# Patient Record
Sex: FEMALE | Race: WHITE | NOT HISPANIC OR LATINO | Employment: OTHER | URBAN - METROPOLITAN AREA
[De-identification: names, ages, dates, MRNs, and addresses within clinical notes are randomized per-mention and may not be internally consistent; named-entity substitution may affect disease eponyms.]

---

## 2017-09-20 ENCOUNTER — TRANSFERRED RECORDS (OUTPATIENT)
Dept: MULTI SPECIALTY CLINIC | Facility: CLINIC | Age: 52
End: 2017-09-20

## 2018-01-31 ENCOUNTER — DOCUMENTATION ONLY (OUTPATIENT)
Dept: FAMILY MEDICINE | Facility: OTHER | Age: 53
End: 2018-01-31

## 2018-01-31 RX ORDER — TRIAMTERENE/HYDROCHLOROTHIAZID 37.5-25 MG
1 TABLET ORAL EVERY MORNING
COMMUNITY
Start: 2013-10-30 | End: 2018-10-22

## 2018-01-31 RX ORDER — MEDROXYPROGESTERONE ACETATE 10 MG
10 TABLET ORAL DAILY
COMMUNITY
Start: 2013-10-30 | End: 2018-09-17

## 2018-01-31 RX ORDER — FOLIC ACID 1 MG/1
2 TABLET ORAL DAILY
COMMUNITY
Start: 2013-10-30

## 2018-01-31 RX ORDER — PANTOPRAZOLE SODIUM 40 MG/1
40 TABLET, DELAYED RELEASE ORAL DAILY
COMMUNITY
Start: 2013-10-30 | End: 2018-10-22

## 2018-01-31 RX ORDER — POTASSIUM CHLORIDE 1500 MG/1
20 TABLET, EXTENDED RELEASE ORAL 2 TIMES DAILY WITH MEALS
COMMUNITY
Start: 2013-10-30 | End: 2018-09-17

## 2018-01-31 RX ORDER — TRAMADOL HYDROCHLORIDE 50 MG/1
50 TABLET ORAL EVERY 6 HOURS PRN
COMMUNITY
Start: 2013-10-30 | End: 2018-09-17

## 2018-01-31 RX ORDER — HYDROXYCHLOROQUINE SULFATE 200 MG/1
400 TABLET, FILM COATED ORAL DAILY
COMMUNITY
Start: 2013-10-30 | End: 2023-02-01

## 2018-09-17 ENCOUNTER — HOSPITAL ENCOUNTER (EMERGENCY)
Facility: OTHER | Age: 53
Discharge: HOME | End: 2018-09-17
Attending: EMERGENCY MEDICINE | Admitting: EMERGENCY MEDICINE
Payer: COMMERCIAL

## 2018-09-17 ENCOUNTER — APPOINTMENT (OUTPATIENT)
Dept: CT IMAGING | Facility: OTHER | Age: 53
End: 2018-09-17
Attending: EMERGENCY MEDICINE
Payer: COMMERCIAL

## 2018-09-17 ENCOUNTER — APPOINTMENT (OUTPATIENT)
Dept: GENERAL RADIOLOGY | Facility: OTHER | Age: 53
End: 2018-09-17
Attending: EMERGENCY MEDICINE
Payer: COMMERCIAL

## 2018-09-17 VITALS
TEMPERATURE: 99.5 F | OXYGEN SATURATION: 84 % | RESPIRATION RATE: 8 BRPM | SYSTOLIC BLOOD PRESSURE: 112 MMHG | HEART RATE: 103 BPM | DIASTOLIC BLOOD PRESSURE: 83 MMHG | HEIGHT: 70 IN | WEIGHT: 280 LBS | BODY MASS INDEX: 40.09 KG/M2

## 2018-09-17 DIAGNOSIS — I80.02 SUPERFICIAL PHLEBITIS OF LEFT LEG: ICD-10-CM

## 2018-09-17 DIAGNOSIS — R07.9 ACUTE CHEST PAIN: Primary | ICD-10-CM

## 2018-09-17 LAB
ALBUMIN SERPL-MCNC: 4 G/DL (ref 3.5–5.7)
ALP SERPL-CCNC: 51 U/L (ref 34–104)
ALT SERPL W P-5'-P-CCNC: 16 U/L (ref 7–52)
ANION GAP SERPL CALCULATED.3IONS-SCNC: 6 MMOL/L (ref 3–14)
AST SERPL W P-5'-P-CCNC: 16 U/L (ref 13–39)
BASOPHILS # BLD AUTO: 0.1 10E9/L (ref 0–0.2)
BASOPHILS NFR BLD AUTO: 1 %
BILIRUB SERPL-MCNC: 0.6 MG/DL (ref 0.3–1)
BUN SERPL-MCNC: 18 MG/DL (ref 7–25)
CALCIUM SERPL-MCNC: 9.3 MG/DL (ref 8.6–10.3)
CHLORIDE SERPL-SCNC: 102 MMOL/L (ref 98–107)
CO2 SERPL-SCNC: 28 MMOL/L (ref 21–31)
CREAT SERPL-MCNC: 0.95 MG/DL (ref 0.6–1.2)
CRP SERPL-MCNC: 0.1 MG/L
D DIMER PPP DDU-MCNC: 884 NG/ML D-DU (ref 0–230)
DIFFERENTIAL METHOD BLD: ABNORMAL
EOSINOPHIL # BLD AUTO: 0 10E9/L (ref 0–0.7)
EOSINOPHIL NFR BLD AUTO: 0.2 %
ERYTHROCYTE [DISTWIDTH] IN BLOOD BY AUTOMATED COUNT: 15.9 % (ref 10–15)
ERYTHROCYTE [SEDIMENTATION RATE] IN BLOOD BY WESTERGREN METHOD: 12 MM/H (ref 1–15)
GFR SERPL CREATININE-BSD FRML MDRD: 61 ML/MIN/1.7M2
GLUCOSE SERPL-MCNC: 102 MG/DL (ref 70–105)
HCT VFR BLD AUTO: 40.6 % (ref 35–47)
HGB BLD-MCNC: 13.5 G/DL (ref 11.7–15.7)
IMM GRANULOCYTES # BLD: 0 10E9/L (ref 0–0.4)
IMM GRANULOCYTES NFR BLD: 0.2 %
LACTATE SERPL-SCNC: 1.2 MMOL/L (ref 0.5–2.2)
LIPASE SERPL-CCNC: 20 U/L (ref 11–82)
LYMPHOCYTES # BLD AUTO: 1.1 10E9/L (ref 0.8–5.3)
LYMPHOCYTES NFR BLD AUTO: 22 %
MCH RBC QN AUTO: 27.8 PG (ref 26.5–33)
MCHC RBC AUTO-ENTMCNC: 33.3 G/DL (ref 31.5–36.5)
MCV RBC AUTO: 84 FL (ref 78–100)
MONOCYTES # BLD AUTO: 0.5 10E9/L (ref 0–1.3)
MONOCYTES NFR BLD AUTO: 10.4 %
NEUTROPHILS # BLD AUTO: 3.4 10E9/L (ref 1.6–8.3)
NEUTROPHILS NFR BLD AUTO: 66.2 %
PLATELET # BLD AUTO: 216 10E9/L (ref 150–450)
POTASSIUM SERPL-SCNC: 4.1 MMOL/L (ref 3.5–5.1)
PROT SERPL-MCNC: 7 G/DL (ref 6.4–8.9)
RBC # BLD AUTO: 4.85 10E12/L (ref 3.8–5.2)
SODIUM SERPL-SCNC: 136 MMOL/L (ref 134–144)
TROPONIN I SERPL-MCNC: <0.03 UG/L (ref 0–0.03)
TSH SERPL DL<=0.05 MIU/L-ACNC: 2.1 IU/ML (ref 0.34–5.6)
WBC # BLD AUTO: 5.1 10E9/L (ref 4–11)

## 2018-09-17 PROCEDURE — 84443 ASSAY THYROID STIM HORMONE: CPT | Performed by: EMERGENCY MEDICINE

## 2018-09-17 PROCEDURE — 25500064 ZZH RX 255 OP 636: Performed by: EMERGENCY MEDICINE

## 2018-09-17 PROCEDURE — 99284 EMERGENCY DEPT VISIT MOD MDM: CPT | Mod: Z6 | Performed by: EMERGENCY MEDICINE

## 2018-09-17 PROCEDURE — 25000132 ZZH RX MED GY IP 250 OP 250 PS 637: Performed by: EMERGENCY MEDICINE

## 2018-09-17 PROCEDURE — 71046 X-RAY EXAM CHEST 2 VIEWS: CPT

## 2018-09-17 PROCEDURE — 36415 COLL VENOUS BLD VENIPUNCTURE: CPT | Performed by: EMERGENCY MEDICINE

## 2018-09-17 PROCEDURE — 71260 CT THORAX DX C+: CPT

## 2018-09-17 PROCEDURE — 85379 FIBRIN DEGRADATION QUANT: CPT | Performed by: EMERGENCY MEDICINE

## 2018-09-17 PROCEDURE — 86140 C-REACTIVE PROTEIN: CPT | Performed by: EMERGENCY MEDICINE

## 2018-09-17 PROCEDURE — 93010 ELECTROCARDIOGRAM REPORT: CPT | Performed by: INTERNAL MEDICINE

## 2018-09-17 PROCEDURE — 93005 ELECTROCARDIOGRAM TRACING: CPT | Performed by: EMERGENCY MEDICINE

## 2018-09-17 PROCEDURE — 85652 RBC SED RATE AUTOMATED: CPT | Performed by: EMERGENCY MEDICINE

## 2018-09-17 PROCEDURE — 84484 ASSAY OF TROPONIN QUANT: CPT | Performed by: EMERGENCY MEDICINE

## 2018-09-17 PROCEDURE — 80053 COMPREHEN METABOLIC PANEL: CPT | Performed by: EMERGENCY MEDICINE

## 2018-09-17 PROCEDURE — 83605 ASSAY OF LACTIC ACID: CPT | Performed by: EMERGENCY MEDICINE

## 2018-09-17 PROCEDURE — 83690 ASSAY OF LIPASE: CPT | Performed by: EMERGENCY MEDICINE

## 2018-09-17 PROCEDURE — 85025 COMPLETE CBC W/AUTO DIFF WBC: CPT | Performed by: EMERGENCY MEDICINE

## 2018-09-17 PROCEDURE — 99285 EMERGENCY DEPT VISIT HI MDM: CPT | Mod: 25 | Performed by: EMERGENCY MEDICINE

## 2018-09-17 RX ORDER — ASPIRIN 325 MG/1
325 TABLET, FILM COATED ORAL DAILY
COMMUNITY
End: 2019-06-18

## 2018-09-17 RX ORDER — IODIXANOL 320 MG/ML
100 INJECTION, SOLUTION INTRAVASCULAR ONCE
Status: COMPLETED | OUTPATIENT
Start: 2018-09-17 | End: 2018-09-17

## 2018-09-17 RX ORDER — ASPIRIN 81 MG/1
162 TABLET, CHEWABLE ORAL ONCE
Status: COMPLETED | OUTPATIENT
Start: 2018-09-17 | End: 2018-09-17

## 2018-09-17 RX ADMIN — ASPIRIN 81 MG 162 MG: 81 TABLET ORAL at 11:58

## 2018-09-17 RX ADMIN — IODIXANOL 100 ML: 320 INJECTION, SOLUTION INTRAVASCULAR at 14:03

## 2018-09-17 NOTE — ED AVS SNAPSHOT
"    [facility]: [phone]                                              INTERAGENCY TRANSFER FORM - LAB / IMAGING / EKG / EMG RESULTS   2018                    Hospital Admission Date: 2018  HUMBERTO MCBRIDE   : 1965  Sex: Female        Attending Provider: Alexys Byrd MD     Allergies:  No Known Allergies    Infection:  None   Service:  EMERGENCY ME    Ht:  1.778 m (5' 10\")   Wt:  127 kg (280 lb)   Admission Wt:  127 kg (280 lb)    BMI:  40.18 kg/m 2   BSA:  2.5 m 2            Patient PCP Information     None on File         Lab Results - 3 Days      Erythrocyte sedimentation rate auto [[id number]]  Resulted: 18 1328, Result status: Final result    Ordering provider: Alexys Byrd MD  18 1148 Resulting lab: [facility]    Specimen Information    Type Source Collected On   Blood  18 1155          Components       Value Reference Range Flag Lab   Sed Rate 12 1 - 15 mm/h  [facility]            Thyrotropin GH [[id number]]  Resulted: 18 1255, Result status: Final result    Ordering provider: Alexys Byrd MD  18 1148 Resulting lab: [facility]    Specimen Information    Type Source Collected On   Blood  18 1155          Components       Value Reference Range Flag Lab   Thyrotropin 2.10 0.34 - 5.60 IU/mL  [facility]            Lipase [[id number]]  Resulted: 18 1228, Result status: Final result    Ordering provider: Alexys Byrd MD  18 1148 Resulting lab: [facility]    Specimen Information    Type Source Collected On   Blood  18 1155          Components       Value Reference Range Flag Lab   Lipase 20 11 - 82 U/L  [facility]            CRP inflammation [[id number]]  Resulted: 18 1228, Result status: Final result    Ordering provider: Alexys Byrd MD  18 1148 Resulting lab: [facility]    Specimen Information  "   Type Source Collected On   Blood  09/17/18 1155          Components       Value Reference Range Flag Lab   CRP Inflammation 0.1 <0.5 mg/L  [facility]            Comprehensive metabolic panel [[id number]]  Resulted: 09/17/18 1228, Result status: Final result    Ordering provider: Alexys Byrd MD  09/17/18 1148 Resulting lab: [facility]    Specimen Information    Type Source Collected On   Blood  09/17/18 1155          Components       Value Reference Range Flag Lab   Sodium 136 134 - 144 mmol/L  [facility]   Potassium 4.1 3.5 - 5.1 mmol/L  [facility]   Chloride 102 98 - 107 mmol/L  [facility]   Carbon Dioxide 28 21 - 31 mmol/L  [facility]   Anion Gap 6 3 - 14 mmol/L  [facility]   Glucose 102 70 - 105 mg/dL  [facility]   Urea Nitrogen 18 7 - 25 mg/dL  [facility]   Creatinine 0.95 0.60 - 1.20 mg/dL  [facility]   GFR Estimate 61 >60 mL/min/1.7m2  [facility]   GFR Estimate If Black 74 >60 mL/min/1.7m2  [facility]   Calcium 9.3 8.6 - 10.3 mg/dL  [facility]   Bilirubin Total 0.6 0.3 - 1.0 mg/dL  [facility]   Albumin 4.0 3.5 - 5.7 g/dL  [facility]   Protein Total 7.0 6.4 - 8.9 g/dL  [facility]   Alkaline Phosphatase 51 34 - 104 U/L  [facility]   ALT 16 7 - 52 U/L  [facility]   AST 16 13 - 39 U/L  [facility]            Troponin I [[id number]]  Resulted: 09/17/18 1224, Result status: Final result    Ordering provider: Alexys Byrd MD  09/17/18 1148 Resulting lab: [facility]    Specimen Information    Type Source Collected On   Blood  09/17/18 1155          Components       Value Reference Range Flag Lab   Troponin I ES <0.030 0.000 - 0.034 ug/L  [facility]            Lactic acid [[id number]]  Resulted: 09/17/18 1222, Result status: Final result    Ordering provider: Alexys Byrd MD  09/17/18 1148 Resulting lab: [facility]    Specimen Information    Type Source Collected On   Blood  09/17/18 1155          Components       Value Reference Range Flag  Lab   Lactic Acid 1.2 0.5 - 2.2 mmol/L  [facility]            D-Dimer ([facility]) [[id number]] (Abnormal)  Resulted: 09/17/18 1213, Result status: Final result    Ordering provider: Alexys Byrd MD  09/17/18 1148 Resulting lab: [facility]    Specimen Information    Type Source Collected On   Blood  09/17/18 1155          Components       Value Reference Range Flag Lab   D-Dimer ng/mL 884 0 - 230 ng/ml D-DU H [facility]            CBC with platelets differential [[id number]] (Abnormal)  Resulted: 09/17/18 1207, Result status: Final result    Ordering provider: Alexys Byrd MD  09/17/18 1148 Resulting lab: [facility]    Specimen Information    Type Source Collected On   Blood  09/17/18 1155          Components       Value Reference Range Flag Lab   WBC 5.1 4.0 - 11.0 10e9/L  [facility]   RBC Count 4.85 3.8 - 5.2 10e12/L  [facility]   Hemoglobin 13.5 11.7 - 15.7 g/dL  [facility]   Hematocrit 40.6 35.0 - 47.0 %  [facility]   MCV 84 78 - 100 fl  [facility]   MCH 27.8 26.5 - 33.0 pg  [facility]   MCHC 33.3 31.5 - 36.5 g/dL  [facility]   RDW 15.9 10.0 - 15.0 % H [facility]   Platelet Count 216 150 - 450 10e9/L  [facility]   Diff Method Automated Method   [facility]   % Neutrophils 66.2 %  [facility]   % Lymphocytes 22.0 %  [facility]   % Monocytes 10.4 %  [facility]   % Eosinophils 0.2 %  [facility]   % Basophils 1.0 %  [facility]   % Immature Granulocytes 0.2 %  [facility]   Absolute Neutrophil 3.4 1.6 - 8.3 10e9/L  [facility]   Absolute Lymphocytes 1.1 0.8 - 5.3 10e9/L  [facility]   Absolute Monocytes 0.5 0.0 - 1.3 10e9/L  [facility]   Absolute Eosinophils 0.0 0.0 - 0.7 10e9/L  [facility]   Absolute Basophils 0.1 0.0 - 0.2 10e9/L  [facility]   Abs Immature Granulocytes 0.0 0 - 0.4 10e9/L  [facility]            Testing Performed By     Lab - Abbreviation Name Director Address Valid Date Range    1743 - [facility] Unknown [address]  [facility] [address] 08/07/15 0948 - Present            Unresulted Labs     None         Imaging Results - 3 Days      CT Chest Pulmonary Embolism w Contrast [[id number]]  Resulted: 09/17/18 1433, Result status: Final result    Ordering provider: Alexys Byrd MD  09/17/18 1257 Resulted by: Shaw Conn MD    Performed: 09/17/18 1352 - 09/17/18 1409 Resulting lab: RADIOLOGY RESULTS    Narrative:       CT CHEST PULMONARY EMBOLISM W CONTRAST    HISTORY: 53 years Female Dyspnea;     TECHNIQUE: Axial CT imaging of the chest was performed With  intravenous contrast. Coronal and sagittal reconstructions were  obtained.    COMPARISON: None    FINDINGS:  There is no evidence of pulmonary embolism. There is no evidence of  thoracic aortic aneurysm or dissection.  There is no mediastinal or hilar or axillary lymphadenopathy.    The lungs are clear. No consolidating airspace opacities are present.  No concerning pulmonary nodules or masses are present         No concerning osseous lesions are identified      Impression:       IMPRESSION: Clear chest. There is no evidence of pulmonary embolism.    SHAW CONN MD      XR Chest 2 Views [[id number]]  Resulted: 09/17/18 1216, Result status: Final result    Ordering provider: Alexys Byrd MD  09/17/18 1148 Resulted by: Shaw Conn MD    Performed: 09/17/18 1201 - 09/17/18 1211 Resulting lab: RADIOLOGY RESULTS    Narrative:       XR CHEST 2 VW    HISTORY: 53 years Female Chest pressure since yesterday morning;     COMPARISON: None    TECHNIQUE: 2 views of the chest were obtained.    FINDINGS: Two views of the chest were obtained. Heart size and  pulmonary vascularity are within normal limits, lungs are clear both  views. No consolidating air space opacities are present.          Impression:       IMPRESSION: Clear chest.    SHAW CONN MD      Testing Performed By     Lab - Abbreviation Name Director Address Valid Date Range    104 -  [facility] RADIOLOGY RESULTS Unknown Unknown 02/16/05 1553 - Present            Encounter-Level Documents:     There are no encounter-level documents.      Order-Level Documents:     There are no order-level documents.

## 2018-09-17 NOTE — DISCHARGE INSTRUCTIONS
*CHEST PAIN, NONCARDIAC    Based on your visit today, the exact cause of your chest pain is not certain. Your condition does not seem serious and your pain does not appear to be coming from your heart. However, sometimes the signs of a serious problem take more time to appear. Therefore, please watch for the warning signs listed below.  HOME CARE:  1. Rest today and avoid strenuous activity.  2. Take any prescribed medicine as directed.  FOLLOW UP with your doctor in 1-3 days.   GET PROMPT MEDICAL ATTENTION if any of the following occur:    A change in the type of pain: if it feels different, becomes more severe, lasts longer, or begins to spread into your shoulder, arm, neck, jaw or back    Shortness of breath or increased pain with breathing    Cough with blood or dark colored sputum (phlegm)    Weakness, dizziness, or fainting    Fever over 101  F (38.3  C)    Swelling, pain or redness in one leg    0392-9946 [employer] [address] [place] [address]. All rights reserved. This information is not intended as a substitute for professional medical care. Always follow your healthcare professional's instructions.  This information has been modified by your health care provider with permission from the publisher.

## 2018-09-17 NOTE — ED AVS SNAPSHOT
[facility]    [address]    [place] [address]    Phone:  [phone]    Fax:  [phone]                                       Phyllis Washington   MRN: [id number]    Department:  [facility]   Date of Visit:  9/17/2018           Patient Information     Date Of Birth          1965        Your diagnoses for this visit were:     Acute chest pain     Superficial phlebitis of left leg        You were seen by Alexys Byrd MD.      Follow-up Information     Schedule an appointment as soon as possible for a visit with pcp.    Why:  Re-evaluation, Continuity of care        Discharge Instructions         *CHEST PAIN, NONCARDIAC    Based on your visit today, the exact cause of your chest pain is not certain. Your condition does not seem serious and your pain does not appear to be coming from your heart. However, sometimes the signs of a serious problem take more time to appear. Therefore, please watch for the warning signs listed below.  HOME CARE:  1. Rest today and avoid strenuous activity.  2. Take any prescribed medicine as directed.  FOLLOW UP with your doctor in 1-3 days.   GET PROMPT MEDICAL ATTENTION if any of the following occur:    A change in the type of pain: if it feels different, becomes more severe, lasts longer, or begins to spread into your shoulder, arm, neck, jaw or back    Shortness of breath or increased pain with breathing    Cough with blood or dark colored sputum (phlegm)    Weakness, dizziness, or fainting    Fever over 101  F (38.3  C)    Swelling, pain or redness in one leg    1557-6954 [employer] [address] [place] [address]. All rights reserved. This information is not intended as a substitute for professional medical care. Always follow your healthcare professional's instructions.  This information has been modified by your health care provider with permission from the publisher.      24 Hour  Appointment Hotline     To schedule an appointment at [facility], please call [phone]. If you don't have a family doctor or clinic, we will help you find one. [facility] are conveniently located to serve the needs of you and your family.           Review of your medicines      Our records show that you are taking the medicines listed below. If these are incorrect, please call your family doctor or clinic.        Dose / Directions Last dose taken    aspirin - buffered 325 MG Tabs tablet   Commonly known as:  ASCRIPTIN   Dose:  325 mg        Take 325 mg by mouth daily   Refills:  0        folic acid 1 MG tablet   Commonly known as:  FOLVITE   Dose:  1 mg        Take 1 mg by mouth daily   Refills:  0        HUMIRA PEN 40 MG/0.8ML pen kit   Generic drug:  adalimumab        INJECT ONE PEN (40 MG) SUBCUTANEOUSLY EVERY 14 DAYS . REFRIGERATE DO NOT FREEZE.   Refills:  0        hydroxychloroquine 200 MG tablet   Commonly known as:  PLAQUENIL        Refills:  0        IRON PO        Refills:  0        methotrexate 2.5 MG tablet CHEMO   Dose:  20 mg        Take 20 mg by mouth once a week   Refills:  0        pantoprazole 40 MG EC tablet   Commonly known as:  PROTONIX   Dose:  40 mg        Take 40 mg by mouth daily   Refills:  0        triamterene-hydrochlorothiazide 37.5-25 MG per tablet   Commonly known as:  MAXZIDE-25   Dose:  1 tablet        Take 1 tablet by mouth every morning   Refills:  0                Procedures and tests performed during your visit     CBC with platelets differential    CRP inflammation    CT Chest Pulmonary Embolism w Contrast    Comprehensive metabolic panel    D-Dimer ([facility])    EKG 12-lead, tracing only    Erythrocyte sedimentation rate auto    Lactic acid    Lipase    Thyrotropin GH    Troponin I    XR Chest 2 Views      Orders Needing Specimen Collection     None      Pending Results     No orders found from 9/15/2018 to 9/18/2018.            Pending Culture Results     No orders  "found from 9/15/2018 to 2018.            Pending Results Instructions     If you had any lab results that were not finalized at the time of your Discharge, you can call the ED Lab Result RN at [phone]. You will be contacted by this team for any positive Lab results or changes in treatment. The nurses are available 7 days a week from 10A to 6:30P.  You can leave a message 24 hours per day and they will return your call.        Thank you for choosing [facility]       Thank you for choosing [facility] for your care. Our goal is always to provide you with excellent care. Hearing back from our patients is one way we can continue to improve our services. Please take a few minutes to complete the written survey that you may receive in the mail after you visit with us. Thank you!        [employer] Information     [employer] lets you send messages to your doctor, view your test results, renew your prescriptions, schedule appointments and more. To sign up, go to [email or web address] . Click on \"Log in\" on the left side of the screen, which will take you to the Welcome page. Then click on \"Sign up Now\" on the right side of the page.     You will be asked to enter the access code listed below, as well as some personal information. Please follow the directions to create your username and password.     Your access code is: [email or web address]  Expires: 2018  2:56 PM     Your access code will  in 90 days. If you need help or a new code, please call your [facility] clinic or [phone].        Care EveryWhere ID     This is your Care EveryWhere ID. This could be used by other organizations to access your [facility] medical records  [id number]        Equal Access to Services     LESTER TATE : jhony Baez qaybta kaalmada adeegyada, waxay idiin hayaan adeeg kharash la'aan ah. So [facility] [phone].    ATENCIÓN: Si habla español, tiene a almonte disposición servicios gratuitos de asistencia " chavez Valentinhuang al [phone].    We comply with applicable federal civil rights laws and [place] laws. We do not discriminate on the basis of race, color, national origin, age, disability, sex, sexual orientation, or gender identity.            After Visit Summary       This is your record. Keep this with you and show to your community pharmacist(s) and doctor(s) at your next visit.

## 2018-09-17 NOTE — ED PROVIDER NOTES
History   No chief complaint on file.    CLARA Rayurer is a 53 year old female who presented to the emergency department with a 2 day history of chest pressure, upper abdominal pain, nausea but no vomiting.  Patient woke up yesterday with upper abdominal pain and chest pressure.  The symptoms have persisted over the last 24 hours.  They have neither improved nor receded.  She takes baby aspirin every day because she has history of recurrent superficial thrombophlebitis.  She had tried using Xarelto before but she started having a.  And this was discontinued.  She denies overt chest pain, shortness of breath, fever, diarrhea or vomiting.  She is worried, very emotional anteriorly and teary.  She suspects that she could be having a clot in her lungs.    Problem List:    Patient Active Problem List    Diagnosis Date Noted     Enthesopathy of hip region 01/14/2011     Priority: Medium     Hypercholesterolemia 12/14/2009     Priority: Medium     Allergic rhinitis 11/02/2004     Priority: Medium     Asthma 09/22/2003     Priority: Medium     Esophageal reflux 09/22/2003     Priority: Medium     Essential hypertension, benign 05/14/2001     Priority: Medium     Obesity 05/14/2001     Priority: Medium        Past Medical History:    Past Medical History:   Diagnosis Date     Personal history of other medical treatment (CODE)        Past Surgical History:    No past surgical history on file.    Family History:    Family History   Problem Relation Age of Onset     Breast Cancer Mother 46     Cancer-breast,now 68     Hypertension Father      Hypertension,67     Diabetes Brother 16     Diabetes,now 41     Other - See Comments Maternal Grandmother      Stroke,85     Other - See Comments Paternal Grandmother      Alzheimer's 73       Social History:  Marital Status:   [2]  Social History   Substance Use Topics     Smoking status: Never Smoker     Smokeless tobacco: Never Used     Alcohol use Not on file     "    Medications:      adalimumab (HUMIRA PEN) 40 MG/0.8ML pen kit   aspirin - buffered (ASCRIPTIN) 325 MG TABS tablet   folic acid (FOLVITE) 1 MG tablet   pantoprazole (PROTONIX) 40 MG EC tablet   triamterene-hydrochlorothiazide (MAXZIDE-25) 37.5-25 MG per tablet   hydroxychloroquine (PLAQUENIL) 200 MG tablet   IRON PO   methotrexate 2.5 MG tablet CHEMO         Review of Systems   All other systems reviewed and are negative.      Physical Exam   BP: 122/85  Pulse: 103  Heart Rate: 104  Temp: 99.5  F (37.5  C)  Resp: 16  Height: 177.8 cm (5' 10\")  Weight: 127 kg (280 lb)  SpO2: 98 %      Physical Exam   Constitutional: She is oriented to person, place, and time. She appears well-developed and well-nourished. No distress.   HENT:   Head: Normocephalic and atraumatic.   Mouth/Throat: Oropharynx is clear and moist.   Eyes: EOM are normal. Pupils are equal, round, and reactive to light.   Cardiovascular: Regular rhythm, normal heart sounds and intact distal pulses.    Pulmonary/Chest: Effort normal and breath sounds normal. No respiratory distress. She has no wheezes.   Abdominal: Soft. Bowel sounds are normal. She exhibits no distension. There is no tenderness.   Musculoskeletal:        Legs:  Neurological: She is alert and oriented to person, place, and time.   Skin: She is not diaphoretic.   Nursing note and vitals reviewed.      ED Course   Patient evaluated and laboratory tests ordered.  Aspirin 160 mg oral given.    ED Course     Procedures    EKG: Sinus tachycardia at 102 bpm.    Results for orders placed or performed during the hospital encounter of 09/17/18 (from the past 24 hour(s))   CBC with platelets differential   Result Value Ref Range    WBC 5.1 4.0 - 11.0 10e9/L    RBC Count 4.85 3.8 - 5.2 10e12/L    Hemoglobin 13.5 11.7 - 15.7 g/dL    Hematocrit 40.6 35.0 - 47.0 %    MCV 84 78 - 100 fl    MCH 27.8 26.5 - 33.0 pg    MCHC 33.3 31.5 - 36.5 g/dL    RDW 15.9 (H) 10.0 - 15.0 %    Platelet Count 216 150 - 450 " 10e9/L    Diff Method Automated Method     % Neutrophils 66.2 %    % Lymphocytes 22.0 %    % Monocytes 10.4 %    % Eosinophils 0.2 %    % Basophils 1.0 %    % Immature Granulocytes 0.2 %    Absolute Neutrophil 3.4 1.6 - 8.3 10e9/L    Absolute Lymphocytes 1.1 0.8 - 5.3 10e9/L    Absolute Monocytes 0.5 0.0 - 1.3 10e9/L    Absolute Eosinophils 0.0 0.0 - 0.7 10e9/L    Absolute Basophils 0.1 0.0 - 0.2 10e9/L    Abs Immature Granulocytes 0.0 0 - 0.4 10e9/L   D-Dimer ([facility])   Result Value Ref Range    D-Dimer ng/mL 884 (H) 0 - 230 ng/ml D-DU   Comprehensive metabolic panel   Result Value Ref Range    Sodium 136 134 - 144 mmol/L    Potassium 4.1 3.5 - 5.1 mmol/L    Chloride 102 98 - 107 mmol/L    Carbon Dioxide 28 21 - 31 mmol/L    Anion Gap 6 3 - 14 mmol/L    Glucose 102 70 - 105 mg/dL    Urea Nitrogen 18 7 - 25 mg/dL    Creatinine 0.95 0.60 - 1.20 mg/dL    GFR Estimate 61 >60 mL/min/1.7m2    GFR Estimate If Black 74 >60 mL/min/1.7m2    Calcium 9.3 8.6 - 10.3 mg/dL    Bilirubin Total 0.6 0.3 - 1.0 mg/dL    Albumin 4.0 3.5 - 5.7 g/dL    Protein Total 7.0 6.4 - 8.9 g/dL    Alkaline Phosphatase 51 34 - 104 U/L    ALT 16 7 - 52 U/L    AST 16 13 - 39 U/L   Thyrotropin GH   Result Value Ref Range    Thyrotropin 2.10 0.34 - 5.60 IU/mL   Lactic acid   Result Value Ref Range    Lactic Acid 1.2 0.5 - 2.2 mmol/L   Lipase   Result Value Ref Range    Lipase 20 11 - 82 U/L   Erythrocyte sedimentation rate auto   Result Value Ref Range    Sed Rate 12 1 - 15 mm/h   CRP inflammation   Result Value Ref Range    CRP Inflammation 0.1 <0.5 mg/L   Troponin I   Result Value Ref Range    Troponin I ES <0.030 0.000 - 0.034 ug/L   XR Chest 2 Views    Narrative    XR CHEST 2 VW    HISTORY: 53 years Female Chest pressure since yesterday morning;     COMPARISON: None    TECHNIQUE: 2 views of the chest were obtained.    FINDINGS: Two views of the chest were obtained. Heart size and  pulmonary vascularity are within normal limits, lungs are clear  both  views. No consolidating air space opacities are present.          Impression    IMPRESSION: Clear chest.    MAHAD CONN MD   CT Chest Pulmonary Embolism w Contrast    Narrative    CT CHEST PULMONARY EMBOLISM W CONTRAST    HISTORY: 53 years Female Dyspnea;     TECHNIQUE: Axial CT imaging of the chest was performed With  intravenous contrast. Coronal and sagittal reconstructions were  obtained.    COMPARISON: None    FINDINGS:  There is no evidence of pulmonary embolism. There is no evidence of  thoracic aortic aneurysm or dissection.  There is no mediastinal or hilar or axillary lymphadenopathy.    The lungs are clear. No consolidating airspace opacities are present.  No concerning pulmonary nodules or masses are present         No concerning osseous lesions are identified      Impression    IMPRESSION: Clear chest. There is no evidence of pulmonary embolism.    MAHAD CONN MD       Medications   aspirin chewable tablet 162 mg (162 mg Oral Given 9/17/18 1158)   iodixanol (VISIPAQUE 320) injection 100 mL (100 mLs Intravenous Given 9/17/18 1403)       Assessments & Plan (with Medical Decision Making)   Acute chest pain with superficial thrombophlebitis of the left thigh: Patient presented to the ED with complaints of chest pressure and epigastric abdominal pain since yesterday morning.  Shows also feeling some subjective shortness of breath.  Patient is very worried that she could be having a clot in her lungs.  She has had a history of recurrent superficial thrombophlebitis and currently has one on the left thigh.  She is on daily baby aspirin.  Patient was evaluated in the ED and EKG done showed sinus tachycardia.  At presentation patient was very emotional anteriorly.  Laboratory test done showed normal troponin, CRP, ESR, lactic acid, TSH, CBC and CMP.  D-dimer was elevated to over 800 ng/ml and CT pulmonary angiogram ruled out acute pulmonary embolism.  These results were discussed with the  patient and she is happy that she did not have a PE.  Patient will try over-the-counter Zantac for the epigastric abdominal discomfort.  Subsequently discharged from the ED.    I have reviewed the nursing notes.    I have reviewed the findings, diagnosis, plan and need for follow up with the patient.    Discharge Medication List as of 9/17/2018  2:56 PM          Final diagnoses:   Acute chest pain   Superficial phlebitis of left leg       9/17/2018   [facility]  09/17/18 7714

## 2018-09-17 NOTE — ED TRIAGE NOTES
"ED Nursing Triage Note (General)   ________________________________    Phyllis VILLASENOR Neururer is a 53 year old Female that presents to triage private car  With history of nausea, lack of appetite, mid sternal chest pressure, dyspnea,  epigastric pain since Saturday.  She has a history of varicose veins with thrombus and has one now in her left thigh.  reported by patient .  /85  Pulse 103  Temp 99.5  F (37.5  C) (Tympanic)  Resp 16  Ht 1.778 m (5' 10\")  Wt 127 kg (280 lb)  SpO2 98%  BMI 40.18 kg/m2t  Patient appears alert  and oriented, in moderate distress., and cooperative, pleasant and anxious behavior.  GCS Total = 15  Airway: intact  Breathing noted as Normal and nonlabored.  Lung sounds clear throughout.  Circulation :ST low 100's, stable BP with   Skin normal, warm, dry  Action taken:  Triage to critical care immediately in bay 5      PRE HOSPITAL PRIOR LIVING SITUATION Spouse  "

## 2018-09-17 NOTE — ED AVS SNAPSHOT
[facility]    [address]    [place] [address]    Phone:  [phone]    Fax:  [phone]                                       Phyllis Washington   MRN: [id number]    Department:  [facility]   Date of Visit:  9/17/2018           After Visit Summary Signature Page     I have received my discharge instructions, and my questions have been answered. I have discussed any challenges I see with this plan with the nurse or doctor.    ..........................................................................................................................................  Patient/Patient Representative Signature      ..........................................................................................................................................  Patient Representative Print Name and Relationship to Patient    ..................................................               ................................................  Date                                   Time    ..........................................................................................................................................  Reviewed by Signature/Title    ...................................................              ..............................................  Date                                               Time          22EPIC Rev 08/18

## 2018-09-26 ENCOUNTER — HEALTH MAINTENANCE LETTER (OUTPATIENT)
Age: 53
End: 2018-09-26

## 2018-10-19 PROBLEM — K63.5 COLON POLYP: Status: ACTIVE | Noted: 2017-09-20

## 2018-10-22 ENCOUNTER — OFFICE VISIT (OUTPATIENT)
Dept: FAMILY MEDICINE | Facility: OTHER | Age: 53
End: 2018-10-22
Attending: FAMILY MEDICINE
Payer: COMMERCIAL

## 2018-10-22 VITALS
DIASTOLIC BLOOD PRESSURE: 80 MMHG | BODY MASS INDEX: 39.51 KG/M2 | HEIGHT: 70 IN | WEIGHT: 276 LBS | SYSTOLIC BLOOD PRESSURE: 118 MMHG | HEART RATE: 72 BPM | TEMPERATURE: 97.1 F | RESPIRATION RATE: 20 BRPM

## 2018-10-22 DIAGNOSIS — K21.9 GASTROESOPHAGEAL REFLUX DISEASE, ESOPHAGITIS PRESENCE NOT SPECIFIED: ICD-10-CM

## 2018-10-22 DIAGNOSIS — Z00.00 HEALTH CARE MAINTENANCE: ICD-10-CM

## 2018-10-22 DIAGNOSIS — Z12.31 ENCOUNTER FOR SCREENING MAMMOGRAM FOR BREAST CANCER: ICD-10-CM

## 2018-10-22 DIAGNOSIS — M05.79 RHEUMATOID ARTHRITIS INVOLVING MULTIPLE SITES WITH POSITIVE RHEUMATOID FACTOR (H): ICD-10-CM

## 2018-10-22 DIAGNOSIS — I10 ESSENTIAL HYPERTENSION, BENIGN: Primary | ICD-10-CM

## 2018-10-22 LAB
ANION GAP SERPL CALCULATED.3IONS-SCNC: 6 MMOL/L (ref 3–14)
BUN SERPL-MCNC: 23 MG/DL (ref 7–25)
CALCIUM SERPL-MCNC: 9.5 MG/DL (ref 8.6–10.3)
CHLORIDE SERPL-SCNC: 100 MMOL/L (ref 98–107)
CO2 SERPL-SCNC: 31 MMOL/L (ref 21–31)
CREAT SERPL-MCNC: 1.31 MG/DL (ref 0.6–1.2)
GFR SERPL CREATININE-BSD FRML MDRD: 42 ML/MIN/1.7M2
GLUCOSE SERPL-MCNC: 101 MG/DL (ref 70–105)
POTASSIUM SERPL-SCNC: 3.6 MMOL/L (ref 3.5–5.1)
SODIUM SERPL-SCNC: 137 MMOL/L (ref 134–144)

## 2018-10-22 PROCEDURE — 36415 COLL VENOUS BLD VENIPUNCTURE: CPT | Performed by: FAMILY MEDICINE

## 2018-10-22 PROCEDURE — 99396 PREV VISIT EST AGE 40-64: CPT | Performed by: FAMILY MEDICINE

## 2018-10-22 PROCEDURE — 80048 BASIC METABOLIC PNL TOTAL CA: CPT | Performed by: FAMILY MEDICINE

## 2018-10-22 RX ORDER — TRIAMTERENE/HYDROCHLOROTHIAZID 37.5-25 MG
1 TABLET ORAL EVERY MORNING
Qty: 90 TABLET | Refills: 3 | Status: SHIPPED | OUTPATIENT
Start: 2018-10-22 | End: 2019-09-26

## 2018-10-22 RX ORDER — PANTOPRAZOLE SODIUM 40 MG/1
40 TABLET, DELAYED RELEASE ORAL DAILY
Qty: 90 TABLET | Refills: 3 | Status: SHIPPED | OUTPATIENT
Start: 2018-10-22 | End: 2019-02-21

## 2018-10-22 ASSESSMENT — ENCOUNTER SYMPTOMS
UNEXPECTED WEIGHT CHANGE: 0
SHORTNESS OF BREATH: 0
JOINT SWELLING: 0
FATIGUE: 0
ABDOMINAL PAIN: 1
ARTHRALGIAS: 0

## 2018-10-22 NOTE — PROGRESS NOTES
"  SUBJECTIVE:   Nursing Notes:   Carin Williamson LPN  10/22/2018 12:53 PM  Unsigned  Chief Complaint   Patient presents with     Physical     establish care        Initial /80 (BP Location: Right arm, Patient Position: Sitting, Cuff Size: Adult Large)  Pulse 72  Temp 97.1  F (36.2  C) (Tympanic)  Resp 20  Ht 5' 10\" (1.778 m)  Wt 276 lb (125.2 kg)  Breastfeeding? No  BMI 39.6 kg/m2 Estimated body mass index is 39.6 kg/(m^2) as calculated from the following:    Height as of this encounter: 5' 10\" (1.778 m).    Weight as of this encounter: 276 lb (125.2 kg).  Medication Reconciliation: complete    Carin Williamson LPN    Phyllis Rayurer is a 53 year old female who presents to clinic today for a physical and to establish care.      Sees Ashley Parkinson for her rheumatoid arthritis.  Had her last flare at the end of July.  Took a taper of prednisone at that time.  This was the first time in a long time that she needed to take prednisone.  She has been on Humira for the past 3 years.  She is also on methotrexate and plaquenil.      Last month had some stomach issues.  Ended up going to the ER.  Had stomach pain and chest pain at the same time.  Has had phlebitis in her varicose veins in the past and had a superficial phlebitis at the same time.  She was evaluated for a PE with CT which was negative.  They had suggested she take zantac as well as the protonix.  She has been on protonix for many years.  Her stomach is better than it had been with taking the additional zantac.  She had her gall bladder checked as well.  Had an abdominal ultrasound, which was normal.      Last mammogram was 11/14/17.  She completes these yearly.  Her mom had breast cancer.    HPI    I personally reviewed medications/allergies/history listed below:    Patient Active Problem List    Diagnosis Date Noted     Health care maintenance 10/22/2018     Priority: Medium     Pap NIL and HPV neg on 8/22/17 ([facility])  DEXA 2015 normal.   "     Colon polyp 09/20/2017     Priority: Medium     Overview:   Colonoscopy 9/20/17 with transverse colon polyp and sigmoid colon polyp (20 cm), both removed       Thrombosis of saphenous vein, left 11/16/2016     Priority: Medium     Disturbance in sleep behavior 07/02/2015     Priority: Medium     Overview:   [facility] Sleep Study: Showed Mild upper airway resistance. No sleep apnea or limb movement disorder.   IMO Update       Family history of diabetes mellitus type II 04/07/2015     Priority: Medium     High risk medication use 09/10/2014     Priority: Medium     Overview:   HCQ, MTX, Humira       Cervical muscle pain 07/25/2013     Priority: Medium     Rheumatoid arthritis involving multiple sites with positive rheumatoid factor (H) 09/28/2012     Priority: Medium     Overview:   RF + 121, CCP neg; onset ~ 2011, Dx 2012  Doxy (9/8/2012- 3/2013)  Nuc Med Scan with symmetric uptake  Plaquenil trial 3/2013- ; MTX 7/2013- ; SSA 10/2014-12/2014; Humira 10/2015-   6/2014: VECTRA 42       Vitamin D deficiency 06/18/2012     Priority: Medium     Overview:   Level of 25 on 5/11/2012       Myalgia 05/11/2012     Priority: Medium     Enthesopathy of hip region 01/14/2011     Priority: Medium     Hypercholesterolemia 12/14/2009     Priority: Medium     Allergic rhinitis 11/02/2004     Priority: Medium     Asthma 09/22/2003     Priority: Medium     Overview:   Updated per 10/1/17 IMO import       Esophageal reflux 09/22/2003     Priority: Medium     Essential hypertension, benign 05/14/2001     Priority: Medium     Obesity 05/14/2001     Priority: Medium     Past Medical History:   Diagnosis Date     Personal history of other medical treatment (CODE)     3 normal childbirths      Past Surgical History:   Procedure Laterality Date     COLONOSCOPY  2017    Dr. Fofana - 2 polyps found.  Told to f/u in 5 years.     Family History   Problem Relation Age of Onset     Hypertension Father      Hypertension,67     Other - See Comments  Maternal Grandmother      Stroke,85     Other - See Comments Paternal Grandmother      Alzheimer's 73     Breast Cancer Mother 46     Cancer-breast,now 68     Cerebrovascular Disease Mother      stroke with hemiparesis 2017     Diabetes Brother 16     Diabetes,now 41     Social History   Substance Use Topics     Smoking status: Never Smoker     Smokeless tobacco: Never Used     Alcohol use Not on file     Social History     Social History Narrative    , 3 adult children;     Homemaker; hobbies - reading, crafts.     Works at [employer] part-time.     Walks 3 miles a day     - Demetrio             Current Outpatient Prescriptions   Medication Sig Dispense Refill     pantoprazole (PROTONIX) 40 MG EC tablet Take 1 tablet (40 mg) by mouth daily 90 tablet 3     triamterene-hydrochlorothiazide (MAXZIDE-25) 37.5-25 MG per tablet Take 1 tablet by mouth every morning 90 tablet 3     adalimumab (HUMIRA PEN) 40 MG/0.8ML pen kit INJECT ONE PEN (40 MG) SUBCUTANEOUSLY EVERY 14 DAYS . REFRIGERATE DO NOT FREEZE.       aspirin - buffered (ASCRIPTIN) 325 MG TABS tablet Take 325 mg by mouth daily       folic acid (FOLVITE) 1 MG tablet Take 1 mg by mouth daily       hydroxychloroquine (PLAQUENIL) 200 MG tablet        IRON PO        methotrexate 2.5 MG tablet CHEMO Take 20 mg by mouth once a week       ranitidine (ZANTAC) 150 MG tablet Take 150 mg by mouth daily  10     [DISCONTINUED] triamterene-hydrochlorothiazide (MAXZIDE-25) 37.5-25 MG per tablet Take 1 tablet by mouth every morning       No Known Allergies    Review of Systems   Constitutional: Negative for fatigue and unexpected weight change.   Respiratory: Negative for shortness of breath.    Cardiovascular: Positive for chest pain. Negative for peripheral edema.   Gastrointestinal: Positive for abdominal pain.   Musculoskeletal: Negative for arthralgias and joint swelling.        OBJECTIVE:     /80 (BP Location: Right arm, Patient Position: Sitting, Cuff Size:  "Adult Large)  Pulse 72  Temp 97.1  F (36.2  C) (Tympanic)  Resp 20  Ht 5' 10\" (1.778 m)  Wt 276 lb (125.2 kg)  Breastfeeding? No  BMI 39.6 kg/m2  Body mass index is 39.6 kg/(m^2).  Physical Exam   Constitutional: She is oriented to person, place, and time. She appears well-developed and well-nourished. No distress.   HENT:   Head: Normocephalic.   Right Ear: Tympanic membrane and external ear normal.   Left Ear: Tympanic membrane and external ear normal.   Nose: Nose normal.   Mouth/Throat: Oropharynx is clear and moist. No oropharyngeal exudate.   Eyes: Conjunctivae are normal. Pupils are equal, round, and reactive to light. Right eye exhibits no discharge. Left eye exhibits no discharge.   Neck: Neck supple. No tracheal deviation present. No thyromegaly present.   Cardiovascular: Normal rate, regular rhythm, S1 normal, S2 normal, normal heart sounds, intact distal pulses and normal pulses.  Exam reveals no gallop, no S3, no S4 and no friction rub.    No murmur heard.  Pulmonary/Chest: Effort normal and breath sounds normal. No respiratory distress. She has no wheezes. She has no rales.   Breast exam:  No masses palpable.  No skin changes, tethering or axillary lymphadenopathy.   Abdominal: Soft. Bowel sounds are normal. She exhibits no distension and no mass. There is no hepatosplenomegaly. There is no tenderness.   Genitourinary: No breast swelling, tenderness or discharge.   Genitourinary Comments: Pelvic/Rectal exams deferred per patient.   Musculoskeletal: Normal range of motion. She exhibits no edema.   Lymphadenopathy:     She has no cervical adenopathy.   Neurological: She is alert and oriented to person, place, and time. She has normal strength and normal reflexes. She exhibits normal muscle tone.   Skin: Skin is warm and dry. No rash noted.   Psychiatric: She has a normal mood and affect. Judgment and thought content normal. Cognition and memory are normal.       PHQ-2 Score:     PHQ-2 ( 1999 " [employer]) 10/22/2018   Q1: Little interest or pleasure in doing things 0   Q2: Feeling down, depressed or hopeless 0   PHQ-2 Score 0       I personally reviewed results withpatient as listed below:   Diagnostic Test Results:  none     ASSESSMENT/PLAN:       ICD-10-CM    1. Essential hypertension, benign I10 triamterene-hydrochlorothiazide (MAXZIDE-25) 37.5-25 MG per tablet     Basic Metabolic Panel     Basic Metabolic Panel   2. Gastroesophageal reflux disease, esophagitis presence not specified K21.9 pantoprazole (PROTONIX) 40 MG EC tablet   3. Rheumatoid arthritis involving multiple sites with positive rheumatoid factor (H) M05.79    4. Encounter for screening mammogram for breast cancer Z12.31 MA Screen Bilateral w/Nathan   5. Health care maintenance Z00.00     Pap NIL and HPV neg on 8/22/17 ([facility])  DEXA 2015 normal.       1.  Maxzide refilled today as above.  Basic Metabolic Profile today as above.   2.  Refilled protonix.  Discussed possibility that symptoms could be from bile reflux from having been on PPI for so many years.  Discussed considering going off of protonix at some point to see if she is able to control symptoms with zantac alone.  Discussed she may experience a temporary increase in reflux symptoms going off of PPI, which would be expected and that we might be able to manage those symptoms if necessary with carafate.  She will consider this at a later date.  protonix was refilled for now.  She doesn't feel she would want to evaluate the chest pain further at this point, but will let me know if she does.  3.  Rheumatoid arthritis is being managed by Dr. Parkinson as above.  4.  Mammogram ordered.  5.  Pap Smear and DEXA are up to date.  Colonoscopy is up to date.  Plans to get a flu shot at her 's employer this week ([employer] [facility]).  Other vaccines are up to date.    Ya Victoria MD  [facility]

## 2018-10-22 NOTE — NURSING NOTE
"Chief Complaint   Patient presents with     Physical     establish care        Initial /80 (BP Location: Right arm, Patient Position: Sitting, Cuff Size: Adult Large)  Pulse 72  Temp 97.1  F (36.2  C) (Tympanic)  Resp 20  Ht 5' 10\" (1.778 m)  Wt 276 lb (125.2 kg)  Breastfeeding? No  BMI 39.6 kg/m2 Estimated body mass index is 39.6 kg/(m^2) as calculated from the following:    Height as of this encounter: 5' 10\" (1.778 m).    Weight as of this encounter: 276 lb (125.2 kg).  Medication Reconciliation: complete    Carin Williamson LPN  "

## 2018-10-22 NOTE — MR AVS SNAPSHOT
After Visit Summary   10/22/2018    Phyllis Washington    MRN: [id number]           Patient Information     Date Of Birth          1965        Visit Information        Provider Department      10/22/2018 12:45 PM Ya Victoria MD [facility]        Today's Diagnoses     Essential hypertension, benign    -  1    Gastroesophageal reflux disease, esophagitis presence not specified        Encounter for screening mammogram for breast cancer           Follow-ups after your visit        Future tests that were ordered for you today     Open Future Orders        Priority Expected Expires Ordered    Basic Metabolic Panel Routine  10/22/2019 10/22/2018    MA Screen Bilateral w/Nathan Routine  10/22/2019 10/22/2018            Who to contact     If you have questions or need follow up information about today's clinic visit or your schedule please contact [facility] AND hospitals directly at [phone].  Normal or non-critical lab and imaging results will be communicated to you by Stocardhart, letter or phone within 4 business days after the clinic has received the results. If you do not hear from us within 7 days, please contact the clinic through Stocardhart or phone. If you have a critical or abnormal lab result, we will notify you by phone as soon as possible.  Submit refill requests through Musicplayr or call your pharmacy and they will forward the refill request to us. Please allow 3 business days for your refill to be completed.          Additional Information About Your Visit        MyChart Information     Musicplayr gives you secure access to your electronic health record. If you see a primary care provider, you can also send messages to your care team and make appointments. If you have questions, please call your primary care clinic.  If you do not have a primary care provider, please call [phone] and they will assist you.        Care EveryWhere ID     This is your Care  "EveryWhere ID. This could be used by other organizations to access your [facility] medical records  [id number]        Your Vitals Were     Pulse Temperature Respirations Height Breastfeeding? BMI (Body Mass Index)    72 97.1  F (36.2  C) (Tympanic) 20 5' 10\" (1.778 m) No 39.6 kg/m2       Blood Pressure from Last 3 Encounters:   10/22/18 118/80   09/17/18 112/83   10/30/13 124/78    Weight from Last 3 Encounters:   10/22/18 276 lb (125.2 kg)   09/17/18 280 lb (127 kg)   10/30/13 276 lb 3.2 oz (125.3 kg)                 Where to get your medicines      These medications were sent to [place] [address] [facility] - [place], [place] - [address]  [address] [facility] [address]     Phone:  [phone]     pantoprazole 40 MG EC tablet    triamterene-hydrochlorothiazide 37.5-25 MG per tablet          Primary Care Provider Fax #    Physician No Ref-Primary [phone]       No address on file        Equal Access to Services     [facility] Center[facility] : Hadii jose de la pazo Sobrant, waaxda luaveryadaha, qaybta kaalmada fawn, duane medina . So [facility] [phone].    ATENCIÓN: Si habla español, tiene a almonte disposición servicios gratuitos de asistencia lingüística. Woody al [phone].    We comply with applicable federal civil rights laws and [place] laws. We do not discriminate on the basis of race, color, national origin, age, disability, sex, sexual orientation, or gender identity.            Thank you!     Thank you for choosing [facility] AND [facility]  for your care. Our goal is always to provide you with excellent care. Hearing back from our patients is one way we can continue to improve our services. Please take a few minutes to complete the written survey that you may receive in the mail after your visit with us. Thank you!             Your Updated Medication List - Protect others around you: Learn how to safely use, store and throw away your medicines at " www.disposemymeds.org.          This list is accurate as of 10/22/18  1:22 PM.  Always use your most recent med list.                   Brand Name Dispense Instructions for use Diagnosis    aspirin - buffered 325 MG Tabs tablet    ASCRIPTIN     Take 325 mg by mouth daily        folic acid 1 MG tablet    FOLVITE     Take 1 mg by mouth daily        HUMIRA PEN 40 MG/0.8ML pen kit   Generic drug:  adalimumab      INJECT ONE PEN (40 MG) SUBCUTANEOUSLY EVERY 14 DAYS . REFRIGERATE DO NOT FREEZE.        hydroxychloroquine 200 MG tablet    PLAQUENIL          IRON PO           methotrexate 2.5 MG tablet CHEMO      Take 20 mg by mouth once a week        pantoprazole 40 MG EC tablet    PROTONIX    90 tablet    Take 1 tablet (40 mg) by mouth daily    Gastroesophageal reflux disease, esophagitis presence not specified       ranitidine 150 MG tablet    ZANTAC     Take 150 mg by mouth daily        triamterene-hydrochlorothiazide 37.5-25 MG per tablet    MAXZIDE-25    90 tablet    Take 1 tablet by mouth every morning    Essential hypertension, benign

## 2018-11-02 ENCOUNTER — TRANSFERRED RECORDS (OUTPATIENT)
Dept: HEALTH INFORMATION MANAGEMENT | Facility: OTHER | Age: 53
End: 2018-11-02

## 2018-11-15 ENCOUNTER — HOSPITAL ENCOUNTER (OUTPATIENT)
Dept: MAMMOGRAPHY | Facility: OTHER | Age: 53
Discharge: HOME | End: 2018-11-15
Attending: FAMILY MEDICINE | Admitting: FAMILY MEDICINE
Payer: COMMERCIAL

## 2018-11-15 DIAGNOSIS — Z12.31 ENCOUNTER FOR SCREENING MAMMOGRAM FOR BREAST CANCER: ICD-10-CM

## 2018-11-15 PROCEDURE — 77067 SCR MAMMO BI INCL CAD: CPT

## 2019-02-06 ENCOUNTER — MYC MEDICAL ADVICE (OUTPATIENT)
Dept: FAMILY MEDICINE | Facility: OTHER | Age: 54
End: 2019-02-06

## 2019-02-06 DIAGNOSIS — K21.9 GASTROESOPHAGEAL REFLUX DISEASE WITHOUT ESOPHAGITIS: Primary | ICD-10-CM

## 2019-02-18 ENCOUNTER — MYC MEDICAL ADVICE (OUTPATIENT)
Dept: FAMILY MEDICINE | Facility: OTHER | Age: 54
End: 2019-02-18

## 2019-02-18 DIAGNOSIS — I80.9 SUPERFICIAL PHLEBITIS: Primary | ICD-10-CM

## 2019-02-18 NOTE — TELEPHONE ENCOUNTER
I would like to see her in my same day appointment on Thursday for complaint of superficial phlebitis.  I have placed an order for bilateral lower extremity doppler ultrasounds.  Ya Victoria MD on 2/18/2019 at 3:42 PM

## 2019-02-18 NOTE — TELEPHONE ENCOUNTER
Has been scheduled and patient is aware.  . Carin Williamson LPN ....................2/18/2019  4:05 PM

## 2019-02-21 ENCOUNTER — HOSPITAL ENCOUNTER (OUTPATIENT)
Dept: ULTRASOUND IMAGING | Facility: OTHER | Age: 54
Discharge: HOME | End: 2019-02-21
Attending: FAMILY MEDICINE | Admitting: FAMILY MEDICINE
Payer: COMMERCIAL

## 2019-02-21 ENCOUNTER — OFFICE VISIT (OUTPATIENT)
Dept: FAMILY MEDICINE | Facility: OTHER | Age: 54
End: 2019-02-21
Attending: FAMILY MEDICINE
Payer: COMMERCIAL

## 2019-02-21 VITALS
HEIGHT: 70 IN | BODY MASS INDEX: 39.37 KG/M2 | HEART RATE: 75 BPM | SYSTOLIC BLOOD PRESSURE: 122 MMHG | TEMPERATURE: 97 F | RESPIRATION RATE: 20 BRPM | DIASTOLIC BLOOD PRESSURE: 64 MMHG | WEIGHT: 275 LBS

## 2019-02-21 DIAGNOSIS — R20.2 PARESTHESIA: ICD-10-CM

## 2019-02-21 DIAGNOSIS — I80.9 SUPERFICIAL PHLEBITIS: Primary | ICD-10-CM

## 2019-02-21 DIAGNOSIS — I80.9 SUPERFICIAL PHLEBITIS: ICD-10-CM

## 2019-02-21 DIAGNOSIS — M79.661 RIGHT CALF PAIN: ICD-10-CM

## 2019-02-21 LAB — VIT B12 SERPL-MCNC: 498 PG/ML (ref 180–914)

## 2019-02-21 PROCEDURE — 82607 VITAMIN B-12: CPT | Performed by: FAMILY MEDICINE

## 2019-02-21 PROCEDURE — 99213 OFFICE O/P EST LOW 20 MIN: CPT | Performed by: FAMILY MEDICINE

## 2019-02-21 PROCEDURE — 93970 EXTREMITY STUDY: CPT

## 2019-02-21 PROCEDURE — 36415 COLL VENOUS BLD VENIPUNCTURE: CPT | Performed by: FAMILY MEDICINE

## 2019-02-21 RX ORDER — PREDNISONE 5 MG/1
5 TABLET ORAL DAILY
Refills: 0 | COMMUNITY
Start: 2018-11-02

## 2019-02-21 ASSESSMENT — MIFFLIN-ST. JEOR: SCORE: 1927.64

## 2019-02-21 NOTE — NURSING NOTE
"Chief Complaint   Patient presents with     RECHECK     superfical phlebitis       Initial /64 (BP Location: Right arm, Patient Position: Sitting, Cuff Size: Adult Large)   Pulse 75   Temp 97  F (36.1  C) (Tympanic)   Resp 20   Ht 1.778 m (5' 10\")   Wt 124.7 kg (275 lb)   BMI 39.46 kg/m   Estimated body mass index is 39.46 kg/m  as calculated from the following:    Height as of this encounter: 1.778 m (5' 10\").    Weight as of this encounter: 124.7 kg (275 lb).  Medication Reconciliation: complete    Carin Williamson LPN  "

## 2019-02-21 NOTE — PROGRESS NOTES
"  SUBJECTIVE:   Nursing Notes:   Carin Williamson LPN  2/21/2019 10:46 AM  Sign at exiting of workspace  Chief Complaint   Patient presents with     RECHECK     superfical phlebitis       Initial /64 (BP Location: Right arm, Patient Position: Sitting, Cuff Size: Adult Large)   Pulse 75   Temp 97  F (36.1  C) (Tympanic)   Resp 20   Ht 1.778 m (5' 10\")   Wt 124.7 kg (275 lb)   BMI 39.46 kg/m    Estimated body mass index is 39.46 kg/m  as calculated from the following:    Height as of this encounter: 1.778 m (5' 10\").    Weight as of this encounter: 124.7 kg (275 lb).  Medication Reconciliation: complete    Carin Williamson LPN    Phyllis VILLASENOR Neururer is a 53 year old female who presents to clinic today for superficial phlebitis.  Went to [place] on 2/8/[place].  Always wears compression stocking when traveling.  Noted swelling and inflammation in her right upper thigh.  Has been wearing compression stockings ever since.  Has been having more swelling distally with pain as well.  Elevating, warm compresses.  Taking a regular strength aspirin daily.  Was very painful 3 days.  Hasn't gotten any worse since then, but still very tender.  For a few weeks, has had some issues with her right calf.  Last summer had a sharp pain in her calf while playing corn hole.  Lasted a few weeks, then went away.  The second time this happened last summer, had swelling as well.  Had an ultrasound at that time and it was negative for DVT.  After [place], her calf started hurting again.  Feels a tingly numbness at times that radiates up to the back of her knee.  Doesn't extend to her achilles.  Her 5th toe on her right foot has been numb for a few years.  Doesn't have any back pain.  No swelling behind her knee.  She had a bilateral lower extremity ultrasound today which showed phlebitis as noted in report below, but no DVT.    HPI    I personally reviewed medications/allergies/history listed below:    Patient Active Problem List "    Diagnosis Date Noted     Health care maintenance 10/22/2018     Priority: Medium     Pap NIL and HPV neg on 8/22/17 ([facility])  DEXA 2015 normal.       Colon polyp 09/20/2017     Priority: Medium     Overview:   Colonoscopy 9/20/17 with transverse colon polyp and sigmoid colon polyp (20 cm), both removed       Thrombosis of saphenous vein, left 11/16/2016     Priority: Medium     Disturbance in sleep behavior 07/02/2015     Priority: Medium     Overview:   [facility] Sleep Study: Showed Mild upper airway resistance. No sleep apnea or limb movement disorder.   IMO Update       Family history of diabetes mellitus type II 04/07/2015     Priority: Medium     High risk medication use 09/10/2014     Priority: Medium     Overview:   HCQ, MTX, Humira       Cervical muscle pain 07/25/2013     Priority: Medium     Rheumatoid arthritis involving multiple sites with positive rheumatoid factor (H) 09/28/2012     Priority: Medium     Overview:   RF + 121, CCP neg; onset ~ 2011, Dx 2012  Doxy (9/8/2012- 3/2013)  Nuc Med Scan with symmetric uptake  Plaquenil trial 3/2013- ; MTX 7/2013- ; SSA 10/2014-12/2014; Humira 10/2015-   6/2014: VECTRA 42       Vitamin D deficiency 06/18/2012     Priority: Medium     Overview:   Level of 25 on 5/11/2012       Myalgia 05/11/2012     Priority: Medium     Enthesopathy of hip region 01/14/2011     Priority: Medium     Hypercholesterolemia 12/14/2009     Priority: Medium     Allergic rhinitis 11/02/2004     Priority: Medium     Asthma 09/22/2003     Priority: Medium     Overview:   Updated per 10/1/17 IMO import       Esophageal reflux 09/22/2003     Priority: Medium     Essential hypertension, benign 05/14/2001     Priority: Medium     Obesity 05/14/2001     Priority: Medium     Past Medical History:   Diagnosis Date     Personal history of other medical treatment (CODE)     3 normal childbirths      Past Surgical History:   Procedure Laterality Date     COLONOSCOPY  2017    Dr. Fofana - [place] polyps  found.  Told to f/u in 5 years.     Family History   Problem Relation Age of Onset     Hypertension Father         Hypertension,67     Other - See Comments Maternal Grandmother         Stroke,85     Other - See Comments Paternal Grandmother         Alzheimer's 73     Breast Cancer Mother 46        Cancer-breast,now 68     Cerebrovascular Disease Mother         stroke with hemiparesis 2017     Diabetes Brother 16        Diabetes,now 41     Social History     Tobacco Use     Smoking status: Never Smoker     Smokeless tobacco: Never Used   Substance Use Topics     Alcohol use: Not on file     Social History     Social History Narrative    , 3 adult children;     Homemaker; hobbies - reading, crafts.     Works at frame shop part-time.     Walks 3 miles a day     - Demetrio         Current Outpatient Medications   Medication Sig Dispense Refill     adalimumab (HUMIRA PEN) 40 MG/0.8ML pen kit INJECT ONE PEN (40 MG) SUBCUTANEOUSLY EVERY 14 DAYS . REFRIGERATE DO NOT FREEZE.       aspirin - buffered (ASCRIPTIN) 325 MG TABS tablet Take 325 mg by mouth daily       folic acid (FOLVITE) 1 MG tablet Take 1 mg by mouth daily       hydroxychloroquine (PLAQUENIL) 200 MG tablet        methotrexate 2.5 MG tablet CHEMO Take 20 mg by mouth once a week       predniSONE (DELTASONE) 5 MG tablet Take 5 mg by mouth daily.  0     ranitidine (ZANTAC) 150 MG capsule Take 1 capsule (150 mg) by mouth 2 times daily 180 capsule 3     triamterene-hydrochlorothiazide (MAXZIDE-25) 37.5-25 MG per tablet Take 1 tablet by mouth every morning 90 tablet 3     No Known Allergies    Review of Systems   Constitutional: Negative for fatigue.   Respiratory: Negative for shortness of breath.    Cardiovascular: Negative for chest pain.   Musculoskeletal: Positive for myalgias (right calf region.). Negative for arthralgias and joint swelling.   Psychiatric/Behavioral: Negative for mood changes.        OBJECTIVE:     /64 (BP Location: Right arm,  "Patient Position: Sitting, Cuff Size: Adult Large)   Pulse 75   Temp 97  F (36.1  C) (Tympanic)   Resp 20   Ht 1.778 m (5' 10\")   Wt 124.7 kg (275 lb)   BMI 39.46 kg/m    Body mass index is 39.46 kg/m .  Physical Exam   Constitutional: She appears well-developed.   HENT:   Head: Normocephalic.   Eyes: Pupils are equal, round, and reactive to light.   Neck: Normal range of motion. Neck supple. No thyromegaly present.   Cardiovascular: Normal rate, regular rhythm and normal heart sounds. Exam reveals no friction rub.   No murmur heard.  Pulmonary/Chest: Effort normal and breath sounds normal. No respiratory distress. She has no wheezes. She has no rales.   Musculoskeletal: She exhibits no edema.   She has extensive varicosities bilaterally.  In her right medial thigh region, there is palpable superficial phlebitis.  No palpable phlebitis elsewhere in her legs.  Her right calf is non-tender to palpation.  No worsening of pain with passive dorsiflexion of her right foot.  No swelling or fullness of the popliteal space.   Lymphadenopathy:     She has no cervical adenopathy.   Psychiatric: She has a normal mood and affect.       PHQ-2 Score:     PHQ-2 ( 1999 Pfizer) 2/21/2019 10/22/2018   Q1: Little interest or pleasure in doing things 0 0   Q2: Feeling down, depressed or hopeless 0 0   PHQ-2 Score 0 0           I personally reviewed results withpatient as listed below:   Diagnostic Test Results:  EXAM:US LOWER EXTREMITY VENOUS DUPLEX BILATERAL     HISTORY: recurrent superficial phlebitis.; Superficial phlebitis.        COMPARISONS: None     TECHNIQUE: Venous duplex ultrasonography of the bilateral lower  extremities was performed.      FINDINGS: The common femoral vein, femoral vein and popliteal vein are  fully compressible with spontaneous and augmentable venous flow.     There is superficial thrombus in varicose veins in the right calf and  thigh which may be acute. There is more chronic appearing " superficial  thrombus in left anterior thigh varicosities.                                                                        IMPRESSION:   1. Negative for DVT.  2. Superficial thrombophlebitis involving varicosities in both lower  extremities..     RANDALL SALEH MD      ASSESSMENT/PLAN:       ICD-10-CM    1. Superficial phlebitis I80.9    2. Right calf pain M79.661 MR Tibia Fibula Lower Leg Right wo Contrast   3. Paresthesia R20.2 Vitamin B12     Vitamin B12       1.  She will continue to wear support stockings, use heat, etc to relieve symptoms.  Recommend taking a daily baby aspirin to help prevent future occurrences.  Also discussed considering vein procedure to see if this also would help to prevent occurrences.  She will consider this and let us know at some point if she would want to pursue this.  2.  Will obtain and MRI to evaluate further to see if there was a muscle injury to account for her symptoms.  3.  Check B12 level to ensure this is not contributing to her right 5th toe numbness. At this time, she feels that the toe numbness doesn't bother her enough that she would want to have an EMG or other tests to evaluate at this time.    Ya Victoria MD  [facility] AND \[facility]\""

## 2019-02-22 ASSESSMENT — ENCOUNTER SYMPTOMS
SHORTNESS OF BREATH: 0
JOINT SWELLING: 0
FATIGUE: 0
ARTHRALGIAS: 0
MYALGIAS: 1

## 2019-02-25 ENCOUNTER — HOSPITAL ENCOUNTER (OUTPATIENT)
Dept: MRI IMAGING | Facility: OTHER | Age: 54
Discharge: HOME | End: 2019-02-25
Attending: FAMILY MEDICINE | Admitting: FAMILY MEDICINE
Payer: COMMERCIAL

## 2019-02-25 DIAGNOSIS — M79.661 RIGHT CALF PAIN: ICD-10-CM

## 2019-02-25 PROCEDURE — 73718 MRI LOWER EXTREMITY W/O DYE: CPT | Mod: RT

## 2019-04-05 ENCOUNTER — HOSPITAL ENCOUNTER (OUTPATIENT)
Dept: ULTRASOUND IMAGING | Facility: OTHER | Age: 54
Discharge: HOME | End: 2019-04-05
Attending: FAMILY MEDICINE | Admitting: FAMILY MEDICINE
Payer: COMMERCIAL

## 2019-04-05 ENCOUNTER — OFFICE VISIT (OUTPATIENT)
Dept: FAMILY MEDICINE | Facility: OTHER | Age: 54
End: 2019-04-05
Attending: FAMILY MEDICINE
Payer: COMMERCIAL

## 2019-04-05 ENCOUNTER — MYC MEDICAL ADVICE (OUTPATIENT)
Dept: FAMILY MEDICINE | Facility: OTHER | Age: 54
End: 2019-04-05

## 2019-04-05 VITALS
TEMPERATURE: 97 F | HEART RATE: 64 BPM | BODY MASS INDEX: 39.57 KG/M2 | RESPIRATION RATE: 16 BRPM | WEIGHT: 276.4 LBS | HEIGHT: 70 IN | DIASTOLIC BLOOD PRESSURE: 80 MMHG | SYSTOLIC BLOOD PRESSURE: 120 MMHG

## 2019-04-05 DIAGNOSIS — I80.9 SUPERFICIAL PHLEBITIS: Primary | ICD-10-CM

## 2019-04-05 DIAGNOSIS — I80.9 PHLEBITIS: ICD-10-CM

## 2019-04-05 DIAGNOSIS — M54.2 NECK PAIN: ICD-10-CM

## 2019-04-05 PROCEDURE — 99214 OFFICE O/P EST MOD 30 MIN: CPT | Performed by: FAMILY MEDICINE

## 2019-04-05 PROCEDURE — 93971 EXTREMITY STUDY: CPT | Mod: RT

## 2019-04-05 SDOH — HEALTH STABILITY: MENTAL HEALTH: HOW OFTEN DO YOU HAVE A DRINK CONTAINING ALCOHOL?: NEVER

## 2019-04-05 ASSESSMENT — MIFFLIN-ST. JEOR: SCORE: 1933.37

## 2019-04-05 ASSESSMENT — PAIN SCALES - GENERAL: PAINLEVEL: MODERATE PAIN (4)

## 2019-04-05 NOTE — NURSING NOTE
"Patient presents to the clinic today with complaints of right leg thrombophlebitis.  Angela Alvarez LPN 4/5/2019   2:35 PM     Chief Complaint   Patient presents with     Musculoskeletal Problem       Initial /80 (BP Location: Right arm, Patient Position: Sitting, Cuff Size: Adult Regular)   Pulse 64   Temp 97  F (36.1  C) (Tympanic)   Resp 16   Ht 1.785 m (5' 10.28\")   Wt 125.4 kg (276 lb 6.4 oz)   Breastfeeding? No   BMI 39.35 kg/m   Estimated body mass index is 39.35 kg/m  as calculated from the following:    Height as of this encounter: 1.785 m (5' 10.28\").    Weight as of this encounter: 125.4 kg (276 lb 6.4 oz).  Medication Reconciliation: complete    Angela Alvarez LPN    "

## 2019-04-05 NOTE — PROGRESS NOTES
"  SUBJECTIVE:   Nursing Notes:   Angela Alvarez LPN  4/5/2019  2:41 PM  Sign at exiting of workspace  Patient presents to the clinic today with complaints of right leg thrombophlebitis.  Angela Alvarez LPN 4/5/2019   2:35 PM     Chief Complaint   Patient presents with     Musculoskeletal Problem       Initial /80 (BP Location: Right arm, Patient Position: Sitting, Cuff Size: Adult Regular)   Pulse 64   Temp 97  F (36.1  C) (Tympanic)   Resp 16   Ht 1.785 m (5' 10.28\")   Wt 125.4 kg (276 lb 6.4 oz)   Breastfeeding? No   BMI 39.35 kg/m    Estimated body mass index is 39.35 kg/m  as calculated from the following:    Height as of this encounter: 1.785 m (5' 10.28\").    Weight as of this encounter: 125.4 kg (276 lb 6.4 oz).  Medication Reconciliation: complete    Angela Alvarez LPN      Phyllis VILLASENOR Neururer is a 54 year old female who presents to clinic today for continued issues with superficial phlebitis in her right leg since 2/9/19.  She continues to have new areas cropping up.  Some of the areas she has had are less swollen and inflamed as they had been, but .  A couple of different times in the past, she has needed to be treated with xarelto for this.  The last time she took it, was about 2 year ago.  Ever time she had taken it in the past, she got her period and it was very heavy.  She has not had a period now for 2 years.  She has been taking a full strength 325 mg aspirin daily since February.  She has also been wearing thigh high compression stockings to try to prevent further phlebitis from occurring.  The last time she took xarelto, only was able to take it for about 10 days.  The time prior about 4 years ago, she had taken it for about 3 weeks.  She had not been taking aspirin between her last episode of phlebitis 2 years ago and this past February when this episode started again.    She also notes that her neck is been more sore in the last month or 2 than normal.  She is " tried getting a new pillow, which has not helped.  She has a relatively new mattress within the past 2 years.  She has not been doing any activities that she thinks have aggravated this.  She does so a lot, but this is not unusual for her.    HPI    I personally reviewed medications/allergies/history listed below:    Patient Active Problem List    Diagnosis Date Noted     Health care maintenance 10/22/2018     Priority: Medium     Pap NIL and HPV neg on 8/22/17 ([facility])  DEXA 2015 normal.       Colon polyp 09/20/2017     Priority: Medium     Overview:   Colonoscopy 9/20/17 with transverse colon polyp and sigmoid colon polyp (20 cm), both removed       Thrombosis of saphenous vein, left 11/16/2016     Priority: Medium     Disturbance in sleep behavior 07/02/2015     Priority: Medium     Overview:   [facility] Sleep Study: Showed Mild upper airway resistance. No sleep apnea or limb movement disorder.   IMO Update       Family history of diabetes mellitus type II 04/07/2015     Priority: Medium     High risk medication use 09/10/2014     Priority: Medium     Overview:   HCQ, MTX, Humira       Cervical muscle pain 07/25/2013     Priority: Medium     Rheumatoid arthritis involving multiple sites with positive rheumatoid factor (H) 09/28/2012     Priority: Medium     Overview:   RF + 121, CCP neg; onset ~ 2011, Dx 2012  Doxy (9/8/2012- 3/2013)  Nuc Med Scan with symmetric uptake  Plaquenil trial 3/2013- ; MTX 7/2013- ; SSA 10/2014-12/2014; Humira 10/2015-   6/2014: VECTRA 42       Vitamin D deficiency 06/18/2012     Priority: Medium     Overview:   Level of 25 on 5/11/2012       Myalgia 05/11/2012     Priority: Medium     Enthesopathy of hip region 01/14/2011     Priority: Medium     Hypercholesterolemia 12/14/2009     Priority: Medium     Allergic rhinitis 11/02/2004     Priority: Medium     Asthma 09/22/2003     Priority: Medium     Overview:   Updated per 10/1/17 IMO import       Esophageal reflux 09/22/2003      Priority: Medium     Essential hypertension, benign 05/14/2001     Priority: Medium     Obesity 05/14/2001     Priority: Medium     Past Medical History:   Diagnosis Date     Personal history of other medical treatment (CODE)     3 normal childbirths      Past Surgical History:   Procedure Laterality Date     COLONOSCOPY  2017    Dr. Fofana - 2 polyps found.  Told to f/u in 5 years.     Family History   Problem Relation Age of Onset     Hypertension Father         Hypertension,67     Other - See Comments Maternal Grandmother         Stroke,85     Other - See Comments Paternal Grandmother         Alzheimer's 73     Breast Cancer Mother 46        Cancer-breast,now 68     Cerebrovascular Disease Mother         stroke with hemiparesis 2017     Diabetes Brother 16        Diabetes,now 41     Social History     Tobacco Use     Smoking status: Never Smoker     Smokeless tobacco: Never Used   Substance Use Topics     Alcohol use: No     Frequency: Never     Comment: Rarely     Social History     Social History Narrative    , 3 adult children;     Homemaker; hobbies - reading, crafts.     Works at frame shop part-time.     Walks 3 miles a day     - Demetrio         Current Outpatient Medications   Medication Sig Dispense Refill     adalimumab (HUMIRA PEN) 40 MG/0.8ML pen kit INJECT ONE PEN (40 MG) SUBCUTANEOUSLY EVERY 14 DAYS . REFRIGERATE DO NOT FREEZE.       aspirin - buffered (ASCRIPTIN) 325 MG TABS tablet Take 325 mg by mouth daily       folic acid (FOLVITE) 1 MG tablet Take 1 mg by mouth daily       hydroxychloroquine (PLAQUENIL) 200 MG tablet        methotrexate 2.5 MG tablet CHEMO Take 20 mg by mouth once a week       predniSONE (DELTASONE) 5 MG tablet Take 5 mg by mouth daily.  0     ranitidine (ZANTAC) 150 MG capsule Take 1 capsule (150 mg) by mouth 2 times daily 180 capsule 3     rivaroxaban ANTICOAGULANT (XARELTO) 20 MG TABS tablet Take 1 tablet (20 mg) by mouth daily (with dinner) 30 tablet 2      "triamterene-hydrochlorothiazide (MAXZIDE-25) 37.5-25 MG per tablet Take 1 tablet by mouth every morning 90 tablet 3     No Known Allergies    Review of Systems   Constitutional: Negative for chills and fever.   Respiratory: Negative for cough and shortness of breath.    Musculoskeletal: Positive for neck pain. Negative for joint swelling.   Psychiatric/Behavioral: The patient is not nervous/anxious.         OBJECTIVE:     /80 (BP Location: Right arm, Patient Position: Sitting, Cuff Size: Adult Regular)   Pulse 64   Temp 97  F (36.1  C) (Tympanic)   Resp 16   Ht 1.785 m (5' 10.28\")   Wt 125.4 kg (276 lb 6.4 oz)   Breastfeeding? No   BMI 39.35 kg/m    Body mass index is 39.35 kg/m .  Physical Exam   Constitutional: She appears well-developed.   HENT:   Head: Normocephalic.   Eyes: Pupils are equal, round, and reactive to light.   Neck: Normal range of motion. Neck supple. No thyromegaly present.   No carotid bruits   Cardiovascular: Normal rate, regular rhythm, normal heart sounds and intact distal pulses.   No murmur heard.  Pulmonary/Chest: Effort normal and breath sounds normal. She has no wheezes. She has no rales.   Musculoskeletal:   Right leg: At several locations along the course of her medial right leg, there are areas of varicosity superficially which feel thrombosed.  These are scattered from above her knee and her mid thigh to just below her knee and her medial calf.   Lymphadenopathy:     She has no cervical adenopathy.   Skin: Skin is warm and dry.   Psychiatric: She has a normal mood and affect.       PHQ-2 Score:     PHQ-2 ( 1999 Pfizer) 4/5/2019 2/21/2019   Q1: Little interest or pleasure in doing things 0 0   Q2: Feeling down, depressed or hopeless 0 0   PHQ-2 Score 0 0         ACT Total Scores 4/5/2019   ACT TOTAL SCORE (Goal Greater than or Equal to 20) 25   In the past 12 months, how many times did you visit the emergency room for your asthma without being admitted to the hospital? 0 "   In the past 12 months, how many times were you hospitalized overnight because of your asthma? 0         I personally reviewed results withpatient as listed below:   Diagnostic Test Results:  Procedure: US LOWER EXTREMITY VENOUS DUPLEX RIGHT     HISTORY:  extensive superficial phlebitis.  r/o DVT.; Phlebitis.     TECHNIQUE: Grayscale, color Doppler and compression views of the deep  venous structures of the right lower extremity.     COMPARISON: None.     FINDINGS:     Interrogation of the deep venous structures from the right common  femoral vein through the veins of the proximal calf demonstrate normal  grayscale appearance, compressibility and augmentable color Doppler  flow.     Thrombosed superficial varicosities are seen in the right medial thigh  through the right calf.                                                                      IMPRESSION:      No evidence of right lower extremity DVT.       PENNY ROMAN MD      ASSESSMENT/PLAN:       ICD-10-CM    1. Superficial phlebitis I80.9 US Lower Extremity Venous Duplex Right     rivaroxaban ANTICOAGULANT (XARELTO) 20 MG TABS tablet   2. Neck pain M54.2        1.  Ultrasound of right leg was repeated today and confirmed that we are still just dealing with superficial phlebitis.  As above, there is no evidence of DVT.  However this is not been improving despite being on a full-strength aspirin 325 mg daily since onset of this almost 2 months ago.  She has been faithfully wearing compression stockings as well.  She has needed to use anticoagulation in the past.  Since the total length of these superficial phlebitis is definitely over 5 mm in length, I did elect to put her back on Xarelto.  Since she had problems with the higher dosing past and she does not have a DVT currently, we will treat her with Xarelto 20 mg daily.  I do want to see her back in a couple of weeks to see how she is doing to make sure this is improving.  Recommended that she hold her  aspirin while she is on the Xarelto.  May need to treat with 3-6 months total.  Once she has completed a course of Xarelto, discussed that she may want to stay on aspirin long-term to try to prevent recurrence of the superficial phlebitis in the future.  2.  Likely musculoskeletal in origin.  She does have underlying rheumatoid arthritis, which certainly could be related.  Discussed that if this is not improving, could consider imaging.  She declined that at this time.  Recheck at next visit.    Ya Victoria MD  [facility]

## 2019-04-05 NOTE — TELEPHONE ENCOUNTER
Patient will come in as soon as she can. She will try to be here by 2:30 pm . Carin Williamson LPN ....................4/5/2019  1:51 PM

## 2019-04-05 NOTE — TELEPHONE ENCOUNTER
I think Phyllis should be seen again to discuss possibly having her on anticoagulation again.  If she could come this afternoon, I can see her.  If today doesn't work, please let me know.  Ya Victoria MD on 4/5/2019 at 1:44 PM

## 2019-04-06 ASSESSMENT — ASTHMA QUESTIONNAIRES: ACT_TOTALSCORE: 25

## 2019-04-06 ASSESSMENT — ENCOUNTER SYMPTOMS
NERVOUS/ANXIOUS: 0
COUGH: 0
CHILLS: 0
FEVER: 0
JOINT SWELLING: 0
NECK PAIN: 1
SHORTNESS OF BREATH: 0

## 2019-04-12 ENCOUNTER — MYC MEDICAL ADVICE (OUTPATIENT)
Dept: FAMILY MEDICINE | Facility: OTHER | Age: 54
End: 2019-04-12

## 2019-04-12 DIAGNOSIS — I80.9 SUPERFICIAL PHLEBITIS: Primary | ICD-10-CM

## 2019-04-18 ENCOUNTER — OFFICE VISIT (OUTPATIENT)
Dept: FAMILY MEDICINE | Facility: OTHER | Age: 54
End: 2019-04-18
Attending: FAMILY MEDICINE
Payer: COMMERCIAL

## 2019-04-18 VITALS
BODY MASS INDEX: 39.8 KG/M2 | HEIGHT: 70 IN | HEART RATE: 72 BPM | RESPIRATION RATE: 20 BRPM | DIASTOLIC BLOOD PRESSURE: 60 MMHG | WEIGHT: 278 LBS | SYSTOLIC BLOOD PRESSURE: 122 MMHG | TEMPERATURE: 98.6 F

## 2019-04-18 DIAGNOSIS — I80.9 SUPERFICIAL PHLEBITIS: Primary | ICD-10-CM

## 2019-04-18 PROCEDURE — 99213 OFFICE O/P EST LOW 20 MIN: CPT | Performed by: FAMILY MEDICINE

## 2019-04-18 ASSESSMENT — ENCOUNTER SYMPTOMS
SHORTNESS OF BREATH: 0
FEVER: 0

## 2019-04-18 ASSESSMENT — MIFFLIN-ST. JEOR: SCORE: 1939.43

## 2019-04-18 ASSESSMENT — PAIN SCALES - GENERAL: PAINLEVEL: MILD PAIN (2)

## 2019-04-18 NOTE — NURSING NOTE
"Chief Complaint   Patient presents with     RECHECK     leg        Initial /60 (BP Location: Right arm, Patient Position: Sitting, Cuff Size: Adult Large)   Pulse 72   Temp 98.6  F (37  C) (Tympanic)   Resp 20   Ht 1.783 m (5' 10.2\")   Wt 126.1 kg (278 lb)   Breastfeeding? No   BMI 39.66 kg/m   Estimated body mass index is 39.66 kg/m  as calculated from the following:    Height as of this encounter: 1.783 m (5' 10.2\").    Weight as of this encounter: 126.1 kg (278 lb).  Medication Reconciliation: complete    Carin Williamson LPN  "

## 2019-04-18 NOTE — PROGRESS NOTES
"  SUBJECTIVE:   Nursing Notes:   Carin Williamson LPN  4/18/2019 12:56 PM  Sign at exiting of workspace  Chief Complaint   Patient presents with     RECHECK     leg        Initial /60 (BP Location: Right arm, Patient Position: Sitting, Cuff Size: Adult Large)   Pulse 72   Temp 98.6  F (37  C) (Tympanic)   Resp 20   Ht 1.783 m (5' 10.2\")   Wt 126.1 kg (278 lb)   Breastfeeding? No   BMI 39.66 kg/m    Estimated body mass index is 39.66 kg/m  as calculated from the following:    Height as of this encounter: 1.783 m (5' 10.2\").    Weight as of this encounter: 126.1 kg (278 lb).  Medication Reconciliation: complete    Carin Williamson LPN    Phyllis VILLASENOR Neururer is a 54 year old female who presents to clinic today for follow up of her right leg.  She has been dealing with superficial phlebitis in this leg since around February 9.  Initially, she just started taking aspirin daily and was wearing compression stockings regularly.  Ultrasound confirmed superficial phlebitis and ruled out DVT.  She then had progressive worsening of the phlebitis.  She had returned on 4/5/2019 to clinic.  At that time, she had new areas of phlebitis that were painful despite taking the full strength aspirin 325 mg daily.  She had needed to be treated with Xarelto in the past.  Ultrasound again was repeated and ruled out DVT, confirming superficial phlebitis.  At that time, we had elected to treat with Xarelto 20 mg daily.  She had previously been on 15 mg twice daily when she had previous phlebitis.  She at that time had onset of vaginal bleeding which was problematic.  For that reason, we had chosen the lower dose of 20 mg daily.  She had contacted me through my chart several days ago due to concerns of new areas of phlebitis despite being on Xarelto.  For that reason, her Xarelto was increased to 15 mg twice daily.  Is only been on this for about 3 days now.  Since that time she does feel like her symptoms are settling a bit.  She " still has some areas of tenderness, but they seem less tender than they had been previously.  She has had no new areas of phlebitis with this increase in dose.  She denies any chest pain or shortness of breath.    HPI    I personally reviewed medications/allergies/history listed below:    Patient Active Problem List    Diagnosis Date Noted     Health care maintenance 10/22/2018     Priority: Medium     Pap NIL and HPV neg on 8/22/17 ([facility])  DEXA 2015 normal.       Colon polyp 09/20/2017     Priority: Medium     Overview:   Colonoscopy 9/20/17 with transverse colon polyp and sigmoid colon polyp (20 cm), both removed       Thrombosis of saphenous vein, left 11/16/2016     Priority: Medium     Disturbance in sleep behavior 07/02/2015     Priority: Medium     Overview:   [facility] Sleep Study: Showed Mild upper airway resistance. No sleep apnea or limb movement disorder.   IMO Update       Family history of diabetes mellitus type II 04/07/2015     Priority: Medium     High risk medication use 09/10/2014     Priority: Medium     Overview:   HCQ, MTX, Humira       Cervical muscle pain 07/25/2013     Priority: Medium     Rheumatoid arthritis involving multiple sites with positive rheumatoid factor (H) 09/28/2012     Priority: Medium     Overview:   RF + 121, CCP neg; onset ~ 2011, Dx 2012  Doxy (9/8/2012- 3/2013)  Nuc Med Scan with symmetric uptake  Plaquenil trial 3/2013- ; MTX 7/2013- ; SSA 10/2014-12/2014; Humira 10/2015-   6/2014: VECTRA 42       Vitamin D deficiency 06/18/2012     Priority: Medium     Overview:   Level of 25 on 5/11/2012       Myalgia 05/11/2012     Priority: Medium     Enthesopathy of hip region 01/14/2011     Priority: Medium     Hypercholesterolemia 12/14/2009     Priority: Medium     Allergic rhinitis 11/02/2004     Priority: Medium     Asthma 09/22/2003     Priority: Medium     Overview:   Updated per 10/1/17 IMO import       Esophageal reflux 09/22/2003     Priority: Medium     Essential  hypertension, benign 05/14/2001     Priority: Medium     Obesity 05/14/2001     Priority: Medium     Past Medical History:   Diagnosis Date     Personal history of other medical treatment (CODE)     3 normal childbirths      Past Surgical History:   Procedure Laterality Date     COLONOSCOPY  2017    Dr. Fofana - 2 polyps found.  Told to f/u in 5 years.     Family History   Problem Relation Age of Onset     Hypertension Father         Hypertension,67     Other - See Comments Maternal Grandmother         Stroke,85     Other - See Comments Paternal Grandmother         Alzheimer's 73     Breast Cancer Mother 46        Cancer-breast,now 68     Cerebrovascular Disease Mother         stroke with hemiparesis 2017     Diabetes Brother 16        Diabetes,now 41     Social History     Tobacco Use     Smoking status: Never Smoker     Smokeless tobacco: Never Used   Substance Use Topics     Alcohol use: No     Frequency: Never     Comment: Rarely     Social History     Social History Narrative    , 3 adult children;     Homemaker; hobbies - reading, crafts.     Works at frame shop part-time.     Walks 3 miles a day     - Demetrio         Current Outpatient Medications   Medication Sig Dispense Refill     adalimumab (HUMIRA PEN) 40 MG/0.8ML pen kit INJECT ONE PEN (40 MG) SUBCUTANEOUSLY EVERY 14 DAYS . REFRIGERATE DO NOT FREEZE.       aspirin - buffered (ASCRIPTIN) 325 MG TABS tablet Take 325 mg by mouth daily       folic acid (FOLVITE) 1 MG tablet Take 1 mg by mouth daily       hydroxychloroquine (PLAQUENIL) 200 MG tablet        methotrexate 2.5 MG tablet CHEMO Take 20 mg by mouth once a week       predniSONE (DELTASONE) 5 MG tablet Take 5 mg by mouth daily.  0     ranitidine (ZANTAC) 150 MG capsule Take 1 capsule (150 mg) by mouth 2 times daily 180 capsule 3     rivaroxaban ANTICOAGULANT (XARELTO) 15 MG TABS tablet Take 1 tablet (15 mg) by mouth 2 times daily (with meals) for 21 days 42 tablet 0     rivaroxaban  "ANTICOAGULANT (XARELTO) 20 MG TABS tablet Take 1 tablet (20 mg) by mouth daily (with dinner) 30 tablet 2     triamterene-hydrochlorothiazide (MAXZIDE-25) 37.5-25 MG per tablet Take 1 tablet by mouth every morning 90 tablet 3     No Known Allergies    Review of Systems   Constitutional: Negative for fever.   Respiratory: Negative for shortness of breath.    Cardiovascular: Negative for chest pain.   Psychiatric/Behavioral: Negative for mood changes.        OBJECTIVE:     /60 (BP Location: Right arm, Patient Position: Sitting, Cuff Size: Adult Large)   Pulse 72   Temp 98.6  F (37  C) (Tympanic)   Resp 20   Ht 1.783 m (5' 10.2\")   Wt 126.1 kg (278 lb)   Breastfeeding? No   BMI 39.66 kg/m    Body mass index is 39.66 kg/m .  Physical Exam   Constitutional: She appears well-developed.   HENT:   Head: Normocephalic.   Eyes: Pupils are equal, round, and reactive to light.   Neck: Normal range of motion. Neck supple. No thyromegaly present.   Cardiovascular: Normal rate, regular rhythm, normal heart sounds and intact distal pulses.   No murmur heard.  Pulmonary/Chest: Effort normal and breath sounds normal. She has no wheezes. She has no rales.   Musculoskeletal:   Right leg: At several locations along the course of her medial right leg, there are areas of varicosity superficially which feel thrombosed.  These are scattered from above her knee and her mid thigh to just below her knee and her medial calf.  These are less prominent than they were at her last visit.   Lymphadenopathy:     She has no cervical adenopathy.   Skin: Skin is warm and dry.   Psychiatric: She has a normal mood and affect.       PHQ-2 Score:     PHQ-2 ( 1999 Pf[employer]) 4/18/2019 4/5/2019   Q1: Little interest or pleasure in doing things 0 0   Q2: Feeling down, depressed or hopeless 0 0   PHQ-2 Score 0 0         ACT Total Scores 4/5/2019 4/18/2019   ACT TOTAL SCORE (Goal Greater than or Equal to 20) 25 25   In the past 12 months, how many times " did you visit the emergency room for your asthma without being admitted to the hospital? 0 0   In the past 12 months, how many times were you hospitalized overnight because of your asthma? 0 0         I personally reviewed results withpatient as listed below:   Diagnostic Test Results:  none     ASSESSMENT/PLAN:       ICD-10-CM    1. Superficial phlebitis I80.9        1.  She will stay on current dose of Xarelto 15 mg twice daily for a full 3 weeks, then decrease to 20 mg daily.  She will follow up in about 2 months to assess whether she may go off of the xarelto at the end of 3 months of treatment.  Continue wearing compression stockings.  At the end of her xarelto treatment, would recommend transitioning to aspirin 325 mg daily. If has more issues with recurrent clots, may need consult with hematology.    Ya Victoria MD  [facility]

## 2019-04-19 ASSESSMENT — ASTHMA QUESTIONNAIRES: ACT_TOTALSCORE: 25

## 2019-05-04 DIAGNOSIS — I80.9 SUPERFICIAL PHLEBITIS: ICD-10-CM

## 2019-05-07 RX ORDER — RIVAROXABAN 15 MG/1
TABLET, FILM COATED ORAL
Qty: 42 TABLET | Refills: 0 | Status: SHIPPED | OUTPATIENT
Start: 2019-05-07 | End: 2019-06-01

## 2019-05-07 NOTE — TELEPHONE ENCOUNTER
"Routing refill request to provider for review/approval because:  Creatinine Clearance greater than 50 ml/min on file in past 3 mos     Serum creatinine less than or equal to 1.4 on file in past 3 mos           LOV: 4/18/19  Unsure if patient to continue  Per LOV  \"1.  She will stay on current dose of Xarelto 15 mg twice daily for a full 3 weeks, then decrease to 20 mg daily.  She will follow up in about 2 months to assess whether she may go off of the xarelto at the end of 3 months of treatment.  Continue wearing compression stockings.  At the end of her xarelto treatment, would recommend transitioning to aspirin 325 mg daily\"    Giulia Saha RN on 5/7/2019 at 1:05 PM    "

## 2019-06-01 DIAGNOSIS — I80.9 SUPERFICIAL PHLEBITIS: ICD-10-CM

## 2019-06-04 RX ORDER — RIVAROXABAN 15 MG/1
TABLET, FILM COATED ORAL
Qty: 30 TABLET | Refills: 0 | Status: SHIPPED | OUTPATIENT
Start: 2019-06-04 | End: 2019-06-18

## 2019-06-04 NOTE — TELEPHONE ENCOUNTER
Chart review shows that Rx as requested was last filled as follows:    Outpatient Medication Detail      Disp Refills Start End ILIA   XARELTO 15 MG TABS tablet 42 tablet 0 5/7/2019  No   Sig: TAKE 1 TABLET (15 MG) BY MOUTH 2 TIMES DAILY (WITH MEALS) FOR 21 DAYS   Sent to pharmacy as: XARELTO 15 MG TABS tablet   Class: E-Prescribe   Order: [id number]   E-Prescribing Status: Receipt confirmed by pharmacy (5/7/2019  1:16 PM CDT)   Printout Tracking     External Result Report   Pharmacy     [facility] [id number] [facility] - [place], [place] - [address]   Associated Diagnoses     Superficial phlebitis [I80.9]       Source Order Set     Order Set Name Order ID    [id number]     Prescribing Provider's NPI: [id number]  Ya Victoria    And is due for a refill. LOV with [facility] was on 4/18/19 and patient is following up on 6/18/19. Writer is unable to fill Rx as requested as per RN refill protocol. Writer will ajay up and route Rx request to [facility] for her consideration/approval.    Unable to complete prescription refill per RN Medication Refill Policy. Froylan Yeh 6/4/2019 12:23 PM

## 2019-06-18 ENCOUNTER — OFFICE VISIT (OUTPATIENT)
Dept: FAMILY MEDICINE | Facility: OTHER | Age: 54
End: 2019-06-18
Attending: FAMILY MEDICINE
Payer: COMMERCIAL

## 2019-06-18 VITALS
SYSTOLIC BLOOD PRESSURE: 115 MMHG | BODY MASS INDEX: 39.21 KG/M2 | HEART RATE: 72 BPM | WEIGHT: 274.8 LBS | TEMPERATURE: 97.4 F | DIASTOLIC BLOOD PRESSURE: 80 MMHG | RESPIRATION RATE: 18 BRPM

## 2019-06-18 DIAGNOSIS — I80.9 SUPERFICIAL PHLEBITIS: ICD-10-CM

## 2019-06-18 PROCEDURE — 99213 OFFICE O/P EST LOW 20 MIN: CPT | Performed by: FAMILY MEDICINE

## 2019-06-18 ASSESSMENT — ENCOUNTER SYMPTOMS
SHORTNESS OF BREATH: 0
NERVOUS/ANXIOUS: 0
FEVER: 0
CHILLS: 0

## 2019-06-18 ASSESSMENT — PAIN SCALES - GENERAL: PAINLEVEL: NO PAIN (0)

## 2019-06-18 NOTE — NURSING NOTE
"Chief Complaint   Patient presents with     RECHECK       Initial /80 (BP Location: Right arm, Patient Position: Sitting, Cuff Size: Adult Large)   Pulse 72   Temp 97.4  F (36.3  C) (Tympanic)   Resp 18   Wt 124.6 kg (274 lb 12.8 oz)   Breastfeeding? No   BMI 39.21 kg/m   Estimated body mass index is 39.21 kg/m  as calculated from the following:    Height as of 4/18/19: 1.783 m (5' 10.2\").    Weight as of this encounter: 124.6 kg (274 lb 12.8 oz).  Medication Reconciliation: complete    Shilpi Booker LPN  "

## 2019-06-18 NOTE — LETTER
My Asthma Action Plan  Name: Phyllis Washington   YOB: 1965  Date: 6/18/2019   My doctor: Ya Victoria MD   My clinic: [facility]        My Control Medicine: None  My Rescue Medicine: none   My Asthma Severity: intermittent  Avoid your asthma triggers: upper respiratory infections               GREEN ZONE   Good Control    I feel good    No cough or wheeze    Can work, sleep and play without asthma symptoms       Take your asthma control medicine every day.     1. If exercise triggers your asthma, take your rescue medication    15 minutes before exercise or sports, and    During exercise if you have asthma symptoms  2. Spacer to use with inhaler: If you have a spacer, make sure to use it with your inhaler             YELLOW ZONE Getting Worse  I have ANY of these:    I do not feel good    Cough or wheeze    Chest feels tight    Wake up at night   1. Keep taking your Green Zone medications  2. Start taking your rescue medicine:    every 20 minutes for up to 1 hour. Then every 4 hours for 24-48 hours.  3. If you stay in the Yellow Zone for more than 12-24 hours, contact your doctor.  4. If you do not return to the Green Zone in 12-24 hours or you get worse, start taking your oral steroid medicine if prescribed by your provider.           RED ZONE Medical Alert - Get Help  I have ANY of these:    I feel awful    Medicine is not helping    Breathing getting harder    Trouble walking or talking    Nose opens wide to breathe       1. Take your rescue medicine NOW  2. If your provider has prescribed an oral steroid medicine, start taking it NOW  3. Call your doctor NOW  4. If you are still in the Red Zone after 20 minutes and you have not reached your doctor:    Take your rescue medicine again and    Call 911 or go to the emergency room right away    See your regular doctor within 2 weeks of an Emergency Room or Urgent Care visit for follow-up treatment.          Annual  Reminders:  Meet with Asthma Educator,  Flu Shot in the Fall, consider Pneumonia Vaccination for patients with asthma (aged 19 and older).    Pharmacy: [facility] [id number] [place] [facility] - [place] [address]                      Asthma Triggers  How To Control Things That Make Your Asthma Worse    Triggers are things that make your asthma worse.  Look at the list below to help you find your triggers and what you can do about them.  You can help prevent asthma flare-ups by staying away from your triggers.      Trigger                                                          What you can do   Cigarette Smoke  Tobacco smoke can make asthma worse. Do not allow smoking in your home, car or around you.  Be sure no one smokes at a child s day care or school.  If you smoke, ask your health care provider for ways to help you quit.  Ask family members to quit too.  Ask your health care provider for a referral to Quit Plan to help you quit smoking, or call 5-082-117-PLAN.     Colds, Flu, Bronchitis  These are common triggers of asthma. Wash your hands often.  Don t touch your eyes, nose or mouth.  Get a flu shot every year.     Dust Mites  These are tiny bugs that live in cloth or carpet. They are too small to see. Wash sheets and blankets in hot water every week.   Encase pillows and mattress in dust mite proof covers.  Avoid having carpet if you can. If you have carpet, vacuum weekly.   Use a dust mask and HEPA vacuum.   Pollen and Outdoor Mold  Some people are allergic to trees, grass, or weed pollen, or molds. Try to keep your windows closed.  Limit time out doors when pollen count is high.   Ask you health care provider about taking medicine during allergy season.     Animal Dander  Some people are allergic to skin flakes, urine or saliva from pets with fur or feathers. Keep pets with fur or feathers out of your home.    If you can t keep the pet outdoors, then keep the pet out of your bedroom.  Keep the bedroom  door closed.  Keep pets off cloth furniture and away from stuffed toys.     Mice, Rats, and Cockroaches  Some people are allergic to the waste from these pests.   Cover food and garbage.  Clean up spills and food crumbs.  Store grease in the refrigerator.   Keep food out of the bedroom.   Indoor Mold  This can be a trigger if your home has high moisture. Fix leaking faucets, pipes, or other sources of water.   Clean moldy surfaces.  Dehumidify basement if it is damp and smelly.   Smoke, Strong Odors, and Sprays  These can reduce air quality. Stay away from strong odors and sprays, such as perfume, powder, hair spray, paints, smoke incense, paint, cleaning products, candles and new carpet.   Exercise or Sports  Some people with asthma have this trigger. Be active!  Ask your doctor about taking medicine before sports or exercise to prevent symptoms.    Warm up for 5-10 minutes before and after sports or exercise.     Other Triggers of Asthma  Cold air:  Cover your nose and mouth with a scarf.  Sometimes laughing or crying can be a trigger.  Some medicines and food can trigger asthma.

## 2019-06-18 NOTE — PROGRESS NOTES
"  SUBJECTIVE:   Nursing Notes:   Shilpi Booker LPN  6/18/2019  9:02 AM  Sign at exiting of workspace  Chief Complaint   Patient presents with     RECHECK       Initial /80 (BP Location: Right arm, Patient Position: Sitting, Cuff Size: Adult Large)   Pulse 72   Temp 97.4  F (36.3  C) (Tympanic)   Resp 18   Wt 124.6 kg (274 lb 12.8 oz)   Breastfeeding? No   BMI 39.21 kg/m    Estimated body mass index is 39.21 kg/m  as calculated from the following:    Height as of 4/18/19: 1.783 m (5' 10.2\").    Weight as of this encounter: 124.6 kg (274 lb 12.8 oz).  Medication Reconciliation: tristan Booker LPN    Phyllis VILLASENOR Neururer is a 54 year old female who presents to clinic today for follow up of her superficial thrombophlebitis.  No new phlebitis lesions.  No bleeding issues.  She feels that she still has palpable clot in her medial right knee and proximal medial calf, but does feel like the clotted area is getting smaller with time.  She is going on a road trip driving out [place] 3 [place] in September.  Plans to be wearing her compression stockings on her trip.  She is still on Xarelto 15 mg twice daily at this time.  She is wondering if she was to have decreased to 20 mg daily again.  We had previously talked about having her take the Xarelto for just 3 months, but now she is wondering if she should be on it longer given that clot is still palpable and also that she will be going on the long road trip.    HPI    I personally reviewed medications/allergies/history listed below:    Patient Active Problem List    Diagnosis Date Noted     Health care maintenance 10/22/2018     Priority: Medium     Pap NIL and HPV neg on 8/22/17 ([facility])  DEXA 2015 normal.       Colon polyp 09/20/2017     Priority: Medium     Overview:   Colonoscopy 9/20/17 with transverse colon polyp and sigmoid colon polyp (20 cm), both removed       Thrombosis of saphenous vein, left 11/16/2016     Priority: Medium     Disturbance in " sleep behavior 07/02/2015     Priority: Medium     Overview:   [facility] Sleep Study: Showed Mild upper airway resistance. No sleep apnea or limb movement disorder.   IMO Update       Family history of diabetes mellitus type II 04/07/2015     Priority: Medium     High risk medication use 09/10/2014     Priority: Medium     Overview:   HCQ, MTX, Humira       Cervical muscle pain 07/25/2013     Priority: Medium     Rheumatoid arthritis involving multiple sites with positive rheumatoid factor (H) 09/28/2012     Priority: Medium     Overview:   RF + 121, CCP neg; onset ~ 2011, Dx 2012  Doxy (9/8/2012- 3/2013)  Nuc Med Scan with symmetric uptake  Plaquenil trial 3/2013- ; MTX 7/2013- ; SSA 10/2014-12/2014; Humira 10/2015-   6/2014: VECTRA 42       Vitamin D deficiency 06/18/2012     Priority: Medium     Overview:   Level of 25 on 5/11/2012       Myalgia 05/11/2012     Priority: Medium     Enthesopathy of hip region 01/14/2011     Priority: Medium     Hypercholesterolemia 12/14/2009     Priority: Medium     Allergic rhinitis 11/02/2004     Priority: Medium     Asthma 09/22/2003     Priority: Medium     Overview:   Updated per 10/1/17 IMO import       Esophageal reflux 09/22/2003     Priority: Medium     Essential hypertension, benign 05/14/2001     Priority: Medium     Obesity 05/14/2001     Priority: Medium     Past Medical History:   Diagnosis Date     Personal history of other medical treatment (CODE)     3 normal childbirths      Past Surgical History:   Procedure Laterality Date     COLONOSCOPY  2017    Dr. Fofana - 2 polyps found.  Told to f/u in 5 years.     Family History   Problem Relation Age of Onset     Hypertension Father         Hypertension,67     Other - See Comments Maternal Grandmother         Stroke,85     Other - See Comments Paternal Grandmother         Alzheimer's 73     Breast Cancer Mother 46        Cancer-breast,now 68     Cerebrovascular Disease Mother         stroke with hemiparesis 2017      Diabetes Brother 16        Diabetes,now 41     Social History     Tobacco Use     Smoking status: Never Smoker     Smokeless tobacco: Never Used   Substance Use Topics     Alcohol use: No     Frequency: Never     Comment: Rarely     Social History     Social History Narrative    , 3 adult children;     Homemaker; hobbies - reading, crafts.     Works at frame shop part-time.     Walks 3 miles a day     - Demetrio         Current Outpatient Medications   Medication Sig Dispense Refill     adalimumab (HUMIRA PEN) 40 MG/0.8ML pen kit INJECT ONE PEN (40 MG) SUBCUTANEOUSLY EVERY 14 DAYS . REFRIGERATE DO NOT FREEZE.       folic acid (FOLVITE) 1 MG tablet Take 1 mg by mouth daily       hydroxychloroquine (PLAQUENIL) 200 MG tablet        methotrexate 2.5 MG tablet CHEMO Take 20 mg by mouth once a week       predniSONE (DELTASONE) 5 MG tablet Take 5 mg by mouth daily.  0     ranitidine (ZANTAC) 150 MG capsule Take 1 capsule (150 mg) by mouth 2 times daily 180 capsule 3     rivaroxaban ANTICOAGULANT (XARELTO) 20 MG TABS tablet Take 1 tablet (20 mg) by mouth daily (with dinner) 30 tablet 2     triamterene-hydrochlorothiazide (MAXZIDE-25) 37.5-25 MG per tablet Take 1 tablet by mouth every morning 90 tablet 3     No Known Allergies    Review of Systems   Constitutional: Negative for chills and fever.   Respiratory: Negative for shortness of breath.    Psychiatric/Behavioral: The patient is not nervous/anxious.         OBJECTIVE:     /80 (BP Location: Right arm, Patient Position: Sitting, Cuff Size: Adult Large)   Pulse 72   Temp 97.4  F (36.3  C) (Tympanic)   Resp 18   Wt 124.6 kg (274 lb 12.8 oz)   Breastfeeding? No   BMI 39.21 kg/m    Body mass index is 39.21 kg/m .  Physical Exam   Constitutional: She appears well-developed.   HENT:   Head: Normocephalic.   Eyes: Pupils are equal, round, and reactive to light. EOM are normal.   Neck: Normal range of motion. Neck supple. No thyromegaly present.    Cardiovascular: Normal rate, regular rhythm, normal heart sounds and intact distal pulses. Exam reveals no friction rub.   No murmur heard.  Pulmonary/Chest: Effort normal and breath sounds normal. No respiratory distress. She has no wheezes.   Musculoskeletal: She exhibits no edema.   Palpable varicosities in the medial knee and proximal medial calf regions of her right leg.   Lymphadenopathy:     She has no cervical adenopathy.   Psychiatric: She has a normal mood and affect.       PHQ-2 Score:     PHQ-2 ( 1999 Pfizer) 6/18/2019 4/18/2019   Q1: Little interest or pleasure in doing things 0 0   Q2: Feeling down, depressed or hopeless 0 0   PHQ-2 Score 0 0       ACT Total Scores 4/5/2019 4/18/2019 6/18/2019   ACT TOTAL SCORE (Goal Greater than or Equal to 20) 25 25 25   In the past 12 months, how many times did you visit the emergency room for your asthma without being admitted to the hospital? 0 0 0   In the past 12 months, how many times were you hospitalized overnight because of your asthma? 0 0 0         I personally reviewed results withpatient as listed below:   Diagnostic Test Results:  none     ASSESSMENT/PLAN:       ICD-10-CM    1. Superficial phlebitis I80.9 rivaroxaban ANTICOAGULANT (XARELTO) 20 MG TABS tablet       1.  She still has palpable clot and has plans to go on a long road trip in 3 months.  For this reason, will have her continue the xarelto for 3 additional months.  However, will have her take 20 mg daily instead of the 15 mg twice daily that she has been taking.  Will have her follow up with me in clinic after she is back from her trip at the end of September to see if she would be ready to stop it at that time.  She will wear her compression stockings on her trip as noted above.  Follow up sooner if any worsening symptoms.  Will plan to transition to aspirin 325 mg daily on discontinuation of Xarelto in the hopes that this will help prevent recurrence.    Ya Victoria MD  [facility]

## 2019-06-19 ASSESSMENT — ASTHMA QUESTIONNAIRES: ACT_TOTALSCORE: 25

## 2019-07-25 ENCOUNTER — MYC MEDICAL ADVICE (OUTPATIENT)
Dept: FAMILY MEDICINE | Facility: OTHER | Age: 54
End: 2019-07-25

## 2019-07-25 DIAGNOSIS — I80.9 SUPERFICIAL PHLEBITIS: ICD-10-CM

## 2019-07-26 ENCOUNTER — MYC MEDICAL ADVICE (OUTPATIENT)
Dept: FAMILY MEDICINE | Facility: OTHER | Age: 54
End: 2019-07-26

## 2019-07-26 DIAGNOSIS — I80.9 SUPERFICIAL PHLEBITIS: Primary | ICD-10-CM

## 2019-07-30 RX ORDER — RIVAROXABAN 15 MG/1
TABLET, FILM COATED ORAL
Qty: 30 TABLET | Refills: 0 | OUTPATIENT
Start: 2019-07-30

## 2019-07-30 NOTE — TELEPHONE ENCOUNTER
Filled 07/26/19 #42. Pharmacy alerted. Unable to complete prescription refill per RNMedication Refill Policy.................... Gisela Tim ....................  7/30/2019   11:13 AM    rivaroxaban ANTICOAGULANT (XARELTO) 15 MG TABS tablet 42 tablet 0 7/26/2019 8/16/2019 No   Sig - Route: Take 1 tablet (15 mg) by mouth 2 times daily (with meals) for 21 days , then resume 20 mg daily. - Oral   Sent to pharmacy as: rivaroxaban ANTICOAGULANT (XARELTO) 15 MG TABS tablet   Class: E-Prescribe   Order: [id number]   E-Prescribing Status: Receipt confirmed by pharmacy (7/26/2019  1:04 PM CDT)   Printout Tracking     External Result Report   Medication Administration Instructions     , then resume 20 mg daily.   Pharmacy     [facility] [id number] [address] [facility] - [place], [place] - [facility] [address]

## 2019-08-15 ENCOUNTER — MYC MEDICAL ADVICE (OUTPATIENT)
Dept: FAMILY MEDICINE | Facility: OTHER | Age: 54
End: 2019-08-15

## 2019-08-15 ENCOUNTER — NURSE TRIAGE (OUTPATIENT)
Dept: FAMILY MEDICINE | Facility: OTHER | Age: 54
End: 2019-08-15

## 2019-08-15 DIAGNOSIS — I80.9 SUPERFICIAL PHLEBITIS: Primary | ICD-10-CM

## 2019-08-15 NOTE — TELEPHONE ENCOUNTER
With [facility] out until Monday will send to triage to review advise.   Mireille Bolanos LPN .............8/15/2019     11:41 AM

## 2019-08-15 NOTE — TELEPHONE ENCOUNTER
"Follow up call to patient regarding My Chart message- see below.     S-(situation):/B-(background):  Hi Ya,   My phebitis was improving until yesterday. Pretty sure I developed another area just above the inside of my knee on the right leg. Very painful so trying to elevate it more often and applying warm cloth to the area. I  have been wearing my compression stocking, but sometimes I think I aggravate the areas on my leg just from putting them on. Definitely felt it could have been what happened yesterday.   I was wondering if I would be able to stay with the 30 mg xarelto for another 3 weeks.  Demetrio and I will be leaving in 2 weeks and driving  out west for 18 days. I'd feel better about being on the 30 mg dose during this time.     I was also wondering if I could get a prescription for another pair of compression stockings. I got 2 pairs 3 or 4 years ago from a prescription that I took to [facility] to get. Since they are now closed does [facility] carry some of these medical supplies?     Thank you, Phyllis Washington       A-(assessment): Contacted patient who states, \" I have been dealing with this for 6 months now. Flares up. Come and goes. They do improve and then something flares it up again. I don't know if it is from the stockings or what. I do have 5 days left of the Xarelto\".     R-(recommendations): Writer offered to send to doc of the day since PCP is out of clinic. \" I am ok with you sending it to the doc of the day and if they think it can it can wait until she gets back on Monday I am ok with that too\".     Will route to doc of the day for consideration. Is this OK to wait until PCP returns on Monday to address?        Gisela Tim RN on 8/15/2019 at 12:15 PM          "

## 2019-08-15 NOTE — TELEPHONE ENCOUNTER
"Doc of the day, SIRIA Marr,  reviewed note below. Per SIRIA Marr, \"Ok to wait until [facility] returns on Monday to address. Patient should go to the ED for an evaluation in the interim with increase swelling, calf pain and SOB\". Patient verbalized understanding and intent to comply. \" I will. Thank you so much\". Gisela Tim RN on 8/15/2019 at 12:33 PM    "

## 2019-08-20 NOTE — TELEPHONE ENCOUNTER
"Pended new rx per patient's request to be sent to [facility].    I called [facility] and they do have compression stockings.   She did say that you would need to write my leg measurements for the stockings on the prescription.    My stockings that I have now are JOBST Relief 20-30 mmHg.  If I could get 2 new pairs I would appreciate it!    Here are the measurements:  Ankle=10\"  Calf=17.5\"  Upper thigh=33\"    Yu Becerra LPN........................8/20/2019  11:26 AM    "

## 2019-09-03 ENCOUNTER — TELEPHONE (OUTPATIENT)
Dept: FAMILY MEDICINE | Facility: OTHER | Age: 54
End: 2019-09-03

## 2019-09-03 NOTE — TELEPHONE ENCOUNTER
Left message for patient to call back . Carin Williamson LPN ....................9/3/2019  12:11 PM

## 2019-09-03 NOTE — TELEPHONE ENCOUNTER
Received form from [employer] [employer] . Says that ranitidine 150 mg is not covered as OTC is available . Please advise if the doctor wants to prescribe a different medication or have the patient buy over the counter. Carin Williamson LPN ....................9/3/2019  10:43 AM

## 2019-09-05 NOTE — TELEPHONE ENCOUNTER
No answer . Will wait for patient to call if has questions. Carin Williamson LPN ....................9/5/2019  1:12 PM

## 2019-09-17 ENCOUNTER — TELEPHONE (OUTPATIENT)
Dept: FAMILY MEDICINE | Facility: OTHER | Age: 54
End: 2019-09-17

## 2019-09-17 RX ORDER — MOXIFLOXACIN 5 MG/ML
SOLUTION/ DROPS OPHTHALMIC
Refills: 0 | COMMUNITY
Start: 2019-06-25 | End: 2019-11-12

## 2019-09-17 RX ORDER — HYDROCODONE BITARTRATE AND ACETAMINOPHEN 5; 325 MG/1; MG/1
1 TABLET ORAL EVERY 6 HOURS PRN
Refills: 0 | COMMUNITY
Start: 2019-08-20 | End: 2019-11-12

## 2019-09-17 NOTE — TELEPHONE ENCOUNTER
Called patient back and she is aware of information below. Carin Williamson LPN ....................9/17/2019  9:05 AM

## 2019-09-23 ENCOUNTER — OFFICE VISIT (OUTPATIENT)
Dept: FAMILY MEDICINE | Facility: OTHER | Age: 54
End: 2019-09-23
Attending: FAMILY MEDICINE
Payer: COMMERCIAL

## 2019-09-23 VITALS
SYSTOLIC BLOOD PRESSURE: 102 MMHG | DIASTOLIC BLOOD PRESSURE: 70 MMHG | TEMPERATURE: 98 F | OXYGEN SATURATION: 98 % | BODY MASS INDEX: 40.43 KG/M2 | RESPIRATION RATE: 18 BRPM | HEIGHT: 69 IN | WEIGHT: 273 LBS | HEART RATE: 64 BPM

## 2019-09-23 DIAGNOSIS — M05.79 RHEUMATOID ARTHRITIS INVOLVING MULTIPLE SITES WITH POSITIVE RHEUMATOID FACTOR (H): ICD-10-CM

## 2019-09-23 DIAGNOSIS — Z01.818 PREOP GENERAL PHYSICAL EXAM: Primary | ICD-10-CM

## 2019-09-23 LAB
ALBUMIN SERPL-MCNC: 3.9 G/DL (ref 3.5–5.7)
ALP SERPL-CCNC: 52 U/L (ref 34–104)
ALT SERPL W P-5'-P-CCNC: 23 U/L (ref 7–52)
ANION GAP SERPL CALCULATED.3IONS-SCNC: 6 MMOL/L (ref 3–14)
AST SERPL W P-5'-P-CCNC: 19 U/L (ref 13–39)
BASOPHILS # BLD AUTO: 0 10E9/L (ref 0–0.2)
BASOPHILS NFR BLD AUTO: 0.9 %
BILIRUB SERPL-MCNC: 0.4 MG/DL (ref 0.3–1)
BUN SERPL-MCNC: 19 MG/DL (ref 7–25)
CALCIUM SERPL-MCNC: 9.2 MG/DL (ref 8.6–10.3)
CHLORIDE SERPL-SCNC: 101 MMOL/L (ref 98–107)
CO2 SERPL-SCNC: 31 MMOL/L (ref 21–31)
CREAT SERPL-MCNC: 0.97 MG/DL (ref 0.6–1.2)
DIFFERENTIAL METHOD BLD: NORMAL
EOSINOPHIL # BLD AUTO: 0 10E9/L (ref 0–0.7)
EOSINOPHIL NFR BLD AUTO: 0.4 %
ERYTHROCYTE [DISTWIDTH] IN BLOOD BY AUTOMATED COUNT: 14.7 % (ref 10–15)
GFR SERPL CREATININE-BSD FRML MDRD: 60 ML/MIN/{1.73_M2}
GLUCOSE SERPL-MCNC: 88 MG/DL (ref 70–105)
HCT VFR BLD AUTO: 43.3 % (ref 35–47)
HGB BLD-MCNC: 13.7 G/DL (ref 11.7–15.7)
IMM GRANULOCYTES # BLD: 0 10E9/L (ref 0–0.4)
IMM GRANULOCYTES NFR BLD: 0 %
LYMPHOCYTES # BLD AUTO: 1.8 10E9/L (ref 0.8–5.3)
LYMPHOCYTES NFR BLD AUTO: 37.5 %
MCH RBC QN AUTO: 27.5 PG (ref 26.5–33)
MCHC RBC AUTO-ENTMCNC: 31.6 G/DL (ref 31.5–36.5)
MCV RBC AUTO: 87 FL (ref 78–100)
MONOCYTES # BLD AUTO: 0.5 10E9/L (ref 0–1.3)
MONOCYTES NFR BLD AUTO: 9.6 %
NEUTROPHILS # BLD AUTO: 2.4 10E9/L (ref 1.6–8.3)
NEUTROPHILS NFR BLD AUTO: 51.6 %
PLATELET # BLD AUTO: 244 10E9/L (ref 150–450)
POTASSIUM SERPL-SCNC: 3.9 MMOL/L (ref 3.5–5.1)
PROT SERPL-MCNC: 7 G/DL (ref 6.4–8.9)
RBC # BLD AUTO: 4.98 10E12/L (ref 3.8–5.2)
SODIUM SERPL-SCNC: 138 MMOL/L (ref 134–144)
WBC # BLD AUTO: 4.7 10E9/L (ref 4–11)

## 2019-09-23 PROCEDURE — 99214 OFFICE O/P EST MOD 30 MIN: CPT | Performed by: FAMILY MEDICINE

## 2019-09-23 PROCEDURE — 93000 ELECTROCARDIOGRAM COMPLETE: CPT | Performed by: INTERNAL MEDICINE

## 2019-09-23 PROCEDURE — 36415 COLL VENOUS BLD VENIPUNCTURE: CPT | Mod: ZL | Performed by: FAMILY MEDICINE

## 2019-09-23 PROCEDURE — 85025 COMPLETE CBC W/AUTO DIFF WBC: CPT | Mod: ZL | Performed by: FAMILY MEDICINE

## 2019-09-23 PROCEDURE — 80053 COMPREHEN METABOLIC PANEL: CPT | Mod: ZL | Performed by: FAMILY MEDICINE

## 2019-09-23 ASSESSMENT — MIFFLIN-ST. JEOR: SCORE: 1901.66

## 2019-09-23 ASSESSMENT — PAIN SCALES - GENERAL: PAINLEVEL: MODERATE PAIN (4)

## 2019-09-23 NOTE — PROGRESS NOTES
[facility] AND [facility]  [address]  [place] [address]  [phone]  Dept: [phone]    PRE-OP EVALUATION:  Today's date: 2019    Phyllis VILLASENOR Neururer (: 1965) presents for pre-operative evaluation assessment as requested by Dr. Guerrero.  She requires evaluation and anesthesia risk assessment prior to undergoing surgery/procedure for treatment of Left cataract surgery .    Proposed Surgery/ Procedure: left cataract surger  Date of Surgery/ Procedure: 10/9/19  Time of Surgery/ Procedure: TBD  Hospital/Surgical Facility: [facility] [place], [place]  Fax number for surgical facility:   Primary Physician: Ya Victoria  Type of Anesthesia Anticipated: to be determined    Patient has a Health Care Directive or Living Will:  NO    1. NO - Do you have a history of heart attack, stroke, stent, bypass or surgery on an artery in the head, neck, heart or legs?  2. NO - Do you ever have any pain or discomfort in your chest?  3. NO - Do you have a history of  Heart Failure?  4. NO - Are you troubled by shortness of breath when: walking on the level, up a slight hill or at night?  5. NO - Do you currently have a cold, bronchitis or other respiratory infection?  6. NO - Do you have a cough, shortness of breath or wheezing?  7. NO - Do you sometimes get pains in the calves of your legs when you walk?  8. YES - DO YOU OR ANYONE IN YOUR FAMILY HAVE PREVIOUS HISTORY OF BLOOD CLOTS? Mother has had a blot clot.  Phyllis has had a history of superficial phlebitis.  9. NO - Do you or does anyone in your family have a serious bleeding problem such as prolonged bleeding following surgeries or cuts?  10. YES - HAVE YOU EVER HAD PROBLEMS WITH ANEMIA OR BEEN TOLD TO TAKE IRON PILLS? Pt has hx of anemia many years ago.  11. NO - Have you had any abnormal blood loss such as black, tarry or bloody stools, or abnormal vaginal bleeding?  12. NO - Have you ever had a blood transfusion?  13.  NO - Have you or any of your relatives ever had problems with anesthesia?  14. NO - Do you have sleep apnea, excessive snoring or daytime drowsiness?  15. NO - Do you have any prosthetic heart valves?  16. NO - Do you have prosthetic joints?  17. NO - Is there any chance that you may be pregnant?      HPI:     HPI related to upcoming procedure: Phyllis has had gradual worsening of the vision of her left eye over the past year or two.  She has had a rather rapidly evolving cataract of this eye.  She does take plaquenil for her rheumatoid arthritis.    She was recently on a road trip to [place] with her .  She is still on xarelto 20 mg daily and has been wearing her compression stockings regularly.  She has not noted any new areas of superficial phlebitis.    MEDICAL HISTORY:     Patient Active Problem List    Diagnosis Date Noted     Health care maintenance 10/22/2018     Priority: Medium     Pap NIL and HPV neg on 8/22/17 ([facility])  DEXA 2015 normal.       Colon polyp 09/20/2017     Priority: Medium     Overview:   Colonoscopy 9/20/17 with transverse colon polyp and sigmoid colon polyp (20 cm), both removed       Thrombosis of saphenous vein, left 11/16/2016     Priority: Medium     Disturbance in sleep behavior 07/02/2015     Priority: Medium     Overview:   [facility] Sleep Study: Showed Mild upper airway resistance. No sleep apnea or limb movement disorder.   IMO Update       Family history of diabetes mellitus type II 04/07/2015     Priority: Medium     High risk medication use 09/10/2014     Priority: Medium     Overview:   HCQ, MTX, Humira       Cervical muscle pain 07/25/2013     Priority: Medium     Rheumatoid arthritis involving multiple sites with positive rheumatoid factor (H) 09/28/2012     Priority: Medium     Overview:   RF + 121, CCP neg; onset ~ 2011, Dx 2012  Doxy (9/8/2012- 3/2013)  Nuc Med Scan with symmetric uptake  Plaquenil trial 3/2013- ; MTX 7/2013- ; SSA 10/2014-12/2014; Humira  10/2015-   6/2014: VECTRA 42       Vitamin D deficiency 06/18/2012     Priority: Medium     Overview:   Level of 25 on 5/11/2012       Myalgia 05/11/2012     Priority: Medium     Enthesopathy of hip region 01/14/2011     Priority: Medium     Hypercholesterolemia 12/14/2009     Priority: Medium     Allergic rhinitis 11/02/2004     Priority: Medium     Asthma 09/22/2003     Priority: Medium     Overview:   Updated per 10/1/17 IMO import       Esophageal reflux 09/22/2003     Priority: Medium     Essential hypertension, benign 05/14/2001     Priority: Medium     Obesity 05/14/2001     Priority: Medium      Past Medical History:   Diagnosis Date     Personal history of other medical treatment (CODE)     3 normal childbirths     Past Surgical History:   Procedure Laterality Date     COLONOSCOPY  2017    Dr. Fofana - 2 polyps found.  Told to f/u in 5 years.     Current Outpatient Medications   Medication Sig Dispense Refill     adalimumab (HUMIRA PEN) 40 MG/0.8ML pen kit INJECT ONE PEN (40 MG) SUBCUTANEOUSLY EVERY 14 DAYS . REFRIGERATE DO NOT FREEZE.       folic acid (FOLVITE) 1 MG tablet Take 1 mg by mouth daily       HYDROcodone-acetaminophen (NORCO) 5-325 MG tablet Take 1 tablet by mouth every 6 hours as needed  0     hydroxychloroquine (PLAQUENIL) 200 MG tablet        ILEVRO 0.3 % ophthalmic suspension PLACE 1 DROP INTO LEFT EYE ONCE A DAY. START 3 DAYS BEFORE SURGERY & CONTINUE FOR 6 WEEKS AFTER  1     methotrexate 2.5 MG tablet CHEMO Take 20 mg by mouth once a week       moxifloxacin (VIGAMOX) 0.5 % ophthalmic solution PLACE 1 DROP INTO LEFT EYE 4X A DAY FOR 3 DAYS BEFORE SURGERY AND CONTINUE FOR 1 WEEK AFTER SURGERY  0     order for DME Equipment being ordered: Jobst Stockings   Thigh High: 20-30mm/hg    Leg measures:  Ankle: 10in  Calf: 17.5in  Upper Thigh: 33in 2 each 1     other medical supplies Jobst stockings.  Thigh high.  16-20 mmHg.  Diagnosis:  I80.9.  Length of need:  99. 2 each 1     predniSONE  "(DELTASONE) 5 MG tablet Take 5 mg by mouth daily.  0     ranitidine (ZANTAC) 150 MG capsule Take 1 capsule (150 mg) by mouth 2 times daily 180 capsule 3     rivaroxaban ANTICOAGULANT (XARELTO) 15 MG TABS tablet Take 1 tablet (15 mg) by mouth 2 times daily (with meals) , then resume 20 mg daily. 42 tablet 2     rivaroxaban ANTICOAGULANT (XARELTO) 20 MG TABS tablet Take 1 tablet (20 mg) by mouth daily (with dinner) 30 tablet 2     triamterene-hydrochlorothiazide (MAXZIDE-25) 37.5-25 MG per tablet Take 1 tablet by mouth every morning 90 tablet 3     OTC products: Prednisone - last dose 10-12 days ago.  No recent use of NSAIDs or aspirin.  She is taking Xarelto 20 mg daily.  She plans to stop this on 10/2/19.    No Known Allergies   Latex Allergy: NO    Social History     Tobacco Use     Smoking status: Never Smoker     Smokeless tobacco: Never Used   Substance Use Topics     Alcohol use: No     Frequency: Never     Comment: Rarely     History   Drug Use No       REVIEW OF SYSTEMS:   CONSTITUTIONAL: NEGATIVE for fever, chills, change in weight  INTEGUMENTARY/SKIN: NEGATIVE for worrisome rashes, moles or lesions  EYES: NEGATIVE for vision changes or irritation  ENT/MOUTH: NEGATIVE for ear, mouth and throat problems  RESP: NEGATIVE for significant cough or SOB  BREAST: NEGATIVE for masses, tenderness or discharge  CV: NEGATIVE for chest pain, palpitations or peripheral edema  GI: NEGATIVE for nausea, abdominal pain, heartburn, or change in bowel habits  : NEGATIVE for frequency, dysuria, or hematuria  MUSCULOSKELETAL: NEGATIVE for significant arthralgias or myalgia  NEURO: NEGATIVE for weakness, dizziness or paresthesias  ENDOCRINE: NEGATIVE for temperature intolerance, skin/hair changes  HEME: NEGATIVE for bleeding problems  PSYCHIATRIC: NEGATIVE for changes in mood or affect    EXAM:   /70   Pulse 64   Temp 98  F (36.7  C) (Tympanic)   Resp 18   Ht 1.759 m (5' 9.25\")   Wt 123.8 kg (273 lb)   SpO2 98%   " BMI 40.02 kg/m      GENERAL APPEARANCE: healthy, alert and no distress     EYES: EOMI, PERRL     HENT: ear canals and TM's normal and nose and mouth without ulcers or lesions     NECK: no adenopathy, no asymmetry, masses, or scars and thyroid normal to palpation     RESP: lungs clear to auscultation - no rales, rhonchi or wheezes     CV: regular rates and rhythm, normal S1 S2, no S3 or S4 and no murmur, click or rub     ABDOMEN:  soft, nontender, no HSM or masses and bowel sounds normal     MS: extremities normal- no gross deformities noted, no evidence of inflammation in joints, FROM in all extremities.     SKIN: no suspicious lesions or rashes     NEURO: Normal strength and tone, sensory exam grossly normal, mentation intact and speech normal     PSYCH: mentation appears normal. and affect normal/bright     LYMPHATICS: No cervical adenopathy    DIAGNOSTICS:   EKG: appears normal, NSR, normal axis, normal intervals, no acute ST/T changes c/w ischemia    Results for orders placed or performed in visit on 09/23/19   Comprehensive Metabolic Panel   Result Value Ref Range    Sodium 138 134 - 144 mmol/L    Potassium 3.9 3.5 - 5.1 mmol/L    Chloride 101 98 - 107 mmol/L    Carbon Dioxide 31 21 - 31 mmol/L    Anion Gap 6 3 - 14 mmol/L    Glucose 88 70 - 105 mg/dL    Urea Nitrogen 19 7 - 25 mg/dL    Creatinine 0.97 0.60 - 1.20 mg/dL    GFR Estimate 60 (L) >60 mL/min/[1.73_m2]    GFR Estimate If Black 72 >60 mL/min/[1.73_m2]    Calcium 9.2 8.6 - 10.3 mg/dL    Bilirubin Total 0.4 0.3 - 1.0 mg/dL    Albumin 3.9 3.5 - 5.7 g/dL    Protein Total 7.0 6.4 - 8.9 g/dL    Alkaline Phosphatase 52 34 - 104 U/L    ALT 23 7 - 52 U/L    AST 19 13 - 39 U/L   CBC and Differential   Result Value Ref Range    WBC 4.7 4.0 - 11.0 10e9/L    RBC Count 4.98 3.8 - 5.2 10e12/L    Hemoglobin 13.7 11.7 - 15.7 g/dL    Hematocrit 43.3 35.0 - 47.0 %    MCV 87 78 - 100 fl    MCH 27.5 26.5 - 33.0 pg    MCHC 31.6 31.5 - 36.5 g/dL    RDW 14.7 10.0 - 15.0 %     Platelet Count 244 150 - 450 10e9/L    Diff Method Automated Method     % Neutrophils 51.6 %    % Lymphocytes 37.5 %    % Monocytes 9.6 %    % Eosinophils 0.4 %    % Basophils 0.9 %    % Immature Granulocytes 0.0 %    Absolute Neutrophil 2.4 1.6 - 8.3 10e9/L    Absolute Lymphocytes 1.8 0.8 - 5.3 10e9/L    Absolute Monocytes 0.5 0.0 - 1.3 10e9/L    Absolute Eosinophils 0.0 0.0 - 0.7 10e9/L    Absolute Basophils 0.0 0.0 - 0.2 10e9/L    Abs Immature Granulocytes 0.0 0 - 0.4 10e9/L   EKG 12-LEAD CLINIC READ    Impression    Normal EKG with a rate of 61.  Ayo Covington MD          IMPRESSION:   Reason for surgery/procedure: cataract left eye.  Diagnosis/reason for consult: rheumatoid arthritis.    The proposed surgical procedure is considered LOW risk.    REVISED CARDIAC RISK INDEX  The patient has the following serious cardiovascular risks for perioperative complications such as (MI, PE, VFib and 3  AV Block):  No serious cardiac risks  INTERPRETATION: 0 risks: Class I (very low risk - 0.4% complication rate)    The patient has the following additional risks for perioperative complications:  No identified additional risks      ICD-10-CM    1. Preop general physical exam Z01.818 EKG 12-LEAD CLINIC READ     CBC and Differential     Comprehensive Metabolic Panel     Comprehensive Metabolic Panel     CBC and Differential   2. Rheumatoid arthritis involving multiple sites with positive rheumatoid factor (H) M05.79 CBC and Differential     Comprehensive Metabolic Panel     Comprehensive Metabolic Panel     CBC and Differential       RECOMMENDATIONS:     She will plan to stop the xarelto and aspirin a week prior to her surgery.    APPROVAL GIVEN to proceed with proposed procedure, without further diagnostic evaluation       Signed Electronically by: Ya Victoria MD    Copy of this evaluation report is provided to requesting physician.    [facility] Preop Guidelines    Revised Cardiac Risk Index

## 2019-09-23 NOTE — NURSING NOTE
Pt presents to clinic today for pre-op exam.      Medication Reconciliation: complete  Ann Felder LPN

## 2019-09-26 DIAGNOSIS — I10 ESSENTIAL HYPERTENSION, BENIGN: ICD-10-CM

## 2019-09-27 RX ORDER — TRIAMTERENE/HYDROCHLOROTHIAZID 37.5-25 MG
TABLET ORAL
Qty: 90 TABLET | Refills: 3 | Status: SHIPPED | OUTPATIENT
Start: 2019-09-27 | End: 2020-08-24

## 2019-09-27 NOTE — TELEPHONE ENCOUNTER
"Requested Prescriptions   Pending Prescriptions Disp Refills     triamterene-HCTZ (MAXZIDE-25) 37.5-25 MG tablet [Pharmacy Med Name: TRIAMTERENE-HCTZ 37.5-25 MG TB] 90 tablet 3     Sig: TAKE 1 TABLET BY MOUTH EVERY DAY IN THE MORNING       Diuretics (Including Combos) Protocol Passed - 9/26/2019  1:34 AM        Passed - Blood pressure under 140/90 in past 12 months     BP Readings from Last 3 Encounters:   09/23/19 102/70   06/18/19 115/80   04/18/19 122/60                 Passed - Recent (12 mo) or future (30 days) visit within the authorizing provider's specialty     Patient had office visit in the last 12 months or has a visit in the next 30 days with authorizing provider or within the authorizing provider's specialty.  See \"Patient Info\" tab in inbasket, or \"Choose Columns\" in Meds & Orders section of the refill encounter.              Passed - Medication is active on med list        Passed - Patient is age 18 or older        Passed - No active pregancy on record        Passed - Normal serum creatinine on file in past 12 months     Recent Labs   Lab Test 09/23/19  1155   CR 0.97              Passed - Normal serum potassium on file in past 12 months     Recent Labs   Lab Test 09/23/19  1155   POTASSIUM 3.9                    Passed - Normal serum sodium on file in past 12 months     Recent Labs   Lab Test 09/23/19  1155                 Passed - No positive pregnancy test in past 12 months        LOV 9/23/19  Prescription approved per [facility] Refill Protocol.  Brenda J. Goodell, RN on 9/27/2019 at 9:52 AM      "

## 2019-10-10 ENCOUNTER — TELEPHONE (OUTPATIENT)
Dept: FAMILY MEDICINE | Facility: OTHER | Age: 54
End: 2019-10-10

## 2019-10-10 ENCOUNTER — HOSPITAL ENCOUNTER (OUTPATIENT)
Dept: GENERAL RADIOLOGY | Facility: OTHER | Age: 54
Discharge: HOME | End: 2019-10-10
Attending: FAMILY MEDICINE | Admitting: FAMILY MEDICINE
Payer: COMMERCIAL

## 2019-10-10 ENCOUNTER — OFFICE VISIT (OUTPATIENT)
Dept: FAMILY MEDICINE | Facility: OTHER | Age: 54
End: 2019-10-10
Attending: FAMILY MEDICINE
Payer: COMMERCIAL

## 2019-10-10 ENCOUNTER — HOSPITAL ENCOUNTER (OUTPATIENT)
Dept: GENERAL RADIOLOGY | Facility: OTHER | Age: 54
End: 2019-10-10
Attending: FAMILY MEDICINE
Payer: COMMERCIAL

## 2019-10-10 VITALS
SYSTOLIC BLOOD PRESSURE: 110 MMHG | DIASTOLIC BLOOD PRESSURE: 68 MMHG | RESPIRATION RATE: 16 BRPM | HEART RATE: 60 BPM | HEIGHT: 69 IN | WEIGHT: 275.6 LBS | BODY MASS INDEX: 40.82 KG/M2 | TEMPERATURE: 97.7 F

## 2019-10-10 DIAGNOSIS — S46.819A STRAIN OF TRAPEZIUS MUSCLE, UNSPECIFIED LATERALITY, INITIAL ENCOUNTER: Primary | ICD-10-CM

## 2019-10-10 DIAGNOSIS — M54.2 NECK PAIN: ICD-10-CM

## 2019-10-10 DIAGNOSIS — M25.512 ACUTE PAIN OF LEFT SHOULDER: ICD-10-CM

## 2019-10-10 DIAGNOSIS — I80.9 SUPERFICIAL PHLEBITIS: ICD-10-CM

## 2019-10-10 DIAGNOSIS — Z23 NEED FOR INFLUENZA VACCINATION: ICD-10-CM

## 2019-10-10 DIAGNOSIS — I83.93 VARICOSE VEINS OF BOTH LOWER EXTREMITIES, UNSPECIFIED WHETHER COMPLICATED: ICD-10-CM

## 2019-10-10 PROCEDURE — 90471 IMMUNIZATION ADMIN: CPT | Performed by: FAMILY MEDICINE

## 2019-10-10 PROCEDURE — 99214 OFFICE O/P EST MOD 30 MIN: CPT | Mod: 25 | Performed by: FAMILY MEDICINE

## 2019-10-10 PROCEDURE — 90686 IIV4 VACC NO PRSV 0.5 ML IM: CPT | Performed by: FAMILY MEDICINE

## 2019-10-10 PROCEDURE — 72050 X-RAY EXAM NECK SPINE 4/5VWS: CPT

## 2019-10-10 PROCEDURE — 73030 X-RAY EXAM OF SHOULDER: CPT | Mod: LT

## 2019-10-10 ASSESSMENT — ENCOUNTER SYMPTOMS
COUGH: 0
JOINT SWELLING: 0
NECK PAIN: 1
SHORTNESS OF BREATH: 0
ARTHRALGIAS: 1
MYALGIAS: 1
NERVOUS/ANXIOUS: 0
FEVER: 0
BACK PAIN: 1

## 2019-10-10 ASSESSMENT — PAIN SCALES - GENERAL: PAINLEVEL: MODERATE PAIN (5)

## 2019-10-10 ASSESSMENT — MIFFLIN-ST. JEOR: SCORE: 1913.45

## 2019-10-10 NOTE — TELEPHONE ENCOUNTER
Please put patient is at 10:15 for 30 minutes for back and arm pain. Carin Williamson LPN ....................10/10/2019  8:54 AM

## 2019-10-10 NOTE — NURSING NOTE
Chief Complaint   Patient presents with     Back Pain     Patient presents to the clinic with back pain. Patient stated pain started in left shoulder about 2 weeks ago. Pain radiates down arm and back.   Medication Reconciliation: completed   Daysi Matthews LPN  10/10/2019 10:08 AM

## 2019-10-10 NOTE — PROGRESS NOTES
SUBJECTIVE:   Nursing Notes:   Daysi Matthews LPN  10/10/2019 10:10 AM  Sign at exiting of workspace  Chief Complaint   Patient presents with     Back Pain     Patient presents to the clinic with back pain. Patient stated pain started in left shoulder about 2 weeks ago. Pain radiates down arm and back.   Medication Reconciliation: completed   Daysi Matthews LPN  10/10/2019 10:08 AM     Phyllis Rayurer is a 54 year old female who presents to clinic today for upper back pain.  Started with left posterior shoulder pain 2 weeks ago after helping her  hold up a heavy wooden section of a deer stand that he has been building.  The pain started a couple of days after doing this. The pain is sharp at times, sometimes like an electrical sensation.  Has a stiffness of her neck.  Feels a specific spot in her neck that is sore.  Worse when she goes to bed, more so if she lays on her right side.  Today, the pain has moved over to her right shoulder as well.  No pain radiating into her hands.  Does have a pain that radiates to her left shoulder at times.  No weakness of  in her hands.  No swelling of her hands or arms.  Has had more rheumatoid arthritis pain in her hands and other joints lately.  She usually doesn't get sharp pain in her shoulders like she has had lately.  She has had more more esophageal discomfort lately.  when she swallows, has a pain in her chest.  No chest pain with exertion.  She is taking zantac for gastroesophageal reflux disease symptoms.  Had cataract surgery yesterday.      She has had a history of extensive varicosities in her lower extremities with recurrent superficial phlebitis.  She had been on xarelto on a couple of different occasions to get these areas of phlebitis to resolve.  She feels that things are stable right now, but is worried that it could occur again in the future.  She is planning on restarting aspirin, which she had been holding due to her recent cataract surgery.    She  needs a flu shot today.    HPI    I personally reviewed medications/allergies/history listed below:    Patient Active Problem List    Diagnosis Date Noted     Health care maintenance 10/22/2018     Priority: Medium     Pap NIL and HPV neg on 8/22/17 ([facility])  DEXA 2015 normal.       Colon polyp 09/20/2017     Priority: Medium     Overview:   Colonoscopy 9/20/17 with transverse colon polyp and sigmoid colon polyp (20 cm), both removed       Thrombosis of saphenous vein, left 11/16/2016     Priority: Medium     Disturbance in sleep behavior 07/02/2015     Priority: Medium     Overview:   [facility] Sleep Study: Showed Mild upper airway resistance. No sleep apnea or limb movement disorder.   IMO Update       Family history of diabetes mellitus type II 04/07/2015     Priority: Medium     High risk medication use 09/10/2014     Priority: Medium     Overview:   HCQ, MTX, Humira       Cervical muscle pain 07/25/2013     Priority: Medium     Rheumatoid arthritis involving multiple sites with positive rheumatoid factor (H) 09/28/2012     Priority: Medium     Overview:   RF + 121, CCP neg; onset ~ 2011, Dx 2012  Doxy (9/8/2012- 3/2013)  Nuc Med Scan with symmetric uptake  Plaquenil trial 3/2013- ; MTX 7/2013- ; SSA 10/2014-12/2014; Humira 10/2015-   6/2014: VECTRA 42       Vitamin D deficiency 06/18/2012     Priority: Medium     Overview:   Level of 25 on 5/11/2012       Myalgia 05/11/2012     Priority: Medium     Enthesopathy of hip region 01/14/2011     Priority: Medium     Hypercholesterolemia 12/14/2009     Priority: Medium     Allergic rhinitis 11/02/2004     Priority: Medium     Asthma 09/22/2003     Priority: Medium     Overview:   Updated per 10/1/17 IMO import       Esophageal reflux 09/22/2003     Priority: Medium     Essential hypertension, benign 05/14/2001     Priority: Medium     Obesity 05/14/2001     Priority: Medium     Past Medical History:   Diagnosis Date     Personal history of other medical treatment  (CODE)     3 normal childbirths      Past Surgical History:   Procedure Laterality Date     COLONOSCOPY  2017    Dr. Fofana - 2 polyps found.  Told to f/u in 5 years.     Family History   Problem Relation Age of Onset     Hypertension Father         Hypertension,67     Other - See Comments Maternal Grandmother         Stroke,85     Other - See Comments Paternal Grandmother         Alzheimer's 73     Breast Cancer Mother 46        Cancer-breast,now 68     Cerebrovascular Disease Mother         stroke with hemiparesis 2017     Diabetes Brother 16        Diabetes,now 41     Social History     Tobacco Use     Smoking status: Never Smoker     Smokeless tobacco: Never Used   Substance Use Topics     Alcohol use: No     Frequency: Never     Comment: Rarely     Social History     Patient does not qualify to have social determinant information on file (likely too young).   Social History Narrative    , 3 adult children;     Homemaker; hobbies - reading, crafts.     Works at frame shop part-time.     Walks 3 miles a day     - Demetrio         Current Outpatient Medications   Medication Sig Dispense Refill     adalimumab (HUMIRA PEN) 40 MG/0.8ML pen kit INJECT ONE PEN (40 MG) SUBCUTANEOUSLY EVERY 14 DAYS . REFRIGERATE DO NOT FREEZE.       folic acid (FOLVITE) 1 MG tablet Take 1 mg by mouth daily       HYDROcodone-acetaminophen (NORCO) 5-325 MG tablet Take 1 tablet by mouth every 6 hours as needed  0     hydroxychloroquine (PLAQUENIL) 200 MG tablet        ILEVRO 0.3 % ophthalmic suspension PLACE 1 DROP INTO LEFT EYE ONCE A DAY. START 3 DAYS BEFORE SURGERY & CONTINUE FOR 6 WEEKS AFTER  1     methotrexate 2.5 MG tablet CHEMO Take 20 mg by mouth once a week       moxifloxacin (VIGAMOX) 0.5 % ophthalmic solution PLACE 1 DROP INTO LEFT EYE 4X A DAY FOR 3 DAYS BEFORE SURGERY AND CONTINUE FOR 1 WEEK AFTER SURGERY  0     order for DME Equipment being ordered: Jobst Stockings   Thigh High: 20-30mm/hg    Leg measures:  Ankle:  "10in  Calf: 17.5in  Upper Thigh: 33in 2 each 1     other medical supplies Jobst stockings.  Thigh high.  16-20 mmHg.  Diagnosis:  I80.9.  Length of need:  99. 2 each 1     predniSONE (DELTASONE) 5 MG tablet Take 5 mg by mouth daily.  0     ranitidine (ZANTAC) 150 MG capsule Take 1 capsule (150 mg) by mouth 2 times daily 180 capsule 3     rivaroxaban ANTICOAGULANT (XARELTO) 15 MG TABS tablet Take 1 tablet (15 mg) by mouth 2 times daily (with meals) , then resume 20 mg daily. 42 tablet 2     rivaroxaban ANTICOAGULANT (XARELTO) 20 MG TABS tablet Take 1 tablet (20 mg) by mouth daily (with dinner) 30 tablet 2     triamterene-HCTZ (MAXZIDE-25) 37.5-25 MG tablet TAKE 1 TABLET BY MOUTH EVERY DAY IN THE MORNING 90 tablet 3     No Known Allergies    Review of Systems   Constitutional: Negative for fever.   Respiratory: Negative for cough and shortness of breath.    Cardiovascular: Negative for peripheral edema.   Musculoskeletal: Positive for arthralgias, back pain, myalgias and neck pain. Negative for joint swelling.   Psychiatric/Behavioral: Negative for mood changes. The patient is not nervous/anxious.         OBJECTIVE:     /68 (BP Location: Right arm, Patient Position: Sitting, Cuff Size: Adult Regular)   Pulse 60   Temp 97.7  F (36.5  C) (Tympanic)   Resp 16   Ht 1.759 m (5' 9.25\")   Wt 125 kg (275 lb 9.6 oz)   BMI 40.41 kg/m    Body mass index is 40.41 kg/m .  Physical Exam  HENT:      Head: Normocephalic.   Eyes:      Pupils: Pupils are equal, round, and reactive to light.   Neck:      Musculoskeletal: Normal range of motion and neck supple.   Cardiovascular:      Rate and Rhythm: Normal rate and regular rhythm.      Pulses: Normal pulses.      Heart sounds: Normal heart sounds. No murmur.   Pulmonary:      Effort: Pulmonary effort is normal.      Breath sounds: Normal breath sounds. No wheezing, rhonchi or rales.   Musculoskeletal:      Right lower leg: No edema.      Left lower leg: No edema.      " Comments: Cervical spine:  No spinous process tenderness in cervical region.  She has paraspinous/trapezius tenderness on the left side.    Left shoulder:  She is able to abduct fully to 180 degrees actively.  Neer's, Hawkin's and Empty can tests are all negative.  Normal range of motion with internal and external rotation.   Lymphadenopathy:      Cervical: No cervical adenopathy.   Neurological:      Mental Status: She is alert.         PHQ-2 Score:     PHQ-2 ( 1999 Pfizer) 10/10/2019 9/23/2019   Q1: Little interest or pleasure in doing things 0 0   Q2: Feeling down, depressed or hopeless 0 0   PHQ-2 Score 0 0         ACT Total Scores 4/5/2019 4/18/2019 6/18/2019   ACT TOTAL SCORE (Goal Greater than or Equal to 20) 25 25 25   In the past 12 months, how many times did you visit the emergency room for your asthma without being admitted to the hospital? 0 0 0   In the past 12 months, how many times were you hospitalized overnight because of your asthma? 0 0 0         I personally reviewed results withpatient as listed below:   Diagnostic Test Results:      PROCEDURE:  XR SHOULDER LT G/E 3 VW     HISTORY: Acute pain of left shoulder.     COMPARISON:  None.     TECHNIQUE:  3 views left shoulder.     FINDINGS:  No fracture or dislocation is identified. The joint spaces  are preserved. No foreign body is seen.                                                                       IMPRESSION: No acute fracture.       PENNY ROMAN MD        PROCEDURE: XR CERVICAL SPINE G/E 4 VW     HISTORY: Neck pain.     COMPARISON: None.     TECHNIQUE: 5 views of the cervical spine were obtained.     FINDINGS: No acute fracture or subluxation is seen. Alignment is  maintained. The odontoid is intact. Disc height loss is mild to  moderate at C4-5, associated with mild uncovertebral hypertrophy and  mild left and mild-to-moderate right foraminal stenosis. Scattered  facet degenerative changes are present.                                                                       IMPRESSION: Degenerative changes of the cervical spine are most  pronounced at C4-5.      PENNY ROMAN MD      ASSESSMENT/PLAN:       ICD-10-CM    1. Strain of trapezius muscle, unspecified laterality, initial encounter S46.819A    2. Neck pain M54.2 XR Cervical Spine G/E 4 Views   3. Acute pain of left shoulder M25.512 XR Shoulder Left G/E 3 Views   4. Superficial phlebitis I80.9 Oncology/Hematology Adult Referral   5. Varicose veins of both lower extremities, unspecified whether complicated I83.93 RADIOLOGY REFERRAL   6. Need for influenza vaccination Z23 GH-IMM- FLU VAC PRESRV FREE QUAD SPLIT VIR > 6 MONTHS IM       1.  She does have some degenerative changes on her cervical spine x-ray, but her pain seems to be focused over her trapezius region.  Offered Physical Therapy referral, which she declines today.  May take ibuprofen, etc if needed for pain.    3.  See #1.  Discussed if symptoms are not improving, could also consider MRI of her cervical spine.  2.  See #1.  3.  She seems to have good mobility of the shoulder itself and x-ray of shoulder looks ok.  Likely due to trapezius issue as noted above.  4.  Given the extensive nature of these in the past and the recurrences she has had, will refer to hematology for further evaluation.  She has a history of rheumatoid arthritis as well and question whether a thrombosis work up would be appropriate.  5.  Referred for a radiology consult to see whether she would be a candidate for ablative procedure.  6.  Flu shot updated today.    Ya Victoria MD  [facility] AND [facility]    Portions of this dictation were created using the Dragon Nuance voice recognition system. Proofreading was completed but there may be errors in text.

## 2019-10-10 NOTE — TELEPHONE ENCOUNTER
Patient is wanting a work in appointment for back pain and shoulder pain if at all possible for today. Please review and call patient. Thanks!

## 2019-10-25 ENCOUNTER — HOSPITAL ENCOUNTER (OUTPATIENT)
Dept: ULTRASOUND IMAGING | Facility: OTHER | Age: 54
Discharge: HOME | End: 2019-10-25
Attending: FAMILY MEDICINE | Admitting: FAMILY MEDICINE
Payer: COMMERCIAL

## 2019-10-25 DIAGNOSIS — I83.93 VARICOSE VEINS OF BOTH LOWER EXTREMITIES, UNSPECIFIED WHETHER COMPLICATED: ICD-10-CM

## 2019-10-25 PROCEDURE — 93970 EXTREMITY STUDY: CPT

## 2019-10-31 ENCOUNTER — TELEPHONE (OUTPATIENT)
Dept: FAMILY MEDICINE | Facility: OTHER | Age: 54
End: 2019-10-31

## 2019-10-31 DIAGNOSIS — I83.93 VARICOSE VEINS OF BOTH LOWER EXTREMITIES, UNSPECIFIED WHETHER COMPLICATED: Primary | ICD-10-CM

## 2019-10-31 RX ORDER — DIAZEPAM 10 MG
TABLET ORAL
Qty: 2 TABLET | Refills: 0 | Status: SHIPPED | OUTPATIENT
Start: 2019-10-31 | End: 2020-07-06

## 2019-10-31 NOTE — TELEPHONE ENCOUNTER
----- Message from Pat Maradiaga RN sent at 10/30/2019  1:48 PM CDT -----  Regarding: Valium pre-procedure vein ablation  The patient has been approved for a vein ablation. The patient will have the option of receiving 10 mg valium 1 hour prior to procedure and is having 2 veins done on separate days. Could you please order 2 valium for the patient to  prior to the procedure? Thank you! :)    Registered Nurse  Diagnostic Imaging  [facility]  [phone]

## 2019-11-04 ENCOUNTER — TELEPHONE (OUTPATIENT)
Dept: FAMILY MEDICINE | Facility: OTHER | Age: 54
End: 2019-11-04

## 2019-11-04 NOTE — TELEPHONE ENCOUNTER
[facility]-pt was sent wrong compression stockings for her vein procedure with Dr. Lauren. Please call. Thank you.  Gisela Lomax

## 2019-11-04 NOTE — TELEPHONE ENCOUNTER
Called patient and she needed number to call [facility] about her compression stockings for up coming vein procedure. Carin Williamson LPN ....................11/4/2019  10:47 AM

## 2019-11-12 ENCOUNTER — OFFICE VISIT (OUTPATIENT)
Dept: FAMILY MEDICINE | Facility: OTHER | Age: 54
End: 2019-11-12
Attending: FAMILY MEDICINE
Payer: COMMERCIAL

## 2019-11-12 VITALS
TEMPERATURE: 96 F | SYSTOLIC BLOOD PRESSURE: 120 MMHG | OXYGEN SATURATION: 92 % | BODY MASS INDEX: 41.18 KG/M2 | RESPIRATION RATE: 20 BRPM | HEART RATE: 65 BPM | WEIGHT: 278 LBS | DIASTOLIC BLOOD PRESSURE: 80 MMHG | HEIGHT: 69 IN

## 2019-11-12 DIAGNOSIS — Z12.31 VISIT FOR SCREENING MAMMOGRAM: ICD-10-CM

## 2019-11-12 DIAGNOSIS — K21.9 GASTROESOPHAGEAL REFLUX DISEASE WITHOUT ESOPHAGITIS: ICD-10-CM

## 2019-11-12 DIAGNOSIS — Z13.29 SCREENING FOR THYROID DISORDER: ICD-10-CM

## 2019-11-12 DIAGNOSIS — Z13.220 SCREENING FOR LIPID DISORDERS: ICD-10-CM

## 2019-11-12 DIAGNOSIS — Z00.00 HEALTH CARE MAINTENANCE: Primary | ICD-10-CM

## 2019-11-12 DIAGNOSIS — I83.93 VARICOSE VEINS OF BOTH LOWER EXTREMITIES, UNSPECIFIED WHETHER COMPLICATED: ICD-10-CM

## 2019-11-12 PROCEDURE — 99396 PREV VISIT EST AGE 40-64: CPT | Performed by: FAMILY MEDICINE

## 2019-11-12 RX ORDER — HYDROCODONE BITARTRATE AND ACETAMINOPHEN 5; 325 MG/1; MG/1
1 TABLET ORAL EVERY 6 HOURS PRN
Refills: 0 | Status: CANCELLED | OUTPATIENT
Start: 2019-11-12

## 2019-11-12 RX ORDER — DIAZEPAM 10 MG
TABLET ORAL
Qty: 2 TABLET | Refills: 0 | Status: CANCELLED | OUTPATIENT
Start: 2019-11-12

## 2019-11-12 RX ORDER — FAMOTIDINE 20 MG/1
20 TABLET, FILM COATED ORAL 2 TIMES DAILY
Qty: 180 TABLET | Refills: 3 | Status: SHIPPED | OUTPATIENT
Start: 2019-11-12

## 2019-11-12 ASSESSMENT — ENCOUNTER SYMPTOMS
NERVOUS/ANXIOUS: 0
ARTHRALGIAS: 1
CHILLS: 0
FEVER: 0

## 2019-11-12 ASSESSMENT — MIFFLIN-ST. JEOR: SCORE: 1924.34

## 2019-11-12 ASSESSMENT — PAIN SCALES - GENERAL: PAINLEVEL: MODERATE PAIN (5)

## 2019-11-12 NOTE — PROGRESS NOTES
"  SUBJECTIVE:   Nursing Notes:   Carin Williamson LPN  11/12/2019  8:33 AM  Sign at exiting of workspace  Chief Complaint   Patient presents with     Physical       Initial /80 (BP Location: Right arm, Patient Position: Sitting, Cuff Size: Adult Large)   Pulse 65   Temp 96  F (35.6  C) (Tympanic)   Resp 20   Ht 1.759 m (5' 9.25\")   Wt 126.1 kg (278 lb)   SpO2 92%   BMI 40.76 kg/m    Estimated body mass index is 40.76 kg/m  as calculated from the following:    Height as of this encounter: 1.759 m (5' 9.25\").    Weight as of this encounter: 126.1 kg (278 lb).  Medication Reconciliation: complete    Carin Williamson LPN    Phyllis Washington is a 54 year old female who presents to clinic today for a physical.    She has had significant issues with lower extremity varicosities and recurrent superficial thrombophlebitis.  She had been on Xarelto earlier this year due to the extent of the superficial phlebitis.  She did not have any DVTs.  She is no longer on Xarelto, but is taking a full 325 mg aspirin daily.  Although she hasn't noticed any increase in her phlebitis since she stopped the Xarelto in September, she hasn't noticed that they have fully resolved either.  She has an upcoming appointment to see Dr. Ojeda on 11/1/519 to discuss this further.  She has an underlying history of rheumatoid arthritis as well.      She has been taking zantac for gastroesophageal reflux disease symptoms.  She recently was told that the zantac was unavailable.  She has been having a little epigastric burning recently, but no acid reflux.  She still has some protonix available at home that she was taking previously.        HPI    I personally reviewed medications/allergies/history listed below:    Patient Active Problem List    Diagnosis Date Noted     Health care maintenance 10/22/2018     Priority: Medium     Pap NIL and HPV neg on 8/22/17 ([facility])  DEXA 2015 normal.       Colon polyp 09/20/2017     Priority: Medium "     Overview:   Colonoscopy 9/20/17 with transverse colon polyp and sigmoid colon polyp (20 cm), both removed       Thrombosis of saphenous vein, left 11/16/2016     Priority: Medium     Disturbance in sleep behavior 07/02/2015     Priority: Medium     Overview:   [facility] Sleep Study: Showed Mild upper airway resistance. No sleep apnea or limb movement disorder.   IMO Update       Family history of diabetes mellitus type II 04/07/2015     Priority: Medium     High risk medication use 09/10/2014     Priority: Medium     Overview:   HCQ, MTX, Humira       Cervical muscle pain 07/25/2013     Priority: Medium     Rheumatoid arthritis involving multiple sites with positive rheumatoid factor (H) 09/28/2012     Priority: Medium     Overview:   RF + 121, CCP neg; onset ~ 2011, Dx 2012  Doxy (9/8/2012- 3/2013)  Nuc Med Scan with symmetric uptake  Plaquenil trial 3/2013- ; MTX 7/2013- ; SSA 10/2014-12/2014; Humira 10/2015-   6/2014: VECTRA 42       Vitamin D deficiency 06/18/2012     Priority: Medium     Overview:   Level of 25 on 5/11/2012       Myalgia 05/11/2012     Priority: Medium     Enthesopathy of hip region 01/14/2011     Priority: Medium     Hypercholesterolemia 12/14/2009     Priority: Medium     Allergic rhinitis 11/02/2004     Priority: Medium     Asthma 09/22/2003     Priority: Medium     Overview:   Updated per 10/1/17 IMO import       Esophageal reflux 09/22/2003     Priority: Medium     Essential hypertension, benign 05/14/2001     Priority: Medium     Obesity 05/14/2001     Priority: Medium     Past Medical History:   Diagnosis Date     Personal history of other medical treatment (CODE)     3 normal childbirths      Past Surgical History:   Procedure Laterality Date     COLONOSCOPY  2017    Dr. Fofana - 2 polyps found.  Told to f/u in 5 years.     Family History   Problem Relation Age of Onset     Hypertension Father         Hypertension,67     Other - See Comments Maternal Grandmother         Stroke,85      Other - See Comments Paternal Grandmother         Alzheimer's 73     Breast Cancer Mother 46        Cancer-breast,now 68     Cerebrovascular Disease Mother         stroke with hemiparesis 2017     Diabetes Brother 16        Diabetes,now 41     Social History     Tobacco Use     Smoking status: Never Smoker     Smokeless tobacco: Never Used   Substance Use Topics     Alcohol use: No     Frequency: Never     Comment: Rarely     Social History     Patient does not qualify to have social determinant information on file (likely too young).   Social History Narrative    , 3 adult children;     Homemaker; hobbies - reading, crafts.     Works at frame shop part-time.     Walks 3 miles a day     - Demetrio         Current Outpatient Medications   Medication Sig Dispense Refill     adalimumab (HUMIRA PEN) 40 MG/0.8ML pen kit INJECT ONE PEN (40 MG) SUBCUTANEOUSLY EVERY 14 DAYS . REFRIGERATE DO NOT FREEZE.       diazepam (VALIUM) 10 MG tablet Take 1 by mouth 60 minutes prior to procedure. 2 tablet 0     famotidine (PEPCID) 20 MG tablet Take 1 tablet (20 mg) by mouth 2 times daily 180 tablet 3     folic acid (FOLVITE) 1 MG tablet Take 1 mg by mouth daily       hydroxychloroquine (PLAQUENIL) 200 MG tablet        ILEVRO 0.3 % ophthalmic suspension PLACE 1 DROP INTO LEFT EYE ONCE A DAY. START 3 DAYS BEFORE SURGERY & CONTINUE FOR 6 WEEKS AFTER  1     methotrexate 2.5 MG tablet CHEMO Take 20 mg by mouth once a week       order for DME Equipment being ordered: Jobst Stockings   Thigh High: 20-30mm/hg    Leg measures:  Ankle: 10in  Calf: 17.5in  Upper Thigh: 33in 2 each 1     other medical supplies Jobst stockings.  Thigh high.  16-20 mmHg.  Diagnosis:  I80.9.  Length of need:  99. 2 each 1     predniSONE (DELTASONE) 5 MG tablet Take 5 mg by mouth daily.  0     triamterene-HCTZ (MAXZIDE-25) 37.5-25 MG tablet TAKE 1 TABLET BY MOUTH EVERY DAY IN THE MORNING 90 tablet 3     No Known Allergies    Review of Systems  "  Constitutional: Negative for chills and fever.   Cardiovascular: Negative for peripheral edema.   Musculoskeletal: Positive for arthralgias.   Psychiatric/Behavioral: Negative for mood changes. The patient is not nervous/anxious.         OBJECTIVE:     /80 (BP Location: Right arm, Patient Position: Sitting, Cuff Size: Adult Large)   Pulse 65   Temp 96  F (35.6  C) (Tympanic)   Resp 20   Ht 1.759 m (5' 9.25\")   Wt 126.1 kg (278 lb)   SpO2 92%   BMI 40.76 kg/m    Body mass index is 40.76 kg/m .  Physical Exam  Constitutional:       General: She is not in acute distress.     Appearance: She is well-developed.   HENT:      Head: Normocephalic.      Right Ear: Tympanic membrane and external ear normal.      Left Ear: Tympanic membrane and external ear normal.      Nose: Nose normal.      Mouth/Throat:      Pharynx: No oropharyngeal exudate.   Eyes:      General:         Right eye: No discharge.         Left eye: No discharge.      Conjunctiva/sclera: Conjunctivae normal.      Pupils: Pupils are equal, round, and reactive to light.   Neck:      Musculoskeletal: Neck supple.      Thyroid: No thyromegaly.      Trachea: No tracheal deviation.   Cardiovascular:      Rate and Rhythm: Normal rate and regular rhythm.      Pulses: Normal pulses.      Heart sounds: Normal heart sounds, S1 normal and S2 normal. No murmur. No friction rub. No gallop. No S3 or S4 sounds.    Pulmonary:      Effort: Pulmonary effort is normal. No respiratory distress.      Breath sounds: Normal breath sounds. No wheezing or rales.      Comments: Breast exam:  No masses palpable bilaterally.  No skin changes, tethering or axillary lymphadenopathy bilaterally.    Abdominal:      General: Bowel sounds are normal. There is no distension.      Palpations: Abdomen is soft. There is no mass.      Tenderness: There is no tenderness.   Genitourinary:     Comments: Pelvic/Rectal exams deferred per patient.  Musculoskeletal: Normal range of motion. "      Comments: Extensive varicosities of both lower legs, right worse than left.  She still has an area of induration over the varicosities of her right medial leg around her knee.   Lymphadenopathy:      Cervical: No cervical adenopathy.   Skin:     General: Skin is warm and dry.      Findings: No rash.   Neurological:      Mental Status: She is alert and oriented to person, place, and time.      Motor: No abnormal muscle tone.      Deep Tendon Reflexes: Reflexes are normal and symmetric.   Psychiatric:         Thought Content: Thought content normal.         Judgement: Judgment normal.           PHQ-2 Score:     PHQ-2 ( 1999 Pfizer) 11/12/2019 10/10/2019   Q1: Little interest or pleasure in doing things 0 0   Q2: Feeling down, depressed or hopeless - 0   PHQ-2 Score - 0         ACT Total Scores 4/5/2019 4/18/2019 6/18/2019   ACT TOTAL SCORE (Goal Greater than or Equal to 20) 25 25 25   In the past 12 months, how many times did you visit the emergency room for your asthma without being admitted to the hospital? 0 0 0   In the past 12 months, how many times were you hospitalized overnight because of your asthma? 0 0 0         I personally reviewed results withpatient as listed below:   Diagnostic Test Results:  none     ASSESSMENT/PLAN:       ICD-10-CM    1. Health care maintenance Z00.00    2. Visit for screening mammogram Z12.31 MA Screen Bilateral w/Nathan   3. Screening for lipid disorders Z13.220 Lipid Panel   4. Screening for thyroid disorder Z13.29 TSH Reflex GH   5. Varicose veins of both lower extremities, unspecified whether complicated I83.93    6. Gastroesophageal reflux disease without esophagitis K21.9 famotidine (PEPCID) 20 MG tablet       1.  Mammogram ordered as below.  Colonoscopy is up to date - 2017, 5 year follow up was recommended ([facility]).  Pap Smear in 2017 was normal ([facility]).  Last DEXA in 2015 was normal ([facility]).  2.  See #1.  3.  Fasting lipid profile ordered.  4.  TSH ordered as  above.  5.  As above, she has an upcoming appointment with Dr. Ojeda.  She is also scheduled for a vein ablation in December.  6.  Recommend trial of protonix for a couple of weeks to see if it will settle her stomach as it sounds like she may have some gastritis currently.  Once finished with protonix, try transitioning to Pepcid 20 mg twice daily.      Ya Victoria MD  [facility] AND [facility]    Portions of this dictation were created using the Dragon Nuance voice recognition system. Proofreading was completed but there may be errors in text.

## 2019-11-12 NOTE — NURSING NOTE
"Chief Complaint   Patient presents with     Physical       Initial /80 (BP Location: Right arm, Patient Position: Sitting, Cuff Size: Adult Large)   Pulse 65   Temp 96  F (35.6  C) (Tympanic)   Resp 20   Ht 1.759 m (5' 9.25\")   Wt 126.1 kg (278 lb)   SpO2 92%   BMI 40.76 kg/m   Estimated body mass index is 40.76 kg/m  as calculated from the following:    Height as of this encounter: 1.759 m (5' 9.25\").    Weight as of this encounter: 126.1 kg (278 lb).  Medication Reconciliation: complete    Carin Williamson LPN  "

## 2019-11-15 ENCOUNTER — ONCOLOGY VISIT (OUTPATIENT)
Dept: ONCOLOGY | Facility: OTHER | Age: 54
End: 2019-11-15
Attending: FAMILY MEDICINE
Payer: COMMERCIAL

## 2019-11-15 VITALS
BODY MASS INDEX: 41.32 KG/M2 | TEMPERATURE: 98.4 F | OXYGEN SATURATION: 99 % | HEIGHT: 69 IN | SYSTOLIC BLOOD PRESSURE: 114 MMHG | WEIGHT: 279 LBS | RESPIRATION RATE: 16 BRPM | HEART RATE: 66 BPM | DIASTOLIC BLOOD PRESSURE: 80 MMHG

## 2019-11-15 DIAGNOSIS — I82.812 THROMBOSIS OF SAPHENOUS VEIN, LEFT: Primary | ICD-10-CM

## 2019-11-15 DIAGNOSIS — Z13.29 SCREENING FOR THYROID DISORDER: ICD-10-CM

## 2019-11-15 DIAGNOSIS — I80.9 SUPERFICIAL PHLEBITIS: ICD-10-CM

## 2019-11-15 DIAGNOSIS — Z13.220 SCREENING FOR LIPID DISORDERS: ICD-10-CM

## 2019-11-15 LAB
ALBUMIN SERPL-MCNC: 3.9 G/DL (ref 3.5–5.7)
ALP SERPL-CCNC: 58 U/L (ref 34–104)
ALT SERPL W P-5'-P-CCNC: 21 U/L (ref 7–52)
ANION GAP SERPL CALCULATED.3IONS-SCNC: 8 MMOL/L (ref 3–14)
AST SERPL W P-5'-P-CCNC: 19 U/L (ref 13–39)
BASOPHILS # BLD AUTO: 0 10E9/L (ref 0–0.2)
BASOPHILS NFR BLD AUTO: 0.9 %
BILIRUB SERPL-MCNC: 0.7 MG/DL (ref 0.3–1)
BUN SERPL-MCNC: 20 MG/DL (ref 7–25)
CALCIUM SERPL-MCNC: 9.2 MG/DL (ref 8.6–10.3)
CHLORIDE SERPL-SCNC: 102 MMOL/L (ref 98–107)
CHOLEST SERPL-MCNC: 181 MG/DL
CO2 SERPL-SCNC: 29 MMOL/L (ref 21–31)
CREAT SERPL-MCNC: 0.87 MG/DL (ref 0.6–1.2)
D DIMER PPP DDU-MCNC: <200 NG/ML D-DU (ref 0–230)
DIFFERENTIAL METHOD BLD: ABNORMAL
EOSINOPHIL # BLD AUTO: 0 10E9/L (ref 0–0.7)
EOSINOPHIL NFR BLD AUTO: 0.4 %
ERYTHROCYTE [DISTWIDTH] IN BLOOD BY AUTOMATED COUNT: 15.3 % (ref 10–15)
ERYTHROCYTE [SEDIMENTATION RATE] IN BLOOD BY WESTERGREN METHOD: 13 MM/H (ref 1–15)
GFR SERPL CREATININE-BSD FRML MDRD: 68 ML/MIN/{1.73_M2}
GLUCOSE SERPL-MCNC: 98 MG/DL (ref 70–105)
HCT VFR BLD AUTO: 41.5 % (ref 35–47)
HDLC SERPL-MCNC: 75 MG/DL (ref 23–92)
HGB BLD-MCNC: 13.6 G/DL (ref 11.7–15.7)
IMM GRANULOCYTES # BLD: 0 10E9/L (ref 0–0.4)
IMM GRANULOCYTES NFR BLD: 0.2 %
LDH SERPL L TO P-CCNC: 162 U/L (ref 140–271)
LDLC SERPL CALC-MCNC: 92 MG/DL
LYMPHOCYTES # BLD AUTO: 1.8 10E9/L (ref 0.8–5.3)
LYMPHOCYTES NFR BLD AUTO: 39.8 %
MCH RBC QN AUTO: 27.9 PG (ref 26.5–33)
MCHC RBC AUTO-ENTMCNC: 32.8 G/DL (ref 31.5–36.5)
MCV RBC AUTO: 85 FL (ref 78–100)
MONOCYTES # BLD AUTO: 0.2 10E9/L (ref 0–1.3)
MONOCYTES NFR BLD AUTO: 5.2 %
NEUTROPHILS # BLD AUTO: 2.5 10E9/L (ref 1.6–8.3)
NEUTROPHILS NFR BLD AUTO: 53.5 %
NONHDLC SERPL-MCNC: 106 MG/DL
PLATELET # BLD AUTO: 280 10E9/L (ref 150–450)
POTASSIUM SERPL-SCNC: 3.6 MMOL/L (ref 3.5–5.1)
PROT SERPL-MCNC: 7.8 G/DL (ref 6.4–8.9)
RBC # BLD AUTO: 4.88 10E12/L (ref 3.8–5.2)
RHEUMATOID FACT SER NEPH-ACNC: 42 IU/ML (ref 0–20)
SODIUM SERPL-SCNC: 139 MMOL/L (ref 134–144)
TRIGL SERPL-MCNC: 68 MG/DL
TSH SERPL DL<=0.05 MIU/L-ACNC: 2.47 IU/ML (ref 0.34–5.6)
WBC # BLD AUTO: 4.6 10E9/L (ref 4–11)

## 2019-11-15 PROCEDURE — 99205 OFFICE O/P NEW HI 60 MIN: CPT | Performed by: INTERNAL MEDICINE

## 2019-11-15 PROCEDURE — 85300 ANTITHROMBIN III ACTIVITY: CPT | Mod: ZL | Performed by: INTERNAL MEDICINE

## 2019-11-15 PROCEDURE — 86146 BETA-2 GLYCOPROTEIN ANTIBODY: CPT | Mod: ZL | Performed by: INTERNAL MEDICINE

## 2019-11-15 PROCEDURE — 81291 MTHFR GENE: CPT | Mod: ZL | Performed by: INTERNAL MEDICINE

## 2019-11-15 PROCEDURE — 81240 F2 GENE: CPT | Mod: ZL | Performed by: INTERNAL MEDICINE

## 2019-11-15 PROCEDURE — 80061 LIPID PANEL: CPT | Mod: ZL | Performed by: FAMILY MEDICINE

## 2019-11-15 PROCEDURE — 84443 ASSAY THYROID STIM HORMONE: CPT | Mod: ZL | Performed by: FAMILY MEDICINE

## 2019-11-15 PROCEDURE — 36415 COLL VENOUS BLD VENIPUNCTURE: CPT | Mod: ZL | Performed by: FAMILY MEDICINE

## 2019-11-15 PROCEDURE — 85025 COMPLETE CBC W/AUTO DIFF WBC: CPT | Mod: ZL | Performed by: INTERNAL MEDICINE

## 2019-11-15 PROCEDURE — 86038 ANTINUCLEAR ANTIBODIES: CPT | Mod: ZL | Performed by: INTERNAL MEDICINE

## 2019-11-15 PROCEDURE — 85652 RBC SED RATE AUTOMATED: CPT | Mod: ZL | Performed by: INTERNAL MEDICINE

## 2019-11-15 PROCEDURE — 82784 ASSAY IGA/IGD/IGG/IGM EACH: CPT | Mod: ZL | Performed by: INTERNAL MEDICINE

## 2019-11-15 PROCEDURE — 85379 FIBRIN DEGRADATION QUANT: CPT | Mod: ZL | Performed by: INTERNAL MEDICINE

## 2019-11-15 PROCEDURE — 00000402 ZZHCL STATISTIC TOTAL PROTEIN: Mod: ZL | Performed by: INTERNAL MEDICINE

## 2019-11-15 PROCEDURE — 86431 RHEUMATOID FACTOR QUANT: CPT | Mod: ZL | Performed by: INTERNAL MEDICINE

## 2019-11-15 PROCEDURE — 85306 CLOT INHIBIT PROT S FREE: CPT | Mod: ZL | Performed by: INTERNAL MEDICINE

## 2019-11-15 PROCEDURE — 86334 IMMUNOFIX E-PHORESIS SERUM: CPT | Mod: ZL | Performed by: INTERNAL MEDICINE

## 2019-11-15 PROCEDURE — 83090 ASSAY OF HOMOCYSTEINE: CPT | Mod: ZL | Performed by: INTERNAL MEDICINE

## 2019-11-15 PROCEDURE — 85303 CLOT INHIBIT PROT C ACTIVITY: CPT | Mod: ZL | Performed by: INTERNAL MEDICINE

## 2019-11-15 PROCEDURE — 80053 COMPREHEN METABOLIC PANEL: CPT | Mod: ZL | Performed by: INTERNAL MEDICINE

## 2019-11-15 PROCEDURE — 82784 ASSAY IGA/IGD/IGG/IGM EACH: CPT | Mod: ZL,91 | Performed by: INTERNAL MEDICINE

## 2019-11-15 PROCEDURE — 81241 F5 GENE: CPT | Mod: ZL | Performed by: INTERNAL MEDICINE

## 2019-11-15 PROCEDURE — 86147 CARDIOLIPIN ANTIBODY EA IG: CPT | Mod: ZL,91 | Performed by: INTERNAL MEDICINE

## 2019-11-15 PROCEDURE — 84165 PROTEIN E-PHORESIS SERUM: CPT | Mod: ZL | Performed by: INTERNAL MEDICINE

## 2019-11-15 PROCEDURE — 83615 LACTATE (LD) (LDH) ENZYME: CPT | Mod: ZL | Performed by: INTERNAL MEDICINE

## 2019-11-15 PROCEDURE — 86147 CARDIOLIPIN ANTIBODY EA IG: CPT | Mod: ZL | Performed by: INTERNAL MEDICINE

## 2019-11-15 ASSESSMENT — PAIN SCALES - GENERAL: PAINLEVEL: MODERATE PAIN (5)

## 2019-11-15 ASSESSMENT — MIFFLIN-ST. JEOR: SCORE: 1928.92

## 2019-11-15 NOTE — NURSING NOTE
"Chief Complaint   Patient presents with     Consult     Superficial phlebitis        Initial /80 (BP Location: Right arm, Patient Position: Sitting, Cuff Size: Adult Regular)   Pulse 66   Temp 98.4  F (36.9  C) (Oral)   Resp 16   Ht 1.759 m (5' 9.25\")   Wt 126.6 kg (279 lb)   SpO2 99%   Breastfeeding No   BMI 40.90 kg/m   Estimated body mass index is 40.9 kg/m  as calculated from the following:    Height as of this encounter: 1.759 m (5' 9.25\").    Weight as of this encounter: 126.6 kg (279 lb).  Medication Reconciliation: complete  Angela Alvarez LPN  "

## 2019-11-15 NOTE — PROGRESS NOTES
Visit Date:   11/15/2019      HEMATOLOGY/ONCOLOGY CONSULTATION      REASON FOR CONSULTATION:  Recurrent superficial phlebitis.      REQUESTING PHYSICIAN:  Ya Victoria MD      HISTORY OF PRESENT ILLNESS:  Ms. Phyllis Washington is a 54-year-old obese white female with a history of rheumatoid arthritis whom we were asked to evaluate concerning recurrent superficial phlebitis.  Apparently, she said she had a first episode of superficial phlebitis involving the right lower extremity approximately 4-5 years ago and was treated with Xarelto for a period of time and developed bleeding; this was stopped.  Subsequently, she developed another episode in the left lower extremity approximately a year ago and most recently had 1 in the right lower extremity which lasted 6 months, she says.  She was on a period of Xarelto and is currently on aspirin.  She did have a recent ultrasound of the lower extremity on 10/25/2019.  The right lower extremity revealed no DVT.  There was deep venous reflux present in the common femoral and femoral vein, saphenofemoral junction was incompetent, greater saphenous vein was incompetent in the lower calf, all the anterior accessory saphenous veins were incompetent and connected to the greater saphenous vein in the lower thigh and calf.  The anterior accessory saphenous vein led to the symptomatic varicosities.  Results of the duplex ultrasound showed reflux with 3 cycles of the anterior accessory saphenous vein in the thigh.  There was a sequela of prior superficial thrombophlebitis in the varicose veins and the greater saphenous vein in the lower calf.  Left lower extremity revealed no DVT.  There was a deep venous reflux in the common femoral and femoral veins.  The saphenofemoral junction was incompetent.  The greater saphenous vein was incompetent.  The anterior accessory saphenous vein was incompetent and led to symptomatic varicosities in the thigh and calf.  There was a sequela of primary  superficial thrombophlebitis and several varicose veins in the lower leg.  The duplex ultrasound showed reflux of 4 seconds within the anterior accessory saphenous vein in the thigh.  The arteries were multiphasic bilaterally.  Final impression was that there was venous insufficiency with the study demonstrating an incompetent right anterior accessory saphenous vein with reflux leading to numerous symptomatic varicosities in the lower thigh and calf.  Venous insufficiency study demonstrated incompetent left anterior accessory saphenous vein with reflux leading to numerous symptomatic varicosities in the thigh and calf.        The patient was felt to be a good candidate for radiofrequency ablation of the bilateral anterior accessory saphenous veins because of the diameter in target vessels measuring 8 mm.  The patient had at least 3 months of compression therapy with persistent leg pain and heaviness interfering with her daily activities.  The patient is now concerned as to why she was having recurrent thrombophlebitis.  She thinks it may be related to rheumatoid arthritis.  She is obese.  She says she is raising a family.  She has had 3 children.  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  Otherwise, she denies any family history of DVT.  She does have chronic neck pain.  Her major complaint is related to the right lower extremity varicosities that cause her pain.  She denies any shortness of breath, chest pain, abdominal pain, fevers, night sweats, weight loss.      PAST MEDICAL HISTORY:  As above, history of obesity, rheumatoid arthritis, allergic rhinitis, myalgias, vitamin D deficiency, cervical muscle pain, disturbance in sleep disorder, thrombosis of saphenous vein, colon polyp, essential hypertension, esophageal reflux, asthma.      PAST SURGICAL HISTORY:  Colonoscopy in 2017 with 2 polyps found.      ALLERGIES:  NO KNOWN DRUG ALLERGIES.      CURRENT MEDICATIONS:   1.  Pepcid 20 mg daily.   2.  Valium 10 mg p.r.n.    3.  Maxzide daily.   4.  Prednisone 5 mg daily.   5.  Humira 40 mg q.14 days.   6.  Folic acid 1 mg daily.   7.  Plaquenil 200 mg daily.   8.  Methotrexate 20 mg once a week.      SOCIAL HISTORY:  Tobacco is negative.  Alcohol is negative.  She is a retired homemaker.      FAMILY HISTORY:  Mother with breast cancer.  Father with lung cancer.      REVIEW OF SYSTEMS:   CENTRAL NERVOUS SYSTEM:  Negative for headaches, change in mental status.   RESPIRATORY:  Negative for cough, hemoptysis, shortness of breath.   ENT:  Significant for hearing loss, odynophagia, nasal congestion.   CARDIAC:  Negative for chest pain, orthopnea, PND, ankle edema.   GASTROINTESTINAL:  Negative for change in bowel habits, bright red blood per rectum, constipation, diarrhea.   MUSCULOSKELETAL:  Significant for chronic neck pain, pain related to varicose veins in the right lower extremity.   GENITOURINARY:  Negative for nocturia, urgency, hematuria, frequency.   CONSTITUTIONAL:  Negative for fevers, night sweats, weight loss.   HEMATOLOGIC:  Negative for easy bruisability, gingival bleeding, epistaxis.      PHYSICAL EXAMINATION:   GENERAL:  She is an obese middle-aged white female in no acute distress.   VITAL SIGNS:  Blood pressure 114/80, pulse 66, respirations 16, temperature 98.4.   HEENT:  Atraumatic, normocephalic.  Oropharynx nonerythematous.   NECK:  Supple.   LUNGS:  Clear to auscultation and percussion.   HEART:  Regular rhythm, S1, S2 normal.   ABDOMEN:  Obese, soft, nontender.   LYMPHATICS:  No cervical, supraclavicular, axillary or inguinal nodes.   EXTREMITIES:  She has multiple varicosities in the right lower extremity just below the knee in the popliteal region.   NEUROLOGIC:  Grossly nonfocal.      LABORATORY DATA:  D-dimer is less than 200.  CBC is within normal limits.      IMPRESSION:  Recurrent superficial thrombophlebitis of both lower extremities.  The patient is scheduled to have radiofrequency ablation.  Rule out  hypercoagulable state.  We will obtain factor V Leiden, prothrombin 2 mutation, homocysteine level, MTHFR DNA, protein C, protein S as well as serum protein electrophoresis.  Otherwise, we will see the patient after the above workup.  Suspect recurrent thrombophlebitis is likely multifactorial due to multiple possibilities including obesity.  We will otherwise see the patient after the above workup.      Seventy minutes was spent with the patient, greater than half the time was spent in counseling and coordinating care.         YANETH MCFADDEN MD             D: 11/15/2019   T: 11/15/2019   MT: ANGELICA      Name:     HUMBERTO MCBRIDE   MRN:      [id number]        Account:      [id number]   :      1965           Visit Date:   11/15/2019      Document: [id number]       cc: Ya Victoria MD

## 2019-11-16 LAB
CARDIOLIPIN ANTIBODY IGG: <1.6 GPL-U/ML (ref 0–19.9)
CARDIOLIPIN ANTIBODY IGM: 1.7 MPL-U/ML (ref 0–19.9)

## 2019-11-17 LAB
B2 GLYCOPROT1 IGG SERPL IA-ACNC: 0.9 U/ML
B2 GLYCOPROT1 IGM SERPL IA-ACNC: <2.9 U/ML

## 2019-11-18 LAB
ALBUMIN SERPL ELPH-MCNC: 4 G/DL (ref 3.7–5.1)
ALPHA1 GLOB SERPL ELPH-MCNC: 0.3 G/DL (ref 0.2–0.4)
ALPHA2 GLOB SERPL ELPH-MCNC: 0.8 G/DL (ref 0.5–0.9)
ANA SER QL IF: NEGATIVE
AT III ACT/NOR PPP CHRO: 97 % (ref 85–135)
B-GLOBULIN SERPL ELPH-MCNC: 0.9 G/DL (ref 0.6–1)
GAMMA GLOB SERPL ELPH-MCNC: 1.4 G/DL (ref 0.7–1.6)
IGA SERPL-MCNC: 282 MG/DL (ref 70–380)
IGG SERPL-MCNC: 1330 MG/DL (ref 695–1620)
IGM SERPL-MCNC: 130 MG/DL (ref 60–265)
M PROTEIN SERPL ELPH-MCNC: 0 G/DL
PROT C ACT/NOR PPP CHRO: 114 % (ref 70–170)
PROT PATTERN SERPL ELPH-IMP: NORMAL
PROT PATTERN SERPL IFE-IMP: NORMAL
PROT S FREE AG ACT/NOR PPP IA: 106 % (ref 55–125)

## 2019-11-20 ENCOUNTER — HOSPITAL ENCOUNTER (OUTPATIENT)
Dept: MAMMOGRAPHY | Facility: OTHER | Age: 54
Discharge: HOME | End: 2019-11-20
Attending: FAMILY MEDICINE | Admitting: FAMILY MEDICINE
Payer: COMMERCIAL

## 2019-11-20 ENCOUNTER — TRANSFERRED RECORDS (OUTPATIENT)
Dept: HEALTH INFORMATION MANAGEMENT | Facility: OTHER | Age: 54
End: 2019-11-20

## 2019-11-20 DIAGNOSIS — Z12.31 VISIT FOR SCREENING MAMMOGRAM: ICD-10-CM

## 2019-11-20 LAB
AST SERPL-CCNC: 26 IU/L (ref 10–40)
CREAT SERPL-MCNC: 0.85 MG/DL (ref 0.4–1)
GFR SERPL CREATININE-BSD FRML MDRD: >60 ML/MIN/1.73M*2
HCYS SERPL-SCNC: 10.8 UMOL/L (ref 4–12)

## 2019-11-20 PROCEDURE — 77063 BREAST TOMOSYNTHESIS BI: CPT

## 2019-11-21 LAB — COPATH REPORT: NORMAL

## 2019-11-25 LAB — COPATH REPORT: NORMAL

## 2019-12-04 ENCOUNTER — HOSPITAL ENCOUNTER (OUTPATIENT)
Dept: GENERAL RADIOLOGY | Facility: OTHER | Age: 54
Discharge: HOME | End: 2019-12-04
Attending: RADIOLOGY | Admitting: FAMILY MEDICINE
Payer: COMMERCIAL

## 2019-12-04 VITALS
SYSTOLIC BLOOD PRESSURE: 113 MMHG | OXYGEN SATURATION: 98 % | TEMPERATURE: 97.5 F | HEART RATE: 77 BPM | DIASTOLIC BLOOD PRESSURE: 85 MMHG | RESPIRATION RATE: 16 BRPM

## 2019-12-04 DIAGNOSIS — I83.813 VARICOSE VEINS OF LOWER EXTREMITY WITH PAIN, BILATERAL: ICD-10-CM

## 2019-12-04 DIAGNOSIS — I83.90 VARICOSE VEIN OF LEG: Primary | ICD-10-CM

## 2019-12-04 PROCEDURE — 25000125 ZZHC RX 250: Performed by: RADIOLOGY

## 2019-12-04 PROCEDURE — 25800030 ZZH RX IP 258 OP 636: Performed by: RADIOLOGY

## 2019-12-04 PROCEDURE — 36475 ENDOVENOUS RF 1ST VEIN: CPT | Mod: RT

## 2019-12-04 PROCEDURE — 25000132 ZZH RX MED GY IP 250 OP 250 PS 637: Performed by: RADIOLOGY

## 2019-12-04 RX ORDER — IBUPROFEN 400 MG/1
400 TABLET, FILM COATED ORAL
Status: COMPLETED | OUTPATIENT
Start: 2019-12-04 | End: 2019-12-04

## 2019-12-04 RX ADMIN — NITROGLYCERIN 15 MG: 20 OINTMENT TOPICAL at 12:35

## 2019-12-04 RX ADMIN — LIDOCAINE HYDROCHLORIDE 10 ML: 10 INJECTION, SOLUTION INFILTRATION; PERINEURAL at 12:35

## 2019-12-04 RX ADMIN — IBUPROFEN 400 MG: 400 TABLET ORAL at 12:35

## 2019-12-04 RX ADMIN — SODIUM BICARBONATE: 84 INJECTION, SOLUTION INTRAVENOUS at 13:13

## 2019-12-04 NOTE — PROGRESS NOTES
Prior to the start of the procedure, Right leg was cleansed with chlorhexidine in sterile fashion, then draped by rad tech. nitroglycerin paste was removed during cleansing of the leg.     Radiofrequency Ablation Information    Company [employer]; Lot # [id number]    RFA catheter length: 60 cm    Element length: 3 cm      Time out:  1338    Cycles of treatment: 5     Cm treated:  15.5 cm    Tumescent used:  100 mL    Total time: 1 min 40 sec    Skin of extremity where heating pad was placed is WDL/yes.      Direct pressure was held to right leg upon removal of the catheter.    Catheter insertion site was covered with sterile 2x2 gauze and sterile tegaderm.    Compression stocking placed on right leg.     Patient verbalized understanding of discharge instructions, and  will drive patient home

## 2019-12-04 NOTE — DISCHARGE INSTRUCTIONS
Radiofrequency Ablation    Your follow-up appointment is 12/09/2019 at 1015.  This exam will take approximately 45 minutes and you may drive yourself to this appointment.  Wear you compression stocking day and night until this appointment.  You may take them off to shower.      After your three day follow-up appointment, wear your stocking during the day and off at night for one week.    Activity:   *  Resume your normal activities today such as walking.   *  Walk at least three times per day for 20 minutes.  *  Avoid vigorous exercise or weight lifting for three days  *  No baths or hot tubs for one week  *  Avoid excessive sun or tanning booths  *  If you received Valium, avoid driving or drinking alcohol for 24 hours    Comfort:  *  You may use ice for the first 24 hours, only leave on for 20 minutes at a time    Diet:  *  Resume your normal diet    When to call your Physician:  *  Toes become discolored and numb  *  Excessive pain or increased pain with walking  *  Fever, chills, redness, drainage or warmth around your incision    It is normal to have bruising and tenderness.  You may also experience a pulling/or tugging sensation of your leg starting around the third or fourth day.      For questions, problems or concerns, contact [phone].

## 2019-12-04 NOTE — IP AVS SNAPSHOT
[facility]  [address]  [place] [address]  Phone:  [phone]  Fax:  [phone]                                    After Visit Summary   12/4/2019    Phyllis Washington    MRN: [id number]           After Visit Summary Signature Page    I have received my discharge instructions, and my questions have been answered. I have discussed any challenges I see with this plan with the nurse or doctor.    ..........................................................................................................................................  Patient/Patient Representative Signature      ..........................................................................................................................................  Patient Representative Print Name and Relationship to Patient    ..................................................               ................................................  Date                                   Time    ..........................................................................................................................................  Reviewed by Signature/Title    ...................................................              ..............................................  Date                                               Time          22EPIC Rev 08/18

## 2019-12-09 ENCOUNTER — HOSPITAL ENCOUNTER (OUTPATIENT)
Dept: ULTRASOUND IMAGING | Facility: OTHER | Age: 54
Discharge: HOME | End: 2019-12-09
Attending: RADIOLOGY | Admitting: FAMILY MEDICINE
Payer: COMMERCIAL

## 2019-12-09 DIAGNOSIS — I83.813 VARICOSE VEINS OF LOWER EXTREMITY WITH PAIN, BILATERAL: ICD-10-CM

## 2019-12-09 PROCEDURE — 93971 EXTREMITY STUDY: CPT | Mod: RT

## 2019-12-19 ENCOUNTER — ONCOLOGY VISIT (OUTPATIENT)
Dept: ONCOLOGY | Facility: OTHER | Age: 54
End: 2019-12-19
Attending: INTERNAL MEDICINE
Payer: COMMERCIAL

## 2019-12-19 VITALS
TEMPERATURE: 97.9 F | DIASTOLIC BLOOD PRESSURE: 80 MMHG | SYSTOLIC BLOOD PRESSURE: 110 MMHG | RESPIRATION RATE: 16 BRPM | BODY MASS INDEX: 41.62 KG/M2 | HEIGHT: 69 IN | HEART RATE: 60 BPM | OXYGEN SATURATION: 98 % | WEIGHT: 281 LBS

## 2019-12-19 DIAGNOSIS — I80.9 SUPERFICIAL PHLEBITIS: Primary | ICD-10-CM

## 2019-12-19 PROCEDURE — 99215 OFFICE O/P EST HI 40 MIN: CPT | Performed by: INTERNAL MEDICINE

## 2019-12-19 ASSESSMENT — MIFFLIN-ST. JEOR: SCORE: 1937.99

## 2019-12-19 ASSESSMENT — PAIN SCALES - GENERAL: PAINLEVEL: MILD PAIN (2)

## 2019-12-19 NOTE — NURSING NOTE
"Chief Complaint   Patient presents with     RECHECK     Superficial Phlebitis       Initial /80 (BP Location: Right arm, Patient Position: Sitting, Cuff Size: Adult Regular)   Pulse 60   Temp 97.9  F (36.6  C) (Tympanic)   Resp 16   Ht 1.759 m (5' 9.25\")   Wt 127.5 kg (281 lb)   SpO2 98%   Breastfeeding No   BMI 41.19 kg/m   Estimated body mass index is 41.19 kg/m  as calculated from the following:    Height as of this encounter: 1.759 m (5' 9.25\").    Weight as of this encounter: 127.5 kg (281 lb).  Medication Reconciliation: complete  Angela Alvarez LPN  "

## 2019-12-19 NOTE — PROGRESS NOTES
Visit Date:   12/19/2019      HEMATOLOGY ONCOLOGY CLINIC NOTE      Mrs. Washington returns for followup of recurrent superficial phlebitis.  We had seen her in consultation on 11/15/2018 at the request of Dr. Victoria.  At that time, she was a 54-year-old obese white female with a history of rheumatoid arthritis we were asked to evaluate concerning recurrent superficial phlebitis.  She said she had a first episode of superficial phlebitis involving the right lower extremity approximately 4-5 years ago, was treated with Xarelto for a period of time and developed bleeding.  This was stopped.  Subsequently, she developed another episode in the left lower extremity approximately a year ago.  Most recently, she had one in the right lower extremity which lasted 6 months.  She says she was on Xarelto for a period of time and was currently on aspirin.  She did have a recent ultrasound of the lower extremity on 10/25/2019.  The right lower extremity revealed no DVT.  There was deep venous reflux present in the common femoral, femoral vein, saphenofemoral junction was incompetent, greater saphenous vein was incompetent, lower calf all the anterior accessory saphenous veins were incompetent, connected to the greater saphenous vein in the lower thigh and calf in the anterior accessory saphenous vein led to symptomatic varicosities.  There was also a duplex ultrasound that showed reflux of 3 cycles of the anterior accessory saphenous vein in the thigh.  There was a sequela of prior superficial thrombophlebitis in the varicose veins and the greater saphenous vein in the lower calf.  Left lower extremity revealed no DVT.  There was a deep venous reflux in the common femoral and femoral vein, saphenofemoral junction was incompetent, the greater saphenous vein was incompetent, the anterior accessory saphenous vein was incompetent which lead to symptomatic varicosities in thigh and calf, sequela of primary superficial  thrombophlebitis in several varicose veins of the lower leg.  Duplex ultrasound showed reflux of 4-6 in the anterior accessory saphenous vein in the thigh.  The arteries were multiphasic bilaterally.  Final impression was that there was venous insufficiency.  The study demonstrated incompetent right anterior accessory saphenous vein with reflux leading to numerous symptomatic varicosities in the lower thigh and calf.  Venous insufficiency study demonstrated incompetent left anterior accessory saphenous vein with reflux leading to numerous symptomatic varicosities in the thigh and calf.  The patient was felt to be a good candidate for radiofrequency ablation of the bilateral anterior accessory saphenous veins because of the diameter in target vessels measuring 8 mm.  The patient had at least 3 months of compression therapy with persistent leg pain and heaviness interfering with her daily activities.  The patient was consented as to why she was having recurrent thrombophlebitis.  She thinks it may be related to rheumatoid arthritis.  She was obese.  There is no family history of DVT.  When we saw the patient, we wanted to rule out hypercoagulable state.  Workup was negative except for MTHFR DNA was heterozygous for the [id number] locus.  The patient is currently on aspirin.  She says she takes folic acid.  She did have radiofrequency ablation of the right lower extremity and she says she feels better.  Otherwise, no other complaints of fevers, night sweats, weight loss.      PHYSICAL EXAMINATION:   GENERAL:  She is a middle-aged white female in no acute distress.   VITAL SIGNS:  Blood pressure 110/80, pulse 60, respirations 16, temp 97.9.   HEENT:  Atraumatic, normocephalic.  Oropharynx nonerythematous.   NECK:  Supple.   LUNGS:  Clear to auscultation and percussion.   HEART:  Regular rhythm, S1, S2 normal.   ABDOMEN:  Soft, normoactive bowel sounds.  No mass, nontender.   BACK:  No cervical, supraclavicular, axillary  nodes or inguinal nodes.   EXTREMITIES:  With trace ankle edema.   NEUROLOGIC:  Nonfocal.      LABORATORY DATA:  D-dimer is less than 200.  Serum protein electrophoresis was negative.  Protein C&S is negative.  Prothrombin II and factor V Leiden mutation analysis was negative.  Beta-2 glycoprotein is negative.  Homocysteine level was normal.  Antithrombin III level is normal.      IMPRESSION:  Recurrent superficial thrombophlebitis in both lower extremities.  The patient underwent radiofrequency ablation of the right lower extremity.  The patient is currently on aspirin.  The plan is to continue aspirin.  We will recommend B12, folic acid and B6 supplementation.  Otherwise, the patient will return to clinic on a p.r.n. basis for followup with Dr. Victoria.  She does not have any evidence of hypercoagulability at this point, as the heterozygous state does not put her at increased risk of deep vein thrombosis.         YANETH MCFADDEN MD             D: 2019   T: 2019   MT: CATRINA      Name:     HUMBERTO MCBRIDE   MRN:      [id number]        Account:      [id number]   :      1965           Visit Date:   2019      Document: [id number]

## 2019-12-27 ENCOUNTER — HOSPITAL ENCOUNTER (OUTPATIENT)
Dept: ULTRASOUND IMAGING | Facility: OTHER | Age: 54
Discharge: HOME | End: 2019-12-27
Attending: RADIOLOGY | Admitting: RADIOLOGY
Payer: COMMERCIAL

## 2019-12-27 DIAGNOSIS — I83.813 VARICOSE VEINS OF LOWER EXTREMITY WITH PAIN, BILATERAL: ICD-10-CM

## 2019-12-27 PROCEDURE — 93971 EXTREMITY STUDY: CPT | Mod: RT

## 2020-01-16 ENCOUNTER — OFFICE VISIT (OUTPATIENT)
Dept: FAMILY MEDICINE | Facility: OTHER | Age: 55
End: 2020-01-16
Attending: FAMILY MEDICINE
Payer: COMMERCIAL

## 2020-01-16 ENCOUNTER — TELEPHONE (OUTPATIENT)
Dept: FAMILY MEDICINE | Facility: OTHER | Age: 55
End: 2020-01-16

## 2020-01-16 ENCOUNTER — HOSPITAL ENCOUNTER (OUTPATIENT)
Dept: ULTRASOUND IMAGING | Facility: OTHER | Age: 55
Discharge: HOME | End: 2020-01-16
Attending: FAMILY MEDICINE | Admitting: FAMILY MEDICINE
Payer: COMMERCIAL

## 2020-01-16 VITALS
SYSTOLIC BLOOD PRESSURE: 124 MMHG | OXYGEN SATURATION: 95 % | TEMPERATURE: 96.3 F | HEART RATE: 67 BPM | DIASTOLIC BLOOD PRESSURE: 62 MMHG | RESPIRATION RATE: 20 BRPM

## 2020-01-16 DIAGNOSIS — M79.661 PAIN OF RIGHT LOWER LEG: ICD-10-CM

## 2020-01-16 DIAGNOSIS — I80.9 SUPERFICIAL PHLEBITIS: ICD-10-CM

## 2020-01-16 DIAGNOSIS — I83.93 VARICOSE VEINS OF BOTH LOWER EXTREMITIES, UNSPECIFIED WHETHER COMPLICATED: ICD-10-CM

## 2020-01-16 DIAGNOSIS — M79.661 PAIN OF RIGHT LOWER LEG: Primary | ICD-10-CM

## 2020-01-16 PROCEDURE — 99214 OFFICE O/P EST MOD 30 MIN: CPT | Performed by: FAMILY MEDICINE

## 2020-01-16 PROCEDURE — 93971 EXTREMITY STUDY: CPT | Mod: RT

## 2020-01-16 ASSESSMENT — ASTHMA QUESTIONNAIRES
ACT_TOTALSCORE: 25
QUESTION_2 LAST FOUR WEEKS HOW OFTEN HAVE YOU HAD SHORTNESS OF BREATH: NOT AT ALL
QUESTION_1 LAST FOUR WEEKS HOW MUCH OF THE TIME DID YOUR ASTHMA KEEP YOU FROM GETTING AS MUCH DONE AT WORK, SCHOOL OR AT HOME: NONE OF THE TIME
QUESTION_4 LAST FOUR WEEKS HOW OFTEN HAVE YOU USED YOUR RESCUE INHALER OR NEBULIZER MEDICATION (SUCH AS ALBUTEROL): NOT AT ALL
QUESTION_5 LAST FOUR WEEKS HOW WOULD YOU RATE YOUR ASTHMA CONTROL: COMPLETELY CONTROLLED
QUESTION_3 LAST FOUR WEEKS HOW OFTEN DID YOUR ASTHMA SYMPTOMS (WHEEZING, COUGHING, SHORTNESS OF BREATH, CHEST TIGHTNESS OR PAIN) WAKE YOU UP AT NIGHT OR EARLIER THAN USUAL IN THE MORNING: NOT AT ALL

## 2020-01-16 ASSESSMENT — PAIN SCALES - GENERAL: PAINLEVEL: MODERATE PAIN (5)

## 2020-01-16 ASSESSMENT — MIFFLIN-ST. JEOR: SCORE: 1937.95

## 2020-01-16 NOTE — TELEPHONE ENCOUNTER
Patient had a vein ablation in right leg on 12/4/19. She is experiencing sudden onset of pain in leg this morning. Offered appointment with [facility], patient refused and would like a call back from [facility] or nurse.    Please call patient at [phone].    Jeanette Murdock on 1/16/2020 at 9:27 AM

## 2020-01-16 NOTE — TELEPHONE ENCOUNTER
Patient notified of below, she accepted appointment today at 1:45.    Myra Naik LPN.................. 1/16/2020 12:13 PM

## 2020-01-16 NOTE — TELEPHONE ENCOUNTER
Talked to patient and she woke up today with a tender spot on the inside of her right thigh . She had vein surgery 6 weeks ago on the right leg and wondering if she should come in or wait to see if it gets worse. She rates the pain at a 5 out of 10 on pain scale. She says it is not red and is not running a fever. Please advise. Carin Williamson LPN ....................1/16/2020  9:50 AM

## 2020-01-16 NOTE — NURSING NOTE
"Chief Complaint   Patient presents with     Musculoskeletal Problem     leg/ vein tenderness     Patient reported as she got up today she has had right leg pain. This is the leg where she had vein surgery 6 weeks ago .   Initial /62 (BP Location: Right arm, Patient Position: Sitting, Cuff Size: Adult Large)   Pulse 67   Temp 96.3  F (35.7  C) (Tympanic)   Resp 20   Ht (P) 1.759 m (5' 9.25\")   Wt (P) 127.5 kg (281 lb)   SpO2 95%   BMI (P) 41.20 kg/m   Estimated body mass index is 41.2 kg/m  (pended) as calculated from the following:    Height as of this encounter: (P) 1.759 m (5' 9.25\").    Weight as of this encounter: (P) 127.5 kg (281 lb).  Medication Reconciliation: complete    Carin Williamson LPN  "

## 2020-01-16 NOTE — PROGRESS NOTES
"  SUBJECTIVE:   Nursing Notes:   Carin Williamson LPN  1/16/2020  1:55 PM  Sign at exiting of workspace  Chief Complaint   Patient presents with     Musculoskeletal Problem     leg/ vein tenderness     Patient reported as she got up today she has had right leg pain. This is the leg where she had vein surgery 6 weeks ago .   Initial /62 (BP Location: Right arm, Patient Position: Sitting, Cuff Size: Adult Large)   Pulse 67   Temp 96.3  F (35.7  C) (Tympanic)   Resp 20   Ht (P) 1.759 m (5' 9.25\")   Wt (P) 127.5 kg (281 lb)   SpO2 95%   BMI (P) 41.20 kg/m    Estimated body mass index is 41.2 kg/m  (pended) as calculated from the following:    Height as of this encounter: (P) 1.759 m (5' 9.25\").    Weight as of this encounter: (P) 127.5 kg (281 lb).  Medication Reconciliation: complete    Carin Williamson LPN    Phyllis VILLASENOR Neururer is a 54 year old female who presents to clinic today for a complaint of right leg pain.  Had a vein ablation about 6 weeks ago in her right leg.  She had some discomfort since then around her knee and behind it since then.  However, today, she started getting a sharper pain on her medial thigh and down her leg.  No redness or warmth of the are.  Feels like the tenderness is overlying a ropy feeling vein.  No shortness of breath or chest pain.      HPI    I personally reviewed medications/allergies/history listed below:    Patient Active Problem List    Diagnosis Date Noted     Health care maintenance 10/22/2018     Priority: Medium     Pap NIL and HPV neg on 8/22/17 ([facility])  DEXA 2015 normal.       Colon polyp 09/20/2017     Priority: Medium     Overview:   Colonoscopy 9/20/17 with transverse colon polyp and sigmoid colon polyp (20 cm), both removed       Thrombosis of saphenous vein, left 11/16/2016     Priority: Medium     Disturbance in sleep behavior 07/02/2015     Priority: Medium     Overview:   [facility] Sleep Study: Showed Mild upper airway resistance. No sleep apnea or " limb movement disorder.   IMO Update       Family history of diabetes mellitus type II 04/07/2015     Priority: Medium     High risk medication use 09/10/2014     Priority: Medium     Overview:   HCQ, MTX, Humira       Cervical muscle pain 07/25/2013     Priority: Medium     Rheumatoid arthritis involving multiple sites with positive rheumatoid factor (H) 09/28/2012     Priority: Medium     Overview:   RF + 121, CCP neg; onset ~ 2011, Dx 2012  Doxy (9/8/2012- 3/2013)  Nuc Med Scan with symmetric uptake  Plaquenil trial 3/2013- ; MTX 7/2013- ; SSA 10/2014-12/2014; Humira 10/2015-   6/2014: VECTRA 42       Vitamin D deficiency 06/18/2012     Priority: Medium     Overview:   Level of 25 on 5/11/2012       Myalgia 05/11/2012     Priority: Medium     Enthesopathy of hip region 01/14/2011     Priority: Medium     Hypercholesterolemia 12/14/2009     Priority: Medium     Allergic rhinitis 11/02/2004     Priority: Medium     Asthma 09/22/2003     Priority: Medium     Overview:   Updated per 10/1/17 IMO import       Esophageal reflux 09/22/2003     Priority: Medium     Essential hypertension, benign 05/14/2001     Priority: Medium     Obesity 05/14/2001     Priority: Medium     Past Medical History:   Diagnosis Date     Personal history of other medical treatment (CODE)     3 normal childbirths      Past Surgical History:   Procedure Laterality Date     COLONOSCOPY  2017    Dr. Fofana - 2 polyps found.  Told to f/u in 5 years.     Family History   Problem Relation Age of Onset     Hypertension Father         Hypertension,67     Other - See Comments Maternal Grandmother         Stroke,85     Other - See Comments Paternal Grandmother         Alzheimer's 73     Breast Cancer Mother 46        Cancer-breast,now 68     Cerebrovascular Disease Mother         stroke with hemiparesis 2017     Diabetes Brother 16        Diabetes,now 41     Social History     Tobacco Use     Smoking status: Never Smoker     Smokeless tobacco: Never Used  "  Substance Use Topics     Alcohol use: No     Frequency: Never     Comment: Rarely     Social History     Social History Narrative    , 3 adult children;     Homemaker; hobbies - reading, crafts.     Works at frame shop part-time.     Walks 3 miles a day     - Demetrio         Current Outpatient Medications   Medication Sig Dispense Refill     adalimumab (HUMIRA PEN) 40 MG/0.8ML pen kit 40 mg every 7 days        diazepam (VALIUM) 10 MG tablet Take 1 by mouth 60 minutes prior to procedure. 2 tablet 0     famotidine (PEPCID) 20 MG tablet Take 1 tablet (20 mg) by mouth 2 times daily 180 tablet 3     folic acid (FOLVITE) 1 MG tablet Take 1 mg by mouth daily       hydroxychloroquine (PLAQUENIL) 200 MG tablet        ILEVRO 0.3 % ophthalmic suspension PLACE 1 DROP INTO LEFT EYE ONCE A DAY. START 3 DAYS BEFORE SURGERY & CONTINUE FOR 6 WEEKS AFTER  1     methotrexate 2.5 MG tablet CHEMO Take 20 mg by mouth once a week       order for DME Equipment being ordered: Jobst Stockings   Thigh High: 20-30mm/hg    Leg measures:  Ankle: 10in  Calf: 17.5in  Upper Thigh: 33in 2 each 1     other medical supplies Jobst stockings.  Thigh high.  16-20 mmHg.  Diagnosis:  I80.9.  Length of need:  99. 2 each 1     predniSONE (DELTASONE) 5 MG tablet Take 5 mg by mouth daily.  0     triamterene-HCTZ (MAXZIDE-25) 37.5-25 MG tablet TAKE 1 TABLET BY MOUTH EVERY DAY IN THE MORNING 90 tablet 3     No Known Allergies    Review of Systems   Constitutional: Negative for chills and fever.   Respiratory: Negative for cough and shortness of breath.    Musculoskeletal: Negative for joint swelling.   Skin: Negative for color change.        OBJECTIVE:     /62 (BP Location: Right arm, Patient Position: Sitting, Cuff Size: Adult Large)   Pulse 67   Temp 96.3  F (35.7  C) (Tympanic)   Resp 20   Ht (P) 1.759 m (5' 9.25\")   Wt (P) 127.5 kg (281 lb)   SpO2 95%   BMI (P) 41.20 kg/m    Body mass index is 41.2 kg/m  (pended).  Physical " Exam  Constitutional:       Appearance: Normal appearance.   HENT:      Head: Normocephalic.   Eyes:      Extraocular Movements: Extraocular movements intact.      Pupils: Pupils are equal, round, and reactive to light.   Neck:      Musculoskeletal: Normal range of motion and neck supple.   Cardiovascular:      Rate and Rhythm: Normal rate and regular rhythm.      Pulses: Normal pulses.      Heart sounds: Normal heart sounds. No murmur.   Pulmonary:      Effort: Pulmonary effort is normal.      Breath sounds: Normal breath sounds. No wheezing, rhonchi or rales.   Musculoskeletal:      Right lower leg: No edema.      Left lower leg: No edema.      Comments: Right leg.  Tender medial thigh just proximal to knee.   Lymphadenopathy:      Cervical: No cervical adenopathy.   Neurological:      Mental Status: She is alert.           No flowsheet data found.    PHQ-2 Score:     PHQ-2 ( 1999 Pfizer) 1/16/2020 12/19/2019   Q1: Little interest or pleasure in doing things 0 0   Q2: Feeling down, depressed or hopeless 0 0   PHQ-2 Score 0 0       No flowsheet data found.      ACT Total Scores 4/18/2019 6/18/2019 1/16/2020   ACT TOTAL SCORE (Goal Greater than or Equal to 20) 25 25 25   In the past 12 months, how many times did you visit the emergency room for your asthma without being admitted to the hospital? 0 0 0   In the past 12 months, how many times were you hospitalized overnight because of your asthma? 0 0 0         I personally reviewed results withpatient as listed below:   Diagnostic Test Results:  none       Procedure: US LOWER EXTREMITY VENOUS DUPLEX RIGHT     HISTORY:  Pain of right lower leg; Varicose veins of both lower  extremities, unspecified whether complicated; Superficial phlebitis.     TECHNIQUE: Grayscale, color Doppler and compression views of the deep  venous structures of the right lower extremity.     COMPARISON: 12/27/2019     FINDINGS:     Interrogation of the deep venous structures from the right  common  femoral vein through the veins of the proximal calf demonstrate normal  grayscale appearance, compressibility and augmentable color Doppler  flow.     Superficial thrombus is present in the AASV and GSV.                                                                      IMPRESSION:      No evidence of lower extremity DVT.       Extension of superficial thrombus into the right greater saphenous  vein.     PENNY ROMAN    ASSESSMENT/PLAN:       ICD-10-CM    1. Pain of right lower leg M79.661 US Lower Extremity Venous Duplex Right     CANCELED: US Lower Extremity Venous Duplex Right   2. Varicose veins of both lower extremities, unspecified whether complicated I83.93 US Lower Extremity Venous Duplex Right     CANCELED: US Lower Extremity Venous Duplex Right   3. Superficial phlebitis I80.9 US Lower Extremity Venous Duplex Right     CANCELED: US Lower Extremity Venous Duplex Right       1.  Her tenderness is directly overlying the area that was previously ablated.  She does have a new extension into the greater saphenous vein, which could be causing symptoms as well.  For now, recommend continuing aspirin 325 mg daily.  Could take twice daily for a week or so to help with discomfort as well.  Recommend resuming her thigh high compression stockings and using heat as well.  Follow up as needed.  2.  See #1.  3.  See #1.    Ya Victoria MD  [facility]    Portions of this dictation were created using the Dragon Nuance voice recognition system. Proofreading was completed but there may be errors in text.

## 2020-01-17 ASSESSMENT — ASTHMA QUESTIONNAIRES: ACT_TOTALSCORE: 25

## 2020-01-18 ASSESSMENT — ENCOUNTER SYMPTOMS
SHORTNESS OF BREATH: 0
JOINT SWELLING: 0
COLOR CHANGE: 0
COUGH: 0
FEVER: 0
CHILLS: 0

## 2020-02-04 ENCOUNTER — TELEPHONE (OUTPATIENT)
Dept: GENERAL RADIOLOGY | Facility: OTHER | Age: 55
End: 2020-02-04

## 2020-02-06 NOTE — TELEPHONE ENCOUNTER
Phyllis was called on 2/6/20  with instructions regarding vein ablation procedure scheduled for 2/11/20.  Allergy and medication review completed.  Patient has received her compression stockings and valium. She will have a . She will take the valium at about 12:15 that day.  All questions answered.

## 2020-02-11 ENCOUNTER — HOSPITAL ENCOUNTER (OUTPATIENT)
Dept: GENERAL RADIOLOGY | Facility: OTHER | Age: 55
Discharge: HOME | End: 2020-02-11
Attending: RADIOLOGY | Admitting: RADIOLOGY
Payer: COMMERCIAL

## 2020-02-11 VITALS
RESPIRATION RATE: 16 BRPM | TEMPERATURE: 97.4 F | DIASTOLIC BLOOD PRESSURE: 66 MMHG | SYSTOLIC BLOOD PRESSURE: 101 MMHG | HEART RATE: 94 BPM | OXYGEN SATURATION: 93 %

## 2020-02-11 DIAGNOSIS — I83.813 VARICOSE VEINS OF LOWER EXTREMITY WITH PAIN, BILATERAL: ICD-10-CM

## 2020-02-11 PROCEDURE — 25000125 ZZHC RX 250: Performed by: RADIOLOGY

## 2020-02-11 PROCEDURE — 36475 ENDOVENOUS RF 1ST VEIN: CPT | Mod: LT

## 2020-02-11 PROCEDURE — 25000132 ZZH RX MED GY IP 250 OP 250 PS 637: Performed by: RADIOLOGY

## 2020-02-11 PROCEDURE — 25800030 ZZH RX IP 258 OP 636: Performed by: RADIOLOGY

## 2020-02-11 RX ORDER — IBUPROFEN 400 MG/1
400 TABLET, FILM COATED ORAL
Status: COMPLETED | OUTPATIENT
Start: 2020-02-11 | End: 2020-02-11

## 2020-02-11 RX ORDER — LIDOCAINE HYDROCHLORIDE 10 MG/ML
0-20 INJECTION, SOLUTION INFILTRATION; PERINEURAL
Status: COMPLETED | OUTPATIENT
Start: 2020-02-11 | End: 2020-02-11

## 2020-02-11 RX ORDER — DIAZEPAM 5 MG
10 TABLET ORAL
Status: DISCONTINUED | OUTPATIENT
Start: 2020-02-11 | End: 2020-02-12 | Stop reason: HOSPADM

## 2020-02-11 RX ADMIN — LIDOCAINE HYDROCHLORIDE 8.5 ML: 10 INJECTION, SOLUTION INFILTRATION; PERINEURAL at 13:39

## 2020-02-11 RX ADMIN — NITROGLYCERIN 15 MG: 20 OINTMENT TOPICAL at 13:15

## 2020-02-11 RX ADMIN — SODIUM BICARBONATE: 84 INJECTION, SOLUTION INTRAVENOUS at 13:41

## 2020-02-11 RX ADMIN — IBUPROFEN 400 MG: 400 TABLET, FILM COATED ORAL at 14:19

## 2020-02-11 NOTE — PROGRESS NOTES
Prior to the start of the procedure, left leg was cleansed with chlorhexidine in sterile fashion, then draped by rad tech. nitroglycerin paste was removed during cleansing of the leg.     Radiofrequency Ablation Belanit; Lot # [id number]    RFA catheter length: 60 cm    Element length: 7 cm    Introducer length: 7 cm    Time out:  1340    Cycles of treatment: 5 cm    Cm treated:  20.5 cm    Tumescent used:  100 mL    Total time: 1 min 40 sec    Skin of extremity where heating pad was placed is WDL.      Direct pressure was held to left leg upon removal of the catheter.    Catheter insertion site was covered with sterile 2x2 gauze and sterile tegaderm.    Compression stocking placed on left leg.

## 2020-02-11 NOTE — DISCHARGE INSTRUCTIONS
Radiofrequency Ablation    Your follow-up appointment is 2/14/20 at 10:00 am.  This exam will take approximately 45 minutes and you may drive yourself to this appointment.  Wear you compression stocking day and night until this appointment.  You may take them off to shower.      After your three day follow-up appointment, wear your stocking during the day and off at night for one week.    Activity:   *  Resume your normal activities today such as walking.   *  Walk at least three times per day for 20 minutes.  *  Avoid vigorous exercise or weight lifting for three days  *  No baths or hot tubs for one week  *  Avoid excessive sun or tanning booths  *  If you received Valium, avoid driving or drinking alcohol for 24 hours    Comfort:  *  You may use ice for the first 24 hours, only leave on for 20 minutes at a time    Diet:  *  Resume your normal diet    When to call your Physician:  *  Toes become discolored and numb  *  Excessive pain or increased pain with walking  *  Fever, chills, redness, drainage or warmth around your incision    It is normal to have bruising and tenderness.  You may also experience a pulling/or tugging sensation of your leg starting around the third or fourth day.      For questions, problems or concerns, contact [phone].

## 2020-02-11 NOTE — IP AVS SNAPSHOT
[facility]  [address]  [place] [address]  Phone:  [phone]  Fax:  [phone]                                    After Visit Summary   2/11/2020    Phyllis Washington    MRN: [id number]           After Visit Summary Signature Page    I have received my discharge instructions, and my questions have been answered. I have discussed any challenges I see with this plan with the nurse or doctor.    ..........................................................................................................................................  Patient/Patient Representative Signature      ..........................................................................................................................................  Patient Representative Print Name and Relationship to Patient    ..................................................               ................................................  Date                                   Time    ..........................................................................................................................................  Reviewed by Signature/Title    ...................................................              ..............................................  Date                                               Time          22EPIC Rev 08/18

## 2020-02-14 ENCOUNTER — HOSPITAL ENCOUNTER (OUTPATIENT)
Dept: ULTRASOUND IMAGING | Facility: OTHER | Age: 55
Discharge: HOME | End: 2020-02-14
Attending: RADIOLOGY | Admitting: FAMILY MEDICINE
Payer: COMMERCIAL

## 2020-02-14 DIAGNOSIS — I83.813 VARICOSE VEINS OF LOWER EXTREMITY WITH PAIN, BILATERAL: ICD-10-CM

## 2020-02-14 PROCEDURE — 93971 EXTREMITY STUDY: CPT | Mod: LT

## 2020-03-11 ENCOUNTER — HEALTH MAINTENANCE LETTER (OUTPATIENT)
Age: 55
End: 2020-03-11

## 2020-04-02 ENCOUNTER — HOSPITAL ENCOUNTER (OUTPATIENT)
Dept: ULTRASOUND IMAGING | Facility: OTHER | Age: 55
Discharge: HOME | End: 2020-04-02
Attending: RADIOLOGY | Admitting: FAMILY MEDICINE
Payer: COMMERCIAL

## 2020-04-02 DIAGNOSIS — I83.813 VARICOSE VEINS OF LOWER EXTREMITY WITH PAIN, BILATERAL: ICD-10-CM

## 2020-04-02 LAB
AST SERPL-CCNC: 19 IU/L (ref 10–40)
CREAT SERPL-MCNC: 0.86 MG/DL (ref 0.4–1)
GFR SERPL CREATININE-BSD FRML MDRD: >60 ML/MIN/1.73M*2
HEP C HIM: NORMAL

## 2020-04-02 PROCEDURE — 93971 EXTREMITY STUDY: CPT | Mod: LT

## 2020-04-03 ENCOUNTER — TRANSFERRED RECORDS (OUTPATIENT)
Dept: HEALTH INFORMATION MANAGEMENT | Facility: OTHER | Age: 55
End: 2020-04-03

## 2020-07-03 ENCOUNTER — OFFICE VISIT (OUTPATIENT)
Dept: FAMILY MEDICINE | Facility: OTHER | Age: 55
End: 2020-07-03
Attending: NURSE PRACTITIONER
Payer: COMMERCIAL

## 2020-07-03 ENCOUNTER — APPOINTMENT (OUTPATIENT)
Dept: GENERAL RADIOLOGY | Facility: OTHER | Age: 55
End: 2020-07-03
Attending: FAMILY MEDICINE
Payer: COMMERCIAL

## 2020-07-03 ENCOUNTER — HOSPITAL ENCOUNTER (EMERGENCY)
Facility: OTHER | Age: 55
Discharge: HOME | End: 2020-07-03
Attending: FAMILY MEDICINE | Admitting: FAMILY MEDICINE
Payer: COMMERCIAL

## 2020-07-03 VITALS
OXYGEN SATURATION: 100 % | DIASTOLIC BLOOD PRESSURE: 87 MMHG | TEMPERATURE: 97.2 F | WEIGHT: 280 LBS | HEIGHT: 70 IN | RESPIRATION RATE: 18 BRPM | HEART RATE: 62 BPM | BODY MASS INDEX: 40.09 KG/M2 | SYSTOLIC BLOOD PRESSURE: 138 MMHG

## 2020-07-03 VITALS
HEIGHT: 70 IN | OXYGEN SATURATION: 98 % | SYSTOLIC BLOOD PRESSURE: 130 MMHG | RESPIRATION RATE: 18 BRPM | HEART RATE: 83 BPM | TEMPERATURE: 97.8 F | DIASTOLIC BLOOD PRESSURE: 74 MMHG | WEIGHT: 280.6 LBS | BODY MASS INDEX: 40.17 KG/M2

## 2020-07-03 DIAGNOSIS — R20.2 PARESTHESIA OF RIGHT ARM: ICD-10-CM

## 2020-07-03 DIAGNOSIS — R07.89 ATYPICAL CHEST PAIN: ICD-10-CM

## 2020-07-03 DIAGNOSIS — R07.9 CHEST PAIN, UNSPECIFIED TYPE: Primary | ICD-10-CM

## 2020-07-03 DIAGNOSIS — M54.2 CERVICAL MUSCLE PAIN: ICD-10-CM

## 2020-07-03 DIAGNOSIS — M05.79 RHEUMATOID ARTHRITIS INVOLVING MULTIPLE SITES WITH POSITIVE RHEUMATOID FACTOR (H): ICD-10-CM

## 2020-07-03 LAB
ALBUMIN SERPL-MCNC: 4.1 G/DL (ref 3.5–5.7)
ALP SERPL-CCNC: 41 U/L (ref 34–104)
ALT SERPL W P-5'-P-CCNC: 17 U/L (ref 7–52)
ANION GAP SERPL CALCULATED.3IONS-SCNC: 8 MMOL/L (ref 3–14)
AST SERPL W P-5'-P-CCNC: 23 U/L (ref 13–39)
BASOPHILS # BLD AUTO: 0 10E9/L (ref 0–0.2)
BASOPHILS NFR BLD AUTO: 0.4 %
BILIRUB SERPL-MCNC: 0.5 MG/DL (ref 0.3–1)
BUN SERPL-MCNC: 16 MG/DL (ref 7–25)
CALCIUM SERPL-MCNC: 9.4 MG/DL (ref 8.6–10.3)
CHLORIDE SERPL-SCNC: 98 MMOL/L (ref 98–107)
CO2 SERPL-SCNC: 28 MMOL/L (ref 21–31)
CREAT SERPL-MCNC: 0.96 MG/DL (ref 0.6–1.2)
D DIMER PPP DDU-MCNC: <200 NG/ML D-DU (ref 0–230)
DIFFERENTIAL METHOD BLD: ABNORMAL
EOSINOPHIL # BLD AUTO: 0 10E9/L (ref 0–0.7)
EOSINOPHIL NFR BLD AUTO: 0.2 %
ERYTHROCYTE [DISTWIDTH] IN BLOOD BY AUTOMATED COUNT: 15.5 % (ref 10–15)
ERYTHROCYTE [SEDIMENTATION RATE] IN BLOOD BY WESTERGREN METHOD: 7 MM/H (ref 1–15)
GFR SERPL CREATININE-BSD FRML MDRD: 60 ML/MIN/{1.73_M2}
GLUCOSE SERPL-MCNC: 99 MG/DL (ref 70–105)
HCT VFR BLD AUTO: 42.2 % (ref 35–47)
HGB BLD-MCNC: 14 G/DL (ref 11.7–15.7)
IMM GRANULOCYTES # BLD: 0 10E9/L (ref 0–0.4)
IMM GRANULOCYTES NFR BLD: 0.2 %
INR PPP: 0.98 (ref 0–1.3)
LIPASE SERPL-CCNC: 18 U/L (ref 11–82)
LYMPHOCYTES # BLD AUTO: 1.7 10E9/L (ref 0.8–5.3)
LYMPHOCYTES NFR BLD AUTO: 37.2 %
MCH RBC QN AUTO: 27.7 PG (ref 26.5–33)
MCHC RBC AUTO-ENTMCNC: 33.2 G/DL (ref 31.5–36.5)
MCV RBC AUTO: 83 FL (ref 78–100)
MONOCYTES # BLD AUTO: 0.5 10E9/L (ref 0–1.3)
MONOCYTES NFR BLD AUTO: 11.9 %
NEUTROPHILS # BLD AUTO: 2.3 10E9/L (ref 1.6–8.3)
NEUTROPHILS NFR BLD AUTO: 50.1 %
NT-PROBNP SERPL-MCNC: 24 PG/ML (ref 0–100)
PLATELET # BLD AUTO: 212 10E9/L (ref 150–450)
POTASSIUM SERPL-SCNC: 3.6 MMOL/L (ref 3.5–5.1)
PROT SERPL-MCNC: 7.1 G/DL (ref 6.4–8.9)
RBC # BLD AUTO: 5.06 10E12/L (ref 3.8–5.2)
SODIUM SERPL-SCNC: 134 MMOL/L (ref 134–144)
TROPONIN I SERPL-MCNC: 2.9 PG/ML
TROPONIN I SERPL-MCNC: <2.3 PG/ML
WBC # BLD AUTO: 4.5 10E9/L (ref 4–11)

## 2020-07-03 PROCEDURE — 96360 HYDRATION IV INFUSION INIT: CPT | Performed by: FAMILY MEDICINE

## 2020-07-03 PROCEDURE — 85025 COMPLETE CBC W/AUTO DIFF WBC: CPT | Performed by: FAMILY MEDICINE

## 2020-07-03 PROCEDURE — 84484 ASSAY OF TROPONIN QUANT: CPT | Mod: 91 | Performed by: FAMILY MEDICINE

## 2020-07-03 PROCEDURE — 25800030 ZZH RX IP 258 OP 636: Performed by: FAMILY MEDICINE

## 2020-07-03 PROCEDURE — 83880 ASSAY OF NATRIURETIC PEPTIDE: CPT | Performed by: FAMILY MEDICINE

## 2020-07-03 PROCEDURE — 83690 ASSAY OF LIPASE: CPT | Performed by: FAMILY MEDICINE

## 2020-07-03 PROCEDURE — 84484 ASSAY OF TROPONIN QUANT: CPT | Performed by: FAMILY MEDICINE

## 2020-07-03 PROCEDURE — 85379 FIBRIN DEGRADATION QUANT: CPT | Performed by: FAMILY MEDICINE

## 2020-07-03 PROCEDURE — 72040 X-RAY EXAM NECK SPINE 2-3 VW: CPT

## 2020-07-03 PROCEDURE — 93005 ELECTROCARDIOGRAM TRACING: CPT | Mod: 76 | Performed by: FAMILY MEDICINE

## 2020-07-03 PROCEDURE — 36415 COLL VENOUS BLD VENIPUNCTURE: CPT | Performed by: FAMILY MEDICINE

## 2020-07-03 PROCEDURE — 99285 EMERGENCY DEPT VISIT HI MDM: CPT | Mod: 25 | Performed by: FAMILY MEDICINE

## 2020-07-03 PROCEDURE — 85610 PROTHROMBIN TIME: CPT | Performed by: FAMILY MEDICINE

## 2020-07-03 PROCEDURE — 96361 HYDRATE IV INFUSION ADD-ON: CPT | Performed by: FAMILY MEDICINE

## 2020-07-03 PROCEDURE — 80053 COMPREHEN METABOLIC PANEL: CPT | Performed by: FAMILY MEDICINE

## 2020-07-03 PROCEDURE — 85652 RBC SED RATE AUTOMATED: CPT | Performed by: FAMILY MEDICINE

## 2020-07-03 PROCEDURE — 71046 X-RAY EXAM CHEST 2 VIEWS: CPT

## 2020-07-03 PROCEDURE — 93010 ELECTROCARDIOGRAM REPORT: CPT | Performed by: INTERNAL MEDICINE

## 2020-07-03 PROCEDURE — 99284 EMERGENCY DEPT VISIT MOD MDM: CPT | Mod: Z6 | Performed by: FAMILY MEDICINE

## 2020-07-03 PROCEDURE — 93005 ELECTROCARDIOGRAM TRACING: CPT | Performed by: FAMILY MEDICINE

## 2020-07-03 RX ORDER — UPADACITINIB 15 MG/1
15 TABLET, EXTENDED RELEASE ORAL
COMMUNITY
Start: 2020-04-20 | End: 2021-09-08

## 2020-07-03 RX ORDER — BRIMONIDINE TARTRATE 2 MG/ML
SOLUTION/ DROPS OPHTHALMIC
COMMUNITY
Start: 2020-01-24 | End: 2020-07-06

## 2020-07-03 RX ORDER — SODIUM CHLORIDE 9 MG/ML
INJECTION, SOLUTION INTRAVENOUS CONTINUOUS
Status: DISCONTINUED | OUTPATIENT
Start: 2020-07-03 | End: 2020-07-03 | Stop reason: HOSPADM

## 2020-07-03 RX ORDER — UPADACITINIB 15 MG/1
TABLET, EXTENDED RELEASE ORAL
COMMUNITY
Start: 2020-06-22 | End: 2020-07-06

## 2020-07-03 RX ORDER — NEEDLES, SAFETY 22GX1 1/2"
NEEDLE, DISPOSABLE MISCELLANEOUS
COMMUNITY
Start: 2020-05-28

## 2020-07-03 RX ADMIN — SODIUM CHLORIDE: 9 INJECTION, SOLUTION INTRAVENOUS at 14:29

## 2020-07-03 ASSESSMENT — MIFFLIN-ST. JEOR
SCORE: 1940.32
SCORE: 1943.04

## 2020-07-03 ASSESSMENT — PAIN SCALES - GENERAL: PAINLEVEL: SEVERE PAIN (7)

## 2020-07-03 NOTE — ED AVS SNAPSHOT
[facility]  [address]  [place] [address]  Phone:  [phone]  Fax:  [phone]                                    Phyllis Washington   MRN: [id number]    Department:  [facility]   Date of Visit:  7/3/2020           After Visit Summary Signature Page    I have received my discharge instructions, and my questions have been answered. I have discussed any challenges I see with this plan with the nurse or doctor.    ..........................................................................................................................................  Patient/Patient Representative Signature      ..........................................................................................................................................  Patient Representative Print Name and Relationship to Patient    ..................................................               ................................................  Date                                   Time    ..........................................................................................................................................  Reviewed by Signature/Title    ...................................................              ..............................................  Date                                               Time          22EPIC Rev 08/18

## 2020-07-03 NOTE — DISCHARGE INSTRUCTIONS
Dear Ms. Neururer,  It was nice to meet you.  As we talked, thankfully we are not finding evidence of heart injury or blood clots or inflammation or infection.  I think you have a pinched nerve in your neck and I would like you to do gentle range of motion exercises of your neck to stay limber and follow up next week in clinic for recheck.    Tylenol for pain; ice for pain and heat for stiffness.    Return as needed for any worsening symptoms.  Dr. Luther Felder

## 2020-07-03 NOTE — ED PROVIDER NOTES
History     Chief Complaint   Patient presents with     Chest Pain     HPI  Phyllis VILLASENOR Neururer is a 55 year old female who presents to [facility] with 3 days of intermittent right arm radiating pain that is also into her armpit and up into her neck and jaw both sides.  Yesterday she had some chest pain associated with it as well, worse with movement and deep breathing and movement of arms.  No associated SOB or sweating or nausea.  No family hx of early MI.  No hx of tobacco.  She does have RA on immune modulating medications - recently changed from Humira to Cimzia.    Allergies:  No Known Allergies    Problem List:    Patient Active Problem List    Diagnosis Date Noted     Health care maintenance 10/22/2018     Priority: Medium     Pap NIL and HPV neg on 8/22/17 ([facility])  DEXA 2015 normal.       Colon polyp 09/20/2017     Priority: Medium     Overview:   Colonoscopy 9/20/17 with transverse colon polyp and sigmoid colon polyp (20 cm), both removed       Thrombosis of saphenous vein, left 11/16/2016     Priority: Medium     Disturbance in sleep behavior 07/02/2015     Priority: Medium     Overview:   [facility] Sleep Study: Showed Mild upper airway resistance. No sleep apnea or limb movement disorder.   IMO Update       Family history of diabetes mellitus type II 04/07/2015     Priority: Medium     High risk medication use 09/10/2014     Priority: Medium     Overview:   HCQ, MTX, Humira       Cervical muscle pain 07/25/2013     Priority: Medium     Rheumatoid arthritis involving multiple sites with positive rheumatoid factor (H) 09/28/2012     Priority: Medium     Overview:   RF + 121, CCP neg; onset ~ 2011, Dx 2012  Doxy (9/8/2012- 3/2013)  Nuc Med Scan with symmetric uptake  Plaquenil trial 3/2013- ; MTX 7/2013- ; SSA 10/2014-12/2014; Humira 10/2015-   6/2014: VECTRA 42       Vitamin D deficiency 06/18/2012     Priority: Medium     Overview:   Level of 25 on 5/11/2012       Myalgia 05/11/2012     Priority: Medium  "    Enthesopathy of hip region 01/14/2011     Priority: Medium     Hypercholesterolemia 12/14/2009     Priority: Medium     Allergic rhinitis 11/02/2004     Priority: Medium     Asthma 09/22/2003     Priority: Medium     Overview:   Updated per 10/1/17 IMO import       Esophageal reflux 09/22/2003     Priority: Medium     Essential hypertension, benign 05/14/2001     Priority: Medium     Obesity 05/14/2001     Priority: Medium        Past Medical History:    Past Medical History:   Diagnosis Date     Personal history of other medical treatment (CODE)        Past Surgical History:    Past Surgical History:   Procedure Laterality Date     COLONOSCOPY  2017    Dr. Fofana - 2 polyps found.  Told to f/u in 5 years.       Family History:    Family History   Problem Relation Age of Onset     Hypertension Father         Hypertension,67     Other - See Comments Maternal Grandmother         Stroke,85     Other - See Comments Paternal Grandmother         Alzheimer's 73     Breast Cancer Mother 46        Cancer-breast,now 68     Cerebrovascular Disease Mother         stroke with hemiparesis 2017     Diabetes Brother 16        Diabetes,now 41       Social History:  Marital Status:   [2]  Social History     Tobacco Use     Smoking status: Never Smoker     Smokeless tobacco: Never Used   Substance Use Topics     Alcohol use: No     Frequency: Never     Comment: Rarely     Drug use: No        Medications:    adalimumab (HUMIRA PEN) 40 MG/0.8ML pen kit  famotidine (PEPCID) 20 MG tablet  folic acid (FOLVITE) 1 MG tablet  hydroxychloroquine (PLAQUENIL) 200 MG tablet  methotrexate 2.5 MG tablet CHEMO  predniSONE (DELTASONE) 5 MG tablet  RINVOQ 15 MG TB24  triamterene-HCTZ (MAXZIDE-25) 37.5-25 MG tablet  Upadacitinib ER (RINVOQ) 15 MG TB24  BD SAFETYGLIDE SYRINGE/NEEDLE 27G X 5/8\" 1 ML MISC  brimonidine (ALPHAGAN) 0.2 % ophthalmic solution  diazepam (VALIUM) 10 MG tablet  ILEVRO 0.3 % ophthalmic suspension  order for DME  other " "medical supplies          Review of Systems   Constitutional: Negative for chills, diaphoresis and fever.   HENT: Negative.    Eyes: Negative.    Respiratory: Negative.  Negative for cough and shortness of breath.    Cardiovascular: Positive for chest pain. Negative for palpitations and leg swelling.   Gastrointestinal: Negative.  Negative for abdominal pain, anal bleeding, blood in stool, diarrhea, nausea and vomiting.        No coughing or vomiting blood.   Genitourinary: Negative for dysuria and hematuria.   Musculoskeletal: Positive for neck pain (pain radiating up sides of neck to jaw from upper back/shoulders.).   Skin: Negative for rash (No shingles rash.).   Neurological: Negative for weakness.        Paresthesias in right arm.   Psychiatric/Behavioral: Negative.        Physical Exam   BP: (!) 150/85  Pulse: 72  Resp: 18  Height: 177.8 cm (5' 10\")  Weight: 127 kg (280 lb)  SpO2: 95 %      Physical Exam  Vitals signs and nursing note reviewed.   Constitutional:       General: She is not in acute distress.     Appearance: She is obese. She is not ill-appearing.   HENT:      Head: Normocephalic and atraumatic.      Comments: No temporal artery tenderness or inflammation appreciated and no other scalp tenderness.  Eyes:      Extraocular Movements: Extraocular movements intact.      Pupils: Pupils are equal, round, and reactive to light.   Neck:      Musculoskeletal: Normal range of motion and neck supple.      Comments: Mild reproducible neck muscle tenderness but good ROM.  No provocation of right arm paresthesias with neck ROM.  Cardiovascular:      Rate and Rhythm: Normal rate and regular rhythm.      Heart sounds: Normal heart sounds.   Pulmonary:      Effort: Pulmonary effort is normal.      Breath sounds: Normal breath sounds.   Chest:      Chest wall: Tenderness present.   Abdominal:      General: Bowel sounds are normal.      Palpations: Abdomen is soft.      Tenderness: There is no abdominal tenderness. "   Musculoskeletal: Normal range of motion.      Right lower leg: She exhibits no tenderness. No edema.      Left lower leg: She exhibits no tenderness. No edema.      Comments: No calf tenderness or lower leg swelling.   Lymphadenopathy:      Cervical: No cervical adenopathy.   Skin:     General: Skin is warm and dry.      Capillary Refill: Capillary refill takes less than 2 seconds.   Neurological:      General: No focal deficit present.      Mental Status: She is alert.      Comments: Grasp 5/5 and symmetric DTR 2/4 and symmetric upper and lower extremities.   Psychiatric:         Mood and Affect: Mood normal.         ED Course        Procedures          EKG: NSR at 62 bpm and normal.  And subsequent Right Sided EKG with NSR at 62 bpm and nonspecific lateral T-wave flattening.    Critical Care time:  none               Results for orders placed or performed during the hospital encounter of 07/03/20 (from the past 24 hour(s))   CBC with platelets differential   Result Value Ref Range    WBC 4.5 4.0 - 11.0 10e9/L    RBC Count 5.06 3.8 - 5.2 10e12/L    Hemoglobin 14.0 11.7 - 15.7 g/dL    Hematocrit 42.2 35.0 - 47.0 %    MCV 83 78 - 100 fl    MCH 27.7 26.5 - 33.0 pg    MCHC 33.2 31.5 - 36.5 g/dL    RDW 15.5 (H) 10.0 - 15.0 %    Platelet Count 212 150 - 450 10e9/L    Diff Method Automated Method     % Neutrophils 50.1 %    % Lymphocytes 37.2 %    % Monocytes 11.9 %    % Eosinophils 0.2 %    % Basophils 0.4 %    % Immature Granulocytes 0.2 %    Absolute Neutrophil 2.3 1.6 - 8.3 10e9/L    Absolute Lymphocytes 1.7 0.8 - 5.3 10e9/L    Absolute Monocytes 0.5 0.0 - 1.3 10e9/L    Absolute Eosinophils 0.0 0.0 - 0.7 10e9/L    Absolute Basophils 0.0 0.0 - 0.2 10e9/L    Abs Immature Granulocytes 0.0 0 - 0.4 10e9/L   INR   Result Value Ref Range    INR 0.98 0 - 1.3   Comprehensive metabolic panel   Result Value Ref Range    Sodium 134 134 - 144 mmol/L    Potassium 3.6 3.5 - 5.1 mmol/L    Chloride 98 98 - 107 mmol/L    Carbon  Dioxide 28 21 - 31 mmol/L    Anion Gap 8 3 - 14 mmol/L    Glucose 99 70 - 105 mg/dL    Urea Nitrogen 16 7 - 25 mg/dL    Creatinine 0.96 0.60 - 1.20 mg/dL    GFR Estimate 60 (L) >60 mL/min/[1.73_m2]    GFR Estimate If Black 73 >60 mL/min/[1.73_m2]    Calcium 9.4 8.6 - 10.3 mg/dL    Bilirubin Total 0.5 0.3 - 1.0 mg/dL    Albumin 4.1 3.5 - 5.7 g/dL    Protein Total 7.1 6.4 - 8.9 g/dL    Alkaline Phosphatase 41 34 - 104 U/L    ALT 17 7 - 52 U/L    AST 23 13 - 39 U/L   Lipase   Result Value Ref Range    Lipase 18 11 - 82 U/L   Troponin GH   Result Value Ref Range    Troponin <2.3 <34.0 pg/mL   Nt probnp inpatient (BNP)   Result Value Ref Range    N-Terminal Pro BNP Inpatient 24 0 - 100 pg/mL   D-Dimer GH   Result Value Ref Range    D-Dimer ng/mL <200 0 - 230 ng/ml D-DU   Erythrocyte sedimentation rate auto   Result Value Ref Range    Sed Rate 7 1 - 15 mm/h   XR Cervical Spine 2/3 Views    Narrative    PROCEDURE: XR CERVICAL SPINE 2/3 VWS 7/3/2020 3:47 PM    HISTORY: Hx of Rheumatoid arthritis and ?right sided radiculopathy    COMPARISONS: None.    TECHNIQUE: AP and lateral    FINDINGS: Cervical disks are normal in height. Minimal anterior  osteophytes are seen at C4-C5. The cervical facet joints appear  normal. The prevertebral soft tissues are normal.         Impression    IMPRESSION: Minimal degenerative changes of the cervical spine    SIVAN THORNE MD   XR Chest 2 Views    Narrative    PROCEDURE:  XR CHEST 2 VW    HISTORY:  atypical CP.     COMPARISON:  2018    FINDINGS:   The cardiac silhouette is normal in size. The pulmonary vasculature is  normal.  The lungs are clear. No pleural effusion or pneumothorax.      Impression    IMPRESSION:  No acute cardiopulmonary disease.      SIVAN THORNE MD   Troponin GH (now)   Result Value Ref Range    Troponin 2.9 <34.0 pg/mL       Medications   sodium chloride 0.9% infusion ( Intravenous New Bag 7/3/20 1429)     5:20 PM  I reassured patient regarding benign labs with  negative serial troponins, no acute EKG changes.  CXR is negative and c-spine with some arthritic changes, and suspect neck radiculopathy.  Recommending gentle ROM and f/u next week in clinic to determine need for MRI.    Assessments & Plan (with Medical Decision Making)   55 year old female with RA having right arm paresthesias for 3+ days and now associate neck and jaw and chest pains that are somewhat positional and reproducible with evaluation to r/o CAD with no acute ischemia on EKG and Right-sided EKG, and negative serial troponin studies.  Normal BNP with clear chest and no indication of CHF.  Normal D-dimer to r/o VTE.  Reassuring normal ESR to r/o PMR/Temporal arteritis.  C-spine shows some DJD/arthritis and suspect that patient has a cervical radiculopathy.  Recommending gentle ROM and f/u in clinic next week.  Return as needed for any worsening sx.    Per AVS:  Dear Ms. Neururer,  It was nice to meet you.  As we talked, thankfully we are not finding evidence of heart injury or blood clots or inflammation or infection.  I think you have a pinched nerve in your neck and I would like you to do gentle range of motion exercises of your neck to stay limber and follow up next week in clinic for recheck.    Tylenol for pain; ice for pain and heat for stiffness.    Return as needed for any worsening symptoms.  Dr. Luther Felder      I have reviewed the nursing notes.    I have reviewed the findings, diagnosis, plan and need for follow up with the patient.       New Prescriptions    No medications on file       Final diagnoses:   Atypical chest pain   Paresthesia of right arm - concern for neck radiculopathy.   Cervical muscle pain   Rheumatoid arthritis involving multiple sites with positive rheumatoid factor (H)       7/3/2020   [facility]     Delbert Felder MD  07/04/20 0786

## 2020-07-03 NOTE — ED TRIAGE NOTES
Patient presents with having chest pain for last few days. States she took 2 regular aspirins (325mg each) around 1200. States she is having right arm pain, 6-7/10 pressure. Denies any nausea, vomiting, or feeling lightheaded/dizzy.

## 2020-07-03 NOTE — NURSING NOTE
"Chief Complaint   Patient presents with     Musculoskeletal Problem     Right arm pain numbese       Patient presents today in clinic for the following bilateral pain and right numbness.    Initial There were no vitals taken for this visit. Estimated body mass index is 41.2 kg/m  (pended) as calculated from the following:    Height as of 1/16/20: (P) 1.759 m (5' 9.25\").    Weight as of 1/16/20: (P) 127.5 kg (281 lb).  Medication Reconciliation: complete    TANYA, FLINCK, LPN  "

## 2020-07-03 NOTE — PROGRESS NOTES
HPI:    Phyllis Washington is a 55 year old female who presents to clinic today for right arm pain/numbness.  She states she started with some pain into her right upper arm on Tuesday.  Yesterday she noted chest pain, substernal that radiates into her back and up into her jaw.  This has not subsided since yesterday.  She has noted some right arm numbness especially to her middle 2 fingers and thumb.  No cardiac history.    Past Medical History:   Diagnosis Date     Personal history of other medical treatment (CODE)     3 normal childbirths       Past Surgical History:   Procedure Laterality Date     COLONOSCOPY  2017    Dr. Fofana - 2 polyps found.  Told to f/u in 5 years.       Family History   Problem Relation Age of Onset     Hypertension Father         Hypertension,67     Other - See Comments Maternal Grandmother         Stroke,85     Other - See Comments Paternal Grandmother         Alzheimer's 73     Breast Cancer Mother 46        Cancer-breast,now 68     Cerebrovascular Disease Mother         stroke with hemiparesis 2017     Diabetes Brother 16        Diabetes,now 41       Social History     Socioeconomic History     Marital status:      Spouse name: Not on file     Number of children: Not on file     Years of education: Not on file     Highest education level: Not on file   Occupational History     Not on file   Social Needs     Financial resource strain: Not on file     Food insecurity     Worry: Not on file     Inability: Not on file     Transportation needs     Medical: Not on file     Non-medical: Not on file   Tobacco Use     Smoking status: Never Smoker     Smokeless tobacco: Never Used   Substance and Sexual Activity     Alcohol use: No     Frequency: Never     Comment: Rarely     Drug use: No     Sexual activity: Yes     Partners: Male     Birth control/protection: Rhythm   Lifestyle     Physical activity     Days per week: Not on file     Minutes per session: Not on file     Stress: Not on file  "  Relationships     Social connections     Talks on phone: Not on file     Gets together: Not on file     Attends Religion service: Not on file     Active member of club or organization: Not on file     Attends meetings of clubs or organizations: Not on file     Relationship status: Not on file     Intimate partner violence     Fear of current or ex partner: Not on file     Emotionally abused: Not on file     Physically abused: Not on file     Forced sexual activity: Not on file   Other Topics Concern     Parent/sibling w/ CABG, MI or angioplasty before 65F 55M? Not Asked   Social History Narrative    , 3 adult children;     Homemaker; hobbies - reading, crafts.     Works at frame shop part-time.     Walks 3 miles a day     - Demetrio           Current Outpatient Medications   Medication Sig Dispense Refill     adalimumab (HUMIRA PEN) 40 MG/0.8ML pen kit 40 mg every 7 days        BD SAFETYGLIDE SYRINGE/NEEDLE 27G X 5/8\" 1 ML MISC 1 EACH BY DOES NOT APPLY ROUTE ONE TIME A WEEK. USE TO DRAW UP METHOTREXATE 25 MG ONCE WEEKLY.       brimonidine (ALPHAGAN) 0.2 % ophthalmic solution        diazepam (VALIUM) 10 MG tablet Take 1 by mouth 60 minutes prior to procedure. 2 tablet 0     famotidine (PEPCID) 20 MG tablet Take 1 tablet (20 mg) by mouth 2 times daily 180 tablet 3     folic acid (FOLVITE) 1 MG tablet Take 1 mg by mouth daily       hydroxychloroquine (PLAQUENIL) 200 MG tablet        ILEVRO 0.3 % ophthalmic suspension PLACE 1 DROP INTO LEFT EYE ONCE A DAY. START 3 DAYS BEFORE SURGERY & CONTINUE FOR 6 WEEKS AFTER  1     methotrexate 2.5 MG tablet CHEMO Take 20 mg by mouth once a week       order for DME Equipment being ordered: Jobst Stockings   Thigh High: 20-30mm/hg    Leg measures:  Ankle: 10in  Calf: 17.5in  Upper Thigh: 33in 2 each 1     other medical supplies Jobst stockings.  Thigh high.  16-20 mmHg.  Diagnosis:  I80.9.  Length of need:  99. 2 each 1     predniSONE (DELTASONE) 5 MG tablet Take 5 mg by " mouth daily.  0     RINVOQ 15 MG TB24        triamterene-HCTZ (MAXZIDE-25) 37.5-25 MG tablet TAKE 1 TABLET BY MOUTH EVERY DAY IN THE MORNING 90 tablet 3     Upadacitinib ER (RINVOQ) 15 MG TB24 Take 15 mg by mouth         No Known Allergies    Given patient's acute chest pain with radiation into back and chest it was determined that she should be seen in the emergency room.  She was transferred via wheelchair with to clinic staff.  Report was given to Dr. Felder in the emergency room.     MISSY Castro CNP on 7/3/2020 at 2:54 PM

## 2020-07-04 LAB
INTERPRETATION ECG - MUSE: NORMAL
INTERPRETATION ECG - MUSE: NORMAL

## 2020-07-04 ASSESSMENT — ENCOUNTER SYMPTOMS
DIAPHORESIS: 0
GASTROINTESTINAL NEGATIVE: 1
ANAL BLEEDING: 0
NECK PAIN: 1
BLOOD IN STOOL: 0
EYES NEGATIVE: 1
DYSURIA: 0
DIARRHEA: 0
RESPIRATORY NEGATIVE: 1
SHORTNESS OF BREATH: 0
CHILLS: 0
FEVER: 0
PSYCHIATRIC NEGATIVE: 1
COUGH: 0
ABDOMINAL PAIN: 0
NAUSEA: 0
PALPITATIONS: 0
HEMATURIA: 0
WEAKNESS: 0
VOMITING: 0

## 2020-07-06 ENCOUNTER — OFFICE VISIT (OUTPATIENT)
Dept: FAMILY MEDICINE | Facility: OTHER | Age: 55
End: 2020-07-06
Attending: FAMILY MEDICINE
Payer: COMMERCIAL

## 2020-07-06 VITALS
HEART RATE: 60 BPM | HEIGHT: 70 IN | TEMPERATURE: 98.5 F | SYSTOLIC BLOOD PRESSURE: 124 MMHG | BODY MASS INDEX: 40.37 KG/M2 | DIASTOLIC BLOOD PRESSURE: 88 MMHG | WEIGHT: 282 LBS

## 2020-07-06 DIAGNOSIS — R07.9 CHEST PAIN, UNSPECIFIED TYPE: Primary | ICD-10-CM

## 2020-07-06 DIAGNOSIS — M06.9 RHEUMATOID ARTHRITIS, RHEUMATOID FACTOR STATUS UNKNOWN (H): ICD-10-CM

## 2020-07-06 DIAGNOSIS — M54.2 NECK PAIN: ICD-10-CM

## 2020-07-06 PROCEDURE — 99214 OFFICE O/P EST MOD 30 MIN: CPT | Performed by: FAMILY MEDICINE

## 2020-07-06 ASSESSMENT — MIFFLIN-ST. JEOR: SCORE: 1949.39

## 2020-07-06 ASSESSMENT — PAIN SCALES - GENERAL: PAINLEVEL: SEVERE PAIN (6)

## 2020-07-06 NOTE — PROGRESS NOTES
"Nursing Notes:   Cheko Naikey J., LPN  7/6/2020  3:42 PM  Sign at exiting of workspace  Patient presents to the clinic today for a follow up from the emergency room, she states she has not had any improvement since that visit.     Med rec complete.  Myra Heena ALMAZAN.................. 7/6/2020 3:37 PM      SUBJECTIVE:   CC:  Phyllis Washington is a 55 year old female who presents to clinic today for the following health issues:  ER follow up for chest pain.     HPI  Phyllis Washington is a 55 year old female who presents for ER follow up for atypical chest pain.    HPI from the ER on 7/3/2020:  \"Phyllis Washington is a 55 year old female who presents to [facility] with 3 days of intermittent right arm radiating pain that is also into her armpit and up into her neck and jaw both sides.  Yesterday she had some chest pain associated with it as well, worse with movement and deep breathing and movement of arms.  No associated SOB or sweating or nausea.  No family hx of early MI.  No hx of tobacco.  She does have RA on immune modulating medications - recently changed from Humira to Rinvoq.\"    Since then, on the 4th she stayed inside, took it easy.  In both arms, there will be some tinging feeling.  She will have some pressure feeling that goes up to her face.  Some tingling in her legs too.  Her breathing has been fine.  No racing heart of palpitations.    Today she took a nap and initially waking up she felt ok and then in a bit her symptoms go on.  She does have neck pain.  At times her arms had felt numb and heavy.  Her legs have not felt numb or heavy.  Overall she would notice the symptoms more if she sat and relaxed.  Also when she sat and relaxed she would feel some brain fogginess.      With RA - she hasn't had these similar symptoms.   She did ask her rheumatologist if this could be a medication side effects.    No fevers.  No falls or injuries.      No Known Allergies  Current Outpatient Medications   Medication " "    BD SAFETYGLIDE SYRINGE/NEEDLE 27G X 5/8\" 1 ML MISC     famotidine (PEPCID) 20 MG tablet     folic acid (FOLVITE) 1 MG tablet     hydroxychloroquine (PLAQUENIL) 200 MG tablet     methotrexate 2.5 MG tablet CHEMO     order for DME     other medical supplies     predniSONE (DELTASONE) 5 MG tablet     triamterene-HCTZ (MAXZIDE-25) 37.5-25 MG tablet     Upadacitinib ER (RINVOQ) 15 MG TB24     No current facility-administered medications for this visit.       Past Medical History:   Diagnosis Date     Personal history of other medical treatment (CODE)     3 normal childbirths     Rheumatoid arthritis (H)       Past Surgical History:   Procedure Laterality Date     COLONOSCOPY  2017    Dr. Fofana - 2 polyps found.  Told to f/u in 5 years.     Family History   Problem Relation Age of Onset     Hypertension Father         Hypertension,67     Other - See Comments Maternal Grandmother         Stroke,85     Other - See Comments Paternal Grandmother         Alzheimer's 73     Breast Cancer Mother 46        Cancer-breast,now 68     Cerebrovascular Disease Mother         stroke with hemiparesis 2017     Diabetes Brother 16        Diabetes,now 41       Review of Systems   Constitutional: Negative.    HENT: Negative.      In December she had vein ablation done - she is wearing her compression stockings too  ; ddimer in the ED was negative.    PHQ-2 Score:     PHQ-2 ( 1999 [employer]) 7/6/2020 7/3/2020   Q1: Little interest or pleasure in doing things 0 0   Q2: Feeling down, depressed or hopeless 0 0   PHQ-2 Score 0 0         No flowsheet data found.      OBJECTIVE:     /88   Pulse 60   Temp 98.5  F (36.9  C) (Tympanic)   Ht 1.778 m (5' 10\")   Wt 127.9 kg (282 lb)   Breastfeeding No   BMI 40.46 kg/m    Body mass index is 40.46 kg/m .  Physical Exam  Vitals signs and nursing note reviewed.   Constitutional:       Appearance: Normal appearance. She is obese.   HENT:      Head: Normocephalic and atraumatic.   Neck:      " Comments: Decreased neck ROM with extension   Cardiovascular:      Rate and Rhythm: Normal rate and regular rhythm.      Heart sounds: Normal heart sounds.   Pulmonary:      Effort: Pulmonary effort is normal.      Breath sounds: Normal breath sounds.   Musculoskeletal:      Comments: Normal shoulder and UE strength  Normal sensation and reflexes     1+ LE edema and varicosities present without superficial thrombosis   Skin:     Findings: No rash.   Neurological:      Mental Status: She is alert.          No results found for any visits on 07/06/20.      ASSESSMENT/PLAN:       ICD-10-CM    1. Chest pain, unspecified type  R07.9 Echocardiogram Dobutamine Stress     MR Cervical Spine w/o Contrast   2. Neck pain  M54.2 MR Cervical Spine w/o Contrast   3. Rheumatoid arthritis, rheumatoid factor status unknown (H)  M06.9             PLAN:  1.  Differential diagnosis of her chest pain and arm pain discussed.  Patient with some risk factors for ASCVD and therefore stress testing is recommended.  She will be scheduled for stress echocardiogram.  We also discussed MUSCULOSKELETAL symptoms related to RA and extra-articular manifestations of RA.  With her arm symptoms, and even those in her leg, spinal stenosis should be evaluated.  MR c-spine is scheduled.    Follow up pending results.      MICHAEL PETERSON MD  [facility]    This note was created using voice recognition software and was screened for errors in transcription.

## 2020-07-06 NOTE — NURSING NOTE
Patient presents to the clinic today for a follow up from the emergency room, she states she has not had any improvement since that visit.     Med rec complete.  Myra Naik LPN.................. 7/6/2020 3:37 PM

## 2020-07-08 ENCOUNTER — HOSPITAL ENCOUNTER (OUTPATIENT)
Dept: MRI IMAGING | Facility: OTHER | Age: 55
Discharge: HOME | End: 2020-07-08
Attending: FAMILY MEDICINE | Admitting: FAMILY MEDICINE
Payer: COMMERCIAL

## 2020-07-08 DIAGNOSIS — R07.9 CHEST PAIN, UNSPECIFIED TYPE: ICD-10-CM

## 2020-07-08 DIAGNOSIS — M54.2 NECK PAIN: ICD-10-CM

## 2020-07-08 PROCEDURE — 72141 MRI NECK SPINE W/O DYE: CPT

## 2020-07-09 DIAGNOSIS — M50.20 HERNIATION OF CERVICAL INTERVERTEBRAL DISC WITHOUT MYELOPATHY: Primary | ICD-10-CM

## 2020-07-12 ASSESSMENT — ENCOUNTER SYMPTOMS: CONSTITUTIONAL NEGATIVE: 1

## 2020-07-13 ENCOUNTER — TELEPHONE (OUTPATIENT)
Dept: CARDIOLOGY | Facility: OTHER | Age: 55
End: 2020-07-13

## 2020-07-15 ENCOUNTER — MYC MEDICAL ADVICE (OUTPATIENT)
Dept: FAMILY MEDICINE | Facility: OTHER | Age: 55
End: 2020-07-15

## 2020-07-16 NOTE — TELEPHONE ENCOUNTER
Called patient and verified name and date of birth. Notified her of note from [facility]. Patient has plans tonight and is unable to come into clinic. Sent to scheduling to make an appointment for tomorrow.  Zandra Daly LPN on 7/16/2020 at 12:53 PM

## 2020-07-16 NOTE — TELEPHONE ENCOUNTER
"She needs to be seen today and needs to have a venous ultrasound to evaluate for the possibility of clot.  It doesn't look like there are any openings with anyone this afternoon in clinic.  Would recommend she be seen in [facility] on \"[facility]\" side if possible to be evaluated today.  Ya Victoria MD   "

## 2020-07-17 ENCOUNTER — OFFICE VISIT (OUTPATIENT)
Dept: FAMILY MEDICINE | Facility: OTHER | Age: 55
End: 2020-07-17
Attending: PHYSICIAN ASSISTANT
Payer: COMMERCIAL

## 2020-07-17 ENCOUNTER — HOSPITAL ENCOUNTER (OUTPATIENT)
Dept: ULTRASOUND IMAGING | Facility: OTHER | Age: 55
End: 2020-07-17
Attending: PHYSICIAN ASSISTANT
Payer: COMMERCIAL

## 2020-07-17 VITALS
DIASTOLIC BLOOD PRESSURE: 92 MMHG | SYSTOLIC BLOOD PRESSURE: 144 MMHG | RESPIRATION RATE: 16 BRPM | TEMPERATURE: 97.4 F | WEIGHT: 281.6 LBS | BODY MASS INDEX: 40.41 KG/M2 | HEART RATE: 78 BPM | OXYGEN SATURATION: 98 %

## 2020-07-17 DIAGNOSIS — R07.9 CHEST PAIN, UNSPECIFIED TYPE: ICD-10-CM

## 2020-07-17 DIAGNOSIS — M50.20 HERNIATION OF CERVICAL INTERVERTEBRAL DISC WITHOUT MYELOPATHY: ICD-10-CM

## 2020-07-17 DIAGNOSIS — M79.601 PAIN OF RIGHT UPPER EXTREMITY: Primary | ICD-10-CM

## 2020-07-17 DIAGNOSIS — M79.601 PAIN OF RIGHT UPPER EXTREMITY: ICD-10-CM

## 2020-07-17 DIAGNOSIS — M06.9 RHEUMATOID ARTHRITIS, RHEUMATOID FACTOR STATUS UNKNOWN (H): ICD-10-CM

## 2020-07-17 DIAGNOSIS — M54.2 NECK PAIN: ICD-10-CM

## 2020-07-17 PROCEDURE — 99214 OFFICE O/P EST MOD 30 MIN: CPT | Performed by: PHYSICIAN ASSISTANT

## 2020-07-17 PROCEDURE — 93971 EXTREMITY STUDY: CPT | Mod: RT

## 2020-07-17 ASSESSMENT — PAIN SCALES - GENERAL: PAINLEVEL: SEVERE PAIN (6)

## 2020-07-17 NOTE — PROGRESS NOTES
"SUBJECTIVE:   Phyllis VILLASENOR Neururer is a 55 year old female here for the following health issues:    HPI  Patient reports on 6/30/2020 she noted a gradual increase in right arm discomfort and pain.  Over the next 3 to 4 days her symptoms worsened.  She presented to the emergency room on 7/3/2020 and was noted to have paresthesias at that time.  Imaging and lab work including troponin, EKG, chest x-ray, and d-dimer were all unremarkable.  She was sent home with recommendations impression she had a pinched nerve in the neck and instructions for gentle range of motion.      Was again seen in the clinic on 7/6/2020 with recommendations for dobutamine stress echo and MR C-spine.  She has an appointment with cardiology on 7/28/2020.  Results of MR C-spine with out contrast showed degenerative disc disease more severe at C6-7 where there is effacement of ventral CSF.  No significant cord compression or foraminal narrowing at any level.  Also, cystic enlargement of the sella and edge of the field-of-view and incompletely evaluated.  Recommend MRI of the brain to evaluate this further.  A referral was then placed to neurology for herniation of cervical intervertebral disc without myelopathy.  No appointment date set yet.     Today, she reports her symptoms have been generally stable and not improved, however, some improvement in swelling, and her pain is 6 out of 10 with pain reproducible on palpation.  She also notes pain increased with range of motion however no pain or discomfort of the deltoid or scapular region suggesting rotator cuff tear.  She denies any fevers, chills, night sweats, erythema or warmth or swelling of the painful areas.      HPI from the ER on 7/3/2020:  \"Phyllis VILLASENOR Neururer is a 55 year old female who presents to [facility] with 3 days of intermittent right arm radiating pain that is also into her armpit and up into her neck and jaw both sides.  Yesterday she had some chest pain associated with it as well, " "worse with movement and deep breathing and movement of arms.  No associated SOB or sweating or nausea.  No family hx of early MI.  No hx of tobacco.  She does have RA on immune modulating medications - recently changed from Humira to Rinvoq.\"     Since then, on the 4th she stayed inside, took it easy.  In both arms, there will be some tinging feeling.  She will have some pressure feeling that goes up to her face.  Some tingling in her legs too.  Her breathing has been fine.  No racing heart of palpitations.    Today she took a nap and initially waking up she felt ok and then in a bit her symptoms go on.  She does have neck pain.  At times her arms had felt numb and heavy.  Her legs have not felt numb or heavy.  Overall she would notice the symptoms more if she sat and relaxed.  Also when she sat and relaxed she would feel some brain fogginess.       With RA - she hasn't had these similar symptoms.   She did ask her rheumatologist if this could be a medication side effects.     No fevers.  No falls or injuries.       Allergies:  No Known Allergies    Review of Systems   As above otherwise ROS is unremarkable.     OBJECTIVE:   BP (!) 144/92 (BP Location: Left arm, Patient Position: Sitting, Cuff Size: Adult Regular)   Pulse 78   Temp 97.4  F (36.3  C) (Temporal)   Resp 16   Wt 127.7 kg (281 lb 9.6 oz)   SpO2 98%   Breastfeeding No   BMI 40.41 kg/m      Physical Exam  General Appearance: Pleasant, alert, appropriate appearance for age and circumstances, no acute distress  Head: Normocephalic, atraumatic  Eyes: PERRL, EOMI  Ears: TM's pearly gray and intact bilaterally. Normal auditory canals and external ears   OroPharynx: Dental hygiene adequate. Normal buccal mucosa. Normal pharynx. No exudates or petechia noted.  Neck: Supple, no masses or lymphadenopathy. Thyroid smooth and rubbery in texture without palpable nodules  Lungs: Normal chest wall and respirations. Clear to auscultation, no wheezes, rales, " rhonchi, or stridor  Cardiovascular: Regular rate and rhythm. S1 and S2 audible, no murmurs, clicks, rubs, or gallops  Musculoskeletal: Tenderness to palpation of the distal medial tissue fold with questionable swelling.  Mild left not lymphadenopathy in the axillary region.  No overlying erythema, warmth, swelling or petechia noted.  No discernable muscle atrophy or weakness; full joint range of motion, no instability, redness, or swelling, no discernable spine deviation or gait abnormalities  Skin: no concerning or new rashes  Vascular: Radial pulses equal and intact bilaterally  Neurologic Exam: Equal  strength bilaterally. CN 2-12 grossly intact.  Normal gait.  Symmetric DTRs, no focal motor or sensory deficits. No tremor.  Psychiatric Exam: Alert and oriented, appropriate affect    Diagnostic Test Results:  No results found for any visits on 07/17/20.    ASSESSMENT/PLAN:     1. Pain of right upper extremity    2. Herniation of cervical intervertebral disc without myelopathy    3. Chest pain, unspecified type    4. Neck pain    5. Rheumatoid arthritis, rheumatoid factor status unknown (H)      Orders Placed This Encounter   Procedures     US Upper Extremity Venous Duplex Right       Etiology unclear at this time.    Has an appointment with cardiology for stress echo on 7/28/2020.    Today, ultrasound upper extremity for cellulitis versus blood clot versus lymphadenopathy    If cellulitis or lymphadenopathy would consider antibiotics.    If blood clot would consider thrombolytics.    If these are benign would await results of stress echo which is set for 7/28/2020    Still awaiting date for neurology referral to work-up herniation of cervical intervertebral disc without myelopathy    JAQUI Miller  [facility]    This document was prepared using voice generated software.  While every attempt was made for accuracy, grammatical errors may exist.

## 2020-07-17 NOTE — NURSING NOTE
Chief Complaint   Patient presents with     Pain     R arm      Right arm pain for a few weeks. Was seen in ER and in clinic since then. Pain has not gone away. Would like an ultrasound. Pain in constant in whole arm. Did have some swelling.     Medication Reconciliation: complete    Allyssa Kay, LPN

## 2020-07-23 ENCOUNTER — TELEPHONE (OUTPATIENT)
Dept: CARDIOLOGY | Facility: OTHER | Age: 55
End: 2020-07-23

## 2020-07-28 ENCOUNTER — HOSPITAL ENCOUNTER (OUTPATIENT)
Dept: CARDIOLOGY | Facility: OTHER | Age: 55
Discharge: HOME | End: 2020-07-28
Attending: FAMILY MEDICINE | Admitting: FAMILY MEDICINE
Payer: COMMERCIAL

## 2020-07-28 VITALS — WEIGHT: 281 LBS | HEIGHT: 70 IN | TEMPERATURE: 97.8 F | BODY MASS INDEX: 40.23 KG/M2 | OXYGEN SATURATION: 98 %

## 2020-07-28 DIAGNOSIS — R07.9 CHEST PAIN, UNSPECIFIED TYPE: ICD-10-CM

## 2020-07-28 PROCEDURE — 93321 DOPPLER ECHO F-UP/LMTD STD: CPT | Mod: 26 | Performed by: INTERNAL MEDICINE

## 2020-07-28 PROCEDURE — 93325 DOPPLER ECHO COLOR FLOW MAPG: CPT | Mod: 26 | Performed by: INTERNAL MEDICINE

## 2020-07-28 PROCEDURE — 93350 STRESS TTE ONLY: CPT | Mod: 26 | Performed by: INTERNAL MEDICINE

## 2020-07-28 PROCEDURE — 25000128 H RX IP 250 OP 636: Performed by: INTERNAL MEDICINE

## 2020-07-28 PROCEDURE — 93018 CV STRESS TEST I&R ONLY: CPT | Performed by: INTERNAL MEDICINE

## 2020-07-28 PROCEDURE — 25500064 ZZH RX 255 OP 636: Performed by: FAMILY MEDICINE

## 2020-07-28 PROCEDURE — 93321 DOPPLER ECHO F-UP/LMTD STD: CPT | Mod: TC

## 2020-07-28 PROCEDURE — 93016 CV STRESS TEST SUPVJ ONLY: CPT | Performed by: INTERNAL MEDICINE

## 2020-07-28 RX ORDER — SODIUM CHLORIDE 9 MG/ML
INJECTION, SOLUTION INTRAVENOUS ONCE
Status: DISCONTINUED | OUTPATIENT
Start: 2020-07-28 | End: 2020-07-29 | Stop reason: HOSPADM

## 2020-07-28 RX ORDER — ATROPINE SULFATE 0.1 MG/ML
.2-2 INJECTION INTRAVENOUS
Status: DISCONTINUED | OUTPATIENT
Start: 2020-07-28 | End: 2020-07-29 | Stop reason: HOSPADM

## 2020-07-28 RX ORDER — DOBUTAMINE HYDROCHLORIDE 200 MG/100ML
10-40 INJECTION INTRAVENOUS CONTINUOUS
Status: DISCONTINUED | OUTPATIENT
Start: 2020-07-28 | End: 2020-07-29 | Stop reason: HOSPADM

## 2020-07-28 RX ADMIN — PERFLUTREN 4 ML: 6.52 INJECTION, SUSPENSION INTRAVENOUS at 15:00

## 2020-07-28 RX ADMIN — DOBUTAMINE HYDROCHLORIDE 10 MCG/KG/MIN: 200 INJECTION INTRAVENOUS at 14:45

## 2020-07-28 ASSESSMENT — MIFFLIN-ST. JEOR: SCORE: 1944.86

## 2020-07-28 NOTE — PROGRESS NOTES
1339 The patient arrived for a Dobutamine stress echo.  The procedure, risks and benefits were discussed and the consent was signed.  The patient was prepped for the stress test, an IV was started,  and the echo sonographer did the initial images with Definity for image enhancement.   Dr. Covington arrived, and the Dobutamine protocol was started via multistep infusion.  The patient tolerated the procedure well.  Stress images were completed, normal saline was infused post Dobutamine,  the IV was removed. The patient was informed that the test results would be given to them by the ordering provider.  The patient was released in stable condition.  Please see the chart for complete test results.

## 2020-08-22 DIAGNOSIS — I10 ESSENTIAL HYPERTENSION, BENIGN: ICD-10-CM

## 2020-08-24 RX ORDER — TRIAMTERENE/HYDROCHLOROTHIAZID 37.5-25 MG
TABLET ORAL
Qty: 90 TABLET | Refills: 3 | Status: SHIPPED | OUTPATIENT
Start: 2020-08-24 | End: 2021-08-12

## 2020-08-24 NOTE — TELEPHONE ENCOUNTER
Triamterene-HCTZ (MAXZIDE-25) 37.5-25 MG tablet   Last Written Prescription Date: 09/27/2019  Last Fill Quantity: 90,   # refills: 3  Last Office Visit: 07/17/2020  Future Office visit:       Routing refill request to provider for review/approval because:  Blood pressure out of range   Failed - Blood pressure over 140/90 in past 12 months   BP Readings from Last 3 Encounters:   07/17/20 (!) 144/92   07/06/20 124/88   07/03/20 138/87        Unable to complete prescription refill per RNMedication Refill Policy.................... Gisela Tim RN ....................  8/24/2020   9:33 AM

## 2020-09-21 ENCOUNTER — OFFICE VISIT (OUTPATIENT)
Dept: FAMILY MEDICINE | Facility: OTHER | Age: 55
End: 2020-09-21
Attending: FAMILY MEDICINE
Payer: COMMERCIAL

## 2020-09-21 VITALS
WEIGHT: 282.25 LBS | DIASTOLIC BLOOD PRESSURE: 82 MMHG | RESPIRATION RATE: 18 BRPM | SYSTOLIC BLOOD PRESSURE: 130 MMHG | HEIGHT: 70 IN | OXYGEN SATURATION: 99 % | TEMPERATURE: 97 F | BODY MASS INDEX: 40.41 KG/M2 | HEART RATE: 75 BPM

## 2020-09-21 DIAGNOSIS — I49.9 IRREGULAR HEART BEAT: Primary | ICD-10-CM

## 2020-09-21 DIAGNOSIS — Z23 NEEDS FLU SHOT: ICD-10-CM

## 2020-09-21 PROCEDURE — 90686 IIV4 VACC NO PRSV 0.5 ML IM: CPT | Performed by: FAMILY MEDICINE

## 2020-09-21 PROCEDURE — 90471 IMMUNIZATION ADMIN: CPT | Performed by: FAMILY MEDICINE

## 2020-09-21 PROCEDURE — 99213 OFFICE O/P EST LOW 20 MIN: CPT | Mod: 25 | Performed by: FAMILY MEDICINE

## 2020-09-21 PROCEDURE — 93000 ELECTROCARDIOGRAM COMPLETE: CPT | Performed by: INTERNAL MEDICINE

## 2020-09-21 ASSESSMENT — MIFFLIN-ST. JEOR: SCORE: 1950.53

## 2020-09-21 ASSESSMENT — ENCOUNTER SYMPTOMS
COUGH: 0
CHILLS: 0
NERVOUS/ANXIOUS: 0
SHORTNESS OF BREATH: 0
FEVER: 0

## 2020-09-21 ASSESSMENT — PAIN SCALES - GENERAL: PAINLEVEL: MILD PAIN (3)

## 2020-09-21 NOTE — PROGRESS NOTES
SUBJECTIVE:   Nursing Notes:   Loretta Cortez LPN  9/21/2020 11:01 AM  Sign at exiting of workspace  Patient presents to clinic experiencing irregular heartbeats.  Medication Reconciliation: complete    Loretta Cortez LPN      Phyllis Rayurer is a 55 year old female who presents to clinic today for a complaint of irregular heart beat.  Has been going on at the time of her stress test.  She has noted that it has been occurring daily in the past couple of weeks.  Sometimes notices it just for a few minutes, sometimes for a couple of hours.  Does not have any shortness of breath or chest pain with it.  When she has it for a while, she feels jittery.  No fatigue either.  She did have a stress echocardiogram on 7/28/2020, which was normal and did not show any ischemia. On the rhythm monitoring, frequent PVCs were noted, including some bigeminy.  With higher doses, the bigeminy resolved.  No change in stress level.  Has been having them this morning as well.    She would like a flu shot today.    HPI    I personally reviewed medications/allergies/history listed below:    Patient Active Problem List    Diagnosis Date Noted     Health care maintenance 10/22/2018     Priority: Medium     Pap NIL and HPV neg on 8/22/17 ([facility])  DEXA 2015 normal.       Colon polyp 09/20/2017     Priority: Medium     Overview:   Colonoscopy 9/20/17 with transverse colon polyp and sigmoid colon polyp (20 cm), both removed       Thrombosis of saphenous vein, left 11/16/2016     Priority: Medium     Disturbance in sleep behavior 07/02/2015     Priority: Medium     Overview:   [facility] Sleep Study: Showed Mild upper airway resistance. No sleep apnea or limb movement disorder.   IMO Update       Family history of diabetes mellitus type II 04/07/2015     Priority: Medium     High risk medication use 09/10/2014     Priority: Medium     Overview:   HCQ, MTX, Humira       Cervical muscle pain 07/25/2013     Priority: Medium     Rheumatoid  arthritis involving multiple sites with positive rheumatoid factor (H) 09/28/2012     Priority: Medium     Overview:   RF + 121, CCP neg; onset ~ 2011, Dx 2012  Doxy (9/8/2012- 3/2013)  Nuc Med Scan with symmetric uptake  Plaquenil trial 3/2013- ; MTX 7/2013- ; SSA 10/2014-12/2014; Humira 10/2015-   6/2014: VECTRA 42       Vitamin D deficiency 06/18/2012     Priority: Medium     Overview:   Level of 25 on 5/11/2012       Myalgia 05/11/2012     Priority: Medium     Enthesopathy of hip region 01/14/2011     Priority: Medium     Hypercholesterolemia 12/14/2009     Priority: Medium     Allergic rhinitis 11/02/2004     Priority: Medium     Asthma 09/22/2003     Priority: Medium     Overview:   Updated per 10/1/17 IMO import       Esophageal reflux 09/22/2003     Priority: Medium     Essential hypertension, benign 05/14/2001     Priority: Medium     Obesity 05/14/2001     Priority: Medium     Past Medical History:   Diagnosis Date     Personal history of other medical treatment (CODE)     3 normal childbirths     Rheumatoid arthritis (H)       Past Surgical History:   Procedure Laterality Date     COLONOSCOPY  2017    Dr. Fofana - 2 polyps found.  Told to f/u in 5 years.     Family History   Problem Relation Age of Onset     Hypertension Father         Hypertension,67     Other - See Comments Maternal Grandmother         Stroke,85     Other - See Comments Paternal Grandmother         Alzheimer's 73     Breast Cancer Mother 46        Cancer-breast,now 68     Cerebrovascular Disease Mother         stroke with hemiparesis 2017     Diabetes Brother 16        Diabetes,now 41     Social History     Tobacco Use     Smoking status: Never Smoker     Smokeless tobacco: Never Used   Substance Use Topics     Alcohol use: No     Frequency: Never     Comment: Rarely     Social History     Social History Narrative    , 3 adult children;     Homemaker; hobbies - reading, crafts.     Works at frame shop part-time.     Walks 3 miles  "a day     - [place]         Current Outpatient Medications   Medication Sig Dispense Refill     BD SAFETYGLIDE SYRINGE/NEEDLE 27G X 5/8\" 1 ML MISC 1 EACH BY DOES NOT APPLY ROUTE ONE TIME A WEEK. USE TO DRAW UP METHOTREXATE 25 MG ONCE WEEKLY.       famotidine (PEPCID) 20 MG tablet Take 1 tablet (20 mg) by mouth 2 times daily 180 tablet 3     folic acid (FOLVITE) 1 MG tablet Take 1 mg by mouth daily       hydroxychloroquine (PLAQUENIL) 200 MG tablet        methotrexate 2.5 MG tablet CHEMO Take 20 mg by mouth once a week       order for DME Equipment being ordered: Jobst Stockings   Thigh High: 20-30mm/hg    Leg measures:  Ankle: 10in  Calf: 17.5in  Upper Thigh: 33in 2 each 1     other medical supplies Jobst stockings.  Thigh high.  16-20 mmHg.  Diagnosis:  I80.9.  Length of need:  99. 2 each 1     predniSONE (DELTASONE) 5 MG tablet Take 5 mg by mouth daily.  0     triamterene-HCTZ (MAXZIDE-25) 37.5-25 MG tablet TAKE 1 TABLET BY MOUTH EVERY DAY IN THE MORNING 90 tablet 3     Upadacitinib ER (RINVOQ) 15 MG TB24 Take 15 mg by mouth       No Known Allergies    Review of Systems   Constitutional: Negative for chills and fever.   Respiratory: Negative for cough and shortness of breath.    Cardiovascular: Negative for peripheral edema.   Psychiatric/Behavioral: Negative for mood changes. The patient is not nervous/anxious.         OBJECTIVE:     /82 (BP Location: Right arm, Patient Position: Sitting, Cuff Size: Adult Large)   Pulse 75   Temp 97  F (36.1  C) (Tympanic)   Resp 18   Ht 1.778 m (5' 10\")   Wt 128 kg (282 lb 4 oz)   LMP  (LMP Unknown)   SpO2 99%   Breastfeeding No   BMI 40.50 kg/m    Body mass index is 40.5 kg/m .  Physical Exam  Constitutional:       Appearance: Normal appearance.   HENT:      Head: Normocephalic.   Eyes:      Extraocular Movements: Extraocular movements intact.      Pupils: Pupils are equal, round, and reactive to light.   Neck:      Musculoskeletal: Normal range of motion and " neck supple.   Cardiovascular:      Rate and Rhythm: Normal rate and regular rhythm.      Pulses: Normal pulses.      Heart sounds: No murmur.   Pulmonary:      Effort: Pulmonary effort is normal.      Breath sounds: Normal breath sounds. No wheezing, rhonchi or rales.   Musculoskeletal:      Right lower leg: No edema.      Left lower leg: No edema.   Lymphadenopathy:      Cervical: No cervical adenopathy.   Neurological:      Mental Status: She is alert.   Psychiatric:         Mood and Affect: Mood normal.         Behavior: Behavior normal.         PHQ-2 Score:     PHQ-2 ( 1999 Pf[employer]) 9/21/2020 7/17/2020   Q1: Little interest or pleasure in doing things 0 0   Q2: Feeling down, depressed or hopeless 0 0   PHQ-2 Score 0 0         ACT Total Scores 6/18/2019 1/16/2020 9/21/2020   ACT TOTAL SCORE (Goal Greater than or Equal to 20) 25 25 25   In the past 12 months, how many times did you visit the emergency room for your asthma without being admitted to the hospital? 0 0 0   In the past 12 months, how many times were you hospitalized overnight because of your asthma? 0 0 0         I personally reviewed results withpatient as listed below:   Diagnostic Test Results:  EKG today showed normal sinus rhythm with a rate of 65 beats per minute.  No ST or T wave changes noted.    ASSESSMENT/PLAN:       ICD-10-CM    1. Irregular heart beat  I49.9 EKG 12-lead, tracing only (Same Day)     Zio Patch Holter Adult Pediatric Greater than 48 hrs   2. Needs flu shot  Z23 HC FLU VAC PRESRV FREE QUAD SPLIT VIR > 6 MONTHS IM       1.  EKG today was normal.  However, she was noted to have some PVCs and bigeminy when she had her stress test this summer.  Will obtain 14 day Zio patch to confirm that this is the rhythm abnormality she is experiencing currently as well.  Discussed that she could consider use of a beta blocker to suppress these if it turns out that this is the issue.  Also discussed that she could consider seeing Cardiology  based on results, but will wait for results of Zio patch before placing any further orders.  2.  Flu shot updated today as above.    Ya Victoria MD  [facility]    Portions of this dictation were created using the Dragon Nuance voice recognition system. Proofreading was completed but there may be errors in text.

## 2020-09-21 NOTE — NURSING NOTE
Patient presents to clinic experiencing irregular heartbeats.  Medication Reconciliation: complete    Loretta Cortez LPN

## 2020-09-22 ASSESSMENT — ASTHMA QUESTIONNAIRES: ACT_TOTALSCORE: 25

## 2020-09-23 ENCOUNTER — HOSPITAL ENCOUNTER (OUTPATIENT)
Dept: CARDIOLOGY | Facility: OTHER | Age: 55
Discharge: HOME | End: 2020-09-23
Attending: FAMILY MEDICINE | Admitting: FAMILY MEDICINE
Payer: COMMERCIAL

## 2020-09-23 DIAGNOSIS — I49.9 IRREGULAR HEART BEAT: ICD-10-CM

## 2020-09-23 LAB — INTERPRETATION ECG - MUSE: NORMAL

## 2020-09-23 PROCEDURE — 0298T LEADLESS EKG MONITOR 3 TO 14 DAYS: CPT | Performed by: INTERNAL MEDICINE

## 2020-09-23 PROCEDURE — 0296T LEADLESS EKG MONITOR 3 TO 14 DAYS: CPT

## 2020-09-24 ENCOUNTER — TRANSFERRED RECORDS (OUTPATIENT)
Dept: HEALTH INFORMATION MANAGEMENT | Facility: OTHER | Age: 55
End: 2020-09-24

## 2020-10-22 DIAGNOSIS — I49.3 PVC'S (PREMATURE VENTRICULAR CONTRACTIONS): ICD-10-CM

## 2020-10-22 DIAGNOSIS — I47.10 SVT (SUPRAVENTRICULAR TACHYCARDIA) (H): Primary | ICD-10-CM

## 2020-10-28 ENCOUNTER — OFFICE VISIT (OUTPATIENT)
Dept: CARDIOLOGY | Facility: OTHER | Age: 55
End: 2020-10-28
Attending: FAMILY MEDICINE
Payer: COMMERCIAL

## 2020-10-28 VITALS
HEART RATE: 64 BPM | RESPIRATION RATE: 16 BRPM | SYSTOLIC BLOOD PRESSURE: 138 MMHG | WEIGHT: 287 LBS | TEMPERATURE: 98.1 F | HEIGHT: 70 IN | BODY MASS INDEX: 41.09 KG/M2 | DIASTOLIC BLOOD PRESSURE: 82 MMHG | OXYGEN SATURATION: 99 %

## 2020-10-28 DIAGNOSIS — I49.3 PVC'S (PREMATURE VENTRICULAR CONTRACTIONS): Primary | ICD-10-CM

## 2020-10-28 DIAGNOSIS — I47.10 SVT (SUPRAVENTRICULAR TACHYCARDIA) (H): ICD-10-CM

## 2020-10-28 DIAGNOSIS — I49.8 VENTRICULAR BIGEMINY: ICD-10-CM

## 2020-10-28 DIAGNOSIS — R00.2 PALPITATIONS: ICD-10-CM

## 2020-10-28 LAB
ANION GAP SERPL CALCULATED.3IONS-SCNC: 5 MMOL/L (ref 3–14)
BUN SERPL-MCNC: 26 MG/DL (ref 7–25)
CALCIUM SERPL-MCNC: 9.4 MG/DL (ref 8.6–10.3)
CHLORIDE SERPL-SCNC: 100 MMOL/L (ref 98–107)
CO2 SERPL-SCNC: 32 MMOL/L (ref 21–31)
CREAT SERPL-MCNC: 1.13 MG/DL (ref 0.6–1.2)
ERYTHROCYTE [DISTWIDTH] IN BLOOD BY AUTOMATED COUNT: 14.4 % (ref 10–15)
GFR SERPL CREATININE-BSD FRML MDRD: 50 ML/MIN/{1.73_M2}
GLUCOSE SERPL-MCNC: 101 MG/DL (ref 70–105)
HCT VFR BLD AUTO: 42.5 % (ref 35–47)
HGB BLD-MCNC: 13.9 G/DL (ref 11.7–15.7)
INTERPRETATION ECG - MUSE: NORMAL
MAGNESIUM SERPL-MCNC: 2 MG/DL (ref 1.9–2.7)
MCH RBC QN AUTO: 28.4 PG (ref 26.5–33)
MCHC RBC AUTO-ENTMCNC: 32.7 G/DL (ref 31.5–36.5)
MCV RBC AUTO: 87 FL (ref 78–100)
PLATELET # BLD AUTO: 273 10E9/L (ref 150–450)
POTASSIUM SERPL-SCNC: 4 MMOL/L (ref 3.5–5.1)
RBC # BLD AUTO: 4.9 10E12/L (ref 3.8–5.2)
SODIUM SERPL-SCNC: 137 MMOL/L (ref 134–144)
TSH SERPL DL<=0.05 MIU/L-ACNC: 2.1 IU/ML (ref 0.34–5.6)
WBC # BLD AUTO: 5.2 10E9/L (ref 4–11)

## 2020-10-28 PROCEDURE — 36415 COLL VENOUS BLD VENIPUNCTURE: CPT | Mod: ZL | Performed by: NURSE PRACTITIONER

## 2020-10-28 PROCEDURE — 93000 ELECTROCARDIOGRAM COMPLETE: CPT | Performed by: INTERNAL MEDICINE

## 2020-10-28 PROCEDURE — 84443 ASSAY THYROID STIM HORMONE: CPT | Mod: ZL | Performed by: NURSE PRACTITIONER

## 2020-10-28 PROCEDURE — 85027 COMPLETE CBC AUTOMATED: CPT | Mod: ZL | Performed by: NURSE PRACTITIONER

## 2020-10-28 PROCEDURE — 83735 ASSAY OF MAGNESIUM: CPT | Mod: ZL | Performed by: NURSE PRACTITIONER

## 2020-10-28 PROCEDURE — 80048 BASIC METABOLIC PNL TOTAL CA: CPT | Mod: ZL | Performed by: NURSE PRACTITIONER

## 2020-10-28 PROCEDURE — 99205 OFFICE O/P NEW HI 60 MIN: CPT | Performed by: NURSE PRACTITIONER

## 2020-10-28 RX ORDER — LANOLIN ALCOHOL/MO/W.PET/CERES
1000 CREAM (GRAM) TOPICAL DAILY
COMMUNITY

## 2020-10-28 RX ORDER — DILTIAZEM HYDROCHLORIDE 120 MG/1
120 CAPSULE, EXTENDED RELEASE ORAL DAILY
Qty: 60 CAPSULE | Refills: 1 | Status: SHIPPED | OUTPATIENT
Start: 2020-10-28 | End: 2020-11-25

## 2020-10-28 RX ORDER — METHOTREXATE 25 MG/ML
1 INJECTION, SOLUTION INTRA-ARTERIAL; INTRAMUSCULAR; INTRAVENOUS WEEKLY
COMMUNITY
Start: 2020-08-18

## 2020-10-28 RX ORDER — LANOLIN ALCOHOL/MO/W.PET/CERES
100 CREAM (GRAM) TOPICAL DAILY
COMMUNITY

## 2020-10-28 ASSESSMENT — PAIN SCALES - GENERAL: PAINLEVEL: MILD PAIN (2)

## 2020-10-28 ASSESSMENT — MIFFLIN-ST. JEOR: SCORE: 1972.07

## 2020-10-28 NOTE — PROGRESS NOTES
[facility]   CARDIOLOGY CONSULT     Phyllis Washington   [unfilled]     Ya Victoria     Chief Complaint   Patient presents with     Consult     SVT and PVC's         HPI:   Ms. Washington is a 55 year old female who presents for cardiology evaluation with palpitations and evidence of PSVT and symptomatic ectopy on recent cardiac monitoring. Patient has a PMH significant for hypertension venous phlebitis, venous thrombosis with negative coagulopathy work-up, cervicalgia, rheumatoid arthritis, vitamin D deficiency, chronic myalgias, allergic rhinitis, reactive airway, GERD and obesity. Patient denies any personal history of heart disease. Mom with CHF later in life with primary precention ICD. Dad with HTN. No heart disease in siblings.     Patient recently reported increased palpitations following a dobutamine stress echo. Reported that her palpitations where rare prior to this study. Now reporting nearly daily palpitations.  She describes symptoms of skipped hard beat. Feeling jittery in chest. No lightheadedness or syncope. No dyspnea or increased exertional dyspnea. She does report feeling increaed fatigue when she has more frequent palpitations.she denies experiencing any chest pain or pressure.  Currently no pain in the arm, jaw, neck or back.      In June 2020 patient was experiencing a new arm pain in right arm which prompted stress test.  She underwent a dobutamine stress echocardiogram on 7/20/2020.  This was a normal dobutamine stress test without evidence of inducible ischemia.  No regional wall motion abnormalities at rest.  With stress, the LVEF increased from 55 to 60% up to greater than 65% and the LV size decreased appropriately.  No subjective symptoms of ischemia.  Resting EKG was normal, with initial infusion, there was frequent PVCs including bigeminy and at higher dobutamine doses, the PVCs resolved.  Minimal nonspecific ST and T wave changes were seen which were  nondiagnostic.  Postinfusion, she had occasional PVCs and ST segments returned to normal baseline.    Given her increased palpitations patient completed ZIO (9/23/2020 through 10/7/2020) revealing predominant sinus rhythm with an average heart rate of 73 bpm, minimum heart rate 43 bpm maximum heart rate of 176 bpm.  There were 17 episodes of supraventricular tachycardia with the longest lasting 17.6 seconds.  No ventricular tachycardia.  No atrial fibrillation or atrial tachycardia.  No pauses or high-grade AV block.  Occasional isolated ventricular ectopy at a 1.6 burden, rare ventricular couplets and triplets.  Rare supraventricular ectopy, less than 1% of all beats.  Ventricular bigeminy and ventricular trigeminy was present.  Her longest episode of ventricular bigeminy lasted 22.3 seconds, longest ventricular trigeminy episode lasting 1 minute and 17 seconds.  She had 66 patient triggered events and 50 diary entries.  She was mainly symptomatic with ventricular ectopy, ventricular trigeminy and ventricular bigeminy.  She was also symptomatic with SVE and symptomatic with 1 episode of SVT.      PAST MEDICAL HISTORY:   Past Medical History:   Diagnosis Date     Personal history of other medical treatment (CODE)     3 normal childbirths     Rheumatoid arthritis (H)           FAMILY HISTORY:   Family History   Problem Relation Age of Onset     Hypertension Father         Hypertension,67     Other - See Comments Maternal Grandmother         Stroke,85     Other - See Comments Paternal Grandmother         Alzheimer's 73     Breast Cancer Mother 46        Cancer-breast,now 68     Cerebrovascular Disease Mother         stroke with hemiparesis 2017     Diabetes Brother 16        Diabetes,now 41          PAST SURGICAL HISTORY:   Past Surgical History:   Procedure Laterality Date     COLONOSCOPY  2017    Dr. Fofana - 2 polyps found.  Told to f/u in 5 years.          SOCIAL HISTORY:   Social History     Socioeconomic History  "    Marital status:      Spouse name: Not on file     Number of children: Not on file     Years of education: Not on file     Highest education level: Not on file   Occupational History     Not on file   Social Needs     Financial resource strain: Not on file     Food insecurity     Worry: Not on file     Inability: Not on file     Transportation needs     Medical: Not on file     Non-medical: Not on file   Tobacco Use     Smoking status: Never Smoker     Smokeless tobacco: Never Used   Substance and Sexual Activity     Alcohol use: No     Frequency: Never     Comment: Rarely     Drug use: No     Sexual activity: Yes     Partners: Male     Birth control/protection: Rhythm   Lifestyle     Physical activity     Days per week: Not on file     Minutes per session: Not on file     Stress: Not on file   Relationships     Social connections     Talks on phone: Not on file     Gets together: Not on file     Attends [employer] service: Not on file     Active member of club or organization: Not on file     Attends meetings of clubs or organizations: Not on file     Relationship status: Not on file     Intimate partner violence     Fear of current or ex partner: Not on file     Emotionally abused: Not on file     Physically abused: Not on file     Forced sexual activity: Not on file   Other Topics Concern     Parent/sibling w/ CABG, MI or angioplasty before 65F 55M? Not Asked   Social History Narrative    , 3 adult children;     Homemaker; hobbies - reading, crafts.     Works at frame shop part-time.     Walks 3 miles a day     - Demetrio              CURRENT MEDICATIONS:   Prior to Admission medications    Medication Sig Start Date End Date Taking? Authorizing Provider   BD SAFETYGLIDE SYRINGE/NEEDLE 27G X 5/8\" 1 ML MISC 1 EACH BY DOES NOT APPLY ROUTE ONE TIME A WEEK. USE TO DRAW UP METHOTREXATE 25 MG ONCE WEEKLY. 5/28/20   Reported, Patient   famotidine (PEPCID) 20 MG tablet Take 1 tablet (20 mg) by mouth 2 " times daily 11/12/19   Ya Victoria MD   folic acid (FOLVITE) 1 MG tablet Take 1 mg by mouth daily 10/30/13   Reported, Patient   hydroxychloroquine (PLAQUENIL) 200 MG tablet  10/30/13   Reported, Patient   methotrexate 2.5 MG tablet CHEMO Take 20 mg by mouth once a week 10/30/13   Reported, Patient   order for DME Equipment being ordered: Jobst Stockings   Thigh High: 20-30mm/hg    Leg measures:  Ankle: 10in  Calf: 17.5in  Upper Thigh: 33in 8/20/19   Brenda Rivas, MISSY CNP   other medical supplies Jobst stockings.  Thigh high.  16-20 mmHg.  Diagnosis:  I80.9.  Length of need:  99. 8/19/19   Ya Victoria MD   predniSONE (DELTASONE) 5 MG tablet Take 5 mg by mouth daily. 11/2/18   Reported, Patient   triamterene-HCTZ (MAXZIDE-25) 37.5-25 MG tablet TAKE 1 TABLET BY MOUTH EVERY DAY IN THE MORNING 8/24/20   Ya Victoria MD   Upadacitinib ER (RINVOQ) 15 MG TB24 Take 15 mg by mouth 4/20/20   Reported, Patient          ALLERGIES:   No Known Allergies       ROS:   CONSTITUTIONAL: No reported fever or chills. No changes in weight.  ENT: No visual disturbance, ear ache, epistaxis or sore throat.   CARDIOVASCULAR: No chest pain, chest pressure or chest discomfort.  Positive for increased palpitations and mild chronic lower extremity edema with venous insufficiency.  RESPIRATORY: Mild JI dyspnea upon exertion, chronic without increased dyspnea. No cough, wheezing or hemoptysis.  No orthopnea or PND.  GI: No reported abdominal pain.   : No reported hematuria or dysuria.   NEUROLOGICAL: No significant lightheadedness, dizziness, syncope, ataxia, paresthesias or weakness.   HEMATOLOGIC: No history of anemia. No bleeding or excessive bruising.  Positive for history of DVT.  MUSCULOSKELETAL: Positive for chronic joint pain with arthritis and chronic myalgias.  ENDOCRINOLOGIC: No temperature intolerance. No hair or skin changes.  SKIN: No abnormal rashes or sores, no unusual  "itching.  PSYCHIATRIC: No history of depression or anxiety. No changes in mood, feeling down or anxious.      PHYSICAL EXAM:   /82 (BP Location: Right arm, Patient Position: Sitting, Cuff Size: Adult Large)   Pulse 64   Temp 98.1  F (36.7  C) (Tympanic)   Resp 16   Ht 1.778 m (5' 10\")   Wt 130.2 kg (287 lb)   LMP  (LMP Unknown)   SpO2 99%   BMI 41.18 kg/m    GENERAL: The patient is a well-developed, well-nourished, in no apparent distress.  HEENT: Head is normocephalic and atraumatic. Eyes are symmetrical with normal visual tracking. No icterus, no xanthelasmas. Nares appeared normal without nasal drainage. Mucous membranes are moist, no cyanosis.  NECK: Supple. No cervical bruits, JVP not visible.   CHEST/ LUNGS: Lungs clear to auscultation, no rales, rhonchi or wheezes, no use of accessory muscles, no retractions, respirations unlabored and normal respiratory rate.   CARDIO: Regular rate and rhythm normal with S1 and S2, no S3 or S4 and no murmur, click or rub.  ABD: Abdomen is nondistended.   EXTREMITIES: Positive for trace LE edema present.   MUSCULOSKELETAL: No visible joint swelling.   NEUROLOGIC: Alert and oriented X3. Normal speech, gait and affect. No focal neurologic deficits.   SKIN: No jaundice. No rashes or visible skin lesions present. No ecchymosis.     EKG:    NSR, rate 62 bpm. Normal ECG.    LAB RESULTS:   Office Visit on 09/21/2020   Component Date Value Ref Range Status     Interpretation ECG 09/21/2020 Click View Image link to view waveform and result   Final          ASSESSMENT:   Phyllis Washington presents for cardiology evaluation with palpitations and evidence of PSVT and symptomatic ectopy on recent cardiac monitoring. Patient has a PMH significant for hypertension venous phlebitis, venous thrombosis with negative coagulopathy work-up, cervicalgia, rheumatoid arthritis, vitamin D deficiency, chronic myalgias, allergic rhinitis, reactive airway, GERD and obesity. Patient denies " any personal history of heart disease. Mom with CHF later in life with primary precention ICD. Dad with HTN. No heart disease in siblings.   Patient reported increased palpitations following a dobutamine stress echo. Reported that her palpitations where rare prior to this study. Now reporting nearly daily palpitations.  She describes symptoms of skipped hard beat. Feeling jittery in chest. No lightheadedness or syncope. No dyspnea or increased exertional dyspnea. She does report feeling increaed fatigue when she has more frequent palpitations. She denies experiencing any exertional chest pain or pressure.     1. PVC's (premature ventricular contractions)  2. SVT (supraventricular tachycardia) (H)  3. Ventricular bigeminy  4. Palpitations    PLAN:   1. Patient with increased palpitations following dobutamine stress echocardiogram this past July.  In review of her recent ZIO monitor, she is largely symptomatic with ventricular ectopy seen as isolated beats, couplets and triplets.  She was also symptomatic with ventricular bigeminy and ventricular trigeminy.  She had 17 nonsustained runs of supraventricular tachycardia, she was only symptomatic with 1-2 of these episodes.  2. Reviewed dobutamine stress echo with no evidence of ischemia or infarct on imaging. Non diagnostic, minimal nonspecific ST and T wave changes. Ventricular ectopy with early infusion and reduced with peak infusion.   3. Reviewed benign nature of her ectopies that she is feeling and SVT given episodes non sustained and no evidence of ischemia with ventricular ectopies. No symptoms to suggest ischemia.   4. Recommended lab evaluation to assess electrolytes, thyroid function and assess for any anemia.   5. Recommended complete echocardiogram for structural exam, echo with stress is limited to mostly LV function and limited valve exam.   6. Given the daily symptoms which are disturbing her ADL's, she will start Diltiazem 120 mg daily for suppression of  ectopy and PSVT. Avoiding beta blocker as first line with history of controlled asthma and reactive airway disease.   7. She has avoided caffeine and no stimulants, no ETOH. Declines any symptoms of sleep apnea which may contribute.  8. Follow-up with cardiology 4 weeks, certainly sooner if needed.     >60 min spent with patient, >50% of visit counseling on findings from ZIO, counseling on above diagnoses and plan of care.     Thank you for allowing me to participate in the care of your patient. Please do not hesitate to contact me if you have any questions.     Chelly Ruffin, APRN CNP [facility]

## 2020-10-28 NOTE — PATIENT INSTRUCTIONS
You were seen by  MISSY Haynes CNP    1. START: diltiazem ER (DILT-XR) 120 MG 24 hr capsule- Take 1 capsule (120 mg) by mouth daily    2. A Echocardiogram has been ordered. You will be called to schedule this. You will receive instructions for testing at that time. You will be contacted with results.    3. No other changes at this time.      You will follow up with [facility] in 4 weeks, sooner if needed.       Please call the cardiology office with problems, questions, or concerns at [phone].    If you experience chest pain, chest pressure, chest tightness, shortness of breath, fainting, lightheadedness, nausea, vomiting, or other concerning symptoms, please report to the Emergency Department or call 911. These symptoms may be emergent, and best treated in the Emergency Department.     Cardiology Nurses  ERIKA Kc, NORAH TUCKER LPN  [facility] ([facility])  [phone]      Patient Education     Understanding Premature Ventricular Contractions (PVCs)  Premature ventricular contractions (PVCs) are a type of abnormal heartbeat (arrhythmia). They are common ly found in people of all ages.    How PVCs happen    Your heart has 4 chambers: 2 upper atria and 2 lower ventricles. Normally, a special group of cells begins the signal to start your heartbeat. These cells are in the sinoatrial (SA) node in the right atrium. The signal quickly travels down your heart s conducting system. It travels to the left and right ventricle. As it travels, the signal triggers nearby parts of your heart to contract. This allows your heart to squeeze in a coordinated way.  During a premature ventricular contraction, the signal to start your heartbeat instead comes from one of the ventricles. This signal is premature, meaning it happens before the SA node has had a chance to fire. The signal spreads through the rest of your heart, causing a heartbeat. If this happens very soon after the  previous heartbeat, your heart will push out very little blood. This causes a feeling of a pause between beats. If it happens a little later, your heart pushes out an almost normal amount of blood. This leads to a feeling of an extra heartbeat. So, the heart has a  premature  heartbeat in between normal heartbeats.  What causes PVCs?  Certain things can help set off a premature signal in the ventricles. These include:    Advancing age    Reduced blood flow to your heart (such as coronary artery disease)    Scarring after a heart attack    Electrolyte problems, such as low sodium or potassium levels    Increased adrenaline, such as with anxiety    Certain medicines, like digoxin  Many heart conditions raise the risk for PVCs. These include:    Mitral valve prolapse    High blood pressure    Heart attack    Coronary heart disease    Dilated cardiomyopathy    Hypertrophic cardiomyopathy    Congenital heart disease    Heart failure  They often happen in people without any heart disease. However, PVCs are somewhat more common in people with some kind of heart disease.   What are the symptoms of PVCs?  Most people with occasional PVCs don t have symptoms. The more PVCs you have, the more likely you are to feel them. When symptoms do happen, they are usually minor. Symptoms may include:    An awareness of the heart beating    A fluttering or flip-flop feeling in your chest    Feeling of a  skipped  or  extra  heartbeat    Dizziness and near-fainting    A pulsing sensation in the neck  PVCs may cause more severe symptoms if you have another heart problem, such as heart failure.   How are PVCs diagnosed?  Your healthcare provider will ask about your medical history and give you a physical exam. An electrocardiogram (ECG) is the main test for diagnosis. This test records the electrical activity of your heart. During an ECG, small pads (electrodes) are placed on your chest, arms, and legs. Wires connect the pads to a machine,  which records your heart s electrical signals. This test allows your provider to look at the signal of your heartbeat for a brief time. Any PVCs that occur during this time will show up on the ECG. In some cases, your healthcare provider might advise ECG monitoring over a day or more, up to 30 days. This can help to catch PVCs that don t happen often. This is done with a monitor you wear night and day for the test period. Heart monitor. There are 2 types of external heart monitors:    Holter monitor. This monitor can be worn for 1 to 7 days. It provides a constant recording of heart activity. After the test is done, your health care provider analyzes the recording.    Event monitors. These monitors can be used for 3 to 4 weeks. One kind is a memory loop recorder. This monitor records constantly, but stores the recording only when you press a button. The other kind is a credit card-sized recorder. This monitor is turned on only during an episode. With both types, you send the recordings of symptoms to your health care provider over the phone.  These may be the only tests your healthcare provider will need. You may need more testing if you have PVCs often, or many in a row. Your provider may look at other causes, including possible heart problems. These tests might include:    Echocardiography. This test looks at your heart s structure and function.    Cardiac stress testing. This test checks how your heart responds to exercise and to evaluate heart artery blood flow.    Cardiac CT or MRI     Blood tests. This is done to check potassium and thyroid levels.  Date Last Reviewed: 11/1/2017 2000-2019 [employer]. [address] [place] [address], [place] [address]. All rights reserved. This information is not intended as a substitute for professional medical care. Always follow your healthcare professional's instructions.           Patient Education     Treatment for Premature Ventricular Contractions  (PVCs)  Premature ventricular contractions (PVCs) are a type of abnormal heartbeat (arrhythmia). They are very common. They can occur in people of all ages from time to time. They usually cause no symptoms or only mild symptoms.  Types of treatment  Most people with PVCs don t need any treatment. If you are treated for another problem with your heart such as heart disease or heart failure, your PVCs may decrease. For example, you might take a medicine to lower your blood pressure. This may lower your rate of PVCs.  In some cases, specific treatment may be done to help prevent PVCs. These are used only if you have symptoms from PVCs. Choices include:    Medicines called beta-blockers    Other medicines to help prevent arrhythmias    Catheter ablation, a procedure to destroy the cells in the heart causing the abnormal beats  Living with PVCs  Your healthcare provider may give your more instructions about how to manage your PVCs, such as the following:    Eat a heart-healthy diet    Get enough exercise    Maintain a healthy weight    Don't drink too much alcohol or caffeine, which can trigger PVCs    Learn to manage stress and fatigue, which can also trigger PVCs    Get treatment for your other health conditions, such as high blood pressure    Make sure to keep all your medical appointments    Check with your healthcare provider before taking any non-prescribed medicines including herbs, supplements, and recreational drugs which can over-excite the heart and trigger PVCs.  When to call your healthcare provider  Call your healthcare provider right away if you have any of these:    Symptoms that get worse over time    Severe symptoms such as chest pain, near-fainting, fainting, or sudden shortness of breath  Date Last Reviewed: 4/1/2018 2000-2019 [employer]. [address] [place] [address], [place] [address]. All rights reserved. This information is not intended as a substitute for professional medical care.  Always follow your healthcare professional's instructions.           Patient Education     Understanding Supraventricular Tachycardia  Supraventricular tachycardia (SVT) is a type of abnormal heart rhythm that results in fast heartbeats. The heart normally beats 60 to 100 beats per minute while you are at rest and awake. With SVT, the heart beats more than 100 times a minute. It may even beat over 200 times a minute. This is caused by a problem in the electrical system of the heart. It can lessen the amount of blood pumped through the heart.  How the heart beats  A heartbeat is the rhythm of the heart as it contracts to squeeze blood through the body. It s caused by electrical signals in the heart. A beat starts when a special group of cells give off an electrical signal. These cells are in the sinoatrial (SA) node. The SA node is in the upper right chamber of your heart (atrium). The signal from the SA node travels down to the 2 lower chambers of your heart (ventricles). On the way, the signal goes through the atrioventricular (AV) node. This is a special group of cells between the atria and ventricles. From there, the signal travels to your left and right ventricles. As it travels, the signal tells nearby parts of your heart to contract. This causes your heart to pump in a coordinated way.  What is SVT?  When you have SVT, the signal to start your heartbeat doesn t come from the SA node. Instead it comes from another part of the left or right atrium. Or it comes from the AV node. Some area outside the SA node begins to fire quickly, causing a rapid heartbeat of over 100 beats per minute or the electrical signals are caught up in an abnormal looping circuit. This shortens the time your ventricles have to fill. If your heartbeat is fast enough, your heart may be unable to pump enough blood forward to the rest of your body. The abnormal heartbeat may last for a few seconds to a few hours before your heart returns to  its normal rhythm. Some SVT rhythms can last for days or weeks, or even become permanent.  Types of SVT  There are several types of SVT. They include:    Atrial fibrillation. This is most common type of SVT. The upper chambers of the heart quiver very fast instead of pumping due to disorganized electrical activity in the atria.    Atrial flutter. This type of SVT is a milder form of fibrillation. The upper chambers of the heart flutter instead of pumping normally.    Atrioventricular ray reentrant tachycardia. It occurs when you have two channels through the AV node, instead of just one. The signal goes down one channel and up the other.    Atrioventricular reciprocating tachycardia. With this condition, there is an extra connection of muscle between the atrium and the ventricle. This is known as an accessory pathway and can conduct electricity upwards and downwards. The signal goes down the AV node and back to the atrium through the accessory pathway. It then goes down the AV node again. In rare cases, this condition leads to an abnormal heart rhythm that causes sudden death. This is a congenital defect, which means you were born with it.    Atrial tachycardia. This is another common type of SVT. A small group of cells in the atria begin to fire abnormally and trigger the fast heartbeat.    Multifocal atrial tachycardia. Multiple groups of cells in your atria fire abnormally and trigger a fast heartbeat.  What causes SVT?  Some types of SVT run in families. Some people have heart problems from birth that cause SVT. High blood pressure, heart failure, mitral valve disease, sleep apnea, thyroid problems, and heart attacks can cause SVT. Smoking, excess caffeine or alcohol, and some medicines can increase your risk of having SVT.  Symptoms of SVT  When SVT happens, you may feel no symptoms. Or you may have:    Fluttering feelings in your chest (palpitations)    A tight feeling or pain in your chest    A pulsing  feeling in your neck    Dizziness    Shortness of breath    Tiredness    Fainting    Nausea  In very rare cases, SVT can cause sudden death.  Diagnosing SVT  Your primary healthcare provider may diagnose you. Or you may see a heart doctor (cardiologist). The doctor will ask about your health history. He or she will also give you a physical exam. You may also have tests. These help show what kind of SVT you have, and what may cause it. They also help check for other problems. The tests may include:    Electrocardiogram (ECG), to analyze the abnormal rhythm    Continuous heart monitors such as a holter monitor or an event recorder to watch your heart rhythm over a longer period in an attempt to catch the SVT rhythm    Blood tests, to look for various causes such as thyroid problems or electrolyte abnormalities    Chest X-ray, to check for lung problems and look at the size of your heart    Exercise stress test, to see how well your heart works under stress    Echocardiography, to check your heart structure and function    Electrophysiologic study (EPS), an invasive procedure using wires in the heart to check the heart's electrical signals and diagnose the SVT  Date Last Reviewed: 5/1/2016 2000-2019 [employer]. [address] [place] [address], [place] [address]. All rights reserved. This information is not intended as a substitute for professional medical care. Always follow your healthcare professional's instructions.

## 2020-10-28 NOTE — NURSING NOTE
"Chief Complaint   Patient presents with     Consult     SVT and PVC's        Initial /82 (BP Location: Right arm, Patient Position: Sitting, Cuff Size: Adult Large)   Pulse 64   Temp 98.1  F (36.7  C) (Tympanic)   Resp 16   Ht 1.778 m (5' 10\")   Wt 130.2 kg (287 lb)   LMP  (LMP Unknown)   SpO2 99%   BMI 41.18 kg/m   Estimated body mass index is 41.18 kg/m  as calculated from the following:    Height as of this encounter: 1.778 m (5' 10\").    Weight as of this encounter: 130.2 kg (287 lb).  Meds Reconciled: complete  Pt is on Aspirin  Pt is not on a Statin  PHQ and/or GUERITA reviewed. Pt referred to PCP/MH Provider as appropriate.    Ambreen Odonnell LPN      "

## 2020-11-12 ENCOUNTER — HOSPITAL ENCOUNTER (OUTPATIENT)
Dept: CARDIOLOGY | Facility: OTHER | Age: 55
End: 2020-11-12
Attending: NURSE PRACTITIONER
Payer: COMMERCIAL

## 2020-11-12 ENCOUNTER — OFFICE VISIT (OUTPATIENT)
Dept: FAMILY MEDICINE | Facility: OTHER | Age: 55
End: 2020-11-12
Attending: FAMILY MEDICINE
Payer: COMMERCIAL

## 2020-11-12 VITALS
BODY MASS INDEX: 42.65 KG/M2 | SYSTOLIC BLOOD PRESSURE: 126 MMHG | DIASTOLIC BLOOD PRESSURE: 86 MMHG | TEMPERATURE: 97.4 F | RESPIRATION RATE: 20 BRPM | WEIGHT: 288 LBS | HEART RATE: 60 BPM | HEIGHT: 69 IN

## 2020-11-12 DIAGNOSIS — R20.0 NUMBNESS OF TOES: ICD-10-CM

## 2020-11-12 DIAGNOSIS — Z23 NEED FOR SHINGLES VACCINE: ICD-10-CM

## 2020-11-12 DIAGNOSIS — I49.3 PVC'S (PREMATURE VENTRICULAR CONTRACTIONS): ICD-10-CM

## 2020-11-12 DIAGNOSIS — I47.10 SVT (SUPRAVENTRICULAR TACHYCARDIA) (H): ICD-10-CM

## 2020-11-12 DIAGNOSIS — Z13.1 SCREENING FOR DIABETES MELLITUS: ICD-10-CM

## 2020-11-12 DIAGNOSIS — Z23 NEED FOR VACCINATION FOR PNEUMOCOCCUS: ICD-10-CM

## 2020-11-12 DIAGNOSIS — Z00.00 HEALTH CARE MAINTENANCE: Primary | ICD-10-CM

## 2020-11-12 DIAGNOSIS — I49.8 VENTRICULAR BIGEMINY: ICD-10-CM

## 2020-11-12 DIAGNOSIS — E23.6 PITUITARY CYST (H): ICD-10-CM

## 2020-11-12 LAB
ANION GAP SERPL CALCULATED.3IONS-SCNC: 7 MMOL/L (ref 3–14)
BUN SERPL-MCNC: 25 MG/DL (ref 7–25)
CALCIUM SERPL-MCNC: 9.2 MG/DL (ref 8.6–10.3)
CHLORIDE SERPL-SCNC: 100 MMOL/L (ref 98–107)
CO2 SERPL-SCNC: 29 MMOL/L (ref 21–31)
CREAT SERPL-MCNC: 1.14 MG/DL (ref 0.6–1.2)
GFR SERPL CREATININE-BSD FRML MDRD: 49 ML/MIN/{1.73_M2}
GLUCOSE SERPL-MCNC: 100 MG/DL (ref 70–105)
POTASSIUM SERPL-SCNC: 3.5 MMOL/L (ref 3.5–5.1)
SODIUM SERPL-SCNC: 136 MMOL/L (ref 134–144)
VIT B12 SERPL-MCNC: 1315 PG/ML (ref 180–914)

## 2020-11-12 PROCEDURE — 80048 BASIC METABOLIC PNL TOTAL CA: CPT | Mod: ZL | Performed by: FAMILY MEDICINE

## 2020-11-12 PROCEDURE — 93306 TTE W/DOPPLER COMPLETE: CPT | Mod: 26 | Performed by: INTERNAL MEDICINE

## 2020-11-12 PROCEDURE — 36415 COLL VENOUS BLD VENIPUNCTURE: CPT | Mod: ZL | Performed by: FAMILY MEDICINE

## 2020-11-12 PROCEDURE — 93306 TTE W/DOPPLER COMPLETE: CPT

## 2020-11-12 PROCEDURE — 90471 IMMUNIZATION ADMIN: CPT | Performed by: FAMILY MEDICINE

## 2020-11-12 PROCEDURE — 99396 PREV VISIT EST AGE 40-64: CPT | Mod: 25 | Performed by: FAMILY MEDICINE

## 2020-11-12 PROCEDURE — 82607 VITAMIN B-12: CPT | Mod: ZL | Performed by: FAMILY MEDICINE

## 2020-11-12 PROCEDURE — 90670 PCV13 VACCINE IM: CPT | Performed by: FAMILY MEDICINE

## 2020-11-12 RX ORDER — ALPRAZOLAM 0.5 MG
TABLET ORAL
Qty: 2 TABLET | Refills: 0 | Status: SHIPPED | OUTPATIENT
Start: 2020-11-12 | End: 2021-11-12

## 2020-11-12 ASSESSMENT — MIFFLIN-ST. JEOR: SCORE: 1960.74

## 2020-11-12 ASSESSMENT — ENCOUNTER SYMPTOMS
NERVOUS/ANXIOUS: 0
SHORTNESS OF BREATH: 0
COUGH: 0
FEVER: 0

## 2020-11-12 NOTE — NURSING NOTE
Chief Complaint   Patient presents with     Physical         Medication Reconciliation: complete    Nida Rios, LPN

## 2020-11-12 NOTE — PROGRESS NOTES
SUBJECTIVE:   Nursing Notes:   Nida Rios LPN  11/12/2020  3:05 PM  Sign at exiting of workspace  Chief Complaint   Patient presents with     Physical         Medication Reconciliation: complete    Nida Rios LPN        Phyllis VILLASENOR Neururer is a 55 year old female who presents to clinic today for a physical.    Will be seeing Cardiology again in 2 weeks.  Started on diltiazem for her frequent PVCs and supraventricular tachycardia.  Feels maybe a little fewer palpitations, but hard to tell.  Has been on plaquenil for at least 5 years.  She noticed an increase in the palpitations this spring or summer.  In September, began having them daily.  She doesn't feel stressed at all.  Has cut most all coffee/caffeine out of her diet.  Mainly is drinking just water.  She has been more constipated as well since this has been going on.  Her diet has not changed.  No flushing.  No diarrhea.  No blood in stools.  Had started Rinvoq in early May and wonders if some of the things she is experiencing are due to this medication.  Her next follow up with Rheumatology is in January (Dr. Parkinson).    She had been to see Dr. Baldwin this summer with complaints of right arm and shoulder pain and chest pain.  She did undergo a cardiac work up, which did not show any signs of ischemic disease.  She did go on to have an MRI of her cervical spine as well.  This was completed on 7/8/2020 and showed degenerative disc disease, most severe at C6-7 with effacement of the ventral CSF.  There was no significant cord compression noted.  Also noted with a cystic enlargement of her sella on the edge of the field of view and was incompletely evaluated.  It was recommended by radiology that she have further imaging with MRI of her brain.  Given her cervical findings, she was referred to see neurosurgery to discuss both issues further.  However, patient had decided against going.  She states that her right arm, shoulder  and chest pain have completely resolved at this point.    Has had some numbness of her right 5th toe for a few years.  Seems to be creeping up her foot some.  No back pain.  She does take B12.    HPI    I personally reviewed medications/allergies/history listed below:    Patient Active Problem List    Diagnosis Date Noted     Health care maintenance 10/22/2018     Priority: Medium     Pap NIL and HPV neg on 8/22/17 ([facility])  DEXA 2015 normal.       Colon polyp 09/20/2017     Priority: Medium     Overview:   Colonoscopy 9/20/17 with transverse colon polyp and sigmoid colon polyp (20 cm), both removed       Thrombosis of saphenous vein, left 11/16/2016     Priority: Medium     Disturbance in sleep behavior 07/02/2015     Priority: Medium     Overview:   [facility] Sleep Study: Showed Mild upper airway resistance. No sleep apnea or limb movement disorder.   IMO Update       Family history of diabetes mellitus type II 04/07/2015     Priority: Medium     High risk medication use 09/10/2014     Priority: Medium     Overview:   HCQ, MTX, Humira       Cervical muscle pain 07/25/2013     Priority: Medium     Rheumatoid arthritis involving multiple sites with positive rheumatoid factor (H) 09/28/2012     Priority: Medium     Overview:   RF + 121, CCP neg; onset ~ 2011, Dx 2012  Doxy (9/8/2012- 3/2013)  Nuc Med Scan with symmetric uptake  Plaquenil trial 3/2013- ; MTX 7/2013- ; SSA 10/2014-12/2014; Humira 10/2015-   6/2014: VECTRA 42       Vitamin D deficiency 06/18/2012     Priority: Medium     Overview:   Level of 25 on 5/11/2012       Myalgia 05/11/2012     Priority: Medium     Enthesopathy of hip region 01/14/2011     Priority: Medium     Allergic rhinitis 11/02/2004     Priority: Medium     Asthma 09/22/2003     Priority: Medium     Overview:   Updated per 10/1/17 IMO import       Esophageal reflux 09/22/2003     Priority: Medium     Essential hypertension, benign 05/14/2001     Priority: Medium     Obesity 05/14/2001      "Priority: Medium     Past Medical History:   Diagnosis Date     Personal history of other medical treatment (CODE)     3 normal childbirths     Rheumatoid arthritis (H)       Past Surgical History:   Procedure Laterality Date     COLONOSCOPY  2017    Dr. Fofana - 2 polyps found.  Told to f/u in 5 years.     Family History   Problem Relation Age of Onset     Hypertension Father         Hypertension,67     Other - See Comments Maternal Grandmother         Stroke,85     Other - See Comments Paternal Grandmother         Alzheimer's 73     Breast Cancer Mother 46        Cancer-breast,now 68     Cerebrovascular Disease Mother         stroke with hemiparesis 2017     Diabetes Brother 16        Diabetes,now 41     Social History     Tobacco Use     Smoking status: Never Smoker     Smokeless tobacco: Never Used   Substance Use Topics     Alcohol use: No     Frequency: Never     Comment: Rarely     Social History     Social History Narrative    , 3 adult children;     Homemaker; hobbies - reading, crafts.     Works at frame shop part-time.     Walks 3 miles a day     - Demetrio         Current Outpatient Medications   Medication Sig Dispense Refill     ALPRAZolam (XANAX) 0.5 MG tablet Take 1 by mouth 30 minutes prior to MRI.  May repeat x 1 if needed. 2 tablet 0     aspirin (ASA) 325 MG EC tablet Take 325 mg by mouth daily       BD SAFETYGLIDE SYRINGE/NEEDLE 27G X 5/8\" 1 ML MISC 1 EACH BY DOES NOT APPLY ROUTE ONE TIME A WEEK. USE TO DRAW UP METHOTREXATE 25 MG ONCE WEEKLY.       cyanocobalamin (VITAMIN B-12) 1000 MCG tablet Take 1,000 mcg by mouth daily       diltiazem ER (DILT-XR) 120 MG 24 hr capsule Take 1 capsule (120 mg) by mouth daily 60 capsule 1     famotidine (PEPCID) 20 MG tablet Take 1 tablet (20 mg) by mouth 2 times daily 180 tablet 3     folic acid (FOLVITE) 1 MG tablet Take 1 mg by mouth daily       hydroxychloroquine (PLAQUENIL) 200 MG tablet        methotrexate 50 MG/2ML injection Inject 1 mL " "Subcutaneous once a week       other medical supplies [employer].  Diagnosis:  Z23. 1 each 1     predniSONE (DELTASONE) 5 MG tablet Take 5 mg by mouth daily.  0     thiamine (B-1) 100 MG tablet Take 100 mg by mouth daily       triamterene-HCTZ (MAXZIDE-25) 37.5-25 MG tablet TAKE 1 TABLET BY MOUTH EVERY DAY IN THE MORNING 90 tablet 3     Upadacitinib ER (RINVOQ) 15 MG TB24 Take 15 mg by mouth       No Known Allergies    Review of Systems   Constitutional: Negative for fever.   Respiratory: Negative for cough and shortness of breath.    Cardiovascular: Negative for peripheral edema.   Psychiatric/Behavioral: Negative for mood changes. The patient is not nervous/anxious.         OBJECTIVE:     /86 (BP Location: Right arm, Patient Position: Sitting, Cuff Size: Adult Large)   Pulse 60   Temp 97.4  F (36.3  C)   Resp 20   Ht 1.753 m (5' 9\")   Wt 130.6 kg (288 lb)   LMP  (LMP Unknown)   BMI 42.53 kg/m    Body mass index is 42.53 kg/m .  Physical Exam  Constitutional:       General: She is not in acute distress.     Appearance: Normal appearance. She is well-developed.   HENT:      Head: Normocephalic.      Right Ear: Tympanic membrane and external ear normal.      Left Ear: Tympanic membrane and external ear normal.      Nose: Nose normal.      Mouth/Throat:      Pharynx: No oropharyngeal exudate.   Eyes:      General:         Right eye: No discharge.         Left eye: No discharge.      Conjunctiva/sclera: Conjunctivae normal.      Pupils: Pupils are equal, round, and reactive to light.   Neck:      Musculoskeletal: Neck supple.      Thyroid: No thyromegaly.      Trachea: No tracheal deviation.   Cardiovascular:      Rate and Rhythm: Normal rate and regular rhythm.      Pulses: Normal pulses.      Heart sounds: Normal heart sounds, S1 normal and S2 normal. No murmur. No friction rub. No gallop. No S3 or S4 sounds.    Pulmonary:      Effort: Pulmonary effort is normal. No respiratory distress.      Breath " sounds: Normal breath sounds. No wheezing or rales.      Comments: Breast exam:  No masses palpable bilaterally.  No skin changes, tethering or axillary lymphadenopathy bilaterally.    Abdominal:      General: Bowel sounds are normal. There is no distension.      Palpations: Abdomen is soft. There is no mass.      Tenderness: There is no abdominal tenderness.   Genitourinary:     Comments: Pelvic/Rectal exams deferred per patient.  Musculoskeletal: Normal range of motion.   Lymphadenopathy:      Cervical: No cervical adenopathy.   Skin:     General: Skin is warm and dry.      Findings: No rash.   Neurological:      Mental Status: She is alert and oriented to person, place, and time.      Motor: No abnormal muscle tone.      Deep Tendon Reflexes: Reflexes are normal and symmetric.   Psychiatric:         Thought Content: Thought content normal.         Judgment: Judgment normal.           PHQ-2 Score:     PHQ-2 ( 1999 Pfizer) 11/12/2020 10/28/2020   Q1: Little interest or pleasure in doing things 0 0   Q2: Feeling down, depressed or hopeless 0 0   PHQ-2 Score 0 0         ACT Total Scores 6/18/2019 1/16/2020 9/21/2020   ACT TOTAL SCORE (Goal Greater than or Equal to 20) 25 25 25   In the past 12 months, how many times did you visit the emergency room for your asthma without being admitted to the hospital? 0 0 0   In the past 12 months, how many times were you hospitalized overnight because of your asthma? 0 0 0         I personally reviewed results withpatient as listed below:   Diagnostic Test Results:  none     ASSESSMENT/PLAN:       ICD-10-CM    1. Health care maintenance  Z00.00    2. SVT (supraventricular tachycardia) (H)  I47.1    3. PVC's (premature ventricular contractions)  I49.3    4. Numbness of toes  R20.0 Vitamin B12     Vitamin B12   5. 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 Screening for diabetes mellitus  Z13.1 Basic Metabolic Panel     Basic  Metabolic Panel   7. Need for vaccination for pneumococcus  Z23 GH IMM-  PNEUMOCOCCAL CONJ VACCINE 13 VALENT IM (Prevnar)   8. Need for shingles vaccine  Z23 other medical supplies       1.  Mammogram is scheduled for 12/10/2020.  Colonoscopy is up to date, last completed in 2017.  5 year follow up was recommended.  Pap Smear last completed in 2017 was normal.  DEXA was last completed on 4/13/15 and was normal.  She wishes to wait before rechecking this.  tdap last completed 10/1/15.  Flu shot has already been completed this season.  2.  As above, she will be seeing Cardiology again in a couple of weeks.  She had another echocardiogram done today, which was reviewed with her and looks normal.    3.  See #2.  4.  Glucose will be checked today (nonfasting) as well as B12.  Discussed this could be evaluated further with EMG or MRI of lumbar spine if she desires.  5.  MRI of brain ordered for further evaluation.  Prescription for xanax to use prior to MRI for claustrophobia sent to pharmacy.  6.  Basic Metabolic Profile to screen for diabetes today as above.  7.  Given her immune suppression with medications for her rheumatoid arthritis, Prevnar updated today.  She has had prior pneumovax.  8.  Prescription given to obtain shingrix at outside pharmacy.      Ya Victoria MD  [facility]    Portions of this dictation were created using the Dragon Nuance voice recognition system. Proofreading was completed but there may be errors in text.

## 2020-11-18 ENCOUNTER — HOSPITAL ENCOUNTER (OUTPATIENT)
Dept: MRI IMAGING | Facility: OTHER | Age: 55
Discharge: HOME | End: 2020-11-18
Attending: FAMILY MEDICINE | Admitting: FAMILY MEDICINE
Payer: COMMERCIAL

## 2020-11-18 DIAGNOSIS — E23.6 PITUITARY CYST (H): ICD-10-CM

## 2020-11-18 PROCEDURE — 255N000002 HC RX 255 OP 636: Performed by: FAMILY MEDICINE

## 2020-11-18 PROCEDURE — A9575 INJ GADOTERATE MEGLUMI 0.1ML: HCPCS | Performed by: FAMILY MEDICINE

## 2020-11-18 PROCEDURE — 70543 MRI ORBT/FAC/NCK W/O &W/DYE: CPT

## 2020-11-18 RX ADMIN — GADOTERATE MEGLUMINE 20 ML: 376.9 INJECTION INTRAVENOUS at 12:05

## 2020-11-25 ENCOUNTER — OFFICE VISIT (OUTPATIENT)
Dept: CARDIOLOGY | Facility: OTHER | Age: 55
End: 2020-11-25
Attending: NURSE PRACTITIONER
Payer: COMMERCIAL

## 2020-11-25 VITALS
DIASTOLIC BLOOD PRESSURE: 88 MMHG | RESPIRATION RATE: 18 BRPM | BODY MASS INDEX: 42.68 KG/M2 | TEMPERATURE: 97.4 F | HEART RATE: 68 BPM | WEIGHT: 289 LBS | SYSTOLIC BLOOD PRESSURE: 138 MMHG | OXYGEN SATURATION: 99 %

## 2020-11-25 DIAGNOSIS — I47.10 SVT (SUPRAVENTRICULAR TACHYCARDIA) (H): ICD-10-CM

## 2020-11-25 DIAGNOSIS — I34.81 MITRAL VALVE ANNULAR CALCIFICATION: ICD-10-CM

## 2020-11-25 DIAGNOSIS — I49.8 VENTRICULAR BIGEMINY: ICD-10-CM

## 2020-11-25 DIAGNOSIS — R00.2 PALPITATIONS: ICD-10-CM

## 2020-11-25 DIAGNOSIS — I49.3 PVC'S (PREMATURE VENTRICULAR CONTRACTIONS): Primary | ICD-10-CM

## 2020-11-25 PROCEDURE — 99214 OFFICE O/P EST MOD 30 MIN: CPT | Performed by: NURSE PRACTITIONER

## 2020-11-25 RX ORDER — DILTIAZEM HYDROCHLORIDE 180 MG/1
180 CAPSULE, EXTENDED RELEASE ORAL DAILY
Qty: 60 CAPSULE | Refills: 3 | Status: SHIPPED | OUTPATIENT
Start: 2020-11-25 | End: 2020-12-18 | Stop reason: DRUGHIGH

## 2020-11-25 ASSESSMENT — PAIN SCALES - GENERAL: PAINLEVEL: NO PAIN (0)

## 2020-11-25 NOTE — PROGRESS NOTES
[facility] HEART CARE   CARDIOLOGY PROGRESS NOTE    Phyllis Washington   [address] [facility] [address]      Ya Victoria     Chief Complaint   Patient presents with     Follow Up     1 month follow up        HPI:   Ms. Washington is a 55 year old female who presents for cardiology follow-up to visit on 10/28/2020 with palpitations and evidence of PSVT and symptomatic ectopy on recent cardiac monitoring. Patient has a PMH significant for hypertension venous phlebitis, venous thrombosis with negative coagulopathy work-up, cervicalgia, rheumatoid arthritis, vitamin D deficiency, chronic myalgias, allergic rhinitis, reactive airway, GERD and obesity. Patient denies any personal history of heart disease. Mom with CHF later in life with primary precention ICD. Dad with HTN. No heart disease in siblings.     Patient reported increased palpitations following a dobutamine stress echo. Reported that her palpitations where rare prior to this study. Now reporting nearly daily palpitations.  She describes symptoms of skipped hard beat. Feeling jittery in chest. No lightheadedness or syncope. No dyspnea or increased exertional dyspnea. She does report feeling increaed fatigue when she has more frequent palpitations.she denies experiencing any chest pain or pressure.  Currently no pain in the arm, jaw, neck or back.      In June 2020 patient was experiencing a new arm pain in right arm which prompted stress test.  She underwent a dobutamine stress echocardiogram on 7/20/2020.  This was a normal dobutamine stress test without evidence of inducible ischemia.  No regional wall motion abnormalities at rest.  With stress, the LVEF increased from 55 to 60% up to greater than 65% and the LV size decreased appropriately.  No subjective symptoms of ischemia.  Resting EKG was normal, with initial infusion, there was frequent PVCs including bigeminy and at higher dobutamine doses, the PVCs resolved.  Minimal nonspecific  ST and T wave changes were seen which were nondiagnostic.  Postinfusion, she had occasional PVCs and ST segments returned to normal baseline.    Given her increased palpitations patient completed ZIO (2020 through 10/7/2020) revealing predominant sinus rhythm with an average heart rate of 73 bpm, minimum heart rate 43 bpm maximum heart rate of 176 bpm.  There were 17 episodes of supraventricular tachycardia with the longest lasting 17.6 seconds.  No ventricular tachycardia.  No atrial fibrillation or atrial tachycardia.  No pauses or high-grade AV block.  Occasional isolated ventricular ectopy at a 1.6 burden, rare ventricular couplets and triplets.  Rare supraventricular ectopy, less than 1% of all beats.  Ventricular bigeminy and ventricular trigeminy was present.  Her longest episode of ventricular bigeminy lasted 22.3 seconds, longest ventricular trigeminy episode lasting 1 minute and 17 seconds.  She had 66 patient triggered events and 50 diary entries.  She was mainly symptomatic with ventricular ectopy, ventricular trigeminy and ventricular bigeminy.  She was also symptomatic with SVE and symptomatic with 1 episode of SVT.    Occasional ventricular ectopy with no significant ectopy burden.  She is extremely symptomatic with these ectopic beats and therefore she was started on diltiazem 120 mg daily.  She does describe benefit since taking this medication today with a reduction in the palpitations.  She has been feeling palpitations more in the evening after dinner, although has been less than in the past prior to starting diltiazem for suppression.  She does not report any recurrence of chest discomfort or increased dyspnea, no edema.  No other concerns today.    IMAGING:     [facility]  Echocardiography Laboratory  [address] [place] [id number]     Name: HUMBERTO MCBRIDE  MRN: [id number]  : 1965  Study Date: 2020 01:42 PM  Age: 55 yrs  Gender:  Female  Patient Location: [facility]  Reason For Study: SVT (supraventricular tachycardia) (H), PVC's (premature  ventric  Ordering Physician: SANNA OWUSU  Referring Physician: SANNA OWUSU  Performed By: Deborah Riojas RDCS, [facility]     BSA: 2.4 m2  Height: 70 in  Weight: 287 lb  HR: 79  BP: 138/82 mmHg  _____________________________________________________________________________  __        Procedure  Echocardiogram with two-dimensional, color and spectral Doppler performed.  _____________________________________________________________________________  __        Interpretation Summary  Left ventricular function, chamber size, wall motion, and wall thickness are  normal.The EF is 55-60%.  Right ventricular function, chamber size, wall motion, and thickness are  normal.  _____________________________________________________________________________  __        Left Ventricle  Left ventricular function, chamber size, wall motion, and wall thickness are  normal.The EF is 55-60%. Left ventricular diastolic function is normal.     Right Ventricle  Right ventricular function, chamber size, wall motion, and thickness are  normal.     Atria  Both atria appear normal.     Mitral Valve  Mild mitral annular calcification is present. Trace mitral insufficiency is  present.        Aortic Valve  Aortic valve is normal in structure and function.     Tricuspid Valve  The tricuspid valve is normal. Trace tricuspid insufficiency is present. The  right ventricular systolic pressure is approximated at 19.0 mmHg plus the  right atrial pressure.     Pulmonic Valve  The pulmonic valve is normal.     Vessels  The thoracic aorta is normal. IVC diameter <2.1 cm collapsing >50% with sniff  suggests a normal RA pressure of 3 mmHg.     Pericardium  No pericardial effusion is present.        Compared to Previous Study  There is no prior study for direct  comparison.  _____________________________________________________________________________  __  MMode/2D Measurements & Calculations  IVSd: 0.96 cm     LVIDd: 4.7 cm  LVIDs: 3.0 cm  LVPWd: 0.79 cm  FS: 35.5 %  LV mass(C)d: 138.2 grams  LV mass(C)dI: 56.8 grams/m2  Ao root diam: 3.7 cm  asc Aorta Diam: 3.3 cm  LVOT diam: 2.3 cm  LVOT area: 4.2 cm2  LA Volume (BP): 67.7 ml  LA Volume Index (BP): 27.9 ml/m2  RWT: 0.34           Doppler Measurements & Calculations  MV E max boom: 68.6 cm/sec  MV A max boom: 93.6 cm/sec  MV E/A: 0.73  MV dec slope: 262.0 cm/sec2  MV dec time: 0.26 sec  Ao V2 max: 114.0 cm/sec  Ao max P.0 mmHg  PRUDENCIO(V,D): 3.2 cm2  LV V1 max PG: 3.1 mmHg  LV V1 max: 87.7 cm/sec  LV V1 VTI: 20.2 cm  SV(LVOT): 83.9 ml  SI(LVOT): 34.5 ml/m2  TR max boom: 217.5 cm/sec  TR max P.0 mmHg  AV Boom Ratio (DI): 0.77  E/E' av.7  Lateral E/e': 6.4  Medial E/e': 9.0        _____________________________________________________________________________  __        Report approved by: Mickey Eugene 2020 02:39 PM  PAST MEDICAL HISTORY:   Past Medical History:   Diagnosis Date     Personal history of other medical treatment (CODE)     3 normal childbirths     Rheumatoid arthritis (H)           FAMILY HISTORY:   Family History   Problem Relation Age of Onset     Hypertension Father         Hypertension,67     Other - See Comments Maternal Grandmother         Stroke,85     Other - See Comments Paternal Grandmother         Alzheimer's 73     Breast Cancer Mother 46        Cancer-breast,now 68     Cerebrovascular Disease Mother         stroke with hemiparesis 2017     Diabetes Brother 16        Diabetes,now 41          PAST SURGICAL HISTORY:   Past Surgical History:   Procedure Laterality Date     COLONOSCOPY  2017    Dr. Fofana - 2 polyps found.  Told to f/u in 5 years.          SOCIAL HISTORY:   Social History     Socioeconomic History     Marital status:      Spouse name: Not on file     Number of  "children: Not on file     Years of education: Not on file     Highest education level: Not on file   Occupational History     Not on file   Social Needs     Financial resource strain: Not on file     Food insecurity     Worry: Not on file     Inability: Not on file     Transportation needs     Medical: Not on file     Non-medical: Not on file   Tobacco Use     Smoking status: Never Smoker     Smokeless tobacco: Never Used   Substance and Sexual Activity     Alcohol use: No     Frequency: Never     Comment: Rarely     Drug use: No     Sexual activity: Yes     Partners: Male     Birth control/protection: Rhythm   Lifestyle     Physical activity     Days per week: Not on file     Minutes per session: Not on file     Stress: Not on file   Relationships     Social connections     Talks on phone: Not on file     Gets together: Not on file     Attends Latter day service: Not on file     Active member of club or organization: Not on file     Attends meetings of clubs or organizations: Not on file     Relationship status: Not on file     Intimate partner violence     Fear of current or ex partner: Not on file     Emotionally abused: Not on file     Physically abused: Not on file     Forced sexual activity: Not on file   Other Topics Concern     Parent/sibling w/ CABG, MI or angioplasty before 65F 55M? Not Asked   Social History Narrative    , 3 adult children;     Homemaker; hobbies - reading, crafts.     Works at frame shop part-time.     Walks 3 miles a day     - Demetrio              CURRENT MEDICATIONS:   Prior to Admission medications    Medication Sig Start Date End Date Taking? Authorizing Provider   BD SAFETYGLIDE SYRINGE/NEEDLE 27G X 5/8\" 1 ML MISC 1 EACH BY DOES NOT APPLY ROUTE ONE TIME A WEEK. USE TO DRAW UP METHOTREXATE 25 MG ONCE WEEKLY. 5/28/20   Reported, Patient   famotidine (PEPCID) 20 MG tablet Take 1 tablet (20 mg) by mouth 2 times daily 11/12/19   Ya Victoria MD   folic acid " (FOLVITE) 1 MG tablet Take 1 mg by mouth daily 10/30/13   Reported, Patient   hydroxychloroquine (PLAQUENIL) 200 MG tablet  10/30/13   Reported, Patient   methotrexate 2.5 MG tablet CHEMO Take 20 mg by mouth once a week 10/30/13   Reported, Patient   order for DME Equipment being ordered: Jobst Stockings   Thigh High: 20-30mm/hg    Leg measures:  Ankle: 10in  Calf: 17.5in  Upper Thigh: 33in 8/20/19   Brenda Rivas APRN CNP   other medical supplies Jobst stockings.  Thigh high.  16-20 mmHg.  Diagnosis:  I80.9.  Length of need:  99. 8/19/19   Ya Victoria MD   predniSONE (DELTASONE) 5 MG tablet Take 5 mg by mouth daily. 11/2/18   Reported, Patient   triamterene-HCTZ (MAXZIDE-25) 37.5-25 MG tablet TAKE 1 TABLET BY MOUTH EVERY DAY IN THE MORNING 8/24/20   Ya Victoria MD   Upadacitinib ER (RINVOQ) 15 MG TB24 Take 15 mg by mouth 4/20/20   Reported, Patient          ALLERGIES:   No Known Allergies       ROS:   CONSTITUTIONAL: No reported fever or chills. No changes in weight.  ENT: No visual disturbance, ear ache, epistaxis or sore throat.   CARDIOVASCULAR: No chest pain, chest pressure or chest discomfort.  Positive for palpitations and mild chronic lower extremity edema with venous insufficiency.  Palpitations have improved, no increased edema.  RESPIRATORY: Mild JI dyspnea without progression. No cough, wheezing or hemoptysis.  No orthopnea or PND.  GI: No reported abdominal pain.   : No reported hematuria or dysuria.   NEUROLOGICAL: No lightheadedness, dizziness, syncope, ataxia, paresthesias or weakness.   HEMATOLOGIC: No history of anemia. No bleeding or excessive bruising.  Positive for history of DVT.  MUSCULOSKELETAL: Positive for chronic joint pain with arthritis and chronic myalgias.  ENDOCRINOLOGIC: No temperature intolerance. No hair or skin changes.  SKIN: No abnormal rashes or sores, no unusual itching.  PSYCHIATRIC: No history of depression or anxiety. No changes in mood,  feeling down or anxious.      PHYSICAL EXAM:   /88 (BP Location: Right arm, Patient Position: Sitting, Cuff Size: Adult Large)   Pulse 68   Temp 97.4  F (36.3  C) (Tympanic)   Resp 18   Wt 131.1 kg (289 lb)   LMP  (LMP Unknown)   SpO2 99%   BMI 42.68 kg/m    GENERAL: The patient is a well-developed, well-nourished, in no apparent distress.  HEENT: Head is normocephalic and atraumatic. Eyes are symmetrical with normal visual tracking. No icterus, no xanthelasmas. Nares appeared normal without nasal drainage. Mucous membranes are moist, no cyanosis.  NECK: Supple.  CHEST/ LUNGS: Lungs clear to auscultation, no rales, rhonchi or wheezes, no use of accessory muscles, no retractions, respirations unlabored and normal respiratory rate.   CARDIO: Regular rate and rhythm normal with S1 and S2, no S3 or S4 and no murmur, click or rub.  ABD: Abdomen is nondistended.   EXTREMITIES: Positive for trace LE edema present.   MUSCULOSKELETAL: No visible joint swelling.   NEUROLOGIC: Alert and oriented X3. Normal speech, gait and affect. No focal neurologic deficits.   SKIN: No jaundice. No rashes or visible skin lesions present. No ecchymosis.     LAB RESULTS:   Office Visit on 09/21/2020   Component Date Value Ref Range Status     Interpretation ECG 09/21/2020 Click View Image link to view waveform and result   Final          ASSESSMENT:   Phyllis Washington presents for cardiology follow-up to visit on 10/28/2020 with palpitations and evidence of PSVT and symptomatic ectopy on recent cardiac monitoring. Patient has a PMH significant for hypertension venous phlebitis, venous thrombosis with negative coagulopathy work-up, cervicalgia, rheumatoid arthritis, vitamin D deficiency, chronic myalgias, allergic rhinitis, reactive airway, GERD and obesity.   Occasional ventricular ectopy with no significant ectopy burden.  She has been significantly symptomatic with these ectopic beats and therefore, she was started on diltiazem  120 mg daily.  She does describe benefit since taking this medication today with a reduction in the palpitations.  She has been feeling palpitations more in the evening after dinner, although has been less than in the past prior to starting diltiazem for suppression.  She does not report any recurrence of chest discomfort or increased dyspnea, no edema.  No other concerns today.    1. PVC's (premature ventricular contractions)  2. SVT (supraventricular tachycardia) (H)  3. Ventricular bigeminy  4. Palpitations  5. Mitral valve annular calcification- mild with mild MI      PLAN:   1. Patient with increased palpitations following dobutamine stress echocardiogram this past July.  In review of her recent ZIO monitor, she is largely symptomatic with ventricular ectopy seen as isolated beats, couplets and triplets.  She was also symptomatic with ventricular bigeminy and ventricular trigeminy.  She had 17 nonsustained runs of supraventricular tachycardia, she was only symptomatic with 1-2 of these episodes.  2. Reviewed dobutamine stress echo with no evidence of ischemia or infarct on imaging. Non diagnostic, minimal nonspecific ST and T wave changes. Ventricular ectopy with early infusion and reduced with peak infusion.   3. Previously reviewed benign nature of her ectopies that she is feeling and SVT given episodes non sustained and no evidence of ischemia with ventricular ectopies. No symptoms to suggest ischemia.   4. Recent lab evaluation with normal electrolytes, stable thyroid function and no anemia.   5. TTE reviewed today and stable. Mid MAC with trace MI. Return for fasting lipids.  6. Given the daily symptoms disturbing her ADL's, very symptomatic with ectopy, she was started Diltiazem 120 mg daily for suppression of ectopy and PSVT. Avoiding beta blocker as first line with history of controlled asthma and reactive airway disease.   7. She has noticed moderate improvement since she started this medication. We  will increase to 180 mg daily and start taking at supper time which is when she is more symptomatic.   8. She has avoided caffeine and no stimulants, no ETOH. Declied any symptoms of sleep apnea which may contribute.  8. Follow-up with cardiology 3 months, certainly sooner if needed.     Thank you for allowing me to participate in the care of your patient. Please do not hesitate to contact me if you have any questions.     Chelly Ruffin, APRN CNP [facility]     patient

## 2020-11-25 NOTE — PATIENT INSTRUCTIONS
You were seen by  MISSY Haynes CNP       1. Laboratory blood work has been ordered for fasting lipid panel may do at 3 month follow up.  You will be notified by phone call or [employer] message when the results are available.       2. Increase Diltiazem to 180mg once daily at supper time.  Prescription sent to your pharmacy.    3. No other changes at this time.        You will follow up with [facility] in 3 months, sooner if needed.       Please call the cardiology office with problems, questions, or concerns at [phone].    If you experience chest pain, chest pressure, chest tightness, shortness of breath, fainting, lightheadedness, nausea, vomiting, or other concerning symptoms, please report to the Emergency Department or call 911. These symptoms may be emergent, and best treated in the Emergency Department.     Cardiology Nurses  ERIKA Kc, NORAH TUCKER, NORAH  [facility] (Unit 3C)  [phone]

## 2020-11-25 NOTE — NURSING NOTE
"Chief Complaint   Patient presents with     Follow Up     1 month follow up       Initial /88 (BP Location: Right arm, Patient Position: Sitting, Cuff Size: Adult Large)   Pulse 68   Temp 97.4  F (36.3  C) (Tympanic)   Resp 18   Wt 131.1 kg (289 lb)   LMP  (LMP Unknown)   SpO2 99%   BMI 42.68 kg/m   Estimated body mass index is 42.68 kg/m  as calculated from the following:    Height as of 11/12/20: 1.753 m (5' 9\").    Weight as of this encounter: 131.1 kg (289 lb).  Meds Reconciled: complete  Pt is on Aspirin  Pt is not on a Statin  PHQ and/or GUERITA reviewed. Pt referred to PCP/MH Provider as appropriate.    Ambreen Odonnell LPN      "

## 2020-12-10 ENCOUNTER — HOSPITAL ENCOUNTER (OUTPATIENT)
Dept: MAMMOGRAPHY | Facility: OTHER | Age: 55
Discharge: HOME | End: 2020-12-10
Attending: FAMILY MEDICINE | Admitting: FAMILY MEDICINE
Payer: COMMERCIAL

## 2020-12-10 DIAGNOSIS — Z12.31 VISIT FOR SCREENING MAMMOGRAM: ICD-10-CM

## 2020-12-10 PROCEDURE — 77063 BREAST TOMOSYNTHESIS BI: CPT

## 2020-12-17 ENCOUNTER — MYC MEDICAL ADVICE (OUTPATIENT)
Dept: CARDIOLOGY | Facility: OTHER | Age: 55
End: 2020-12-17

## 2020-12-17 DIAGNOSIS — R00.2 PALPITATIONS: ICD-10-CM

## 2020-12-17 DIAGNOSIS — I49.8 VENTRICULAR BIGEMINY: ICD-10-CM

## 2020-12-17 DIAGNOSIS — I49.3 PVC'S (PREMATURE VENTRICULAR CONTRACTIONS): ICD-10-CM

## 2020-12-17 DIAGNOSIS — I47.10 SVT (SUPRAVENTRICULAR TACHYCARDIA) (H): ICD-10-CM

## 2020-12-18 RX ORDER — DILTIAZEM HYDROCHLORIDE 240 MG/1
240 CAPSULE, EXTENDED RELEASE ORAL DAILY
Qty: 90 CAPSULE | Refills: 3 | Status: SHIPPED | OUTPATIENT
Start: 2020-12-18 | End: 2021-03-03

## 2020-12-18 NOTE — TELEPHONE ENCOUNTER
Chelly Ruffin APRN CNP Green, Glenda M, RN   Caller: Unspecified (Yesterday, 12:20 PM)   Phone Number: [phone]             Please have patient increase the Diltiazem to 240 mg.   Thanks,   MISSY Diop CNP     Will send new Rx to pharmacy [facility] and will notify patient via mychart.  Claudia Grimes RN on 12/18/2020 at 3:24 PM

## 2020-12-19 DIAGNOSIS — I47.10 SVT (SUPRAVENTRICULAR TACHYCARDIA) (H): ICD-10-CM

## 2020-12-19 DIAGNOSIS — I49.8 VENTRICULAR BIGEMINY: ICD-10-CM

## 2020-12-19 DIAGNOSIS — R00.2 PALPITATIONS: ICD-10-CM

## 2020-12-19 DIAGNOSIS — I49.3 PVC'S (PREMATURE VENTRICULAR CONTRACTIONS): ICD-10-CM

## 2020-12-21 RX ORDER — DILTIAZEM HYDROCHLORIDE 120 MG/1
CAPSULE, EXTENDED RELEASE ORAL
Qty: 60 CAPSULE | Refills: 1 | OUTPATIENT
Start: 2020-12-21

## 2020-12-21 NOTE — TELEPHONE ENCOUNTER
diltizem Duplicate     Last Written Prescription Date:  12/18/20  Last Fill Quantity: 90,   # refills: 3  Last Office Visit: 11/25/20  Future Office visit:    Next 5 appointments (look out 90 days)    Feb 24, 2021  8:45 AM  Return Visit with MISSY Michele CNP  [facility] [address]  [place] [address]  [phone]

## 2020-12-30 ENCOUNTER — MYC MEDICAL ADVICE (OUTPATIENT)
Dept: CARDIOLOGY | Facility: OTHER | Age: 55
End: 2020-12-30

## 2020-12-30 NOTE — TELEPHONE ENCOUNTER
Patient does not check her BP or HR at home.  Patient would like to try increasing her Potassium with diet first before taking another medication.  Nurse did send information on Potassium- Rich foods via [employer].  Patient will call if continuing to have problems.  Claudia Grimes RN on 12/30/2020 at 2:13 PM           Chelly Ruffin APRN CNP Green, Glenda M, RN   Caller: Unspecified (Today, 11:29 AM)   Phone Number: [phone]             Can we have patient provide us with a current BP and HR for what she is normally running since we increased her Diltiazem to 240 mg? Based on this I may med adjust. I also see that her potassium was low end of normal at 3.5 when she had this checked recently. I would like to see this closer to 4.0, low potassium can increase her premature beats and cause more palpitations. Please have her increase potassium in diet or we could also start her on low dose potassium supplement.   Thanks,   MISSY Diop CNP

## 2021-01-17 DIAGNOSIS — R00.2 PALPITATIONS: ICD-10-CM

## 2021-01-17 DIAGNOSIS — I49.3 PVC'S (PREMATURE VENTRICULAR CONTRACTIONS): ICD-10-CM

## 2021-01-17 DIAGNOSIS — I49.8 VENTRICULAR BIGEMINY: ICD-10-CM

## 2021-01-17 DIAGNOSIS — I47.10 SVT (SUPRAVENTRICULAR TACHYCARDIA) (H): ICD-10-CM

## 2021-01-18 RX ORDER — DILTIAZEM HYDROCHLORIDE 180 MG/1
CAPSULE, EXTENDED RELEASE ORAL
Qty: 60 CAPSULE | Refills: 3 | OUTPATIENT
Start: 2021-01-18

## 2021-01-18 NOTE — TELEPHONE ENCOUNTER
"    Requested Prescriptions   Refused Prescriptions Disp Refills     TIADYLT  MG 24 hr ER beaded capsule [Pharmacy Med Name: TIADYLT  MG CAPSULE] 60 capsule 3     Sig: TAKE 1 CAPSULE BY MOUTH EVERY DAY       Calcium Channel Blockers Protocol  Failed - 1/17/2021  9:43 AM        Failed - Medication is active on med list        Passed - Blood pressure under 140/90 in past 12 months     BP Readings from Last 3 Encounters:   11/25/20 138/88   11/12/20 126/86   10/28/20 138/82                 Passed - Normal ALT in past 12 months     Recent Labs   Lab Test 07/03/20  1418   ALT 17             Passed - Recent (12 mo) or future (30 days) visit within the authorizing provider's specialty     Patient has had an office visit with the authorizing provider or a provider within the authorizing providers department within the previous 12 mos or has a future within next 30 days. See \"Patient Info\" tab in inbasket, or \"Choose Columns\" in Meds & Orders section of the refill encounter.              Passed - Patient is age 18 or older        Passed - No active pregnancy on record        Passed - Normal serum creatinine on file in past 12 months     Recent Labs   Lab Test 11/12/20  1553   CR 1.14       Ok to refill medication if creatinine is low          Passed - No positive pregnancy test in past 12 months           This medication was changed on 12/17/2020 to  diltiazem ER (DILT-XR) 240 MG 24 hr ER beaded capsule Declined request.  Claudia Grimes RN on 1/18/2021 at 3:43 PM    "

## 2021-01-25 ENCOUNTER — TRANSFERRED RECORDS (OUTPATIENT)
Dept: HEALTH INFORMATION MANAGEMENT | Facility: OTHER | Age: 56
End: 2021-01-25

## 2021-03-03 ENCOUNTER — OFFICE VISIT (OUTPATIENT)
Dept: CARDIOLOGY | Facility: OTHER | Age: 56
End: 2021-03-03
Attending: NURSE PRACTITIONER
Payer: COMMERCIAL

## 2021-03-03 VITALS
HEIGHT: 69 IN | HEART RATE: 68 BPM | SYSTOLIC BLOOD PRESSURE: 120 MMHG | TEMPERATURE: 97.8 F | DIASTOLIC BLOOD PRESSURE: 86 MMHG | OXYGEN SATURATION: 97 % | BODY MASS INDEX: 42.95 KG/M2 | RESPIRATION RATE: 16 BRPM | WEIGHT: 290 LBS

## 2021-03-03 DIAGNOSIS — I49.3 PVC'S (PREMATURE VENTRICULAR CONTRACTIONS): Primary | ICD-10-CM

## 2021-03-03 DIAGNOSIS — I49.8 VENTRICULAR BIGEMINY: ICD-10-CM

## 2021-03-03 DIAGNOSIS — I34.81 MITRAL VALVE ANNULAR CALCIFICATION: ICD-10-CM

## 2021-03-03 DIAGNOSIS — R00.2 PALPITATIONS: ICD-10-CM

## 2021-03-03 DIAGNOSIS — I47.10 SVT (SUPRAVENTRICULAR TACHYCARDIA) (H): ICD-10-CM

## 2021-03-03 LAB
ANION GAP SERPL CALCULATED.3IONS-SCNC: 8 MMOL/L (ref 3–14)
BUN SERPL-MCNC: 24 MG/DL (ref 7–25)
CALCIUM SERPL-MCNC: 9.5 MG/DL (ref 8.6–10.3)
CHLORIDE SERPL-SCNC: 101 MMOL/L (ref 98–107)
CHOLEST SERPL-MCNC: 219 MG/DL
CO2 SERPL-SCNC: 31 MMOL/L (ref 21–31)
CREAT SERPL-MCNC: 1.06 MG/DL (ref 0.6–1.2)
GFR SERPL CREATININE-BSD FRML MDRD: 53 ML/MIN/{1.73_M2}
GLUCOSE SERPL-MCNC: 93 MG/DL (ref 70–105)
HDLC SERPL-MCNC: 84 MG/DL (ref 23–92)
LDLC SERPL CALC-MCNC: 124 MG/DL
NONHDLC SERPL-MCNC: 135 MG/DL
POTASSIUM SERPL-SCNC: 3.9 MMOL/L (ref 3.5–5.1)
SODIUM SERPL-SCNC: 140 MMOL/L (ref 134–144)
TRIGL SERPL-MCNC: 56 MG/DL

## 2021-03-03 PROCEDURE — 80048 BASIC METABOLIC PNL TOTAL CA: CPT | Mod: ZL | Performed by: NURSE PRACTITIONER

## 2021-03-03 PROCEDURE — 36415 COLL VENOUS BLD VENIPUNCTURE: CPT | Mod: ZL | Performed by: NURSE PRACTITIONER

## 2021-03-03 PROCEDURE — 99215 OFFICE O/P EST HI 40 MIN: CPT | Performed by: NURSE PRACTITIONER

## 2021-03-03 PROCEDURE — 80061 LIPID PANEL: CPT | Mod: ZL | Performed by: NURSE PRACTITIONER

## 2021-03-03 RX ORDER — PLANT STANOL ESTER 450 MG
2 TABLET ORAL DAILY
COMMUNITY
End: 2021-09-08

## 2021-03-03 RX ORDER — METOPROLOL SUCCINATE 50 MG/1
50 TABLET, EXTENDED RELEASE ORAL
Qty: 90 TABLET | Refills: 0 | Status: SHIPPED | OUTPATIENT
Start: 2021-03-03 | End: 2021-05-13

## 2021-03-03 ASSESSMENT — PAIN SCALES - GENERAL: PAINLEVEL: NO PAIN (0)

## 2021-03-03 ASSESSMENT — MIFFLIN-ST. JEOR: SCORE: 1964.81

## 2021-03-03 NOTE — PROGRESS NOTES
[facility] HEART CARE   CARDIOLOGY PROGRESS NOTE    Phyllis Washington   [address] [facility] [address]      Ya Victoria     Chief Complaint   Patient presents with     Follow Up     3 month follow up  - pvc's        HPI:   Ms. Washington is a 56 year old female who presents for cardiology follow-up to visit on 11/25/2020 with palpitations and evidence of PSVT and symptomatic ectopy on recent cardiac monitoring. Patient has a PMH significant for hypertension venous phlebitis, venous thrombosis with negative coagulopathy work-up, cervicalgia, rheumatoid arthritis, vitamin D deficiency, chronic myalgias, allergic rhinitis, reactive airway, GERD and obesity. Patient denies any personal history of heart disease. Mom with CHF later in life with primary prevention ICD. Dad with HTN. No heart disease in siblings.     Patient reported increased palpitations following a dobutamine stress echo. Reported that her palpitations where rare prior to this study. Now reporting nearly daily palpitations.  She describes symptoms of skipped hard beat. Feeling jittery in chest. No lightheadedness or syncope. No dyspnea or increased exertional dyspnea. She does report feeling increaed fatigue when she has more frequent palpitations.she denies experiencing any chest pain or pressure.  Currently no pain in the arm, jaw, neck or back.      In June 2020 patient was experiencing a new arm pain in right arm which prompted stress test.  She underwent a dobutamine stress echocardiogram on 7/20/2020.  This was a normal dobutamine stress test without evidence of inducible ischemia.  No regional wall motion abnormalities at rest.  With stress, the LVEF increased from 55 to 60% up to greater than 65% and the LV size decreased appropriately.  No subjective symptoms of ischemia.  Resting EKG was normal, with initial infusion, there was frequent PVCs including bigeminy and at higher dobutamine doses, the PVCs resolved.  Minimal  nonspecific ST and T wave changes were seen which were nondiagnostic.  Postinfusion, she had occasional PVCs and ST segments returned to normal baseline.     Given her increased palpitations patient completed ZIO (9/23/2020 through 10/7/2020) revealing predominant sinus rhythm with an average heart rate of 73 bpm, minimum heart rate 43 bpm maximum heart rate of 176 bpm.  There were 17 episodes of supraventricular tachycardia with the longest lasting 17.6 seconds.  No ventricular tachycardia.  No atrial fibrillation or atrial tachycardia.  No pauses or high-grade AV block.  Occasional isolated ventricular ectopy at a 1.6 burden, rare ventricular couplets and triplets.  Rare supraventricular ectopy, less than 1% of all beats.  Ventricular bigeminy and ventricular trigeminy was present.  Her longest episode of ventricular bigeminy lasted 22.3 seconds, longest ventricular trigeminy episode lasting 1 minute and 17 seconds.  She had 66 patient triggered events and 50 diary entries.  She was mainly symptomatic with ventricular ectopy, ventricular trigeminy and ventricular bigeminy.  She was also symptomatic with SVE and symptomatic with 1 episode of SVT.    Occasional ventricular ectopy with no significant ectopy burden.  She has been significantly symptomatic with these ectopic beats and therefore, she was started on diltiazem 120 mg daily.  She did describe benefit with starting this medication, reduction in the palpitations.  She had been feeling palpitations more in the evening after dinner, although has been less than in the past prior to starting diltiazem for suppression.     At her last visit, she reported increased palpitations again.  Her diltiazem was uptitrated to 180 mg daily and since the last visit uptitrated again to 240 mg daily.  She does not describe any benefit with further up titration of her diltiazem.  Symptoms are reported as improved for the first couple weeks after up titration and then return  back to normal.  She is currently describing daily palpitations.  Sensation of skipped beat followed by a heartbeat which is benefiting with her PVCs .  No increased dyspnea.  No lightheadedness, dizziness or syncope.  She does not report any recurrence of chest discomfort.  No edema.     IMAGING:     [facility],[place]  Echocardiography Laboratory  [address] [place] [address]     Name: HUMBERTO MCBRIDE  MRN: [id number]  : 1965  Study Date: 2020 01:42 PM  Age: 55 yrs  Gender: Female  Patient Location: [facility]  Reason For Study: SVT (supraventricular tachycardia) (H), PVC's (premature  ventric  Ordering Physician: SANNA OWUSU  Referring Physician: SANNA OWUSU  Performed By: Deborah Riojas RDCS, RVT     BSA: 2.4 m2  Height: 70 in  Weight: 287 lb  HR: 79  BP: 138/82 mmHg  _____________________________________________________________________________  __        Procedure  Echocardiogram with two-dimensional, color and spectral Doppler performed.  _____________________________________________________________________________  __        Interpretation Summary  Left ventricular function, chamber size, wall motion, and wall thickness are  normal.The EF is 55-60%.  Right ventricular function, chamber size, wall motion, and thickness are  normal.  _____________________________________________________________________________  __        Left Ventricle  Left ventricular function, chamber size, wall motion, and wall thickness are  normal.The EF is 55-60%. Left ventricular diastolic function is normal.     Right Ventricle  Right ventricular function, chamber size, wall motion, and thickness are  normal.     Atria  Both atria appear normal.     Mitral Valve  Mild mitral annular calcification is present. Trace mitral insufficiency is  present.        Aortic Valve  Aortic valve is normal in structure and function.     Tricuspid Valve  The tricuspid valve is normal. Trace  tricuspid insufficiency is present. The  right ventricular systolic pressure is approximated at 19.0 mmHg plus the  right atrial pressure.     Pulmonic Valve  The pulmonic valve is normal.     Vessels  The thoracic aorta is normal. IVC diameter <2.1 cm collapsing >50% with sniff  suggests a normal RA pressure of 3 mmHg.     Pericardium  No pericardial effusion is present.        Compared to Previous Study  There is no prior study for direct comparison.  _____________________________________________________________________________  __  MMode/2D Measurements & Calculations  IVSd: 0.96 cm     LVIDd: 4.7 cm  LVIDs: 3.0 cm  LVPWd: 0.79 cm  FS: 35.5 %  LV mass(C)d: 138.2 grams  LV mass(C)dI: 56.8 grams/m2  Ao root diam: 3.7 cm  asc Aorta Diam: 3.3 cm  LVOT diam: 2.3 cm  LVOT area: 4.2 cm2  LA Volume (BP): 67.7 ml  LA Volume Index (BP): 27.9 ml/m2  RWT: 0.34           Doppler Measurements & Calculations  MV E max boom: 68.6 cm/sec  MV A max boom: 93.6 cm/sec  MV E/A: 0.73  MV dec slope: 262.0 cm/sec2  MV dec time: 0.26 sec  Ao V2 max: 114.0 cm/sec  Ao max P.0 mmHg  PRUDENCIO(V,D): 3.2 cm2  LV V1 max PG: 3.1 mmHg  LV V1 max: 87.7 cm/sec  LV V1 VTI: 20.2 cm  SV(LVOT): 83.9 ml  SI(LVOT): 34.5 ml/m2  TR max boom: 217.5 cm/sec  TR max P.0 mmHg  AV Boom Ratio (DI): 0.77  E/E' av.7  Lateral E/e': 6.4  Medial E/e': 9.0        _____________________________________________________________________________  __        Report approved by: Mickey Eugene 2020 02:39 PM  PAST MEDICAL HISTORY:   Past Medical History:   Diagnosis Date     Personal history of other medical treatment (CODE)     3 normal childbirths     Rheumatoid arthritis (H)           FAMILY HISTORY:   Family History   Problem Relation Age of Onset     Hypertension Father         Hypertension,67     Other - See Comments Maternal Grandmother         Stroke,85     Other - See Comments Paternal Grandmother         Alzheimer's 73     Breast Cancer Mother 46         Cancer-breast,now 68     Cerebrovascular Disease Mother         stroke with hemiparesis 2017     Diabetes Brother 16        Diabetes,now 41          PAST SURGICAL HISTORY:   Past Surgical History:   Procedure Laterality Date     COLONOSCOPY  2017    Dr. Fofana - 2 polyps found.  Told to f/u in 5 years.          SOCIAL HISTORY:   Social History     Socioeconomic History     Marital status:      Spouse name: Not on file     Number of children: Not on file     Years of education: Not on file     Highest education level: Not on file   Occupational History     Not on file   Social Needs     Financial resource strain: Not on file     Food insecurity     Worry: Not on file     Inability: Not on file     Transportation needs     Medical: Not on file     Non-medical: Not on file   Tobacco Use     Smoking status: Never Smoker     Smokeless tobacco: Never Used   Substance and Sexual Activity     Alcohol use: No     Frequency: Never     Comment: Rarely     Drug use: No     Sexual activity: Yes     Partners: Male     Birth control/protection: Rhythm   Lifestyle     Physical activity     Days per week: Not on file     Minutes per session: Not on file     Stress: Not on file   Relationships     Social connections     Talks on phone: Not on file     Gets together: Not on file     Attends Jehovah's witness service: Not on file     Active member of club or organization: Not on file     Attends meetings of clubs or organizations: Not on file     Relationship status: Not on file     Intimate partner violence     Fear of current or ex partner: Not on file     Emotionally abused: Not on file     Physically abused: Not on file     Forced sexual activity: Not on file   Other Topics Concern     Parent/sibling w/ CABG, MI or angioplasty before 65F 55M? Not Asked   Social History Narrative    , 3 adult children;     Homemaker; hobbies - reading, crafts.     Works at [employer] shop part-time.     Walks 3 miles a day     - Demetrio      "         CURRENT MEDICATIONS:   Prior to Admission medications    Medication Sig Start Date End Date Taking? Authorizing Provider   BD SAFETYGLIDE SYRINGE/NEEDLE 27G X 5/8\" 1 ML MISC 1 EACH BY DOES NOT APPLY ROUTE ONE TIME A WEEK. USE TO DRAW UP METHOTREXATE 25 MG ONCE WEEKLY. 5/28/20   Reported, Patient   famotidine (PEPCID) 20 MG tablet Take 1 tablet (20 mg) by mouth 2 times daily 11/12/19   Ya Victoria MD   folic acid (FOLVITE) 1 MG tablet Take 1 mg by mouth daily 10/30/13   Reported, Patient   hydroxychloroquine (PLAQUENIL) 200 MG tablet  10/30/13   Reported, Patient   methotrexate 2.5 MG tablet CHEMO Take 20 mg by mouth once a week 10/30/13   Reported, Patient   order for DME Equipment being ordered: Jobst Stockings   Thigh High: 20-30mm/hg    Leg measures:  Ankle: 10in  Calf: 17.5in  Upper Thigh: 33in 8/20/19   Brenda Rivas APRN CNP   other medical supplies Jobst stockings.  Thigh high.  16-20 mmHg.  Diagnosis:  I80.9.  Length of need:  99. 8/19/19   Ya Victoria MD   predniSONE (DELTASONE) 5 MG tablet Take 5 mg by mouth daily. 11/2/18   Reported, Patient   triamterene-HCTZ (MAXZIDE-25) 37.5-25 MG tablet TAKE 1 TABLET BY MOUTH EVERY DAY IN THE MORNING 8/24/20   Ya Victoria MD   Upadacitinib ER (RINVOQ) 15 MG TB24 Take 15 mg by mouth 4/20/20   Reported, Patient          ALLERGIES:   No Known Allergies       ROS:   CONSTITUTIONAL: No reported fever or chills. No changes in weight.  ENT: No visual disturbance, ear ache, epistaxis or sore throat.   CARDIOVASCULAR: No chest pain, chest pressure or chest discomfort.  Positive for continued palpitations and mild chronic lower extremity edema with venous insufficiency.    RESPIRATORY: Mild JI dyspnea without progression. No cough, wheezing or hemoptysis.  No orthopnea or PND.  GI: No reported abdominal pain.   : No reported hematuria or dysuria.   NEUROLOGICAL: No lightheadedness, dizziness, syncope, ataxia, paresthesias " "or weakness.   HEMATOLOGIC: No history of anemia. No bleeding or excessive bruising.  Positive for history of DVT.  MUSCULOSKELETAL: Positive for chronic joint pain with arthritis and chronic myalgias.  ENDOCRINOLOGIC: No temperature intolerance. No hair or skin changes.  SKIN: No abnormal rashes or sores, no unusual itching.  PSYCHIATRIC: No history of depression or anxiety. No changes in mood, feeling down or anxious.      PHYSICAL EXAM:   /86 (BP Location: Right arm, Patient Position: Sitting, Cuff Size: Adult Large)   Pulse 68   Temp 97.8  F (36.6  C) (Tympanic)   Resp 16   Ht 1.753 m (5' 9\")   Wt 131.5 kg (290 lb)   LMP  (LMP Unknown)   SpO2 97%   BMI 42.83 kg/m    GENERAL: The patient is a well-developed, well-nourished, in no apparent distress.  HEENT: Head is normocephalic and atraumatic. Eyes are symmetrical with normal visual tracking. No icterus, no xanthelasmas. Nares appeared normal without nasal drainage. Mucous membranes are moist, no cyanosis.  NECK: Supple.  CHEST/ LUNGS: Lungs clear to auscultation, no rales, rhonchi or wheezes, no use of accessory muscles, no retractions, respirations unlabored and normal respiratory rate.   CARDIO: Regular rate and rhythm normal with S1 and S2, no S3 or S4 and no murmur, click or rub.  ABD: Abdomen is nondistended.   EXTREMITIES: No LE edema present.   MUSCULOSKELETAL: No visible joint swelling.   NEUROLOGIC: Alert and oriented X3. Normal speech, gait and affect. No focal neurologic deficits.   SKIN: No jaundice. No rashes or visible skin lesions present. No ecchymosis.     LAB RESULTS:   Office Visit on 09/21/2020   Component Date Value Ref Range Status     Interpretation ECG 09/21/2020 Click View Image link to view waveform and result   Final          ASSESSMENT:   Phyllis Rayurenitish presents for cardiology follow-up to visit on 11/25/2020 with palpitations and evidence of PSVT and symptomatic ectopy on recent cardiac monitoring. Patient has a PMH " significant for hypertension venous phlebitis, venous thrombosis with negative coagulopathy work-up, cervicalgia, rheumatoid arthritis, vitamin D deficiency, chronic myalgias, allergic rhinitis, reactive airway, GERD and obesity.   At her last visit, she reported increased palpitations again.  Her diltiazem was uptitrated to 180 mg daily and since the last visit uptitrated again to 240 mg daily.  She does not describe any benefit with further up titration of her diltiazem.  Symptoms are reported as improved for the first couple weeks after up titration and then return back to normal.  She is currently describing daily palpitations.  Sensation of skipped beat followed by a heartbeat which is benefiting with her PVCs .  No increased dyspnea.  No lightheadedness, dizziness or syncope.  She does not report any recurrence of chest discomfort.  No edema.     1. PVC's (premature ventricular contractions)  2. SVT (supraventricular tachycardia) (H)  3. Ventricular bigeminy  4. Palpitations  5. Mitral valve annular calcification- mild with mild MI      PLAN:   1. Patient with persistent daily palpitations.  In review of her recent ZIO monitor, she is symptomatic with ventricular ectopy seen as isolated beats, couplets and triplets.  She was also symptomatic with ventricular bigeminy and ventricular trigeminy.  She had 17 nonsustained runs of supraventricular tachycardia, she was only symptomatic with 1-2 of these episodes.  2. Reviewed dobutamine stress echo with no evidence of ischemia or infarct on imaging. Non diagnostic, minimal nonspecific ST and T wave changes. Ventricular ectopy with early infusion and reduced at peak infusion.   3. Previously reviewed benign nature of her ectopies that she is feeling and SVT given episodes non sustained and no evidence of ischemia with ventricular ectopies. No anginal symptoms.  4. Recent lab evaluation with stable thyroid function and no anemia. She is chronically hypokalemic  secondary to her thiazide diuretic.  She has increased her potassium intake and started on oral over-the-counter potassium replacement.  We will reassess her potassium level today.  Given her history of symptomatic ectopy and PSVT, recommended goal potassium 4.0. Consider stopping her thiazide diuretic due to secondary hypokalemia.  5. TTE previously reviewed with mid MAC and trace MI.   6. Given the daily symptoms disturbing her ADL's,symptomatic largely with ventricular ectopy, up titration of diltiazem has not provided much benefit in symptom relief.   7. Recommended adjusting to beta blocker as first line for VE suppression, she will stop diltiazem and begin Toprol-XL 50 mg in the evening.  Her asthma has been well controlled for many years, no use of her albuterol inhaler.    8. She has avoided caffeine and no stimulants, no ETOH.  Denies any symptoms of sleep apnea which may contribute.  9.  She is fasting today for lipids, we will notify her of results.  10.  Given history of MAC with mitral insufficiency and SVT, she will repeat echocardiogram at 1 year interval (expected 11/21).  11.  She will notify us of her MyChart in 2 weeks to see if symptoms of palpitations have improved with medication adjustment today.  If symptoms not improved, consider repeat cardiac monitoring. May be candidate for flecainide AAT for ectopy suppression.  12.  Follow-up with cardiology in 6 months, certainly sooner if needed.    Thank you for allowing me to participate in the care of your patient. Please do not hesitate to contact me if you have any questions.     Total time of 60 min spent reviewing records, face to face time reviewing HPI and reviewing prior testing results, along with counseling on recommended plan of care.     Chelly Ruffin, APRN CNP [facility]

## 2021-03-03 NOTE — NURSING NOTE
"Chief Complaint   Patient presents with     Follow Up     3 month follow up  - pvc's       Initial /86 (BP Location: Right arm, Patient Position: Sitting, Cuff Size: Adult Large)   Pulse 68   Temp 97.8  F (36.6  C) (Tympanic)   Resp 16   Ht 1.753 m (5' 9\")   Wt 131.5 kg (290 lb)   LMP  (LMP Unknown)   SpO2 97%   BMI 42.83 kg/m   Estimated body mass index is 42.83 kg/m  as calculated from the following:    Height as of this encounter: 1.753 m (5' 9\").    Weight as of this encounter: 131.5 kg (290 lb).  Meds Reconciled: complete  Pt is on Aspirin  Pt is not on a Statin  PHQ and/or GUERITA reviewed. Pt referred to PCP/MH Provider as appropriate.    Ambreen Odonnell LPN      "

## 2021-03-03 NOTE — PATIENT INSTRUCTIONS
You were seen by  MISSY Haynes CNP      1. Stop Diltiazem     2. Start Toprol XL 50 mg daily at supper time.    3. Send us a [employer] message in 2 weeks to let us know how you are doing.  Please include what Potassium supplement you are taking and the exact dose.  If med is not helping we may consider having you wear another heart monitor.        You will follow up with [facility] in 6 months, sooner if you need to wear another monitor.       Please call the cardiology office with problems, questions, or concerns at [phone].    If you experience chest pain, chest pressure, chest tightness, shortness of breath, fainting, lightheadedness, nausea, vomiting, or other concerning symptoms, please report to the Emergency Department or call 911. These symptoms may be emergent, and best treated in the Emergency Department.     Cardiology Nurses  ERIKA Kc, NORAH TUCKER, NORAH  [facility] ([address])  [phone]

## 2021-03-22 ENCOUNTER — MYC MEDICAL ADVICE (OUTPATIENT)
Dept: CARDIOLOGY | Facility: OTHER | Age: 56
End: 2021-03-22

## 2021-03-22 NOTE — TELEPHONE ENCOUNTER
Called patient with recommendations per MISSY Haynes CNP.  Claudia Grimes RN on 3/22/2021 at 5:08 PM    Chelly Ruffin APRN CNP Green, Glenda M, RN   Caller: Unspecified (Today, 10:17 AM)   Phone Number: [phone]             I am happy to hear the PVC's are calming down. Rinvoq does not list much for cardiac side effects. There is a possibility that her methotrexate may be contributing. Now that she is back on the medications, we can evaluate if PVC's increase again or remain well controlled with the new beta blocker medication. Not abnormal for about one week of the new medication to settle things down. I am happy with her BP and HR readings.   Thanks,   MISSY Diop CNP

## 2021-04-19 ENCOUNTER — MYC MEDICAL ADVICE (OUTPATIENT)
Dept: CARDIOLOGY | Facility: OTHER | Age: 56
End: 2021-04-19

## 2021-04-26 LAB
AST SERPL-CCNC: 22 IU/L (ref 10–40)
CREAT SERPL-MCNC: 0.84 MG/DL (ref 0.4–1)
GFR SERPL CREATININE-BSD FRML MDRD: >60 ML/MIN/1.73M2

## 2021-05-13 DIAGNOSIS — I49.3 PVC'S (PREMATURE VENTRICULAR CONTRACTIONS): ICD-10-CM

## 2021-05-13 DIAGNOSIS — I49.8 VENTRICULAR BIGEMINY: ICD-10-CM

## 2021-05-13 DIAGNOSIS — I47.10 SVT (SUPRAVENTRICULAR TACHYCARDIA) (H): ICD-10-CM

## 2021-05-13 DIAGNOSIS — R00.2 PALPITATIONS: ICD-10-CM

## 2021-05-13 RX ORDER — METOPROLOL SUCCINATE 50 MG/1
50 TABLET, EXTENDED RELEASE ORAL
Qty: 90 TABLET | Refills: 0 | Status: SHIPPED | OUTPATIENT
Start: 2021-05-13 | End: 2021-07-09

## 2021-05-13 NOTE — TELEPHONE ENCOUNTER
Metoprolol       Last Written Prescription Date:  3-3-21  Last Fill Quantity: 90,   # refills: 0  Last Office Visit: 3-8-21  Future Office visit:

## 2021-06-25 ENCOUNTER — TRANSFERRED RECORDS (OUTPATIENT)
Dept: HEALTH INFORMATION MANAGEMENT | Facility: OTHER | Age: 56
End: 2021-06-25

## 2021-07-09 ENCOUNTER — MYC REFILL (OUTPATIENT)
Dept: CARDIOLOGY | Facility: OTHER | Age: 56
End: 2021-07-09

## 2021-07-09 DIAGNOSIS — I49.8 VENTRICULAR BIGEMINY: ICD-10-CM

## 2021-07-09 DIAGNOSIS — I49.3 PVC'S (PREMATURE VENTRICULAR CONTRACTIONS): ICD-10-CM

## 2021-07-09 DIAGNOSIS — R00.2 PALPITATIONS: ICD-10-CM

## 2021-07-09 DIAGNOSIS — I47.10 SVT (SUPRAVENTRICULAR TACHYCARDIA) (H): ICD-10-CM

## 2021-07-12 RX ORDER — METOPROLOL SUCCINATE 50 MG/1
50 TABLET, EXTENDED RELEASE ORAL
Qty: 90 TABLET | Refills: 0 | Status: SHIPPED | OUTPATIENT
Start: 2021-07-12 | End: 2021-09-08

## 2021-07-12 NOTE — TELEPHONE ENCOUNTER
"Requested Prescriptions   Pending Prescriptions Disp Refills     metoprolol succinate ER (TOPROL-XL) 50 MG 24 hr tablet 90 tablet 0     Sig: Take 1 tablet (50 mg) by mouth daily (with dinner)       Beta-Blockers Protocol Passed - 7/9/2021  2:33 PM        Passed - Blood pressure under 140/90 in past 12 months     BP Readings from Last 3 Encounters:   03/03/21 120/86   11/25/20 138/88   11/12/20 126/86                 Passed - Patient is age 6 or older        Passed - Recent (12 mo) or future (30 days) visit within the authorizing provider's specialty     Patient has had an office visit with the authorizing provider or a provider within the authorizing providers department within the previous 12 mos or has a future within next 30 days. See \"Patient Info\" tab in inbasket, or \"Choose Columns\" in Meds & Orders section of the refill encounter.              Passed - Medication is active on med list           Refill done per RN refill policy.  Ann Lee RN......July 12, 2021...8:08 AM   "

## 2021-08-11 DIAGNOSIS — I10 ESSENTIAL HYPERTENSION, BENIGN: ICD-10-CM

## 2021-08-12 RX ORDER — TRIAMTERENE/HYDROCHLOROTHIAZID 37.5-25 MG
TABLET ORAL
Qty: 90 TABLET | Refills: 3 | Status: SHIPPED | OUTPATIENT
Start: 2021-08-12 | End: 2021-11-12

## 2021-08-12 NOTE — TELEPHONE ENCOUNTER
[facility] sent Rx request for the following:     Requested Prescriptions   Pending Prescriptions Disp Refills     triamterene-HCTZ (MAXZIDE-25) 37.5-25 MG tablet [Pharmacy Med Name: TRIAMTERENE-HCTZ 37.5-25 MG TB] 90 tablet 3       Sig: TAKE 1 TABLET BY MOUTH EVERY DAY IN THE MORNING     Last Prescription Date:   8/24/2020  Last Fill Qty/Refills:         90, R-3    Last Office Visit:             11/12/2020   Future Office visit:           none    Interaction Warning. Routing for PCP review.   Unable to complete prescription refill per RN Medication Refill Policy.................... Dunia Figueroa RN ....................  8/12/2021   10:00 AM

## 2021-08-30 ENCOUNTER — TRANSFERRED RECORDS (OUTPATIENT)
Dept: HEALTH INFORMATION MANAGEMENT | Facility: OTHER | Age: 56
End: 2021-08-30

## 2021-09-08 ENCOUNTER — TELEPHONE (OUTPATIENT)
Dept: CARDIOLOGY | Facility: OTHER | Age: 56
End: 2021-09-08

## 2021-09-08 ENCOUNTER — OFFICE VISIT (OUTPATIENT)
Dept: CARDIOLOGY | Facility: OTHER | Age: 56
End: 2021-09-08
Attending: NURSE PRACTITIONER
Payer: COMMERCIAL

## 2021-09-08 VITALS
RESPIRATION RATE: 16 BRPM | WEIGHT: 293 LBS | SYSTOLIC BLOOD PRESSURE: 112 MMHG | BODY MASS INDEX: 43.42 KG/M2 | TEMPERATURE: 96.9 F | HEART RATE: 64 BPM | OXYGEN SATURATION: 100 % | DIASTOLIC BLOOD PRESSURE: 76 MMHG

## 2021-09-08 DIAGNOSIS — I49.8 VENTRICULAR BIGEMINY: ICD-10-CM

## 2021-09-08 DIAGNOSIS — I49.3 PVC'S (PREMATURE VENTRICULAR CONTRACTIONS): ICD-10-CM

## 2021-09-08 DIAGNOSIS — I34.81 MITRAL VALVE ANNULAR CALCIFICATION: Primary | ICD-10-CM

## 2021-09-08 DIAGNOSIS — E78.5 MILD HYPERLIPIDEMIA: ICD-10-CM

## 2021-09-08 DIAGNOSIS — I47.10 SVT (SUPRAVENTRICULAR TACHYCARDIA) (H): ICD-10-CM

## 2021-09-08 DIAGNOSIS — R00.2 PALPITATIONS: ICD-10-CM

## 2021-09-08 PROCEDURE — 99214 OFFICE O/P EST MOD 30 MIN: CPT | Performed by: NURSE PRACTITIONER

## 2021-09-08 RX ORDER — METOPROLOL SUCCINATE 50 MG/1
50 TABLET, EXTENDED RELEASE ORAL
Qty: 90 TABLET | Refills: 3 | Status: SHIPPED | OUTPATIENT
Start: 2021-09-08 | End: 2022-09-06

## 2021-09-08 RX ORDER — DEXAMETHASONE SODIUM PHOSPHATE 4 MG/ML
INJECTION, SOLUTION INTRAMUSCULAR; INTRAVENOUS
COMMUNITY
Start: 2021-08-12

## 2021-09-08 ASSESSMENT — PAIN SCALES - GENERAL: PAINLEVEL: MILD PAIN (2)

## 2021-09-08 NOTE — PATIENT INSTRUCTIONS
1. Hold Maxzide for 2-4 weeks and let us know if palpitations improved.   2. If increased edema we could look at alternative medication for diuretic.   3. Follow-up in 6 months, certainly sooner if needed.

## 2021-09-08 NOTE — NURSING NOTE
"Chief Complaint   Patient presents with     RECHECK     6 month follow up       Initial /76 (BP Location: Right arm, Patient Position: Sitting, Cuff Size: Adult Large)   Pulse 64   Temp 96.9  F (36.1  C) (Tympanic)   Resp 16   Wt 133.4 kg (294 lb)   LMP  (LMP Unknown)   SpO2 100%   BMI 43.42 kg/m   Estimated body mass index is 43.42 kg/m  as calculated from the following:    Height as of 3/3/21: 1.753 m (5' 9\").    Weight as of this encounter: 133.4 kg (294 lb).  Meds Reconciled: complete  Pt is on Aspirin  Pt is not on a Statin  PHQ and/or GUERITA reviewed. Pt referred to PCP/MH Provider as appropriate.    FOOD SECURITY SCREENING QUESTIONS  Hunger Vital Signs:  Within the past 12 months we worried whether our food would run out before we got money to buy more. Never  Within the past 12 months the food we bought just didn't last and we didn't have money to get more. Never  Ambreen Odonnell LPN 9/8/2021 9:05 AM    "

## 2021-09-08 NOTE — TELEPHONE ENCOUNTER
Patient was only take 1 potassium. Per [facility] if patient was taking 2 patient was to cut down to 1 a day and if patient was taking 1 a day patient was to stop taking potassium all together. Removed potassium from patients medication list. And Patient agreeable to stop taking potassium supplement. Keke Lyles RN  ....................  9/8/2021   10:01 AM

## 2021-09-08 NOTE — PROGRESS NOTES
[facility] HEART CARE   CARDIOLOGY PROGRESS NOTE    Phyllis Washington   [address] [address] [id number]      Ya Victoria     Chief Complaint   Patient presents with     RECHECK     6 month follow up        HPI:   Ms. Washington is a 56 year old female who presents for cardiology follow-up to visit on 3/3/21 with history of palpitations and evidence of PSVT and symptomatic ectopy on recent cardiac monitoring. Patient has a PMH significant for hypertension venous phlebitis, venous thrombosis with negative coagulopathy work-up, cervicalgia, rheumatoid arthritis, vitamin D deficiency, chronic myalgias, allergic rhinitis, reactive airway, GERD and obesity.    Patient reported increased palpitations following a previous dobutamine stress echo. Reported that her palpitations where rare prior to this study. Now reporting nearly daily palpitations.  She describes symptoms of skipped hard beat. Feeling jittery in chest. No lightheadedness or syncope. No dyspnea or increased exertional dyspnea. She does report feeling increaed fatigue when she has more frequent palpitations.she denies experiencing any chest pain or pressure.  Currently no pain in the arm, jaw, neck or back.      In June 2020 patient was experiencing a new arm pain in right arm which prompted stress test.  She underwent a dobutamine stress echocardiogram on 7/20/2020.  This was a normal dobutamine stress test without evidence of inducible ischemia.  No regional wall motion abnormalities at rest.  With stress, the LVEF increased from 55 to 60% up to greater than 65% and the LV size decreased appropriately.  No subjective symptoms of ischemia.  Resting EKG was normal, with initial infusion, there was frequent PVCs including bigeminy and at higher dobutamine doses, the PVCs resolved.  Minimal nonspecific ST and T wave changes were seen which were nondiagnostic.  Postinfusion, she had occasional PVCs and ST segments returned to normal  baseline.     Given her increased palpitations patient completed ZIO (9/23/2020 through 10/7/2020) revealing predominant sinus rhythm with an average heart rate of 73 bpm, minimum heart rate 43 bpm maximum heart rate of 176 bpm.  There were 17 episodes of supraventricular tachycardia with the longest lasting 17.6 seconds.  No ventricular tachycardia.  No atrial fibrillation or atrial tachycardia.  No pauses or high-grade AV block.  Occasional isolated ventricular ectopy at a 1.6 burden, rare ventricular couplets and triplets.  Rare supraventricular ectopy, less than 1% of all beats.  Ventricular bigeminy and ventricular trigeminy was present.  Her longest episode of ventricular bigeminy lasted 22.3 seconds, longest ventricular trigeminy episode lasting 1 minute and 17 seconds.  She had 66 patient triggered events and 50 diary entries.  She was mainly symptomatic with ventricular ectopy, ventricular trigeminy and ventricular bigeminy.  She was also symptomatic with SVE and symptomatic with 1 episode of SVT.    Occasional ventricular ectopy with no significant ectopy burden.  She has been significantly symptomatic with these ectopic beats and therefore, she was started on diltiazem 120 mg daily.  She did describe benefit with starting this medication, reduction in the palpitations.  She had been feeling palpitations more in the evening after dinner, although has been less than in the past prior to starting diltiazem for suppression.     At recent visit, she reported increased palpitations again.  Her diltiazem was uptitrated to 180 mg daily and since the last visit uptitrated again to 240 mg daily.  She denied much benefit with further up titration of her diltiazem.  Symptoms were reported as improved for the first couple weeks after up titration and then return back to normal.  Described daily palpitations. No increased dyspnea.  No lightheadedness, dizziness or syncope.  She does not report any recurrence of chest  discomfort.  No edema. At her last visit it was recommended adjusting to beta blocker as first line for VE suppression, she will stop diltiazem and begin Toprol-XL 50 mg in the evening.    Patient has noted benefit since starting the Toprol XL vs the Diltiazem. Some breakthrough at times. Stopped the Rinvoq and not noticing any improvement in symptoms.  She is hopeful that her cholesterol levels will have improved after being off this Rinvoq.  She will return fasting for lipids at her next visit.  Her Plaquenil was increased to 400 mg daily when she discontinued the Rinvoq. Not much change since we adjusted the Toprol XL to 75 mg. Therefore, reduced back to the 50 mg tabs. Noticing symptoms of palpitations more with resting and when going to bed at night. No new concerns today, she does not endorse any anginal symptoms.    IMAGING:     [facility],[place]  Echocardiography Laboratory  [address] [place] [address]     Name: HUMBERTO MCBRIDE  MRN: [id number]  : 1965  Study Date: 2020 01:42 PM  Age: 55 yrs  Gender: Female  Patient Location: [facility]  Reason For Study: SVT (supraventricular tachycardia) (H), PVC's (premature  ventric  Ordering Physician: SANNA OWUSU  Referring Physician: SANNA OWUSU  Performed By: Deborah Riojas RDCS, GM     BSA: 2.4 m2  Height: 70 in  Weight: 287 lb  HR: 79  BP: 138/82 mmHg  _____________________________________________________________________________  __        Procedure  Echocardiogram with two-dimensional, color and spectral Doppler performed.  _____________________________________________________________________________  __        Interpretation Summary  Left ventricular function, chamber size, wall motion, and wall thickness are  normal.The EF is 55-60%.  Right ventricular function, chamber size, wall motion, and thickness  are  normal.  _____________________________________________________________________________  __        Left Ventricle  Left ventricular function, chamber size, wall motion, and wall thickness are  normal.The EF is 55-60%. Left ventricular diastolic function is normal.     Right Ventricle  Right ventricular function, chamber size, wall motion, and thickness are  normal.     Atria  Both atria appear normal.     Mitral Valve  Mild mitral annular calcification is present. Trace mitral insufficiency is  present.        Aortic Valve  Aortic valve is normal in structure and function.     Tricuspid Valve  The tricuspid valve is normal. Trace tricuspid insufficiency is present. The  right ventricular systolic pressure is approximated at 19.0 mmHg plus the  right atrial pressure.     Pulmonic Valve  The pulmonic valve is normal.     Vessels  The thoracic aorta is normal. IVC diameter <2.1 cm collapsing >50% with sniff  suggests a normal RA pressure of 3 mmHg.     Pericardium  No pericardial effusion is present.        Compared to Previous Study  There is no prior study for direct comparison.  _____________________________________________________________________________  __  MMode/2D Measurements & Calculations  IVSd: 0.96 cm     LVIDd: 4.7 cm  LVIDs: 3.0 cm  LVPWd: 0.79 cm  FS: 35.5 %  LV mass(C)d: 138.2 grams  LV mass(C)dI: 56.8 grams/m2  Ao root diam: 3.7 cm  asc Aorta Diam: 3.3 cm  LVOT diam: 2.3 cm  LVOT area: 4.2 cm2  LA Volume (BP): 67.7 ml  LA Volume Index (BP): 27.9 ml/m2  RWT: 0.34           Doppler Measurements & Calculations  MV E max boom: 68.6 cm/sec  MV A max boom: 93.6 cm/sec  MV E/A: 0.73  MV dec slope: 262.0 cm/sec2  MV dec time: 0.26 sec  Ao V2 max: 114.0 cm/sec  Ao max P.0 mmHg  PRUDENCIO(V,D): 3.2 cm2  LV V1 max PG: 3.1 mmHg  LV V1 max: 87.7 cm/sec  LV V1 VTI: 20.2 cm  SV(LVOT): 83.9 ml  SI(LVOT): 34.5 ml/m2  TR max boom: 217.5 cm/sec  TR max P.0 mmHg  AV Boom Ratio (DI): 0.77  E/E' av.7  Lateral E/e':  6.4  [facility] E/e': 9.0        _____________________________________________________________________________  __        Report approved by: Mickey Eugene 11/12/2020 02:39 PM  PAST MEDICAL HISTORY:   Past Medical History:   Diagnosis Date     Personal history of other medical treatment (CODE)     3 normal childbirths     Rheumatoid arthritis (H)           FAMILY HISTORY:   Family History   Problem Relation Age of Onset     Hypertension Father         Hypertension,67     Other - See Comments Maternal Grandmother         Stroke,85     Other - See Comments Paternal Grandmother         Alzheimer's 73     Breast Cancer Mother 46        Cancer-breast,now 68     Cerebrovascular Disease Mother         stroke with hemiparesis 2017     Diabetes Brother 16        Diabetes,now 41          PAST SURGICAL HISTORY:   Past Surgical History:   Procedure Laterality Date     COLONOSCOPY  2017    Dr. Fofana - 2 polyps found.  Told to f/u in 5 years.          SOCIAL HISTORY:   Social History     Socioeconomic History     Marital status:      Spouse name: Not on file     Number of children: Not on file     Years of education: Not on file     Highest education level: Not on file   Occupational History     Not on file   Social Needs     Financial resource strain: Not on file     Food insecurity     Worry: Not on file     Inability: Not on file     Transportation needs     Medical: Not on file     Non-medical: Not on file   Tobacco Use     Smoking status: Never Smoker     Smokeless tobacco: Never Used   Substance and Sexual Activity     Alcohol use: No     Frequency: Never     Comment: Rarely     Drug use: No     Sexual activity: Yes     Partners: Male     Birth control/protection: Rhythm   Lifestyle     Physical activity     Days per week: Not on file     Minutes per session: Not on file     Stress: Not on file   Relationships     Social connections     Talks on phone: Not on file     Gets together: Not on file     Attends  "Jain service: Not on file     Active member of club or organization: Not on file     Attends meetings of clubs or organizations: Not on file     Relationship status: Not on file     Intimate partner violence     Fear of current or ex partner: Not on file     Emotionally abused: Not on file     Physically abused: Not on file     Forced sexual activity: Not on file   Other Topics Concern     Parent/sibling w/ CABG, MI or angioplasty before 65F 55M? Not Asked   Social History Narrative    , 3 adult children;     Homemaker; hobbies - reading, crafts.     Works at frame shop part-time.     Walks 3 miles a day     - Demetrio              CURRENT MEDICATIONS:   Prior to Admission medications    Medication Sig Start Date End Date Taking? Authorizing Provider   BD SAFETYGLIDE SYRINGE/NEEDLE 27G X 5/8\" 1 ML MISC 1 EACH BY DOES NOT APPLY ROUTE ONE TIME A WEEK. USE TO DRAW UP METHOTREXATE 25 MG ONCE WEEKLY. 5/28/20   Reported, Patient   famotidine (PEPCID) 20 MG tablet Take 1 tablet (20 mg) by mouth 2 times daily 11/12/19   Ya Victoria MD   folic acid (FOLVITE) 1 MG tablet Take 1 mg by mouth daily 10/30/13   Reported, Patient   hydroxychloroquine (PLAQUENIL) 200 MG tablet  10/30/13   Reported, Patient   methotrexate 2.5 MG tablet CHEMO Take 20 mg by mouth once a week 10/30/13   Reported, Patient   order for DME Equipment being ordered: Jobst Stockings   Thigh High: 20-30mm/hg    Leg measures:  Ankle: 10in  Calf: 17.5in  Upper Thigh: 33in 8/20/19   Brenda Rivas, APRN CNP   other medical supplies Jobst stockings.  Thigh high.  16-20 mmHg.  Diagnosis:  I80.9.  Length of need:  99. 8/19/19   Ya Victoria MD   predniSONE (DELTASONE) 5 MG tablet Take 5 mg by mouth daily. 11/2/18   Reported, Patient   triamterene-HCTZ (MAXZIDE-25) 37.5-25 MG tablet TAKE 1 TABLET BY MOUTH EVERY DAY IN THE MORNING 8/24/20   Ya Victoria MD   Upadacitinib ER (RINVOQ) 15 MG TB24 Take 15 mg by mouth " 4/20/20   Reported, Patient          ALLERGIES:   No Known Allergies       ROS:   CONSTITUTIONAL: No reported fever or chills. No changes in weight.  ENT: No visual disturbance, ear ache, epistaxis or sore throat.   CARDIOVASCULAR: No chest pain, chest pressure or chest discomfort.  Occasional palpitations, improved on Toprol-XL.  No increased edema, history of venous insufficiency.    RESPIRATORY: Mild JI dyspnea without progression. No cough, wheezing or hemoptysis.  No orthopnea or PND.  GI: No reported abdominal pain, melena or hematochezia.  : No reported hematuria or dysuria.   NEUROLOGICAL: No lightheadedness, dizziness, syncope, ataxia, paresthesias or weakness.   HEMATOLOGIC: No history of anemia. No bleeding or excessive bruising.  Positive for history of DVT.  MUSCULOSKELETAL: Positive for chronic joint pain with arthritis and chronic myalgias.  ENDOCRINOLOGIC: No temperature intolerance. No hair or skin changes.  SKIN: No abnormal rashes or sores, no unusual itching.  PSYCHIATRIC: No history of depression or anxiety. No changes in mood, feeling down or anxious.      PHYSICAL EXAM:   /76 (BP Location: Right arm, Patient Position: Sitting, Cuff Size: Adult Large)   Pulse 64   Temp 96.9  F (36.1  C) (Tympanic)   Resp 16   Wt 133.4 kg (294 lb)   LMP  (LMP Unknown)   SpO2 100%   BMI 43.42 kg/m    GENERAL: The patient is a well-developed, well-nourished, in no apparent distress.  HEENT: Head is normocephalic and atraumatic. Eyes are symmetrical with normal visual tracking. No icterus, no xanthelasmas. Nares appeared normal without nasal drainage. Mucous membranes are moist, no cyanosis.  NECK: Supple.  No carotid bruits, no JVD.  CHEST/ LUNGS: Lungs clear to auscultation, no rales, rhonchi or wheezes, no use of accessory muscles, no retractions, respirations unlabored and normal respiratory rate.   CARDIO: Regular rate and rhythm normal with S1 and S2, no S3 or S4 and no murmur, click or  rub.  ABD: Abdomen is nondistended.   EXTREMITIES: Trace LE edema present.   MUSCULOSKELETAL: No visible joint swelling.   NEUROLOGIC: Alert and oriented X3. Normal speech, gait and affect. No focal neurologic deficits.   SKIN: No jaundice. No rashes or visible skin lesions present. No ecchymosis.     LAB RESULTS:   Office Visit on 09/21/2020   Component Date Value Ref Range Status     Interpretation ECG 09/21/2020 Click View Image link to view waveform and result   Final          ASSESSMENT:   Phyllis Washington presents for cardiology follow-up to visit on 3/3/21 with history of palpitations and evidence of PSVT and symptomatic ectopy on recent cardiac monitoring. Patient has a PMH significant for hypertension venous phlebitis, venous thrombosis with negative coagulopathy work-up, cervicalgia, rheumatoid arthritis, vitamin D deficiency, chronic myalgias, allergic rhinitis, reactive airway, GERD and obesity.  Patient has noted benefit since starting the Toprol XL vs the Diltiazem. Some breakthrough at times. Stopped the Rinvoq and not noticing any improvement in symptoms.  She is hopeful that her cholesterol levels will have improved after being off this Rinvoq.  She will return fasting for lipids at her next visit.  Her Plaquenil was increased to 400 mg daily when she discontinued the Rinvoq. Not much change since we adjusted the Toprol XL to 75 mg. Therefore, reduced back to the 50 mg tabs. Noticing symptoms of palpitations more with resting and when going to bed at night. No new concerns today, she does not endorse any anginal symptoms.    1. Palpitations  2. PVC's (premature ventricular contractions)  3. SVT (supraventricular tachycardia) (H)  4. Ventricular bigeminy  5. Mitral valve annular calcification- mild with mild MI  6. Mild hyperlipidemia      PLAN:   1. Patient with mild improvement in palpitations on Toprol-XL 50 mg daily.  In review of her recent ZIO monitor, she is symptomatic with ventricular ectopy  seen as isolated beats, couplets and triplets.  She was also symptomatic with ventricular bigeminy and ventricular trigeminy.  She had 17 nonsustained runs of supraventricular tachycardia, she was only symptomatic with 1-2 of these episodes.  2.  Previously reviewed dobutamine stress echo with no evidence of ischemia or infarct on imaging. Non diagnostic, minimal nonspecific ST and T wave changes. Ventricular ectopy with early infusion and reduced at peak infusion.   3. Previously reviewed benign nature of her ectopies that she is feeling and SVT given episodes non sustained and no evidence of ischemia with ventricular ectopies. No anginal symptoms.  4. Recent lab evaluation with stable thyroid function and no anemia. She is chronically hypokalemic secondary to her thiazide diuretic.    5. She is willing to try holding Maxzide and see if palpitations improve, recommended 2-4 week drug holiday. If palpitations improved and increased edema, consider starting low dose loop diuretic.  6. If continued significant symptoms despite use of AV ray blocking agents, may consider flecainide AAT for suppression of ectopy.  However, her ectopy is low burden.  This would be primarily to treat symptoms.  7. She has avoided caffeine and no stimulants, no ETOH.  Denies any symptoms of sleep apnea which may contribute.  She does understand that her Plaquenil and methotrexate may be contributing to her level of palpitations and ectopy.  8.  Follow-up with cardiology in 6 months, certainly sooner if needed.    Thank you for allowing me to participate in the care of your patient. Please do not hesitate to contact me if you have any questions.       Chelly Ruffin, APRN CNP [facility]

## 2021-09-14 ENCOUNTER — IMMUNIZATION (OUTPATIENT)
Dept: FAMILY MEDICINE | Facility: OTHER | Age: 56
End: 2021-09-14
Attending: FAMILY MEDICINE
Payer: COMMERCIAL

## 2021-09-14 PROCEDURE — 91300 PR COVID VAC PFIZER DIL RECON 30 MCG/0.3 ML IM: CPT

## 2021-09-14 PROCEDURE — 0003A PR COVID VAC PFIZER DIL RECON 30 MCG/0.3 ML IM: CPT

## 2021-11-12 ENCOUNTER — OFFICE VISIT (OUTPATIENT)
Dept: FAMILY MEDICINE | Facility: OTHER | Age: 56
End: 2021-11-12
Attending: FAMILY MEDICINE
Payer: COMMERCIAL

## 2021-11-12 ENCOUNTER — HOSPITAL ENCOUNTER (OUTPATIENT)
Dept: CARDIOLOGY | Facility: OTHER | Age: 56
End: 2021-11-12
Attending: NURSE PRACTITIONER
Payer: COMMERCIAL

## 2021-11-12 VITALS
OXYGEN SATURATION: 98 % | BODY MASS INDEX: 41.95 KG/M2 | WEIGHT: 293 LBS | RESPIRATION RATE: 16 BRPM | SYSTOLIC BLOOD PRESSURE: 130 MMHG | DIASTOLIC BLOOD PRESSURE: 82 MMHG | HEART RATE: 70 BPM | TEMPERATURE: 96.3 F | HEIGHT: 70 IN

## 2021-11-12 DIAGNOSIS — Z12.31 VISIT FOR SCREENING MAMMOGRAM: ICD-10-CM

## 2021-11-12 DIAGNOSIS — I34.81 MITRAL VALVE ANNULAR CALCIFICATION: ICD-10-CM

## 2021-11-12 DIAGNOSIS — I10 ESSENTIAL HYPERTENSION, BENIGN: ICD-10-CM

## 2021-11-12 DIAGNOSIS — Z13.1 SCREENING FOR DIABETES MELLITUS: ICD-10-CM

## 2021-11-12 DIAGNOSIS — I47.10 SVT (SUPRAVENTRICULAR TACHYCARDIA) (H): ICD-10-CM

## 2021-11-12 DIAGNOSIS — I49.8 VENTRICULAR BIGEMINY: ICD-10-CM

## 2021-11-12 DIAGNOSIS — E66.01 MORBID OBESITY (H): ICD-10-CM

## 2021-11-12 DIAGNOSIS — I49.3 PVC'S (PREMATURE VENTRICULAR CONTRACTIONS): ICD-10-CM

## 2021-11-12 DIAGNOSIS — Z00.00 ROUTINE GENERAL MEDICAL EXAMINATION AT A HEALTH CARE FACILITY: Primary | ICD-10-CM

## 2021-11-12 DIAGNOSIS — M25.522 LEFT ELBOW PAIN: ICD-10-CM

## 2021-11-12 DIAGNOSIS — R00.2 PALPITATIONS: ICD-10-CM

## 2021-11-12 DIAGNOSIS — R20.2 PARESTHESIA OF RIGHT FOOT: ICD-10-CM

## 2021-11-12 DIAGNOSIS — Z23 NEEDS FLU SHOT: ICD-10-CM

## 2021-11-12 LAB
ALBUMIN SERPL-MCNC: 3.9 G/DL (ref 3.5–5.7)
ALP SERPL-CCNC: 62 U/L (ref 34–104)
ALT SERPL W P-5'-P-CCNC: 16 U/L (ref 7–52)
ANION GAP SERPL CALCULATED.3IONS-SCNC: 7 MMOL/L (ref 3–14)
AST SERPL W P-5'-P-CCNC: 17 U/L (ref 13–39)
BILIRUB SERPL-MCNC: 0.4 MG/DL (ref 0.3–1)
BUN SERPL-MCNC: 26 MG/DL (ref 7–25)
CALCIUM SERPL-MCNC: 9.6 MG/DL (ref 8.6–10.3)
CHLORIDE BLD-SCNC: 103 MMOL/L (ref 98–107)
CO2 SERPL-SCNC: 29 MMOL/L (ref 21–31)
CREAT SERPL-MCNC: 0.94 MG/DL (ref 0.6–1.2)
GFR SERPL CREATININE-BSD FRML MDRD: 68 ML/MIN/1.73M2
GLUCOSE BLD-MCNC: 88 MG/DL (ref 70–105)
LVEF ECHO: NORMAL
POTASSIUM BLD-SCNC: 4.4 MMOL/L (ref 3.5–5.1)
PROT SERPL-MCNC: 6.8 G/DL (ref 6.4–8.9)
SODIUM SERPL-SCNC: 139 MMOL/L (ref 134–144)
TSH SERPL DL<=0.005 MIU/L-ACNC: 2.23 MU/L (ref 0.4–4)
VIT B12 SERPL-MCNC: 504 PG/ML (ref 180–914)

## 2021-11-12 PROCEDURE — 84443 ASSAY THYROID STIM HORMONE: CPT | Mod: ZL | Performed by: FAMILY MEDICINE

## 2021-11-12 PROCEDURE — 99396 PREV VISIT EST AGE 40-64: CPT | Mod: 25 | Performed by: FAMILY MEDICINE

## 2021-11-12 PROCEDURE — 36415 COLL VENOUS BLD VENIPUNCTURE: CPT | Mod: ZL | Performed by: FAMILY MEDICINE

## 2021-11-12 PROCEDURE — 90682 RIV4 VACC RECOMBINANT DNA IM: CPT | Performed by: FAMILY MEDICINE

## 2021-11-12 PROCEDURE — 82040 ASSAY OF SERUM ALBUMIN: CPT | Mod: ZL | Performed by: FAMILY MEDICINE

## 2021-11-12 PROCEDURE — 93306 TTE W/DOPPLER COMPLETE: CPT | Mod: 26 | Performed by: INTERNAL MEDICINE

## 2021-11-12 PROCEDURE — 82607 VITAMIN B-12: CPT | Mod: ZL | Performed by: FAMILY MEDICINE

## 2021-11-12 PROCEDURE — 90471 IMMUNIZATION ADMIN: CPT | Performed by: FAMILY MEDICINE

## 2021-11-12 PROCEDURE — 93306 TTE W/DOPPLER COMPLETE: CPT

## 2021-11-12 ASSESSMENT — PAIN SCALES - GENERAL: PAINLEVEL: MILD PAIN (2)

## 2021-11-12 ASSESSMENT — MIFFLIN-ST. JEOR: SCORE: 2009.71

## 2021-11-12 NOTE — PATIENT INSTRUCTIONS
[email or web address]  [email or web address]      Preventive Health Recommendations  Female Ages 50 - 64    Yearly exam: See your health care provider every year in order to  o Review health changes.   o Discuss preventive care.    o Review your medicines if your doctor has prescribed any.      Get a Pap test every three years (unless you have an abnormal result and your provider advises testing more often).    If you get Pap tests with HPV test, you only need to test every 5 years, unless you have an abnormal result.     You do not need a Pap test if your uterus was removed (hysterectomy) and you have not had cancer.    You should be tested each year for STDs (sexually transmitted diseases) if you're at risk.     Have a mammogram every 1 to 2 years.    Have a colonoscopy at age 50, or have a yearly FIT test (stool test). These exams screen for colon cancer.      Have a cholesterol test every 5 years, or more often if advised.    Have a diabetes test (fasting glucose) every three years. If you are at risk for diabetes, you should have this test more often.     If you are at risk for osteoporosis (brittle bone disease), think about having a bone density scan (DEXA).    Shots: Get a flu shot each year. Get a tetanus shot every 10 years.    Nutrition:     Eat at least 5 servings of fruits and vegetables each day.    Eat whole-grain bread, whole-wheat pasta and brown rice instead of white grains and rice.    Get adequate Calcium and Vitamin D.     Lifestyle    Exercise at least 150 minutes a week (30 minutes a day, 5 days a week). This will help you control your weight and prevent disease.    Limit alcohol to one drink per day.    No smoking.     Wear sunscreen to prevent skin cancer.     See your dentist every six months for an exam and cleaning.    See your eye doctor every 1 to 2 years.

## 2021-11-12 NOTE — PROGRESS NOTES
SUBJECTIVE:   CC: Phyllis VILLASENOR Neururer is an 56 year old woman who presents for preventive health visit.     Patient has been advised of split billing requirements and indicates understanding: Yes  Healthy Habits:    Getting at least 3 servings of Calcium per day:  Yes    Bi-annual eye exam:  Yes    Dental care twice a year:  Yes    Sleep apnea or symptoms of sleep apnea:  None    Diet:  Regular (no restrictions)    Frequency of exercise:  4-5 days/week    Duration of exercise:  30-45 minutes    Taking medications regularly:  No    Barriers to taking medications:  None    Medication side effects:  None    PHQ-2 Total Score:    Additional concerns today:  Yes    Has neuropathy symptoms in her right foot.  Initially it was involving just her 5th toe.  Since February, has progressed.  Now has progressed to the entire bottom of her foot.  Doesn't seem like it has progressed up her ankle consistently.  Some days she notices it in her ankle, some days she does not.    Her cholesterol was up a bit last year.  She had been on Rinvoq at the time, which may have been part of the reason for that.  She is only on methotrexate and plaquenil for the time being.  She was having a lot more aching and the Rinvoq was not working well.    Has had a pain in her left elbow for a few months.  Feels like a pulled muscle.  There was nothing she did that she can think of that would have pulled any muscles that she thinks would have caused it.  Hard to hold her grandkids because of it.  Feels very stiff in the morning.  Hard to sleep at night.  This pain started when she was still on the Rinvoq.      Today's PHQ-2 Score:   PHQ-2 ( 1999 Pf[employer]) 11/12/2021   Q1: Little interest or pleasure in doing things 0   Q2: Feeling down, depressed or hopeless 0   PHQ-2 Score 0       Abuse: Current or Past (Physical, Sexual or Emotional) - No  Do you feel safe in your environment? Yes    Have you ever done Advance Care Planning? (For example, a Health  Directive, POLST, or a discussion with a medical provider or your loved ones about your wishes): No, advance care planning information given to patient to review.  Patient plans to discuss their wishes with loved ones or provider.      Social History     Tobacco Use     Smoking status: Never Smoker     Smokeless tobacco: Never Used   Substance Use Topics     Alcohol use: No     Comment: Rarely     If you drink alcohol do you typically have >3 drinks per day or >7 drinks per week? No    No flowsheet data found.    Reviewed orders with patient.  Reviewed health maintenance and updated orders accordingly - Yes  BP Readings from Last 3 Encounters:   11/12/21 130/82   09/08/21 112/76   03/03/21 120/86    Wt Readings from Last 3 Encounters:   11/12/21 134.4 kg (296 lb 6.4 oz)   09/08/21 133.4 kg (294 lb)   03/03/21 131.5 kg (290 lb)                  Patient Active Problem List   Diagnosis     Allergic rhinitis     Asthma     Esophageal reflux     Essential hypertension, benign     Obesity     Enthesopathy of hip region     Cervical muscle pain     Colon polyp     Disturbance in sleep behavior     Family history of diabetes mellitus type II     High risk medication use     Myalgia     Rheumatoid arthritis involving multiple sites with positive rheumatoid factor (H)     Thrombosis of saphenous vein, left     Vitamin D deficiency     Health care maintenance     Mitral valve annular calcification- mild with mild MI     PVC's (premature ventricular contractions)     SVT (supraventricular tachycardia) (H)     Ventricular bigeminy     Past Surgical History:   Procedure Laterality Date     COLONOSCOPY  2017    Dr. Fofana - 2 polyps found.  Told to f/u in 5 years.       Social History     Tobacco Use     Smoking status: Never Smoker     Smokeless tobacco: Never Used   Substance Use Topics     Alcohol use: No     Comment: Rarely     Family History   Problem Relation Age of Onset     Hypertension Father         Hypertension,67      "Other - See Comments Maternal Grandmother         Stroke,85     Other - See Comments Paternal Grandmother         Alzheimer's 73     Breast Cancer Mother 46        Cancer-breast,now 68     Cerebrovascular Disease Mother         stroke with hemiparesis 2017     Diabetes Brother 16        Diabetes,now 41         Current Outpatient Medications   Medication Sig Dispense Refill     aspirin (ASA) 325 MG EC tablet Take 325 mg by mouth daily       B-D INSULIN SYRINGE MICROFINE 27G X 5/8\" 1 ML MISC 1 EACH BY DOES NOT APPLY ROUTE ONE TIME A WEEK. USE TO DRAW UP METHOTREXATE 25 MG ONCE WEEKLY.       BD SAFETYGLIDE SYRINGE/NEEDLE 27G X 5/8\" 1 ML MISC 1 EACH BY DOES NOT APPLY ROUTE ONE TIME A WEEK. USE TO DRAW UP METHOTREXATE 25 MG ONCE WEEKLY.       cyanocobalamin (VITAMIN B-12) 1000 MCG tablet Take 1,000 mcg by mouth daily       famotidine (PEPCID) 20 MG tablet Take 1 tablet (20 mg) by mouth 2 times daily 180 tablet 3     folic acid (FOLVITE) 1 MG tablet Take 1 mg by mouth daily       hydroxychloroquine (PLAQUENIL) 200 MG tablet Take 400 mg by mouth daily       methotrexate 50 MG/2ML injection Inject 1 mL Subcutaneous once a week       metoprolol succinate ER (TOPROL-XL) 50 MG 24 hr tablet Take 1 tablet (50 mg) by mouth daily (with dinner) 90 tablet 3     predniSONE (DELTASONE) 5 MG tablet Take 5 mg by mouth daily.  0     thiamine (B-1) 100 MG tablet Take 100 mg by mouth daily (Patient not taking: Reported on 11/12/2021)       No Known Allergies    Breast Cancer Screening:  Any new diagnosis of family breast, ovarian, or bowel cancer? No    FHS-7: No flowsheet data found.  click delete button to remove this line now    Pertinent mammograms are reviewed under the imaging tab.    History of abnormal Pap smear: NO - age 30-65 PAP every 5 years with negative HPV co-testing recommended     Reviewed and updated as needed this visit by clinical staff  Tobacco  Allergies  Meds             Reviewed and updated as needed this visit " "by Provider               Past Medical History:   Diagnosis Date     Personal history of other medical treatment (CODE)     3 normal childbirths     Rheumatoid arthritis (H)       Past Surgical History:   Procedure Laterality Date     COLONOSCOPY  2017    Dr. Fofana - 2 polyps found.  Told to f/u in 5 years.       Review of Systems  CONSTITUTIONAL: NEGATIVE for fever, chills, change in weight  INTEGUMENTARY/SKIN: NEGATIVE for worrisome rashes, moles or lesions  EYES: NEGATIVE for vision changes or irritation  ENT: NEGATIVE for ear, mouth and throat problems  RESP: NEGATIVE for significant cough or SOB  BREAST: NEGATIVE for masses, tenderness or discharge  CV: NEGATIVE for chest pain, palpitations or peripheral edema  GI: NEGATIVE for nausea, abdominal pain, heartburn, or change in bowel habits  : NEGATIVE for unusual urinary or vaginal symptoms. No vaginal bleeding.  MUSCULOSKELETAL: NEGATIVE for significant arthralgias or myalgia  NEURO: NEGATIVE for weakness, dizziness or paresthesias  PSYCHIATRIC: NEGATIVE for changes in mood or affect      OBJECTIVE:   /82 (BP Location: Right arm, Patient Position: Sitting, Cuff Size: Adult Regular)   Pulse 70   Temp (!) 96.3  F (35.7  C) (Tympanic)   Resp 16   Ht 1.778 m (5' 10\")   Wt 134.4 kg (296 lb 6.4 oz)   LMP  (LMP Unknown)   SpO2 98%   Breastfeeding No   BMI 42.53 kg/m    Physical Exam  GENERAL APPEARANCE: healthy, alert and no distress  EYES: Eyes grossly normal to inspection, PERRL and conjunctivae and sclerae normal  HENT: ear canals and TM's normal, nose and mouth without ulcers or lesions, oropharynx clear and oral mucous membranes moist  NECK: no adenopathy, no asymmetry, masses, or scars and thyroid normal to palpation  RESP: lungs clear to auscultation - no rales, rhonchi or wheezes  BREAST: normal without masses, tenderness or nipple discharge and no palpable axillary masses or adenopathy  CV: regular rate and rhythm, normal S1 S2, no S3 or S4, " no murmur, click or rub, no peripheral edema and peripheral pulses strong  ABDOMEN: soft, nontender, no hepatosplenomegaly, no masses and bowel sounds normal  MS: no musculoskeletal defects are noted and gait is age appropriate without ataxia.  Mild tenderness over medial epicondyle of her left elbow and across her antecubital region.  Also has mild tenderness to palpation of her left biceps insertion at her shoulder.  SKIN: no suspicious lesions or rashes  NEURO: Normal strength and tone, mentation intact and speech normal.  Decreased sensation to monofilament exam in her right foot toes and metatarsal heads.  PSYCH: mentation appears normal and affect normal/bright    Diagnostic Test Results:  Labs reviewed in Epic    ASSESSMENT/PLAN:       ICD-10-CM    1. Routine general medical examination at a health care facility  Z00.00    2. Visit for screening mammogram  Z12.31 MA Screen Bilateral w/Nathan   3. Needs flu shot  Z23 INFLUENZA QUAD, RECOMBINANT, P-FREE (RIV4) (FLUBLOK)   4. Screening for diabetes mellitus  Z13.1    5. Essential hypertension, benign  I10    6. Peripheral polyneuropathy  G62.9 Vitamin B12     Comprehensive metabolic panel     TSH Reflex GH     Vitamin B12     Comprehensive metabolic panel     TSH Reflex GH   7. Left elbow pain  M25.522 Occupational Therapy Referral       1.  Mammogram is ordered.  Colonoscopy is up-to-date for 1 more year.  Pap last completed in 2017 was normal.  We will plan to update this next year.  DEXA last completed 4/13/2015.  Tdap is up-to-date, last completed 10/1/2015.  Shingrix x2 is up-to-date.  Covid x3 is up-to-date.  She has received both the Prevnar and Pneumovax.    2.  See #1.  3.  Flu shot updated today.  4.  Comprehensive metabolic profile completed today as above.  5.  Stable.  Labs as above.  Continue on current meds.  6.  B12, comprehensive metabolic profile and TSH completed today as above.  Also ordered EMG.  7.  Referred to occupational therapy.  If not  "making progress, consider MRI of elbow and orthopedic referral.    Patient has been advised of split billing requirements and indicates understanding: Yes  COUNSELING:  Reviewed preventive health counseling, as reflected in patient instructions       Regular exercise       Healthy diet/nutrition       Osteoporosis prevention/bone health       Consider Hep C screening for all patients one time for ages 18-79 years    Estimated body mass index is 42.53 kg/m  as calculated from the following:    Height as of this encounter: 1.778 m (5' 10\").    Weight as of this encounter: 134.4 kg (296 lb 6.4 oz).    Weight management plan: Discussed healthy diet and exercise guidelines    She reports that she has never smoked. She has never used smokeless tobacco.      Counseling Resources:  ATP IV Guidelines  Pooled Cohorts Equation Calculator  Breast Cancer Risk Calculator  BRCA-Related Cancer Risk Assessment: FHS-7 Tool  FRAX Risk Assessment  ICSI Preventive Guidelines  Dietary Guidelines for Americans, 2010  USDA's MyPlate  ASA Prophylaxis  Lung CA Screening    Ya Victoria MD  [facility]  "

## 2021-11-12 NOTE — NURSING NOTE
Chief Complaint   Patient presents with     Physical     Here today for px. Is fasting. Would like like to talk about her worsening neuropathy pain. Also complains of L arm weakness and pain for a few months.     Medication Reconciliation: complete    Allyssa Kay LPN

## 2021-11-13 PROBLEM — E66.01 MORBID OBESITY (H): Status: ACTIVE | Noted: 2021-11-13

## 2021-11-15 ENCOUNTER — HOSPITAL ENCOUNTER (OUTPATIENT)
Dept: OCCUPATIONAL THERAPY | Facility: OTHER | Age: 56
Setting detail: THERAPIES SERIES
End: 2021-11-15
Attending: FAMILY MEDICINE
Payer: COMMERCIAL

## 2021-11-15 DIAGNOSIS — M25.522 LEFT ELBOW PAIN: ICD-10-CM

## 2021-11-15 PROCEDURE — 97165 OT EVAL LOW COMPLEX 30 MIN: CPT | Mod: GO

## 2021-11-15 PROCEDURE — 97110 THERAPEUTIC EXERCISES: CPT | Mod: GO

## 2021-11-15 PROCEDURE — 97140 MANUAL THERAPY 1/> REGIONS: CPT | Mod: GO

## 2021-11-15 PROCEDURE — 97010 HOT OR COLD PACKS THERAPY: CPT | Mod: GO

## 2021-11-16 NOTE — PROGRESS NOTES
11/15/21 0900   Quick Adds   Type of Visit Initial Outpatient Occupational Therapy Evaluation   General Information   Start Of Care Date 11/15/21   Referring Physician Dr. Victoria   Orders Evaluate and treat as indicated   Orders Date 11/12/21   Medical Diagnosis Left elbow pain M25.522    Onset of Illness/Injury or Date of Surgery   (About 10 months ago)   Surgical/Medical History Reviewed Yes   Additional Occupational Profile Info/Pertinent History of Current Problem Patient started noticing left elbow pain in the elbow crease about 10 months ago.  She cannot think of anything that started the pain.  She states certain movements and carrying aggrivate her pain, but it never really goes away.  She thinks it has gotten a little worse over time.    Comments/Observations Patient presents with deep elbow pain at left elbow crease.  No visible edema.  Patient does have RA, however she reports the pain in her elbow is different than a normal RA joint pain that she experiences.     Role/Living Environment   Patient role/Employment history Retired   Prior Level Comments Independent without pain   Role/Living Environment Comments Remains independent but now has pain when completing: housework, carrying grandkids, meal preparations   Patient/family Goals Statement To become pain free.    Pain   Patient currently in pain Yes   Pain location L elbow crease   Pain rating 1-2/10   Pain description Discomfort   Pain comments Worst: 8/10 sharp pains at elbow crease with certain movements and lifting tasks; Better: Tylenol,    Fall Risk Screen   Fall screen completed by OT   Have you fallen 2 or more times in the past year? No   Have you fallen and had an injury in the past year? No   Is patient a fall risk? No   Abuse Screen (yes response referral indicated)   Feels Unsafe at Home or Work/School no   Feels Threatened by Someone no   Does Anyone Try to Keep You From Having Contact with Others or Doing Things Outside Your  Home? no   Physical Signs of Abuse Present no   Sensation   Sensation Comments No changes   Range of Motion (ROM)   ROM Comments Pain with supination   Strength   Strength Comments Pain with resisted pronation, pain with resisted wrist extension   Hand Strength   Hand Dominance Right   Left Hand  (pounds) 44 pounds   Right Hand  (pounds) 76 pounds   Left Lateral Pinch (pounds) 8 pounds  (pain)   Right Lateral Pinch (pounds) 12 pounds   Left Three Point Pinch (pounds) 10 pounds   Right Three Point Pinch (pounds) 13 pounds   Hand Strength Comments Decreased LUE strength   Planned Therapy Interventions   Planned Therapy Interventions ADL training;IADL training;Orthotic fitting/training;Manual therapy;Joint mobilization;Coordination training;ROM;Self care/Home management;Strengthening;Stretching;Therapeutic activities   Planned Modalities Electrical stimulation;Hot/cold packs;Paraffin bath;Iontophoresis;Ultrasound   Intervention Comments Ultrasound with 10% Ketoprofen and/or Iontophoresis wtih 4% Dexamethasone for inflammation reduction    OT Goal 1   Goal Identifier Strength   Goal Description Patient will have at least 55lbs of L  strength for improved ability to  and hold her grandchildren.   Target Date 01/10/22    OT Goal 2   Goal Identifier Pain   Goal Description Patient will report pain levels of no higher than 2/10 during completion of all daily activities during 2 consecutive sessions.   Target Date 01/10/22    OT Goal 3   Goal Identifier Functional Use   Goal Description Patient will report decreased difficulty opening a tight or new jar from current rating of moderate difficulty to mild-no difficulty for improved ability to complete simple meal preparations.   Target Date 01/10/22   OT Goal 4   Goal Identifier Sleep Hygiene   Goal Description Patient will report decreased difficulty sleeping due to LUE pain from current rating of moderate difficulty to mild-no difficulty for improved sleep  hygiene.   Target Date 01/10/22   Clinical Impression   Criteria for Skilled Therapeutic Interventions Met Yes, treatment indicated   OT Diagnosis L elbow pain   Influenced by the following impairments Decreased strength, decreased functional use, increased pain   Assessment of Occupational Performance 1-3 Performance Deficits   Clinical Decision Making (Complexity) Low complexity   Therapy Frequency 1-2x/week   Predicted Duration of Therapy Intervention (days/wks) 8 weeks   Risks and Benefits of Treatment have been explained. Yes   Patient, Family & other staff in agreement with plan of care Yes   Total Evaluation Time   OT Ck Low Complexity Minutes (85119) 44

## 2021-11-16 NOTE — PROGRESS NOTES
[facility]          OUTPATIENT OCCUPATIONAL THERAPY  EVALUATION  PLAN OF TREATMENT FOR OUTPATIENT REHABILITATION  (COMPLETE FOR INITIAL CLAIMS ONLY)  Patient's Last Name, First Name, M.I.  YOB: 1965  FelixPhyllis  JACKLYN                        Provider's Name  [facility] Medical Record No.  [id number]                               Onset Date:      (About 10 months ago)   Start of Care Date:     11/15/21   Type:     ___PT   _X_OT   ___SLP Medical Diagnosis:     Left elbow pain M25.522                           OT Diagnosis:     L elbow pain Visits from SOC:  1   _________________________________________________________________________________  Plan of Treatment/Functional Goals:  ADL training,IADL training,Orthotic fitting/training,Manual therapy,Joint mobilization,Coordination training,ROM,Self care/Home management,Strengthening,Stretching,Therapeutic activities  Electrical stimulation,Hot/cold packs,Paraffin bath,Iontophoresis,Ultrasound    Ultrasound with 10% Ketoprofen and/or Iontophoresis wtih 4% Dexamethasone for inflammation reduction      Goals  Goal Identifier: Strength  Goal Description: Patient will have at least 55lbs of L  strength for improved ability to  and hold her grandchildren.  Target Date: 01/10/22     Goal Identifier: Pain  Goal Description: Patient will report pain levels of no higher than 2/10 during completion of all daily activities during 2 consecutive sessions.  Target Date: 01/10/22     Goal Identifier: Functional Use  Goal Description: Patient will report decreased difficulty opening a tight or new jar from current rating of moderate difficulty to mild-no difficulty for improved ability to complete simple meal preparations.  Target Date: 01/10/22     Goal Identifier: Sleep Hygiene  Goal Description: Patient will report decreased  difficulty sleeping due to LUE pain from current rating of moderate difficulty to mild-no difficulty for improved sleep hygiene.  Target Date: 01/10/22        Therapy Frequency: 1-2x/week     Predicted Duration of Therapy Intervention (days/wks): 8 weeks  LISA Hernandez          I CERTIFY THE NEED FOR THESE SERVICES FURNISHED UNDER        THIS PLAN OF TREATMENT AND WHILE UNDER MY CARE     (Physician co-signature of this document indicates review and certification of the therapy plan).              Certification for use of medications as stated above from: 11/15/2021 to 2/15/2022             Referring Physician: Dr. Victoria     Initial Assessment        See Epic Evaluation      Start Of Care Date: 11/15/21

## 2021-11-18 ENCOUNTER — HOSPITAL ENCOUNTER (OUTPATIENT)
Dept: OCCUPATIONAL THERAPY | Facility: OTHER | Age: 56
Setting detail: THERAPIES SERIES
End: 2021-11-18
Attending: FAMILY MEDICINE
Payer: COMMERCIAL

## 2021-11-18 PROCEDURE — 97140 MANUAL THERAPY 1/> REGIONS: CPT | Mod: GO

## 2021-11-18 PROCEDURE — 97110 THERAPEUTIC EXERCISES: CPT | Mod: GO

## 2021-11-18 PROCEDURE — 97035 APP MDLTY 1+ULTRASOUND EA 15: CPT | Mod: GO

## 2021-11-22 ENCOUNTER — HOSPITAL ENCOUNTER (OUTPATIENT)
Dept: OCCUPATIONAL THERAPY | Facility: OTHER | Age: 56
Setting detail: THERAPIES SERIES
End: 2021-11-22
Attending: FAMILY MEDICINE
Payer: COMMERCIAL

## 2021-11-22 PROCEDURE — 97035 APP MDLTY 1+ULTRASOUND EA 15: CPT | Mod: GO

## 2021-11-22 PROCEDURE — 97110 THERAPEUTIC EXERCISES: CPT | Mod: GO

## 2021-11-22 PROCEDURE — 97140 MANUAL THERAPY 1/> REGIONS: CPT | Mod: GO

## 2021-11-30 ENCOUNTER — HOSPITAL ENCOUNTER (OUTPATIENT)
Dept: OCCUPATIONAL THERAPY | Facility: OTHER | Age: 56
Setting detail: THERAPIES SERIES
End: 2021-11-30
Attending: FAMILY MEDICINE
Payer: COMMERCIAL

## 2021-11-30 PROCEDURE — 97110 THERAPEUTIC EXERCISES: CPT | Mod: GO

## 2021-11-30 PROCEDURE — 97033 APP MDLTY 1+IONTPHRSIS EA 15: CPT | Mod: GO

## 2021-11-30 PROCEDURE — 97140 MANUAL THERAPY 1/> REGIONS: CPT | Mod: GO

## 2021-12-09 ENCOUNTER — HOSPITAL ENCOUNTER (OUTPATIENT)
Dept: OCCUPATIONAL THERAPY | Facility: OTHER | Age: 56
Setting detail: THERAPIES SERIES
End: 2021-12-09
Attending: FAMILY MEDICINE
Payer: COMMERCIAL

## 2021-12-09 PROCEDURE — 97035 APP MDLTY 1+ULTRASOUND EA 15: CPT | Mod: GO

## 2021-12-09 PROCEDURE — 97140 MANUAL THERAPY 1/> REGIONS: CPT | Mod: GO

## 2021-12-09 PROCEDURE — 97033 APP MDLTY 1+IONTPHRSIS EA 15: CPT | Mod: GO

## 2021-12-09 PROCEDURE — 97110 THERAPEUTIC EXERCISES: CPT | Mod: GO

## 2021-12-13 ENCOUNTER — HOSPITAL ENCOUNTER (OUTPATIENT)
Dept: MAMMOGRAPHY | Facility: OTHER | Age: 56
Discharge: HOME | End: 2021-12-13
Attending: FAMILY MEDICINE | Admitting: FAMILY MEDICINE
Payer: COMMERCIAL

## 2021-12-13 ENCOUNTER — HOSPITAL ENCOUNTER (OUTPATIENT)
Dept: OCCUPATIONAL THERAPY | Facility: OTHER | Age: 56
Setting detail: THERAPIES SERIES
End: 2021-12-13
Attending: FAMILY MEDICINE
Payer: COMMERCIAL

## 2021-12-13 DIAGNOSIS — Z12.31 VISIT FOR SCREENING MAMMOGRAM: ICD-10-CM

## 2021-12-13 PROCEDURE — 97035 APP MDLTY 1+ULTRASOUND EA 15: CPT | Mod: GO

## 2021-12-13 PROCEDURE — 77063 BREAST TOMOSYNTHESIS BI: CPT

## 2021-12-13 PROCEDURE — 97110 THERAPEUTIC EXERCISES: CPT | Mod: GO

## 2021-12-13 PROCEDURE — 97140 MANUAL THERAPY 1/> REGIONS: CPT | Mod: GO

## 2021-12-13 PROCEDURE — 97033 APP MDLTY 1+IONTPHRSIS EA 15: CPT | Mod: GO

## 2021-12-14 ENCOUNTER — OFFICE VISIT (OUTPATIENT)
Dept: NEUROLOGY | Facility: OTHER | Age: 56
End: 2021-12-14
Attending: PSYCHIATRY & NEUROLOGY
Payer: COMMERCIAL

## 2021-12-14 ENCOUNTER — TELEPHONE (OUTPATIENT)
Dept: FAMILY MEDICINE | Facility: OTHER | Age: 56
End: 2021-12-14

## 2021-12-14 VITALS
WEIGHT: 293 LBS | RESPIRATION RATE: 18 BRPM | DIASTOLIC BLOOD PRESSURE: 82 MMHG | TEMPERATURE: 97.2 F | SYSTOLIC BLOOD PRESSURE: 130 MMHG | BODY MASS INDEX: 41.95 KG/M2 | OXYGEN SATURATION: 96 % | HEART RATE: 79 BPM | HEIGHT: 70 IN

## 2021-12-14 DIAGNOSIS — G57.51 TARSAL TUNNEL SYNDROME OF RIGHT SIDE: Primary | ICD-10-CM

## 2021-12-14 DIAGNOSIS — G57.51 TARSAL TUNNEL SYNDROME, RIGHT LOWER LIMB: Primary | ICD-10-CM

## 2021-12-14 PROCEDURE — 95911 NRV CNDJ TEST 9-10 STUDIES: CPT | Performed by: PSYCHIATRY & NEUROLOGY

## 2021-12-14 PROCEDURE — 99204 OFFICE O/P NEW MOD 45 MIN: CPT | Mod: 25 | Performed by: PSYCHIATRY & NEUROLOGY

## 2021-12-14 PROCEDURE — 95886 MUSC TEST DONE W/N TEST COMP: CPT | Performed by: PSYCHIATRY & NEUROLOGY

## 2021-12-14 ASSESSMENT — PAIN SCALES - GENERAL: PAINLEVEL: MILD PAIN (2)

## 2021-12-14 ASSESSMENT — MIFFLIN-ST. JEOR: SCORE: 2003.36

## 2021-12-14 NOTE — PROGRESS NOTES
Visit Date: 12/14/2021    NEURODIAGNOSTICS CONSULT    REFERRING PHYSICIAN:  Dr. Ya Shirley    HISTORY OF PRESENT ILLNESS:  The patient is a 56-year-old woman who relates a 2-year history of recurrent paresthesia and sensory loss initially affecting just the fifth toe of the right foot, but now involving the arch of the foot and the heel with some extension above the ankle.  She does not perceive any weakness, she does not describe more proximal symptoms and she has not had back pain.    PAST MEDICAL HISTORY:  Significant for chronic obesity, but negative for diabetes.  There has been no specific injury to the lower extremity or to the back.  She does have hypertension.    A 10-system review of systems other than the above is negative.    FAMILY HISTORY:  Positive for diabetes.    PHYSICAL EXAMINATION:  VITAL SIGNS:  The patient is 70 inches tall and weighs 295 pounds.  Blood pressure is 130/82.  NEUROLOGIC:  Strength is 5/5 bilaterally for the quadriceps, hamstrings, dorsiflexors of the foot and plantar flexors of the foot.  There is no muscle wasting.  Reflexes are 2/4 at the knees and 1/4 at the ankles.  Pinprick is intact in the L3 to S1 dermatomes.  Gait is consistent with body habitus.    NERVE CONDUCTION STUDIES:  Motor nerve conduction testing was performed for the right peroneal and tibial nerves.  Distal latencies, amplitudes, conduction velocities and H reflex latencies were normal.    Antidromic sensory nerve conduction studies were performed for the right sural, intermediate dorsal cutaneous and medial dorsal cutaneous nerves.  Orthodromic mixed conduction studies were performed for the medial and lateral plantar nerves.  Latencies and conduction velocities were normal throughout.  Amplitudes were reduced for the plantar nerves.    MONOPOLAR EMG NEEDLE EXAMINATION:  Monopolar needle exam was performed for the right tibialis anterior, gastrocnemius, extensor hallucis longus, abductor hallucis, and  peroneus brevis.  All of the tested muscles showed normal insertional activity.  Motor units were normal in size with normal recruitment and interference.    IMPRESSION:  The patient has abnormalities on nerve conduction testing, most consistent with mild tarsal tunnel syndrome.  Otherwise, she looks to be neurologically healthy.  The condition would likely improve with weight loss.  Other options include corticosteroid injection into the medial aspect of the ankle and surgical treatment.    Findings were reviewed with the patient.    Tulio Dickson MD        D: 2021   T: 2021   MT: [id number]    Name:     HUMBERTO MCBRIDE  MRN:      [id number]        Account:    [id number]   :      1965           Visit Date: 2021     Document: [id number]

## 2021-12-14 NOTE — TELEPHONE ENCOUNTER
Called and verified patient full name and . Notified patient of results. She would like referral.     Allyssa Kay LPN on 2021 at 1:27 PM

## 2021-12-14 NOTE — NURSING NOTE
"Chief Complaint   Patient presents with     Neurologic Problem   Right lower paresthesia    Initial /82   Pulse 79   Temp 97.2  F (36.2  C) (Tympanic)   Resp 18   Ht 1.778 m (5' 10\")   Wt 133.8 kg (295 lb)   LMP  (LMP Unknown)   SpO2 96%   Breastfeeding No   BMI 42.33 kg/m   Estimated body mass index is 42.33 kg/m  as calculated from the following:    Height as of this encounter: 1.778 m (5' 10\").    Weight as of this encounter: 133.8 kg (295 lb).  Medication Reconciliation: complete    FOOD SECURITY SCREENING QUESTIONS  Hunger Vital Signs:  Within the past 12 months we worried whether our food would run out before we got money to buy more. Never  Within the past 12 months the food we bought just didn't last and we didn't have money to get more. Never    Advanced Care Directive Reviewed    Dereje Black, NORAH    "

## 2021-12-14 NOTE — LETTER
12/14/2021         RE: Phyllis VILLASENOR Neururer  [address] [facility] [address]        Dear Colleague,    Thank you for referring your patient, Phyllis Rayurer, to the [facility] AND hospitals. Please see a copy of my visit note below.    Visit Date: 12/14/2021    NEURODIAGNOSTICS CONSULT    REFERRING PHYSICIAN:  Dr. Ya Shirley    HISTORY OF PRESENT ILLNESS:  The patient is a 56-year-old woman who relates a 2-year history of recurrent paresthesia and sensory loss initially affecting just the fifth toe of the right foot, but now involving the arch of the foot and the heel with some extension above the ankle.  She does not perceive any weakness, she does not describe more proximal symptoms and she has not had back pain.    PAST MEDICAL HISTORY:  Significant for chronic obesity, but negative for diabetes.  There has been no specific injury to the lower extremity or to the back.  She does have hypertension.    A 10-system review of systems other than the above is negative.    FAMILY HISTORY:  Positive for diabetes.    PHYSICAL EXAMINATION:  VITAL SIGNS:  The patient is 70 inches tall and weighs 295 pounds.  Blood pressure is 130/82.  NEUROLOGIC:  Strength is 5/5 bilaterally for the quadriceps, hamstrings, dorsiflexors of the foot and plantar flexors of the foot.  There is no muscle wasting.  Reflexes are 2/4 at the knees and 1/4 at the ankles.  Pinprick is intact in the L3 to S1 dermatomes.  Gait is consistent with body habitus.    NERVE CONDUCTION STUDIES:  Motor nerve conduction testing was performed for the right peroneal and tibial nerves.  Distal latencies, amplitudes, conduction velocities and H reflex latencies were normal.    Antidromic sensory nerve conduction studies were performed for the right sural, intermediate dorsal cutaneous and medial dorsal cutaneous nerves.  Orthodromic mixed conduction studies were performed for the medial and lateral plantar nerves.  Latencies and  conduction velocities were normal throughout.  Amplitudes were reduced for the plantar nerves.    MONOPOLAR EMG NEEDLE EXAMINATION:  Monopolar needle exam was performed for the right tibialis anterior, gastrocnemius, extensor hallucis longus, abductor hallucis, and peroneus brevis.  All of the tested muscles showed normal insertional activity.  Motor units were normal in size with normal recruitment and interference.    IMPRESSION:  The patient has abnormalities on nerve conduction testing, most consistent with mild tarsal tunnel syndrome.  Otherwise, she looks to be neurologically healthy.  The condition would likely improve with weight loss.  Other options include corticosteroid injection into the medial aspect of the ankle and surgical treatment.    Findings were reviewed with the patient.    Tulio Dickson MD        D: 2021   T: 2021   MT: [id number]    Name:     HUMBERTO MCBRIDE  MRN:      [id number]        Account:    [id number]   :      1965           Visit Date: 2021     Document: [id number]      Again, thank you for allowing me to participate in the care of your patient.        Sincerely,        Tulio Dickson MD

## 2021-12-14 NOTE — TELEPHONE ENCOUNTER
Patient informed referral was sent and she will receive a call to schedule     Allyssa Herrera CMA on 12/14/2021 at 1:55 PM

## 2021-12-14 NOTE — TELEPHONE ENCOUNTER
Please call - her EMG showed that she likely has tarsal tunnel syndrome in her right foot.  She could consider a referral to Orthopedics if she wishes for this.  If interested, please let me know and I can refer.  Ya Victoria MD

## 2021-12-16 ENCOUNTER — HOSPITAL ENCOUNTER (OUTPATIENT)
Dept: OCCUPATIONAL THERAPY | Facility: OTHER | Age: 56
Setting detail: THERAPIES SERIES
End: 2021-12-16
Attending: FAMILY MEDICINE
Payer: COMMERCIAL

## 2021-12-16 PROCEDURE — 97035 APP MDLTY 1+ULTRASOUND EA 15: CPT | Mod: GO | Performed by: OCCUPATIONAL THERAPIST

## 2021-12-16 PROCEDURE — 97110 THERAPEUTIC EXERCISES: CPT | Mod: GO | Performed by: OCCUPATIONAL THERAPIST

## 2021-12-16 PROCEDURE — 97010 HOT OR COLD PACKS THERAPY: CPT | Mod: GO | Performed by: OCCUPATIONAL THERAPIST

## 2021-12-16 PROCEDURE — 97140 MANUAL THERAPY 1/> REGIONS: CPT | Mod: GO | Performed by: OCCUPATIONAL THERAPIST

## 2021-12-16 PROCEDURE — 97033 APP MDLTY 1+IONTPHRSIS EA 15: CPT | Mod: GO | Performed by: OCCUPATIONAL THERAPIST

## 2021-12-20 ENCOUNTER — HOSPITAL ENCOUNTER (OUTPATIENT)
Dept: OCCUPATIONAL THERAPY | Facility: OTHER | Age: 56
Setting detail: THERAPIES SERIES
End: 2021-12-20
Attending: FAMILY MEDICINE
Payer: COMMERCIAL

## 2021-12-20 PROCEDURE — 97110 THERAPEUTIC EXERCISES: CPT | Mod: GO

## 2021-12-20 PROCEDURE — 97035 APP MDLTY 1+ULTRASOUND EA 15: CPT | Mod: GO

## 2021-12-20 PROCEDURE — 97140 MANUAL THERAPY 1/> REGIONS: CPT | Mod: GO

## 2021-12-20 PROCEDURE — 97033 APP MDLTY 1+IONTPHRSIS EA 15: CPT | Mod: GO

## 2021-12-28 ENCOUNTER — HOSPITAL ENCOUNTER (OUTPATIENT)
Dept: OCCUPATIONAL THERAPY | Facility: OTHER | Age: 56
Setting detail: THERAPIES SERIES
End: 2021-12-28
Attending: FAMILY MEDICINE
Payer: COMMERCIAL

## 2021-12-28 PROCEDURE — 97140 MANUAL THERAPY 1/> REGIONS: CPT | Mod: GO

## 2021-12-28 PROCEDURE — 97110 THERAPEUTIC EXERCISES: CPT | Mod: GO

## 2021-12-28 PROCEDURE — 97035 APP MDLTY 1+ULTRASOUND EA 15: CPT | Mod: GO

## 2021-12-30 ENCOUNTER — OFFICE VISIT (OUTPATIENT)
Dept: ORTHOPEDICS | Facility: OTHER | Age: 56
End: 2021-12-30
Attending: FAMILY MEDICINE
Payer: COMMERCIAL

## 2021-12-30 VITALS
HEART RATE: 60 BPM | DIASTOLIC BLOOD PRESSURE: 90 MMHG | BODY MASS INDEX: 42.62 KG/M2 | SYSTOLIC BLOOD PRESSURE: 128 MMHG | WEIGHT: 293 LBS

## 2021-12-30 DIAGNOSIS — G57.51 TARSAL TUNNEL SYNDROME OF RIGHT SIDE: ICD-10-CM

## 2021-12-30 PROCEDURE — 99203 OFFICE O/P NEW LOW 30 MIN: CPT | Performed by: PODIATRIST

## 2021-12-30 NOTE — PROGRESS NOTES
Visit Date: 12/30/2021    HISTORY OF PRESENT ILLNESS:  Phyllis is here with right ankle pain and numbness. She had an EMG done by Dr. Dickson suggestive of a tarsal tunnel impingement.  The plantar aspect of her foot is numb.  She gets radiating pain proximally in the upper calf.  She is here to have this evaluated and discuss options.    HISTORY REVIEW:  I have reviewed this patient's past medical history, family history, social history as well as medications and allergies.  Any changes/additions were appropriately charted in the patient's electronic medical record.      PHYSICAL EXAMINATION:  CONSTITUTIONAL:  The patient is alert and oriented x3, well appearing and in no apparent distress.  Affect is pleasant and answers questions appropriately.  VASCULAR:  Circulation is intact with palpable pedal pulses and adequate capillary fill time to all digits.  Hair growth is present and appropriate to mid foot and digits.  NEUROLOGIC:  Light touch sensation is intact to digits.  There is a negative Tinel sign.  There are no signs of apparent nerve entrapment of superficial peroneal or common peroneal nerves.  INTEGUMENT:  No abnormal dermatologic lesions are noted.  Skin has normal texture and turgor.  MUSCULOSKELETAL:      ASSESSMENT:  Tarsal tunnel impingement.    PLAN:  I discussed condition and treatment with the patient today.  I did review options with the patient today.  We discussed cortisone injection. She has complete numbness in her foot and radiating symptoms proximally.  It may take decompressing this for this to resolve on a somewhat permanent basis.  I discussed this in detail.  She would like to proceed with the surgery and decompression.  The surgery, recovery, risks, benefits, complications were discussed in detail.  Followup accordingly postoperatively.  She will get a preop prior to proceeding.    Shiva Finch DPM        D: 12/30/2021   T: 12/30/2021   MT: MERI    Name:     PHYLLIS MCBRIDE JACKLYNGreta  MRN:       [id number]        Account:    [id number]   :      1965           Visit Date: 2021     Document: [id number]

## 2021-12-30 NOTE — PROGRESS NOTES
Patient is here for consult on her right ankle.  Gloria Dempsey LPN .....................12/30/2021 11:17 AM

## 2022-01-03 ENCOUNTER — HOSPITAL ENCOUNTER (OUTPATIENT)
Dept: OCCUPATIONAL THERAPY | Facility: OTHER | Age: 57
Setting detail: THERAPIES SERIES
End: 2022-01-03
Attending: FAMILY MEDICINE
Payer: COMMERCIAL

## 2022-01-03 PROCEDURE — 97140 MANUAL THERAPY 1/> REGIONS: CPT | Mod: GO

## 2022-01-03 PROCEDURE — 97110 THERAPEUTIC EXERCISES: CPT | Mod: GO

## 2022-01-03 PROCEDURE — 97035 APP MDLTY 1+ULTRASOUND EA 15: CPT | Mod: GO

## 2022-01-03 NOTE — PROGRESS NOTES
[facility]    Outpatient Occupational Therapy Progress Note  Patient: Phyllis VILLASENOR Neururer  : 1965    Beginning/End Dates of Reporting Period:  11/15/2021 to 1/3/2022    Referring Provider: Dr. Victoria    Therapy Diagnosis: left elbow pain    Client Self Report: Phyllis reports that her elbow has been feeling a lot better even since last session.    Objective Measurements:    Strength Initial (11/15/2021) Current (1/3/2022)    44# 80#   Lateral pinch  8# P 20#   3 point pinch  10# P 12#         Goals:     Goal Identifier Strength   Goal Description Patient will have at least 55lbs of L  strength for improved ability to  and hold her grandchildren.   Target Date 01/10/22   Date Met   1/3/2022   Progress (detail required for progress note):  Patient has max 80#  strength and reports improved ability to  her grand children.  Goal met.       Goal Identifier Pain   Goal Description Patient will report pain levels of no higher than 2/10 during completion of all daily activities during 2 consecutive sessions.   Target Date 01/10/22   Date Met   1/3/2022   Progress (detail required for progress note):  Phyllis reports less than 2/10 pain for the last 2 sessions.  Goal met.  Discontinue.      Goal Identifier Functional Use   Goal Description Patient will report decreased difficulty opening a tight or new jar from current rating of moderate difficulty to mild-no difficulty for improved ability to complete simple meal preparations.   Target Date 01/10/22   Date Met      Progress (detail required for progress note):  Patient reports mild difficulty on 50% of trials. Monitor goal.      Goal Identifier Sleep Hygiene   Goal Description Patient will report decreased difficulty sleeping due to LUE pain from current rating of moderate difficulty to mild-no difficulty for improved sleep hygiene.    Target Date 01/10/22   Date Met      Progress (detail required for progress note):  Patient reports that she has been able to sleep without waking from elbow pain for at least a week now.  Monitor goal.            Plan:  Changes to therapy plan of care: Phyllis has made great progress this reporting period.  She will plan to have 1 more visit in 2 weeks if needed.      Discharge:  No

## 2022-02-08 ENCOUNTER — OFFICE VISIT (OUTPATIENT)
Dept: FAMILY MEDICINE | Facility: OTHER | Age: 57
End: 2022-02-08
Attending: FAMILY MEDICINE
Payer: COMMERCIAL

## 2022-02-08 ENCOUNTER — HOSPITAL ENCOUNTER (OUTPATIENT)
Dept: ULTRASOUND IMAGING | Facility: OTHER | Age: 57
End: 2022-02-08
Attending: FAMILY MEDICINE
Payer: COMMERCIAL

## 2022-02-08 VITALS
HEART RATE: 88 BPM | WEIGHT: 293 LBS | BODY MASS INDEX: 42.53 KG/M2 | DIASTOLIC BLOOD PRESSURE: 82 MMHG | SYSTOLIC BLOOD PRESSURE: 138 MMHG | OXYGEN SATURATION: 97 % | RESPIRATION RATE: 16 BRPM | TEMPERATURE: 97.9 F

## 2022-02-08 DIAGNOSIS — I83.813 VARICOSE VEINS OF LEG WITH PAIN, BILATERAL: ICD-10-CM

## 2022-02-08 DIAGNOSIS — I80.00 PHLEBITIS OF SUPERFICIAL VEIN OF LOWER EXTREMITY, UNSPECIFIED LATERALITY: Primary | ICD-10-CM

## 2022-02-08 DIAGNOSIS — R22.43 LOCALIZED SWELLING OF BOTH LOWER LEGS: ICD-10-CM

## 2022-02-08 PROCEDURE — 99214 OFFICE O/P EST MOD 30 MIN: CPT | Performed by: FAMILY MEDICINE

## 2022-02-08 PROCEDURE — 93970 EXTREMITY STUDY: CPT

## 2022-02-08 ASSESSMENT — ENCOUNTER SYMPTOMS
CHILLS: 0
NERVOUS/ANXIOUS: 0
COUGH: 0
FEVER: 0
SHORTNESS OF BREATH: 0

## 2022-02-08 ASSESSMENT — PAIN SCALES - GENERAL: PAINLEVEL: MODERATE PAIN (5)

## 2022-02-08 NOTE — PROGRESS NOTES
SUBJECTIVE:   Nursing Notes:   Allyssa Kay LPN  2/8/2022 10:19 AM  Sign at exiting of workspace  Chief Complaint   Patient presents with     Musculoskeletal Problem     bilat      Bilat leg pain. Right leg started about 10 days ago. Pain starts around behind knee and goes up into thigh. l leg started about 5 days ago. Pain is only noted in L thigh area. Some swelling. Very painful.     Medication Reconciliation: complete    Allyssa CASTELLANOSGreta NORAH Kay        Phyllis VILLASENOR Neururer is a 56 year old female who presents to clinic today for bilateral leg pain.  Her right leg started hurting 10 days ago and extends into her thigh.  Her left leg started after that, about 5 days ago.  The pain in her left leg is just in her thigh area.  There is some associated swelling.  She has a history in the past of superficial phlebitis.  She had seen Dr. Ojeda a couple of years ago when she had issues with the superficial phlebitis.  She did have a work up for hypercoagulability.  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  She had undergone radiofrequency ablation of both legs a couple of years ago.  At the time she saw Dr. Ojeda last, he recommended that she continue aspirin, B12, folic acid and B6 supplementation.  He felt that she was not at increased risk for DVT.  She had previously been treated for several weeks with Xarelto with a large superficial phlebitis in the past.  Had not been wearing her stockings recently.  A couple of weeks ago, her  had surgery in [place] and had been sitting more than usual.  Had a cold after [place] for about 3 weeks, but was over it.  About 3 days prior to the swelling starting, she was very stuffy and achy.  She was extremely tired the first day this swelling started, but then was up and active.  Also she was noticing some extra swelling in her hands, but not in her feet.  She has rheumatoid arthritis, but this didn't seem like the  usual pain in her hands she would have with flares of this.  She did a home covid test 8 days ago. The congestion symptoms seemed better after about 4 days.  She has been vaccinated against covid.  Since her symptoms started, she had been more diligent in wearing her compression stockings.    HPI    I personally reviewed medications/allergies/history listed below:    Patient Active Problem List    Diagnosis Date Noted     Morbid obesity (H) 11/13/2021     Priority: Medium     Mitral valve annular calcification- mild with mild MI 11/25/2020     Priority: Medium     PVC's (premature ventricular contractions) 11/25/2020     Priority: Medium     SVT (supraventricular tachycardia) (H) 11/25/2020     Priority: Medium     Ventricular bigeminy 11/25/2020     Priority: Medium     Health care maintenance 10/22/2018     Priority: Medium     Pap NIL and HPV neg on 8/22/17 ([facility])  DEXA 2015 normal.       Colon polyp 09/20/2017     Priority: Medium     Overview:   Colonoscopy 9/20/17 with transverse colon polyp and sigmoid colon polyp (20 cm), both removed       Thrombosis of saphenous vein, left 11/16/2016     Priority: Medium     Disturbance in sleep behavior 07/02/2015     Priority: Medium     Overview:   [facility] Sleep Study: Showed Mild upper airway resistance. No sleep apnea or limb movement disorder.   IMO Update       Family history of diabetes mellitus type II 04/07/2015     Priority: Medium     High risk medication use 09/10/2014     Priority: Medium     Overview:   HCQ, MTX, Humira       Cervical muscle pain 07/25/2013     Priority: Medium     Rheumatoid arthritis involving multiple sites with positive rheumatoid factor (H) 09/28/2012     Priority: Medium     Overview:   RF + 121, CCP neg; onset ~ 2011, Dx 2012  Doxy (9/8/2012- 3/2013)  Nuc Med Scan with symmetric uptake  Plaquenil trial 3/2013- ; MTX 7/2013- ; SSA 10/2014-12/2014; Humira 10/2015-   6/2014: VECTRA 42       Vitamin D deficiency 06/18/2012      "Priority: Medium     Overview:   Level of 25 on 5/11/2012       Myalgia 05/11/2012     Priority: Medium     Enthesopathy of hip region 01/14/2011     Priority: Medium     Allergic rhinitis 11/02/2004     Priority: Medium     Asthma 09/22/2003     Priority: Medium     Overview:   Updated per 10/1/17 IMO import       Esophageal reflux 09/22/2003     Priority: Medium     Essential hypertension, benign 05/14/2001     Priority: Medium     Obesity 05/14/2001     Priority: Medium     Past Medical History:   Diagnosis Date     Personal history of other medical treatment (CODE)     3 normal childbirths     Rheumatoid arthritis (H)       Past Surgical History:   Procedure Laterality Date     COLONOSCOPY  2017    Dr. Fofana - 2 polyps found.  Told to f/u in 5 years.     Family History   Problem Relation Age of Onset     Hypertension Father         Hypertension,67     Other - See Comments Maternal Grandmother         Stroke,85     Other - See Comments Paternal Grandmother         Alzheimer's 73     Breast Cancer Mother 46        Cancer-breast,now 68     Cerebrovascular Disease Mother         stroke with hemiparesis 2017     Diabetes Brother 16        Diabetes,now 41     Social History     Tobacco Use     Smoking status: Never Smoker     Smokeless tobacco: Never Used   Substance Use Topics     Alcohol use: No     Comment: Rarely     Social History     Social History Narrative    , 3 adult children;     Homemaker; hobbies - reading, crafts.      - Demetrio         Current Outpatient Medications   Medication Sig Dispense Refill     aspirin (ASA) 325 MG EC tablet Take 325 mg by mouth daily       B-D INSULIN SYRINGE MICROFINE 27G X 5/8\" 1 ML MISC 1 EACH BY DOES NOT APPLY ROUTE ONE TIME A WEEK. USE TO DRAW UP METHOTREXATE 25 MG ONCE WEEKLY.       BD SAFETYGLIDE SYRINGE/NEEDLE 27G X 5/8\" 1 ML MISC 1 EACH BY DOES NOT APPLY ROUTE ONE TIME A WEEK. USE TO DRAW UP METHOTREXATE 25 MG ONCE WEEKLY.       cyanocobalamin (VITAMIN B-12) " 1000 MCG tablet Take 1,000 mcg by mouth daily       famotidine (PEPCID) 20 MG tablet Take 1 tablet (20 mg) by mouth 2 times daily 180 tablet 3     folic acid (FOLVITE) 1 MG tablet Take 1 mg by mouth daily       hydroxychloroquine (PLAQUENIL) 200 MG tablet Take 400 mg by mouth daily       methotrexate 50 MG/2ML injection Inject 1 mL Subcutaneous once a week       metoprolol succinate ER (TOPROL-XL) 50 MG 24 hr tablet Take 1 tablet (50 mg) by mouth daily (with dinner) 90 tablet 3     predniSONE (DELTASONE) 5 MG tablet Take 5 mg by mouth daily.  0     thiamine (B-1) 100 MG tablet Take 100 mg by mouth daily        No Known Allergies    Review of Systems   Constitutional: Negative for chills and fever.   Respiratory: Negative for cough and shortness of breath.    Cardiovascular: Negative for peripheral edema.   Psychiatric/Behavioral: Negative for mood changes. The patient is not nervous/anxious.         OBJECTIVE:     /82 (BP Location: Right arm, Patient Position: Sitting, Cuff Size: Adult Regular)   Pulse 88   Temp 97.9  F (36.6  C) (Tympanic)   Resp 16   Wt 134.4 kg (296 lb 6.4 oz)   LMP  (LMP Unknown)   SpO2 97%   Breastfeeding No   BMI 42.53 kg/m    Body mass index is 42.53 kg/m .  Physical Exam  Constitutional:       Appearance: Normal appearance.   HENT:      Head: Normocephalic.   Eyes:      Extraocular Movements: Extraocular movements intact.      Pupils: Pupils are equal, round, and reactive to light.   Cardiovascular:      Rate and Rhythm: Normal rate and regular rhythm.      Pulses: Normal pulses.      Heart sounds: Normal heart sounds. No murmur heard.      Pulmonary:      Effort: Pulmonary effort is normal.      Breath sounds: Normal breath sounds. No wheezing, rhonchi or rales.   Musculoskeletal:      Right lower leg: No edema.      Left lower leg: No edema.      Comments: Palpable, tender, ropy consistency varicosities noted in both thighs.  Her right leg veins are more prominent than her  left.   Neurological:      Mental Status: She is alert.   Psychiatric:         Mood and Affect: Mood normal.         Behavior: Behavior normal.           PHQ-2 Score:     PHQ-2 ( 1999 Pfizer) 2/8/2022 11/12/2021   Q1: Little interest or pleasure in doing things 0 0   Q2: Feeling down, depressed or hopeless 0 0   PHQ-2 Score 0 0   PHQ-2 Total Score (12-17 Years)- Positive if 3 or more points; Administer PHQ-A if positive - -   Q1: Little interest or pleasure in doing things Not at all -   Q2: Feeling down, depressed or hopeless Not at all -   PHQ-2 Score 0 -       No flowsheet data found.      ACT Total Scores 6/18/2019 1/16/2020 9/21/2020   ACT TOTAL SCORE (Goal Greater than or Equal to 20) 25 25 25   In the past 12 months, how many times did you visit the emergency room for your asthma without being admitted to the hospital? 0 0 0   In the past 12 months, how many times were you hospitalized overnight because of your asthma? 0 0 0         I personally reviewed results withpatient as listed below:   Diagnostic Test Results:  Results for orders placed or performed during the hospital encounter of 02/08/22   US Lower Extremity Venous Duplex Bilateral     Status: None    Narrative    Procedure: US LOWER EXTREMITY VENOUS DUPLEX BILATERAL    HISTORY:   hx of significant superficial phlebitis.  Please r/o DVT.;  Localized swelling of both lower legs; Varicose veins of leg with  pain, bilateral.    TECHNIQUE: Grayscale, color Doppler and compression views of the deep  venous structures of the bilateral lower extremity.    COMPARISON: 4/2/2020.    FINDINGS:    Interrogation of the deep venous structures from the bilateral common  femoral vein through the veins of the proximal calf demonstrate normal  grayscale appearance, compressibility and augmentable color Doppler  flow.    Superficial venous thrombosis is identified within a varicosity of the  medial right thigh.      Impression    IMPRESSION:     No evidence of lower  extremity DVT.      PENNY ROMAN MD         SYSTEM ID:  [id number]        ASSESSMENT/PLAN:       ICD-10-CM    1. Phlebitis of superficial vein of lower extremity, unspecified laterality  I80.00 US Lower Extremity Venous Duplex Bilateral   2. Varicose veins of leg with pain, bilateral  I83.813 US Lower Extremity Venous Duplex Bilateral     CANCELED: US Lower Extremity Venous Duplex Bilateral       1.  Superficial phlebitis of her right thigh noted on ultrasound.  Suspect milder disease on her left side as well with her current symptoms.  Luckily no DVT noted.  Her symptoms are easing slightly in the past couple of days.  Will have her continue to wear compression stockings.  Continue aspirin 325 mg daily.  Continue applying warm compresses.  She is hoping to have surgery for tarsal tunnel soon and actually has an appointment scheduled for a preop next week.  Will assess at that time whether the superficial phlebitis is improved enough that she can be off of the aspirin temporarily in preparation for the surgery or if she would prefer to delay surgery slightly.  Follow up sooner if any other concerns.  2.  See above.  She has already had bilateral lower extremity endovenous ablation.  She is on aspirin and wearing compression stockings.  She has had prior hypercoagulable work up.    Ya Victoria MD  [facility]

## 2022-02-08 NOTE — NURSING NOTE
Chief Complaint   Patient presents with     Musculoskeletal Problem     bilat      Bilat leg pain. Right leg started about 10 days ago. Pain starts around behind knee and goes up into thigh. l leg started about 5 days ago. Pain is only noted in L thigh area. Some swelling. Very painful.     Medication Reconciliation: complete    Allyssa Kay LPN

## 2022-02-10 NOTE — PATIENT INSTRUCTIONS
Preparing for Your Surgery  Getting started  A nurse will call you to review your health history and instructions. They will give you an arrival time based on your scheduled surgery time. Please be ready to share:    Your doctor's clinic name and phone number    Your medical, surgical and anesthesia history    A list of allergies and sensitivities    A list of medicines, including herbal treatments and over-the-counter drugs    Whether the patient has a legal guardian (ask how to send us the papers in advance)  Please tell us if you're pregnant--or if there's any chance you might be pregnant. Some surgeries may injure a fetus (unborn baby), so they require a pregnancy test. Surgeries that are safe for a fetus don't always need a test, and you can choose whether to have one.   If you have a child who's having surgery, please ask for a copy of Preparing for Your Child's Surgery.    Preparing for surgery    Within 30 days of surgery: Have a pre-op exam (sometimes called an H&P, or History and Physical). This can be done at a clinic or pre-operative center.  ? If you're having a , you may not need this exam. Talk to your care team.    At your pre-op exam, talk to your care team about all medicines you take. If you need to stop any medicines before surgery, ask when to start taking them again.  ? We do this for your safety. Many medicines can make you bleed too much during surgery. Some change how well surgery (anesthesia) drugs work.    Call your insurance company to let them know you're having surgery. (If you don't have insurance, call [phone].)    Call your clinic if there's any change in your health. This includes signs of a cold or flu (sore throat, runny nose, cough, rash, fever). It also includes a scrape or scratch near the surgery site.    If you have questions on the day of surgery, call your hospital or surgery center.  COVID testing  You may need to be tested for COVID-19 before having  surgery. If so, your surgical team will give you instructions for scheduling this test, separate from your preoperative history and physical.  Eating and drinking guidelines  For your safety: Unless your surgeon tells you otherwise, follow the guidelines below.    Eat and drink as usual until 8 hours before surgery. After that, no food or milk.    Drink clear liquids until 2 hours before surgery. These are liquids you can see through, like water, Gatorade and Propel Water. You may also have black coffee and tea (no cream or milk).    Nothing by mouth within 2 hours of surgery. This includes gum, candy and breath mints.    If you drink alcohol: Stop drinking it the night before surgery.    If your care team tells you to take medicine on the morning of surgery, it's okay to take it with a sip of water.  Preventing infection    Shower or bathe the night before and morning of your surgery. Follow the instructions your clinic gave you. (If no instructions, use regular soap.)    Don't shave or clip hair near your surgery site. We'll remove the hair if needed.    Don't smoke or vape the morning of surgery. You may chew nicotine gum up to 2 hours before surgery. A nicotine patch is okay.  ? Note: Some surgeries require you to completely quit smoking and nicotine. Check with your surgeon.    Your care team will make every effort to keep you safe from infection. We will:  ? Clean our hands often with soap and water (or an alcohol-based hand rub).  ? Clean the skin at your surgery site with a special soap that kills germs.  ? Give you a special gown to keep you warm. (Cold raises the risk of infection.)  ? Wear special hair covers, masks, gowns and gloves during surgery.  ? Give antibiotic medicine, if prescribed. Not all surgeries need antibiotics.  What to bring on the day of surgery    Photo ID and insurance card    Copy of your health care directive, if you have one    Glasses and hearing aides (bring cases)  ? You can't  wear contacts during surgery    Inhaler and eye drops, if you use them (tell us about these when you arrive)    CPAP machine or breathing device, if you use them    A few personal items, if spending the night    If you have . . .  ? A pacemaker, ICD (cardiac defibrillator) or other implant: Bring the ID card.  ? An implanted stimulator: Bring the remote control.  ? A legal guardian: Bring a copy of the certified (court-stamped) guardianship papers.  Please remove any jewelry, including body piercings. Leave jewelry and other valuables at home.  If you're going home the day of surgery    You must have a responsible adult drive you home. They should stay with you overnight as well.    If you don't have someone to stay with you, and you aren't safe to go home alone, we may keep you overnight. Insurance often won't pay for this.  After surgery  If it's hard to control your pain or you need more pain medicine, please call your surgeon's office.  Questions?   If you have any questions for your care team, list them here: _________________________________________________________________________________________________________________________________________________________________________ ____________________________________ ____________________________________ ____________________________________  For informational purposes only. Not to replace the advice of your health care provider. Copyright   2003, 2019 [facility], [place]. All rights reserved. Clinically reviewed by Nette Buenrostro MD. Revert [id number] - REV 07/21.        No ibuprofen, aleve or aspirin for at least 7 days prior to surgery.    No vitamins or supplements for at least 7 days prior to surgery.    The morning of surgery, take only metoprolol, with a small sip of water.    Check with your Rheumatologist to see if its ok to stay on the plaquenil and methotrexate prior to surgery.

## 2022-02-10 NOTE — PROGRESS NOTES
[facility]  [address]  [place] [address]  Phone: [phone]  Fax: [phone]  Primary Provider: Ya Martínez  Pre-op Performing Provider: YA MARTÍNEZ      PREOPERATIVE EVALUATION:  Today's date: 2/15/2022    Phyllis VILLASENOR Neururer is a 56 year old female who presents for a preoperative evaluation.    Surgical Information:  Surgery/Procedure: Right Tarsal Tunnel  Surgery Location: [facility]  Surgeon: Dr. Finch  Surgery Date: 3/3/2022  Time of Surgery: TBD  Where patient plans to recover: At home with family  Fax number for surgical facility: Note does not need to be faxed, will be available electronically in Epic.    Type of Anesthesia Anticipated: to be determined    Assessment & Plan     The proposed surgical procedure is considered LOW risk.    Preop general physical exam  She is cleared for surgery as scheduled.   - EKG 12-lead complete w/read - Clinics  - Basic Metabolic Panel; Future        No ibuprofen, aleve or aspirin for at least 7 days prior to surgery.    No vitamins or supplements for at least 7 days prior to surgery.    The morning of surgery, take only metoprolol, with a small sip of water.    Check with your Rheumatologist to see if its ok to stay on the plaquenil and methotrexate prior to surgery.    Essential hypertension, benign  Blood pressure stable.  Labs/ekg completed as noted.  - EKG 12-lead complete w/read - Clinics  - Basic Metabolic Panel; Future    Phlebitis of superficial vein of lower extremity, unspecified laterality  Symptoms improving.  Recommend stopping aspirin for a week prior to surgery as above.        RECOMMENDATION:  APPROVAL GIVEN to proceed with proposed procedure, without further diagnostic evaluation.      Subjective     HPI related to upcoming procedure:     Right foot has tarsal tunnel syndrome.  Started having numbness in that foot about a year ago.  She had been having numbness of her 5th toe for about 5 years  "prior to that.  She is now having numbness along the bottom and lateral side of her foot.  She has a Covid test scheduled on 2/27/2022.    I saw her last week for superficial phlebitis.  She remains on aspirin 325 mg daily. She feels today that finally her right thigh is less tender and the veins less swollen than they had.  She has felt a little \"blah\" the past couple of weeks.  Her appetite has been a little decreased and has felt a little bloated.  She had labs for her rheumatoid arthritis yesterday and her AST was a bit elevated at 66.  She is on methotrexate.      Preop Questions 2/15/2022   1. Have you ever had a heart attack or stroke? No   2. Have you ever had surgery on your heart or blood vessels, such as a stent placement, a coronary artery bypass, or surgery on an artery in your head, neck, heart, or legs? YES - varicose vein ablation.   3. Do you have chest pain with activity? No   4. Do you have a history of  heart failure? No   5. Do you currently have a cold, bronchitis or symptoms of other infection? No   6. Do you have a cough, shortness of breath, or wheezing? No   7. Do you or anyone in your family have previous history of blood clots? YES - mother had a stroke, but no history of DVT or PE.   8. Do you or does anyone in your family have a serious bleeding problem such as prolonged bleeding following surgeries or cuts? No   9. Have you ever had problems with anemia or been told to take iron pills? YES - many years ago when menstruating and around pregnancy   10. Have you had any abnormal blood loss such as black, tarry or bloody stools, or abnormal vaginal bleeding? YES - in 2013, had vaginal bleeding for 2-3 months.  Went through menopause in 2015 and has not had any bleeding since then.   11. Have you ever had a blood transfusion? No   12. Are you willing to have a blood transfusion if it is medically needed before, during, or after your surgery? Yes   13. Have you or any of your relatives ever " had problems with anesthesia? YES - mother had excessive drowsiness and difficulty waking up after surgery.   14. Do you have sleep apnea, excessive snoring or daytime drowsiness? No   15. Do you have any artifical heart valves or other implanted medical devices like a pacemaker, defibrillator, or continuous glucose monitor? No   16. Do you have artificial joints? No   17. Are you allergic to latex? No   18. Is there any chance that you may be pregnant? No     Health Care Directive:  Patient does not have a Health Care Directive or Living Will: Discussed advance care planning with patient; information given to patient to review.    Preoperative Review of :   reviewed - no record of controlled substances prescribed.          Review of Systems  CONSTITUTIONAL: NEGATIVE for fever, chills, change in weight  INTEGUMENTARY/SKIN: NEGATIVE for worrisome rashes, moles or lesions  EYES: NEGATIVE for vision changes or irritation  ENT/MOUTH: NEGATIVE for ear, mouth and throat problems  RESP: NEGATIVE for significant cough or SOB  CV: NEGATIVE for chest pain, palpitations or peripheral edema  GI: NEGATIVE for nausea, abdominal pain, heartburn, or change in bowel habits  : NEGATIVE for frequency, dysuria, or hematuria  MUSCULOSKELETAL: NEGATIVE for significant arthralgias or myalgia  NEURO: NEGATIVE for weakness, dizziness or paresthesias  ENDOCRINE: NEGATIVE for temperature intolerance, skin/hair changes  HEME: NEGATIVE for bleeding problems  PSYCHIATRIC: NEGATIVE for changes in mood or affect    Patient Active Problem List    Diagnosis Date Noted     Morbid obesity (H) 11/13/2021     Priority: Medium     Mitral valve annular calcification- mild with mild MI 11/25/2020     Priority: Medium     PVC's (premature ventricular contractions) 11/25/2020     Priority: Medium     SVT (supraventricular tachycardia) (H) 11/25/2020     Priority: Medium     Ventricular bigeminy 11/25/2020     Priority: Medium     Health care  maintenance 10/22/2018     Priority: Medium     Pap NIL and HPV neg on 8/22/17 ([facility])  DEXA 2015 normal.       Colon polyp 09/20/2017     Priority: Medium     Overview:   Colonoscopy 9/20/17 with transverse colon polyp and sigmoid colon polyp (20 cm), both removed       Thrombosis of saphenous vein, left 11/16/2016     Priority: Medium     Disturbance in sleep behavior 07/02/2015     Priority: Medium     Overview:   [facility] Sleep Study: Showed Mild upper airway resistance. No sleep apnea or limb movement disorder.   IMO Update       Family history of diabetes mellitus type II 04/07/2015     Priority: Medium     High risk medication use 09/10/2014     Priority: Medium     Overview:   HCQ, MTX, Humira       Cervical muscle pain 07/25/2013     Priority: Medium     Rheumatoid arthritis involving multiple sites with positive rheumatoid factor (H) 09/28/2012     Priority: Medium     Overview:   RF + 121, CCP neg; onset ~ 2011, Dx 2012  Doxy (9/8/2012- 3/2013)  Nuc Med Scan with symmetric uptake  Plaquenil trial 3/2013- ; MTX 7/2013- ; SSA 10/2014-12/2014; Humira 10/2015-   6/2014: VECTRA 42       Vitamin D deficiency 06/18/2012     Priority: Medium     Overview:   Level of 25 on 5/11/2012       Myalgia 05/11/2012     Priority: Medium     Enthesopathy of hip region 01/14/2011     Priority: Medium     Allergic rhinitis 11/02/2004     Priority: Medium     Asthma 09/22/2003     Priority: Medium     Overview:   Updated per 10/1/17 IMO import       Esophageal reflux 09/22/2003     Priority: Medium     Essential hypertension, benign 05/14/2001     Priority: Medium     Obesity 05/14/2001     Priority: Medium      Past Medical History:   Diagnosis Date     Personal history of other medical treatment (CODE)     3 normal childbirths     Rheumatoid arthritis (H)      Past Surgical History:   Procedure Laterality Date     COLONOSCOPY  2017    Dr. Fofana - 2 polyps found.  Told to f/u in 5 years.     Current Outpatient Medications  "  Medication Sig Dispense Refill     aspirin (ASA) 325 MG EC tablet Take 325 mg by mouth daily       B-D INSULIN SYRINGE MICROFINE 27G X 5/8\" 1 ML MISC 1 EACH BY DOES NOT APPLY ROUTE ONE TIME A WEEK. USE TO DRAW UP METHOTREXATE 25 MG ONCE WEEKLY.       BD SAFETYGLIDE SYRINGE/NEEDLE 27G X 5/8\" 1 ML MISC 1 EACH BY DOES NOT APPLY ROUTE ONE TIME A WEEK. USE TO DRAW UP METHOTREXATE 25 MG ONCE WEEKLY.       cyanocobalamin (VITAMIN B-12) 1000 MCG tablet Take 1,000 mcg by mouth daily       famotidine (PEPCID) 20 MG tablet Take 1 tablet (20 mg) by mouth 2 times daily 180 tablet 3     folic acid (FOLVITE) 1 MG tablet Take 1 mg by mouth daily       hydroxychloroquine (PLAQUENIL) 200 MG tablet Take 400 mg by mouth daily       methotrexate 50 MG/2ML injection Inject 1 mL Subcutaneous once a week       metoprolol succinate ER (TOPROL-XL) 50 MG 24 hr tablet Take 1 tablet (50 mg) by mouth daily (with dinner) 90 tablet 3     predniSONE (DELTASONE) 5 MG tablet Take 5 mg by mouth daily.  0     thiamine (B-1) 100 MG tablet Take 100 mg by mouth daily          No Known Allergies     Social History     Tobacco Use     Smoking status: Never Smoker     Smokeless tobacco: Never Used   Substance Use Topics     Alcohol use: No     Comment: Rarely     Family History   Problem Relation Age of Onset     Hypertension Father         Hypertension,67     Other - See Comments Maternal Grandmother         Stroke,85     Other - See Comments Paternal Grandmother         Alzheimer's 73     Breast Cancer Mother 46        Cancer-breast,now 68     Cerebrovascular Disease Mother         stroke with hemiparesis 2017     Diabetes Brother 16        Diabetes,now 41     History   Drug Use Unknown         Objective     /82 (BP Location: Right arm, Patient Position: Sitting, Cuff Size: Adult Regular)   Pulse 60   Temp 98.7  F (37.1  C) (Tympanic)   Resp 18   Ht 1.778 m (5' 10\")   Wt 134.3 kg (296 lb)   LMP  (LMP Unknown)   SpO2 99%   Breastfeeding No  "  BMI 42.47 kg/m      Physical Exam    GENERAL APPEARANCE: healthy, alert and no distress     EYES: EOMI, PERRL     HENT: ear canals and TM's normal and nose and mouth without ulcers or lesions     NECK: no adenopathy, no asymmetry, masses, or scars and thyroid normal to palpation     RESP: lungs clear to auscultation - no rales, rhonchi or wheezes     CV: regular rates and rhythm, normal S1 S2, no S3 or S4 and no murmur, click or rub     ABDOMEN:  soft, nontender, no HSM or masses and bowel sounds normal     MS: extremities normal- no gross deformities noted, no evidence of inflammation in joints, FROM in all extremities.     SKIN: no suspicious lesions or rashes     NEURO: Normal strength and tone, sensory exam grossly normal, mentation intact and speech normal     PSYCH: mentation appears normal. and affect normal/bright     LYMPHATICS: No cervical adenopathy    Recent Labs   Lab Test 11/12/21  1045 04/26/21  0000 03/03/21  0945 11/12/20  1553 10/28/20  1535 07/03/20  1418   HGB  --   --   --   --  13.9 14.0   PLT  --   --   --   --  273 212   INR  --   --   --   --   --  0.98     --  140   < > 137 134   POTASSIUM 4.4  --  3.9   < > 4.0 3.6   CR 0.94 0.84 1.06   < > 1.13 0.96    < > = values in this interval not displayed.        Diagnostics:      Results for orders placed or performed in visit on 02/15/22   Basic Metabolic Panel     Status: Abnormal   Result Value Ref Range    Sodium 135 134 - 144 mmol/L    Potassium 4.6 3.5 - 5.1 mmol/L    Chloride 98 98 - 107 mmol/L    Carbon Dioxide (CO2) 30 21 - 31 mmol/L    Anion Gap 7 3 - 14 mmol/L    Urea Nitrogen 15 7 - 25 mg/dL    Creatinine 1.09 0.60 - 1.20 mg/dL    Calcium 8.9 8.6 - 10.3 mg/dL    Glucose 96 70 - 105 mg/dL    GFR Estimate 59 (L) >60 mL/min/1.73m2        Order: [id number]   Ref Range & Units 1 d ago Resulting Agency   WBC 3.2 - 11.0 10*9/L 3.5  [facility] LABORATORY   HGB 11.2 - 15.5 g/dL 12.5  [facility] LABORATORY   HCT  34.3 - 46.0 % 38.3  [facility] LABORATORY   MCV 81.4 - 99.0 fL 81.3 Low   [facility] LABORATORY    - 375 10*9/L 138  [facility] LABORATORY   Platelet Slide Review  Adequate  [facility] CLINICAL LABORATORY   ANC Calculation 1.5 - 7.6 10**9/L 1.5  [facility] CLINICAL LABORATORY   Specimen Collected: 02/14/22 10:16 AM Last Resulted: 02/15/22  8:35 AM   Received From: [facility]  Result Received: 02/15/22  8:58 AM       EKG showed normal sinus rhythm with a rate of 83 beats per minute.  No ST or T wave changes noted.      Revised Cardiac Risk Index (RCRI):  The patient has the following serious cardiovascular risks for perioperative complications:   - No serious cardiac risks = 0 points     RCRI Interpretation: 0 points: Class I (very low risk - 0.4% complication rate)           Signed Electronically by: Ya Victoria MD  Copy of this evaluation report is provided to requesting physician.

## 2022-02-15 ENCOUNTER — OFFICE VISIT (OUTPATIENT)
Dept: FAMILY MEDICINE | Facility: OTHER | Age: 57
End: 2022-02-15
Attending: FAMILY MEDICINE
Payer: COMMERCIAL

## 2022-02-15 VITALS
SYSTOLIC BLOOD PRESSURE: 136 MMHG | BODY MASS INDEX: 41.95 KG/M2 | RESPIRATION RATE: 18 BRPM | WEIGHT: 293 LBS | HEIGHT: 70 IN | OXYGEN SATURATION: 99 % | HEART RATE: 60 BPM | DIASTOLIC BLOOD PRESSURE: 82 MMHG | TEMPERATURE: 98.7 F

## 2022-02-15 DIAGNOSIS — I10 ESSENTIAL HYPERTENSION, BENIGN: ICD-10-CM

## 2022-02-15 DIAGNOSIS — I80.00 PHLEBITIS OF SUPERFICIAL VEIN OF LOWER EXTREMITY, UNSPECIFIED LATERALITY: ICD-10-CM

## 2022-02-15 DIAGNOSIS — Z01.818 PREOP GENERAL PHYSICAL EXAM: Primary | ICD-10-CM

## 2022-02-15 LAB
ANION GAP SERPL CALCULATED.3IONS-SCNC: 7 MMOL/L (ref 3–14)
BUN SERPL-MCNC: 15 MG/DL (ref 7–25)
CALCIUM SERPL-MCNC: 8.9 MG/DL (ref 8.6–10.3)
CHLORIDE BLD-SCNC: 98 MMOL/L (ref 98–107)
CO2 SERPL-SCNC: 30 MMOL/L (ref 21–31)
CREAT SERPL-MCNC: 1.09 MG/DL (ref 0.6–1.2)
GFR SERPL CREATININE-BSD FRML MDRD: 59 ML/MIN/1.73M2
GLUCOSE BLD-MCNC: 96 MG/DL (ref 70–105)
POTASSIUM BLD-SCNC: 4.6 MMOL/L (ref 3.5–5.1)
SODIUM SERPL-SCNC: 135 MMOL/L (ref 134–144)

## 2022-02-15 PROCEDURE — 93000 ELECTROCARDIOGRAM COMPLETE: CPT | Performed by: INTERNAL MEDICINE

## 2022-02-15 PROCEDURE — 80048 BASIC METABOLIC PNL TOTAL CA: CPT | Mod: ZL | Performed by: FAMILY MEDICINE

## 2022-02-15 PROCEDURE — 99213 OFFICE O/P EST LOW 20 MIN: CPT | Performed by: FAMILY MEDICINE

## 2022-02-15 PROCEDURE — 36415 COLL VENOUS BLD VENIPUNCTURE: CPT | Mod: ZL | Performed by: FAMILY MEDICINE

## 2022-02-15 ASSESSMENT — PAIN SCALES - GENERAL: PAINLEVEL: MODERATE PAIN (5)

## 2022-02-15 ASSESSMENT — MIFFLIN-ST. JEOR: SCORE: 2007.9

## 2022-02-15 NOTE — NURSING NOTE
Chief Complaint   Patient presents with     Pre-Op Exam         Medication Reconciliation: complete    Allyssa Kay, LPN

## 2022-02-16 LAB
ATRIAL RATE - MUSE: 83 BPM
DIASTOLIC BLOOD PRESSURE - MUSE: NORMAL MMHG
INTERPRETATION ECG - MUSE: NORMAL
P AXIS - MUSE: 42 DEGREES
PR INTERVAL - MUSE: 154 MS
QRS DURATION - MUSE: 76 MS
QT - MUSE: 354 MS
QTC - MUSE: 415 MS
R AXIS - MUSE: 37 DEGREES
SYSTOLIC BLOOD PRESSURE - MUSE: NORMAL MMHG
T AXIS - MUSE: 28 DEGREES
VENTRICULAR RATE- MUSE: 83 BPM

## 2022-02-18 ENCOUNTER — HOSPITAL ENCOUNTER (OUTPATIENT)
Dept: ULTRASOUND IMAGING | Facility: OTHER | Age: 57
End: 2022-02-18
Attending: FAMILY MEDICINE
Payer: COMMERCIAL

## 2022-02-18 ENCOUNTER — OFFICE VISIT (OUTPATIENT)
Dept: FAMILY MEDICINE | Facility: OTHER | Age: 57
End: 2022-02-18
Attending: FAMILY MEDICINE
Payer: COMMERCIAL

## 2022-02-18 VITALS
RESPIRATION RATE: 18 BRPM | HEART RATE: 84 BPM | OXYGEN SATURATION: 98 % | DIASTOLIC BLOOD PRESSURE: 80 MMHG | TEMPERATURE: 97.6 F | SYSTOLIC BLOOD PRESSURE: 128 MMHG | BODY MASS INDEX: 42.47 KG/M2 | WEIGHT: 293 LBS

## 2022-02-18 DIAGNOSIS — I80.00 PHLEBITIS OF SUPERFICIAL VEIN OF LOWER EXTREMITY, UNSPECIFIED LATERALITY: ICD-10-CM

## 2022-02-18 DIAGNOSIS — M79.89 LEFT LEG SWELLING: ICD-10-CM

## 2022-02-18 DIAGNOSIS — I80.9 SUPERFICIAL PHLEBITIS: Primary | ICD-10-CM

## 2022-02-18 PROCEDURE — 99213 OFFICE O/P EST LOW 20 MIN: CPT | Performed by: FAMILY MEDICINE

## 2022-02-18 PROCEDURE — 93971 EXTREMITY STUDY: CPT | Mod: LT

## 2022-02-18 ASSESSMENT — ENCOUNTER SYMPTOMS
CHILLS: 0
SHORTNESS OF BREATH: 0
COUGH: 0
FEVER: 0
NERVOUS/ANXIOUS: 0

## 2022-02-18 ASSESSMENT — PAIN SCALES - GENERAL: PAINLEVEL: MODERATE PAIN (5)

## 2022-02-18 NOTE — Clinical Note
MARILEE Ferguson has bilateral superficial phlebitis again and has elected to treat with xarelto.  She wants to cancel her 3/3/2022 surgery and plans to reschedule later.  Thank you!

## 2022-02-18 NOTE — PROGRESS NOTES
SUBJECTIVE:   Nursing Notes:   Allyssa Kay LPN  2/18/2022 10:20 AM  Sign at exiting of workspace  Chief Complaint   Patient presents with     RECHECK     Pain and swelling worse in bilat lower legs.     Medication Reconciliation: complete    Allyssa Kay LPN        Phyllis VILLASENOR Neururer is a 56 year old female who presents to clinic today for worsening pain and swelling in both legs.  I had seen her on 2/8/22 and at that time, she had been dealing with bilateral superficial phlebitis.  She has remained on daily full dose aspirin 325 mg daily.  She is scheduled for foot surgery on 3/3/2022.  I had seen her earlier this week for a preop.  Since then, 2 days ago, she developed a sore, swollen area on her left calf.  She feels that she has developed another superficial phlebitis in this area.  She has another area behind her left knee that is sore and swollen.  Since I had saw her on 2/8/22, her right thigh superficial phlebitis feels like it has extended up to her right outer hip area.  No shortness of breath or chest pain.      HPI    I personally reviewed medications/allergies/history listed below:    Patient Active Problem List    Diagnosis Date Noted     Morbid obesity (H) 11/13/2021     Priority: Medium     Mitral valve annular calcification- mild with mild MI 11/25/2020     Priority: Medium     PVC's (premature ventricular contractions) 11/25/2020     Priority: Medium     SVT (supraventricular tachycardia) (H) 11/25/2020     Priority: Medium     Ventricular bigeminy 11/25/2020     Priority: Medium     Health care maintenance 10/22/2018     Priority: Medium     Pap NIL and HPV neg on 8/22/17 ([facility])  DEXA 2015 normal.       Colon polyp 09/20/2017     Priority: Medium     Overview:   Colonoscopy 9/20/17 with transverse colon polyp and sigmoid colon polyp (20 cm), both removed       Thrombosis of saphenous vein, left 11/16/2016     Priority: Medium     Disturbance in sleep behavior 07/02/2015     Priority:  Medium     Overview:   [facility] Sleep Study: Showed Mild upper airway resistance. No sleep apnea or limb movement disorder.   IMO Update       Family history of diabetes mellitus type II 04/07/2015     Priority: Medium     High risk medication use 09/10/2014     Priority: Medium     Overview:   HCQ, MTX, Humira       Cervical muscle pain 07/25/2013     Priority: Medium     Rheumatoid arthritis involving multiple sites with positive rheumatoid factor (H) 09/28/2012     Priority: Medium     Overview:   RF + 121, CCP neg; onset ~ 2011, Dx 2012  Doxy (9/8/2012- 3/2013)  Nuc Med Scan with symmetric uptake  Plaquenil trial 3/2013- ; MTX 7/2013- ; SSA 10/2014-12/2014; Humira 10/2015-   6/2014: VECTRA 42       Vitamin D deficiency 06/18/2012     Priority: Medium     Overview:   Level of 25 on 5/11/2012       Myalgia 05/11/2012     Priority: Medium     Enthesopathy of hip region 01/14/2011     Priority: Medium     Allergic rhinitis 11/02/2004     Priority: Medium     Asthma 09/22/2003     Priority: Medium     Overview:   Updated per 10/1/17 IMO import       Esophageal reflux 09/22/2003     Priority: Medium     Essential hypertension, benign 05/14/2001     Priority: Medium     Obesity 05/14/2001     Priority: Medium     Past Medical History:   Diagnosis Date     Personal history of other medical treatment (CODE)     3 normal childbirths     Rheumatoid arthritis (H)       Past Surgical History:   Procedure Laterality Date     COLONOSCOPY  2017    Dr. Fofana - 2 polyps found.  Told to f/u in 5 years.     Family History   Problem Relation Age of Onset     Hypertension Father         Hypertension,67     Other - See Comments Maternal Grandmother         Stroke,85     Other - See Comments Paternal Grandmother         Alzheimer's 73     Breast Cancer Mother 46        Cancer-breast,now 68     Cerebrovascular Disease Mother         stroke with hemiparesis 2017     Diabetes Brother 16        Diabetes,now 41     Social History     Tobacco  "Use     Smoking status: Never Smoker     Smokeless tobacco: Never Used   Substance Use Topics     Alcohol use: No     Comment: Rarely     Social History     Social History Narrative    , 3 adult children;     Homemaker; hobbies - reading, crafts.      - Demetrio         Current Outpatient Medications   Medication Sig Dispense Refill     aspirin (ASA) 325 MG EC tablet Take 325 mg by mouth daily       B-D INSULIN SYRINGE MICROFINE 27G X 5/8\" 1 ML MISC 1 EACH BY DOES NOT APPLY ROUTE ONE TIME A WEEK. USE TO DRAW UP METHOTREXATE 25 MG ONCE WEEKLY.       BD SAFETYGLIDE SYRINGE/NEEDLE 27G X 5/8\" 1 ML MISC 1 EACH BY DOES NOT APPLY ROUTE ONE TIME A WEEK. USE TO DRAW UP METHOTREXATE 25 MG ONCE WEEKLY.       cyanocobalamin (VITAMIN B-12) 1000 MCG tablet Take 1,000 mcg by mouth daily       famotidine (PEPCID) 20 MG tablet Take 1 tablet (20 mg) by mouth 2 times daily 180 tablet 3     folic acid (FOLVITE) 1 MG tablet Take 1 mg by mouth daily       hydroxychloroquine (PLAQUENIL) 200 MG tablet Take 400 mg by mouth daily       methotrexate 50 MG/2ML injection Inject 1 mL Subcutaneous once a week       metoprolol succinate ER (TOPROL-XL) 50 MG 24 hr tablet Take 1 tablet (50 mg) by mouth daily (with dinner) 90 tablet 3     predniSONE (DELTASONE) 5 MG tablet Take 5 mg by mouth daily.  0     rivaroxaban ANTICOAGULANT (XARELTO ANTICOAGULANT) 15 MG TABS tablet Take 1 tablet (15 mg) by mouth 2 times daily (with meals) , then decrease to 20 mg daily. 42 tablet 0     rivaroxaban ANTICOAGULANT (XARELTO ANTICOAGULANT) 20 MG TABS tablet Take 1 tablet (20 mg) by mouth daily (with dinner) 30 tablet 2     thiamine (B-1) 100 MG tablet Take 100 mg by mouth daily        No Known Allergies    Review of Systems   Constitutional: Negative for chills and fever.   Respiratory: Negative for cough and shortness of breath.    Cardiovascular: Positive for peripheral edema.   Psychiatric/Behavioral: Negative for mood changes. The patient is not " nervous/anxious.         OBJECTIVE:     /80 (BP Location: Right arm, Patient Position: Sitting, Cuff Size: Adult Regular)   Pulse 84   Temp 97.6  F (36.4  C) (Tympanic)   Resp 18   Wt 134.3 kg (296 lb)   LMP  (LMP Unknown)   SpO2 98%   Breastfeeding No   BMI 42.47 kg/m    Body mass index is 42.47 kg/m .  Physical Exam  Constitutional:       Appearance: Normal appearance.   HENT:      Head: Normocephalic.   Eyes:      Extraocular Movements: Extraocular movements intact.      Pupils: Pupils are equal, round, and reactive to light.   Cardiovascular:      Rate and Rhythm: Normal rate and regular rhythm.      Pulses: Normal pulses.      Heart sounds: Normal heart sounds. No murmur heard.  Pulmonary:      Effort: Pulmonary effort is normal.      Breath sounds: Normal breath sounds. No wheezing, rhonchi or rales.   Musculoskeletal:      Cervical back: Normal range of motion and neck supple.      Comments: Left lower extremity - tenderness over her posterior calf and posterior popliteal region.  Ropy vein palpable in this area.  No redness or warmth noted.   Neurological:      Mental Status: She is alert.   Psychiatric:         Mood and Affect: Mood normal.         Behavior: Behavior normal.           PHQ-2 Score:     PHQ-2 ( 1999 [employer]) 2/18/2022 2/8/2022   Q1: Little interest or pleasure in doing things 0 0   Q2: Feeling down, depressed or hopeless 0 0   PHQ-2 Score 0 0   PHQ-2 Total Score (12-17 Years)- Positive if 3 or more points; Administer PHQ-A if positive - -   Q1: Little interest or pleasure in doing things Not at all Not at all   Q2: Feeling down, depressed or hopeless Not at all Not at all   PHQ-2 Score 0 0         ACT Total Scores 6/18/2019 1/16/2020 9/21/2020   ACT TOTAL SCORE (Goal Greater than or Equal to 20) 25 25 25   In the past 12 months, how many times did you visit the emergency room for your asthma without being admitted to the hospital? 0 0 0   In the past 12 months, how many times  were you hospitalized overnight because of your asthma? 0 0 0         I personally reviewed results withpatient as listed below:   Diagnostic Test Results:  Results for orders placed or performed during the hospital encounter of 02/18/22   US Lower Extremity Venous Duplex Left     Status: None    Narrative    Procedure: US LOWER EXTREMITY VENOUS DUPLEX LEFT    HISTORY:  Left leg swelling; Phlebitis of superficial vein of lower  extremity, unspecified laterality.    TECHNIQUE: Grayscale, color Doppler and compression views of the deep  venous structures of the left lower extremity.    COMPARISON: 2/8/2022.    FINDINGS:    Interrogation of the deep venous structures from the left common  femoral vein through the veins of the proximal calf demonstrate normal  grayscale appearance, compressibility and augmentable color Doppler  flow.    A thrombosed superficial varicosity is present in the area of  identified concern.      Impression    IMPRESSION:     No evidence of left lower extremity DVT.      PENNY ROMAN MD         SYSTEM ID:  [id number]        ASSESSMENT/PLAN:       ICD-10-CM    1. Superficial phlebitis  I80.9 US Lower Extremity Venous Duplex Left     CT Abdomen Pelvis w Contrast     rivaroxaban ANTICOAGULANT (XARELTO ANTICOAGULANT) 15 MG TABS tablet     rivaroxaban ANTICOAGULANT (XARELTO ANTICOAGULANT) 20 MG TABS tablet       1.  ultrasound confirmed additional superficial phlebitis in her left leg new from 2/8/22, but no DVT.  She has been consistent with using her aspirin 325 mg daily and also wearing compression stockings.  She has used xarelto in the past for significant superficial phlebitis and wishes to go on this again.  She will start with 15 mg twice daily x 3 weeks, then 20 mg daily.  Recommend taking this for about a month.  If the phlebitis resolves before then, discussed that she should consider continuing it for about 2 weeks after to ensure that they don't come back again too quickly.   Recommend holding aspirin while on the xarelto.  Once off of it, she may go back to the aspirin.  She has seen Dr. Ojeda in the past for a hypercoagulability work up, which did not show any serious issues.  CT of the abdomen/pelvis ordered to ensure that there is no abnormal there that would be impairing return venous blood flow.  She is planning to delay her foot surgery that was scheduled for 3/3/2022 and will reschedule this at a later date.    Ya Victoria MD  [facility]

## 2022-02-18 NOTE — NURSING NOTE
Chief Complaint   Patient presents with     RECHECK     Pain and swelling worse in bilat lower legs.     Medication Reconciliation: complete    Allyssa Kay LPN

## 2022-02-25 ENCOUNTER — HOSPITAL ENCOUNTER (OUTPATIENT)
Dept: CT IMAGING | Facility: OTHER | Age: 57
Discharge: HOME | End: 2022-02-25
Attending: FAMILY MEDICINE | Admitting: FAMILY MEDICINE
Payer: COMMERCIAL

## 2022-02-25 DIAGNOSIS — K55.069 SUPERIOR MESENTERIC VEIN THROMBOSIS (H): ICD-10-CM

## 2022-02-25 DIAGNOSIS — K55.069 MESENTERIC VEIN THROMBOSIS (H): ICD-10-CM

## 2022-02-25 DIAGNOSIS — I80.9 SUPERFICIAL PHLEBITIS: Primary | ICD-10-CM

## 2022-02-25 DIAGNOSIS — I80.9 SUPERFICIAL PHLEBITIS: ICD-10-CM

## 2022-02-25 PROCEDURE — 74177 CT ABD & PELVIS W/CONTRAST: CPT

## 2022-02-25 PROCEDURE — 250N000011 HC RX IP 250 OP 636: Performed by: FAMILY MEDICINE

## 2022-02-25 RX ORDER — IOPAMIDOL 755 MG/ML
150 INJECTION, SOLUTION INTRAVASCULAR ONCE
Status: COMPLETED | OUTPATIENT
Start: 2022-02-25 | End: 2022-02-25

## 2022-02-25 RX ADMIN — IOPAMIDOL 150 ML: 755 INJECTION, SOLUTION INTRAVENOUS at 13:33

## 2022-03-01 ENCOUNTER — TELEPHONE (OUTPATIENT)
Dept: ONCOLOGY | Facility: OTHER | Age: 57
End: 2022-03-01
Payer: COMMERCIAL

## 2022-03-01 NOTE — TELEPHONE ENCOUNTER
Oncology/Hematology Care Coordination - Referral Review    Referred by:  Ya Victoria MD    Diagnosis:  Superficial phlebitis    Imaging:  Ultrasound left lower extremity 2/18/22, bilateral done 2/8/22.   Ct abdomen pelvis 2/25/22.    Lab:  2/14/22    Surgery/Biopsy:      Pathology:      Outside Records:      Per Ya Victoria MD Ultrasound confirmed additional superficial phlebitis in her left leg new from 2/8/22, but no DVT.  She has been consistent with using her aspirin 325 mg daily and also wearing compression stockings.  She has used xarelto in the past for significant superficial phlebitis and wishes to go on this again.  She will start with 15 mg twice daily x 3 weeks, then 20 mg daily.  Recommended to take this for about a month by Ya Victoria MD  Has seen Mary Ojeda MD 12/19/19 for superficial phlebitis and hypercoagulation workup was done and per Mary Ojeda MD follow up as needed only. Patient was to start folic acid,vitamin B12,vitamin B6 daily.     Augustina Bradshaw RN...........3/1/2022 10:55 AM      Augustina Bradshaw RN on 3/1/2022 at 10:50 AM

## 2022-03-28 ENCOUNTER — OFFICE VISIT (OUTPATIENT)
Dept: CARDIOLOGY | Facility: OTHER | Age: 57
End: 2022-03-28
Attending: NURSE PRACTITIONER
Payer: COMMERCIAL

## 2022-03-28 VITALS
HEART RATE: 68 BPM | DIASTOLIC BLOOD PRESSURE: 78 MMHG | RESPIRATION RATE: 16 BRPM | WEIGHT: 291 LBS | BODY MASS INDEX: 41.75 KG/M2 | TEMPERATURE: 97.4 F | SYSTOLIC BLOOD PRESSURE: 108 MMHG | OXYGEN SATURATION: 98 %

## 2022-03-28 DIAGNOSIS — I34.81 MITRAL ANNULAR CALCIFICATION: ICD-10-CM

## 2022-03-28 DIAGNOSIS — Z86.718 PERSONAL HISTORY OF DVT (DEEP VEIN THROMBOSIS): ICD-10-CM

## 2022-03-28 DIAGNOSIS — I82.91 RECURRENT THROMBUS: ICD-10-CM

## 2022-03-28 DIAGNOSIS — I49.3 PVC'S (PREMATURE VENTRICULAR CONTRACTIONS): ICD-10-CM

## 2022-03-28 DIAGNOSIS — R00.2 PALPITATIONS: Primary | ICD-10-CM

## 2022-03-28 DIAGNOSIS — I47.10 PAROXYSMAL SUPRAVENTRICULAR TACHYCARDIA (H): ICD-10-CM

## 2022-03-28 DIAGNOSIS — K55.069 MESENTERIC VEIN THROMBOSIS (H): ICD-10-CM

## 2022-03-28 DIAGNOSIS — I49.8 VENTRICULAR BIGEMINY: ICD-10-CM

## 2022-03-28 DIAGNOSIS — E78.5 MILD HYPERLIPIDEMIA: ICD-10-CM

## 2022-03-28 PROCEDURE — 99214 OFFICE O/P EST MOD 30 MIN: CPT | Performed by: NURSE PRACTITIONER

## 2022-03-28 ASSESSMENT — PAIN SCALES - GENERAL: PAINLEVEL: MILD PAIN (2)

## 2022-03-28 NOTE — PATIENT INSTRUCTIONS
You were seen by  MISSY Haynes CNP     1. Echocardiogram has been ordered for November of this year. You will be called to schedule this.  You will receive instructions for testing at that time.  You will be contacted with results.     2. Please come fasting to your appointment with Dr. Ojeda on Wednesday and have your lipid panel drawn at that appointment.      You will follow up with [facility] in 1 year, sooner if needed.     Please call the cardiology office with problems, questions, or concerns at [phone].    If you experience chest pain, chest pressure, chest tightness, shortness of breath, fainting, lightheadedness, nausea, vomiting, or other concerning symptoms, please report to the Emergency Department or call 911. These symptoms may be emergent, and best treated in the Emergency Department.     Cardiology Nurses  ERIKA Juarez, JAIRON  Ambreen, NORAH  [facility] ([address])  [phone]

## 2022-03-28 NOTE — NURSING NOTE
"Chief Complaint   Patient presents with     Follow Up     6 month follow up       Initial /78 (BP Location: Right arm, Patient Position: Sitting, Cuff Size: Adult Large)   Pulse 68   Temp 97.4  F (36.3  C) (Tympanic)   Resp 16   Wt 132 kg (291 lb)   LMP  (LMP Unknown)   SpO2 98%   BMI 41.75 kg/m   Estimated body mass index is 41.75 kg/m  as calculated from the following:    Height as of 2/15/22: 1.778 m (5' 10\").    Weight as of this encounter: 132 kg (291 lb).  Meds Reconciled: complete  Pt is on Aspirin  Pt is not on a Statin  PHQ and/or GUERITA reviewed. Pt referred to PCP/MH Provider as appropriate.    Ambreen Odonnell LPN      "

## 2022-03-28 NOTE — PROGRESS NOTES
[facility] HEART CARE   CARDIOLOGY PROGRESS NOTE    Phyllis Washington   [address] [facility] [id number]      Ya Victoria     Chief Complaint   Patient presents with     Follow Up     6 month follow up        HPI:   Ms. Washington is a 57 year old female who presents for cardiology follow-up to visit on 9/8/21 with history of palpitations and evidence of PSVT and symptomatic ectopy on cardiac monitoring. Patient has a PMH significant for hypertension venous phlebitis, venous thrombosis with negative coagulopathy work-up, cervicalgia, rheumatoid arthritis, vitamin D deficiency, chronic myalgias, allergic rhinitis, reactive airway, GERD and obesity.    In June 2020 patient was experiencing a new arm pain in right arm which prompted stress echocardiogram.  She underwent a dobutamine stress echocardiogram on 7/20/2020.  This was a normal dobutamine stress test without evidence of inducible ischemia.  No regional wall motion abnormalities at rest.  With stress, the LVEF increased from 55 to 60% up to greater than 65% and the LV size decreased appropriately.  No subjective symptoms of ischemia.  Resting EKG was normal, with initial infusion, there were frequent PVCs including bigeminy and at higher dobutamine doses, the PVCs resolved.  Minimal nonspecific ST and T wave changes were seen which were nondiagnostic.  Postinfusion, she had occasional PVCs and ST segments returned to normal baseline.     Given her increased palpitations patient completed ZIO (9/23/2020 through 10/7/2020) revealing predominant sinus rhythm with an average heart rate of 73 bpm, minimum heart rate 43 bpm maximum heart rate of 176 bpm.  There were 17 episodes of supraventricular tachycardia with the longest lasting 17.6 seconds.  No ventricular tachycardia.  No atrial fibrillation or atrial tachycardia.  No pauses or high-grade AV block.  Occasional isolated ventricular ectopy at a 1.6 burden, rare ventricular couplets and  triplets.  Rare supraventricular ectopy, less than 1% of all beats.  Ventricular bigeminy and ventricular trigeminy was present.  Her longest episode of ventricular bigeminy lasted 22.3 seconds, longest ventricular trigeminy episode lasting 1 minute and 17 seconds.  She had 66 patient triggered events and 50 diary entries.  She was mainly symptomatic with ventricular ectopy, ventricular trigeminy and ventricular bigeminy.  She was also symptomatic with SVE and symptomatic with 1 episode of SVT.    Occasional ventricular ectopy with no significant ectopy burden.  She has been significantly symptomatic with these ectopic beats and therefore, she was started on diltiazem 120 mg daily.  She did describe benefit with starting this medication, reduction in the palpitations.  She had been feeling palpitations more in the evening after dinner, although has been less than in the past prior to starting diltiazem for suppression.     At recent visit, she reported increased palpitations again.  Her diltiazem was uptitrated to 180 mg daily and since the last visit uptitrated again to 240 mg daily.  She denied much benefit with further up titration of her diltiazem.  Symptoms were reported as improved for the first couple weeks after up titration and then return back to normal.  Described daily palpitations. No increased dyspnea.  No lightheadedness, dizziness or syncope.  She does not report any recurrence of chest discomfort.  No edema. At her last visit it was recommended adjusting to beta blocker as first line for VE suppression, she will stop diltiazem and begin Toprol-XL 50 mg in the evening.    Patient has noted benefit since starting the Toprol XL vs the Diltiazem. Some breakthrough at times. Stopped the Rinvoq and not noticing any improvement in symptoms.  She is hopeful that her cholesterol levels will have improved after being off this Rinvoq.  She will return fasting for lipids at her next visit.  Her Plaquenil was  "increased to 400 mg daily when she discontinued the Rinvoq. Not much change since we adjusted the Toprol XL to 75 mg. Therefore, reduced back to the 50 mg tabs. Noticing symptoms of palpitations more with resting and when going to bed at night.     Interval History:  Continues on metoprolol 50 mg once daily around noon for palpations. She has not had any side effects with metoprolol.  She feels her symptoms of palpitations are improved and she is not noticing them as much as previously. Tends to notice most in the evening, when relaxing, when lying in bed. She denies any chest pain, dyspnea. Tolerating daily activity without interference from palpitations, dyspnea, exertional chest pain. No lightheaded, dizziness.     She has not had any lower extremity edema but she recently did have some swelling, discomfort of varicose veins. Superficial thrombophlebitis end of 2022. Currently on xarelto due to worsening of symptoms and CTA abdomen 2022- \"Thrombus within mesenteric veins including superior mesenteric vein just below the confluence with the splenic vein. No bowel wall thickening or definite findings of ischemia.\" She denies any acute abdominal pain, bowel changes. She denies any family history of bleeding or clotting disorders. She has been tolerating xarelto without any concerning symptoms of melena, hematochezia, hematemesis, epistaxis, hematomas. She is schedule to see hematology Dr. Ojeda on Wednesday.      IMAGING:     [facility],[place]  Echocardiography Laboratory  [address] [place] [id number]     Name: HUMBERTO MCBRIDE  MRN: [id number]  : 1965  Study Date: 2020 01:42 PM  Age: 55 yrs  Gender: Female  Patient Location: [facility]  Reason For Study: SVT (supraventricular tachycardia) (H), PVC's (premature  ventric  Ordering Physician: SANNA OWUSU  Referring Physician: SANNA OWUSU  Performed By: Deborah Riojas RDCS, T     BSA: 2.4 " m2  Height: 70 in  Weight: 287 lb  HR: 79  BP: 138/82 mmHg  _____________________________________________________________________________  __        Procedure  Echocardiogram with two-dimensional, color and spectral Doppler performed.  _____________________________________________________________________________  __        Interpretation Summary  Left ventricular function, chamber size, wall motion, and wall thickness are  normal.The EF is 55-60%.  Right ventricular function, chamber size, wall motion, and thickness are  normal.  _____________________________________________________________________________  __        Left Ventricle  Left ventricular function, chamber size, wall motion, and wall thickness are  normal.The EF is 55-60%. Left ventricular diastolic function is normal.     Right Ventricle  Right ventricular function, chamber size, wall motion, and thickness are  normal.     Atria  Both atria appear normal.     Mitral Valve  Mild mitral annular calcification is present. Trace mitral insufficiency is  present.        Aortic Valve  Aortic valve is normal in structure and function.     Tricuspid Valve  The tricuspid valve is normal. Trace tricuspid insufficiency is present. The  right ventricular systolic pressure is approximated at 19.0 mmHg plus the  right atrial pressure.     Pulmonic Valve  The pulmonic valve is normal.     Vessels  The thoracic aorta is normal. IVC diameter <2.1 cm collapsing >50% with sniff  suggests a normal RA pressure of 3 mmHg.     Pericardium  No pericardial effusion is present.        Compared to Previous Study  There is no prior study for direct comparison.  _____________________________________________________________________________  __  MMode/2D Measurements & Calculations  IVSd: 0.96 cm     LVIDd: 4.7 cm  LVIDs: 3.0 cm  LVPWd: 0.79 cm  FS: 35.5 %  LV mass(C)d: 138.2 grams  LV mass(C)dI: 56.8 grams/m2  Ao root diam: 3.7 cm  asc Aorta Diam: 3.3 cm  LVOT diam: 2.3 cm  LVOT area:  4.2 cm2  LA Volume (BP): 67.7 ml  LA Volume Index (BP): 27.9 ml/m2  RWT: 0.34           Doppler Measurements & Calculations  MV E max boom: 68.6 cm/sec  MV A max boom: 93.6 cm/sec  MV E/A: 0.73  MV dec slope: 262.0 cm/sec2  MV dec time: 0.26 sec  Ao V2 max: 114.0 cm/sec  Ao max P.0 mmHg  PRUDENCIO(V,D): 3.2 cm2  LV V1 max PG: 3.1 mmHg  LV V1 max: 87.7 cm/sec  LV V1 VTI: 20.2 cm  SV(LVOT): 83.9 ml  SI(LVOT): 34.5 ml/m2  TR max boom: 217.5 cm/sec  TR max P.0 mmHg  AV Boom Ratio (DI): 0.77  E/E' av.7  Lateral E/e': 6.4  Medial E/e': 9.0        _____________________________________________________________________________  __        Report approved by: Mickey Eugene 2020 02:39 PM  PAST MEDICAL HISTORY:   Past Medical History:   Diagnosis Date     Personal history of other medical treatment (CODE)     3 normal childbirths     Rheumatoid arthritis (H)           FAMILY HISTORY:   Family History   Problem Relation Age of Onset     Hypertension Father         Hypertension,67     Other - See Comments Maternal Grandmother         Stroke,85     Other - See Comments Paternal Grandmother         Alzheimer's 73     Breast Cancer Mother 46        Cancer-breast,now 68     Cerebrovascular Disease Mother         stroke with hemiparesis 2017     Diabetes Brother 16        Diabetes,now 41          PAST SURGICAL HISTORY:   Past Surgical History:   Procedure Laterality Date     COLONOSCOPY  2017    Dr. Fofana - 2 polyps found.  Told to f/u in 5 years.          SOCIAL HISTORY:   Social History     Socioeconomic History     Marital status:      Spouse name: Not on file     Number of children: Not on file     Years of education: Not on file     Highest education level: Not on file   Occupational History     Not on file   Social Needs     Financial resource strain: Not on file     Food insecurity     Worry: Not on file     Inability: Not on file     Transportation needs     Medical: Not on file     Non-medical: Not on  "file   Tobacco Use     Smoking status: Never Smoker     Smokeless tobacco: Never Used   Substance and Sexual Activity     Alcohol use: No     Frequency: Never     Comment: Rarely     Drug use: No     Sexual activity: Yes     Partners: Male     Birth control/protection: Rhythm   Lifestyle     Physical activity     Days per week: Not on file     Minutes per session: Not on file     Stress: Not on file   Relationships     Social connections     Talks on phone: Not on file     Gets together: Not on file     Attends [employer] service: Not on file     Active member of club or organization: Not on file     Attends meetings of clubs or organizations: Not on file     Relationship status: Not on file     Intimate partner violence     Fear of current or ex partner: Not on file     Emotionally abused: Not on file     Physically abused: Not on file     Forced sexual activity: Not on file   Other Topics Concern     Parent/sibling w/ CABG, MI or angioplasty before 65F 55M? Not Asked   Social History Narrative    , 3 adult children;     Homemaker; hobbies - reading, crafts.     Works at frame shop part-time.     Walks 3 miles a day     - Demetrio              CURRENT MEDICATIONS:   Current Outpatient Medications   Medication     aspirin (ASA) 325 MG EC tablet     B-D INSULIN SYRINGE MICROFINE 27G X 5/8\" 1 ML MISC     BD SAFETYGLIDE SYRINGE/NEEDLE 27G X 5/8\" 1 ML MISC     cyanocobalamin (VITAMIN B-12) 1000 MCG tablet     famotidine (PEPCID) 20 MG tablet     folic acid (FOLVITE) 1 MG tablet     hydroxychloroquine (PLAQUENIL) 200 MG tablet     methotrexate 50 MG/2ML injection     metoprolol succinate ER (TOPROL-XL) 50 MG 24 hr tablet     predniSONE (DELTASONE) 5 MG tablet     rivaroxaban ANTICOAGULANT (XARELTO ANTICOAGULANT) 20 MG TABS tablet     thiamine (B-1) 100 MG tablet     vitamin B6 (PYRIDOXINE) 200 MG tablet     No current facility-administered medications for this visit.         ALLERGIES:   No Known Allergies "       ROS:   CONSTITUTIONAL: No reported fever or chills. No changes in weight.  ENT: No visual disturbance, ear ache, epistaxis or sore throat.   CARDIOVASCULAR: No chest pain, chest pressure or chest discomfort.  Occasional palpitations, improved on Toprol-XL.  No increased edema, +history of venous insufficiency and recent anticoagulation for superficial thrombophlebitis and mesenteric thrombus  RESPIRATORY: Denies dyspnea, dyspnea on exertion. No cough, wheezing or hemoptysis.  No orthopnea or PND.  GI: No reported abdominal pain, melena or hematochezia.  : No reported hematuria or dysuria.   NEUROLOGICAL: No lightheadedness, dizziness, syncope, ataxia, paresthesias or weakness.   HEMATOLOGIC: No history of anemia. No bleeding or excessive bruising.  Positive for history of DVT.  MUSCULOSKELETAL: Positive for chronic joint pain with arthritis and chronic myalgias- follows with rheumatology  ENDOCRINOLOGIC: No temperature intolerance. No hair or skin changes.  SKIN: No abnormal rashes or sores, no unusual itching.  PSYCHIATRIC: No history of depression or anxiety. No changes in mood, feeling down or anxious.      PHYSICAL EXAM:   /78 (BP Location: Right arm, Patient Position: Sitting, Cuff Size: Adult Large)   Pulse 68   Temp 97.4  F (36.3  C) (Tympanic)   Resp 16   Wt 132 kg (291 lb)   LMP  (LMP Unknown)   SpO2 98%   BMI 41.75 kg/m    GENERAL: The patient is a well-developed, well-nourished, in no apparent distress.  HEENT: Head is normocephalic and atraumatic. Eyes are symmetrical with normal visual tracking. No icterus, no xanthelasmas. Nares appeared normal without nasal drainage. Mucous membranes are moist, no cyanosis.  NECK: Supple.  No carotid bruits, no JVD.  CHEST/ LUNGS: Lungs clear to auscultation, no rales, rhonchi or wheezes, no use of accessory muscles, no retractions, respirations unlabored and normal respiratory rate.   CARDIO: Regular rate and rhythm normal with S1 and S2, no S3  or S4 and no murmur, click or rub.  ABD: Abdomen is nondistended.   EXTREMITIES: No lower extremity edema  MUSCULOSKELETAL: No visible joint swelling.   NEUROLOGIC: Alert and oriented X3. Normal speech, gait and affect. No focal neurologic deficits.   SKIN: No jaundice. No rashes or visible skin lesions present. No ecchymosis.     LAB RESULTS:   Office Visit on 02/15/2022   Component Date Value Ref Range Status     Ventricular Rate 02/15/2022 83  BPM Final     Atrial Rate 02/15/2022 83  BPM Final     WV Interval 02/15/2022 154  ms Final     QRS Duration 02/15/2022 76  ms Final     QT 02/15/2022 354  ms Final     QTc 02/15/2022 415  ms Final     P Axis 02/15/2022 42  degrees Final     R AXIS 02/15/2022 37  degrees Final     T Axis 02/15/2022 28  degrees Final     Interpretation ECG 02/15/2022    Final                    Value:Sinus rhythm  Low voltage QRS  Borderline ECG  When compared with ECG of 28-OCT-2020 14:21,  No significant change was found  Confirmed by MD RYAN, TOM ([id number]) on 2/16/2022 8:32:44 AM       Sodium 02/15/2022 135  134 - 144 mmol/L Final     Potassium 02/15/2022 4.6  3.5 - 5.1 mmol/L Final     Chloride 02/15/2022 98  98 - 107 mmol/L Final     Carbon Dioxide (CO2) 02/15/2022 30  21 - 31 mmol/L Final     Anion Gap 02/15/2022 7  3 - 14 mmol/L Final     Urea Nitrogen 02/15/2022 15  7 - 25 mg/dL Final     Creatinine 02/15/2022 1.09  0.60 - 1.20 mg/dL Final     Calcium 02/15/2022 8.9  8.6 - 10.3 mg/dL Final     Glucose 02/15/2022 96  70 - 105 mg/dL Final     GFR Estimate 02/15/2022 59 (A) >60 mL/min/1.73m2 Final    Effective December 21, 2021 eGFRcr in adults is calculated using the 2021 CKD-EPI creatinine equation which includes age and gender (Malou schwartz al., NEJ, DOI: 10.1056/GGDKta1294955)           ASSESSMENT:   Phyllis VILLASENOR Felix presents for cardiology follow-up to visit on 9/8/21 with history of palpitations and evidence of PSVT and symptomatic ectopy on cardiac monitoring. Patient has a PMH  significant for hypertension venous phlebitis, venous thrombosis with negative coagulopathy work-up, cervicalgia, rheumatoid arthritis, vitamin D deficiency, chronic myalgias, allergic rhinitis, reactive airway, GERD and obesity.  Today, Phyllis continues on metoprolol 50 mg once daily around noon for palpations. She has not had any side effects with metoprolol.  She feels her symptoms of palpitations are improved and she is not noticing them as much as previously. She denies any chest pain, dyspnea. Tolerating daily activity without interference from palpitations, dyspnea, exertional chest pain. No lightheaded, dizziness or syncope. She will be seeing hematology for further work-up with history of venous thrombosis and identified mesenteric thrombosis, doing well on Xarelto oral anticoagulation at this time. No symptoms concerning for  mesenteric ischemia.     1. Palpitations  2. PVC's (premature ventricular contractions)  3. SVT (supraventricular tachycardia) (H)  4. Ventricular bigeminy  5. Mitral valve annular calcification- mild with mild MI  6. Mild hyperlipidemia  7. Mesenteric Vein thrombosis  8. Personal history of DVT    PLAN:   1. Patient stable on Toprol-XL 50 mg daily, good suppression with limited palpitations. MIKAYLAO previously reviewed with symptoms identified to be associated to ventricular ectopy and ventricular bigeminy.   2.  Previously reviewed dobutamine stress echo with no evidence of ischemia or infarct on imaging. Minimal nonspecific ST and T wave changes. Ventricular ectopy with early infusion and reduced at peak infusion.   3. Previously reviewed benign nature of her ectopies that she is feeling and SVT given episodes non sustained and no evidence of ischemia with ventricular ectopies. No anginal symptoms.  4. If continued significant symptoms despite use of AV ray blocking agents, may consider flecainide AAT for suppression of ectopy.  However, her ectopy is low burden.  This would be  primarily to treat symptoms.  5. She has avoided caffeine and no stimulants, no ETOH.  Denies any symptoms of sleep apnea which may contribute.  She does understand that her Plaquenil and methotrexate may be contributing to her level of palpitations and ectopy.  6. Fasting lipid panel with upcoming labs this week.  7. Continue with xarelto for recurrent venous thrombus and further upcoming work-up with hematology.   8. Repeat TTE in November, consider bubble study. No history of TIA or CVA.   8. Follow-up with cardiology in 12 months, certainly sooner if needed.    Thank you for allowing me to participate in the care of your patient. Please do not hesitate to contact me if you have any questions.       Chelly Ruffin, APRN CNP [facility]

## 2022-03-30 ENCOUNTER — ONCOLOGY VISIT (OUTPATIENT)
Dept: ONCOLOGY | Facility: OTHER | Age: 57
End: 2022-03-30
Attending: INTERNAL MEDICINE
Payer: COMMERCIAL

## 2022-03-30 VITALS
TEMPERATURE: 97.7 F | OXYGEN SATURATION: 99 % | BODY MASS INDEX: 41.09 KG/M2 | HEART RATE: 65 BPM | RESPIRATION RATE: 16 BRPM | WEIGHT: 287 LBS | HEIGHT: 70 IN | SYSTOLIC BLOOD PRESSURE: 128 MMHG | DIASTOLIC BLOOD PRESSURE: 88 MMHG

## 2022-03-30 DIAGNOSIS — K55.069 MESENTERIC VEIN THROMBOSIS (H): ICD-10-CM

## 2022-03-30 DIAGNOSIS — I80.9 SUPERFICIAL PHLEBITIS: ICD-10-CM

## 2022-03-30 DIAGNOSIS — K55.069 SUPERIOR MESENTERIC VEIN THROMBOSIS (H): ICD-10-CM

## 2022-03-30 DIAGNOSIS — E78.5 MILD HYPERLIPIDEMIA: ICD-10-CM

## 2022-03-30 LAB
ALBUMIN SERPL-MCNC: 4 G/DL (ref 3.5–5.7)
ALP SERPL-CCNC: 49 U/L (ref 34–104)
ALT SERPL W P-5'-P-CCNC: 31 U/L (ref 7–52)
ANION GAP SERPL CALCULATED.3IONS-SCNC: 6 MMOL/L (ref 3–14)
AST SERPL W P-5'-P-CCNC: 32 U/L (ref 13–39)
BASOPHILS # BLD AUTO: 0 10E3/UL (ref 0–0.2)
BASOPHILS NFR BLD AUTO: 1 %
BILIRUB SERPL-MCNC: 0.6 MG/DL (ref 0.3–1)
BUN SERPL-MCNC: 23 MG/DL (ref 7–25)
CALCIUM SERPL-MCNC: 9.7 MG/DL (ref 8.6–10.3)
CHLORIDE BLD-SCNC: 104 MMOL/L (ref 98–107)
CHOLEST SERPL-MCNC: 173 MG/DL
CO2 SERPL-SCNC: 28 MMOL/L (ref 21–31)
CREAT SERPL-MCNC: 1.03 MG/DL (ref 0.6–1.2)
D DIMER PPP FEU-MCNC: 0.45 UG/ML FEU (ref 0–0.5)
EOSINOPHIL # BLD AUTO: 0 10E3/UL (ref 0–0.7)
EOSINOPHIL NFR BLD AUTO: 1 %
ERYTHROCYTE [DISTWIDTH] IN BLOOD BY AUTOMATED COUNT: 16.3 % (ref 10–15)
ERYTHROCYTE [SEDIMENTATION RATE] IN BLOOD BY WESTERGREN METHOD: 13 MM/HR (ref 0–30)
FASTING STATUS PATIENT QL REPORTED: NORMAL
GFR SERPL CREATININE-BSD FRML MDRD: 63 ML/MIN/1.73M2
GLUCOSE BLD-MCNC: 91 MG/DL (ref 70–105)
HCT VFR BLD AUTO: 39.5 % (ref 35–47)
HDLC SERPL-MCNC: 68 MG/DL (ref 23–92)
HGB BLD-MCNC: 13 G/DL (ref 11.7–15.7)
IMM GRANULOCYTES # BLD: 0 10E3/UL
IMM GRANULOCYTES NFR BLD: 0 %
LDH SERPL L TO P-CCNC: 193 U/L (ref 140–271)
LDLC SERPL CALC-MCNC: 90 MG/DL
LYMPHOCYTES # BLD AUTO: 1.4 10E3/UL (ref 0.8–5.3)
LYMPHOCYTES NFR BLD AUTO: 45 %
MCH RBC QN AUTO: 26.8 PG (ref 26.5–33)
MCHC RBC AUTO-ENTMCNC: 32.9 G/DL (ref 31.5–36.5)
MCV RBC AUTO: 81 FL (ref 78–100)
MONOCYTES # BLD AUTO: 0.3 10E3/UL (ref 0–1.3)
MONOCYTES NFR BLD AUTO: 9 %
NEUTROPHILS # BLD AUTO: 1.4 10E3/UL (ref 1.6–8.3)
NEUTROPHILS NFR BLD AUTO: 44 %
NONHDLC SERPL-MCNC: 105 MG/DL
NRBC # BLD AUTO: 0 10E3/UL
NRBC BLD AUTO-RTO: 0 /100
PLATELET # BLD AUTO: 186 10E3/UL (ref 150–450)
POTASSIUM BLD-SCNC: 3.9 MMOL/L (ref 3.5–5.1)
PROT SERPL-MCNC: 7.8 G/DL (ref 6.4–8.9)
RBC # BLD AUTO: 4.85 10E6/UL (ref 3.8–5.2)
RHEUMATOID FACT SER NEPH-ACNC: 35 IU/ML
SODIUM SERPL-SCNC: 138 MMOL/L (ref 134–144)
TOTAL PROTEIN SERUM FOR ELP: 7.5 G/DL (ref 6.8–8.8)
TRIGL SERPL-MCNC: 74 MG/DL
WBC # BLD AUTO: 3.2 10E3/UL (ref 4–11)

## 2022-03-30 PROCEDURE — 36415 COLL VENOUS BLD VENIPUNCTURE: CPT | Mod: ZL | Performed by: INTERNAL MEDICINE

## 2022-03-30 PROCEDURE — 86147 CARDIOLIPIN ANTIBODY EA IG: CPT | Mod: ZL | Performed by: INTERNAL MEDICINE

## 2022-03-30 PROCEDURE — 84155 ASSAY OF PROTEIN SERUM: CPT | Mod: ZL,XU | Performed by: INTERNAL MEDICINE

## 2022-03-30 PROCEDURE — 83090 ASSAY OF HOMOCYSTEINE: CPT | Mod: ZL | Performed by: INTERNAL MEDICINE

## 2022-03-30 PROCEDURE — 86146 BETA-2 GLYCOPROTEIN ANTIBODY: CPT | Mod: ZL | Performed by: INTERNAL MEDICINE

## 2022-03-30 PROCEDURE — 99417 PROLNG OP E/M EACH 15 MIN: CPT | Performed by: INTERNAL MEDICINE

## 2022-03-30 PROCEDURE — 80053 COMPREHEN METABOLIC PANEL: CPT | Mod: ZL | Performed by: INTERNAL MEDICINE

## 2022-03-30 PROCEDURE — 86431 RHEUMATOID FACTOR QUANT: CPT | Mod: ZL | Performed by: INTERNAL MEDICINE

## 2022-03-30 PROCEDURE — 85670 THROMBIN TIME PLASMA: CPT | Mod: ZL | Performed by: INTERNAL MEDICINE

## 2022-03-30 PROCEDURE — 85652 RBC SED RATE AUTOMATED: CPT | Mod: ZL | Performed by: INTERNAL MEDICINE

## 2022-03-30 PROCEDURE — 84165 PROTEIN E-PHORESIS SERUM: CPT | Mod: 26 | Performed by: PATHOLOGY

## 2022-03-30 PROCEDURE — 85385 FIBRINOGEN ANTIGEN: CPT | Mod: ZL | Performed by: INTERNAL MEDICINE

## 2022-03-30 PROCEDURE — 83615 LACTATE (LD) (LDH) ENZYME: CPT | Mod: ZL | Performed by: INTERNAL MEDICINE

## 2022-03-30 PROCEDURE — 84165 PROTEIN E-PHORESIS SERUM: CPT | Mod: ZL | Performed by: PATHOLOGY

## 2022-03-30 PROCEDURE — 81291 MTHFR GENE: CPT | Mod: ZL | Performed by: INTERNAL MEDICINE

## 2022-03-30 PROCEDURE — 86334 IMMUNOFIX E-PHORESIS SERUM: CPT | Mod: ZL | Performed by: PATHOLOGY

## 2022-03-30 PROCEDURE — 86334 IMMUNOFIX E-PHORESIS SERUM: CPT | Mod: 26 | Performed by: PATHOLOGY

## 2022-03-30 PROCEDURE — 85379 FIBRIN DEGRADATION QUANT: CPT | Mod: ZL | Performed by: INTERNAL MEDICINE

## 2022-03-30 PROCEDURE — 80061 LIPID PANEL: CPT | Mod: ZL | Performed by: INTERNAL MEDICINE

## 2022-03-30 PROCEDURE — 85025 COMPLETE CBC W/AUTO DIFF WBC: CPT | Mod: ZL | Performed by: INTERNAL MEDICINE

## 2022-03-30 PROCEDURE — 99205 OFFICE O/P NEW HI 60 MIN: CPT | Performed by: INTERNAL MEDICINE

## 2022-03-30 PROCEDURE — 86038 ANTINUCLEAR ANTIBODIES: CPT | Mod: ZL | Performed by: INTERNAL MEDICINE

## 2022-03-30 PROCEDURE — 85300 ANTITHROMBIN III ACTIVITY: CPT | Mod: ZL | Performed by: INTERNAL MEDICINE

## 2022-03-30 PROCEDURE — 81240 F2 GENE: CPT | Mod: ZL | Performed by: INTERNAL MEDICINE

## 2022-03-30 ASSESSMENT — PAIN SCALES - GENERAL: PAINLEVEL: MILD PAIN (2)

## 2022-03-30 NOTE — NURSING NOTE
Chief Complaint   Patient presents with     Hematology     CONSULT:  Superficial phlebitis     Medication Reconciliation: complete    Yessenia Peng CMA ([facility])

## 2022-03-31 LAB
ALBUMIN SERPL ELPH-MCNC: 4 G/DL (ref 3.7–5.1)
ALPHA1 GLOB SERPL ELPH-MCNC: 0.3 G/DL (ref 0.2–0.4)
ALPHA2 GLOB SERPL ELPH-MCNC: 0.7 G/DL (ref 0.5–0.9)
AT III ACT/NOR PPP CHRO: 97 % (ref 85–135)
B-GLOBULIN SERPL ELPH-MCNC: 0.8 G/DL (ref 0.6–1)
B2 GLYCOPROT1 IGG SERPL IA-ACNC: <0.8 U/ML
B2 GLYCOPROT1 IGM SERPL IA-ACNC: <2.4 U/ML
CARDIOLIPIN IGG SER IA-ACNC: <2 GPL-U/ML
CARDIOLIPIN IGG SER IA-ACNC: NEGATIVE
CARDIOLIPIN IGM SER IA-ACNC: <2 MPL-U/ML
CARDIOLIPIN IGM SER IA-ACNC: NEGATIVE
GAMMA GLOB SERPL ELPH-MCNC: 1.7 G/DL (ref 0.7–1.6)
M PROTEIN SERPL ELPH-MCNC: 0.3 G/DL
PROT PATTERN SERPL ELPH-IMP: ABNORMAL
PROT PATTERN SERPL IFE-IMP: NORMAL
THROMBIN TIME: 18 SECONDS (ref 13–19)

## 2022-03-31 NOTE — PROGRESS NOTES
Visit Date: 03/30/2022    HEMATOLOGY/ONCOLOGY CONSULTATION    REASON FOR CONSULTATION:  Mesenteric vein thrombosis, rule out hypercoagulable state.    REQUESTING PHYSICIAN:  Dr. Victoria.    HISTORY OF PRESENT ILLNESS:  Mrs. Washington is a 57-year-old white female with history of recurrent superficial thrombophlebitis, status post ablation, a MTHFR DNA heterozygote on aspirin, who recently developed pain and swelling, worse in both lower legs.  She was seen by Dr. Victoria who noted right thigh superficial phlebitis that extended up to her right outer hip area.  As part of the workup, Dr. Victoria ordered bilateral venous Dopplers on 02/08/2022.  There was no evidence of lower extremity DVT, but there was interrogation of the deep venous structures and bilateral common femoral vein through the veins of the proximal calf, demonstrating normal appearance.  There was superficial venous thrombus identified within the varicosity in the medial right thigh.  These were repeated again on 02/18/2022 and the findings were there were thrombosed superficial varicosities present in the area.  The patient apparently had complaints of abdominal pain and CT abdomen and pelvis was ordered and this revealed that the patient had a new thrombus within a couple the mesenteric veins including the superior mesenteric vein just below the confluence of the splenic vein.  No bowel wall thickening, no definite findings of leg ischemia.  The patient was started on Xarelto by Dr. Victoria.  The patient also has been seen by Rheumatology, who ordered blood work including beta 2 glycoprotein IgM antibody, which was normal.  Phospholipid IgM antibody was elevated at 19.4.  Myeloperoxidase antibodies were negative.  Proteinase 3 IgG antibody was negative.  NARCISO was positive at 1:320.  We had seen the patient previously for evaluation of recurrent superficial thrombophlebitis back in 12/19/2019.  We had done her hypercoagulability  workup and she was found to be heterozygous for the MTHFR DNA C677T locus.  We recommended vitamin B6, B12, folic acid supplementation, continue aspirin.  Since then, she had an ablation and she said her symptoms had improved, but now has developed recurrent superficial thrombophlebitis as well as mesenteric vein thrombosis.  She says she still gets dull abdominal pain, but it has improved on Xarelto.  There is no family history of DVT.  There was a family history of cancer.  She is a nonsmoker.  She may get dyspnea on exertion.  No cough, hemoptysis, fevers, night sweats, weight loss.  She does have obesity.    PAST MEDICAL HISTORY:  As above, also history of mitral valve annular calcification, PVCs, SVTs, ventricular bigeminy, colon polyps, thrombosis of saphenous vein, family history of diabetes mellitus type 2, high risk medication use, cervical muscle pain, rheumatoid arthritis involving multiple sites with positive rheumatoid factor, vitamin D deficiency, myalgia, enthesopathy of hip region, allergic rhinitis, asthma, esophageal reflux, essential hypertension, obesity.     ALLERGIES:  SHE HAS NO KNOWN DRUG ALLERGIES.    CURRENT MEDICATIONS:  Include Xarelto 20 mg daily, Toprol-XL 50 mg daily, methotrexate 50 mg injection once a week, Pepcid 20 daily, prednisone 5 mg daily, folic acid 1 mg daily, Plaquenil 400 mg daily, aspirin 325 mg daily, vitamin B6 200 mg daily, vitamin B1 at 200 mg daily, vitamin B12 1000 mcg daily.     SOCIAL HISTORY:  Tobacco is negative.  Alcohol is negative.  She is a retired homemaker.    FAMILY HISTORY:  Mother with breast cancer.  Father with lung cancer.     REVIEW OF SYSTEMS:    CNS:  Negative for headache, change in mental status.  ENT:  Negative for hearing loss.  RESPIRATORY: Occasional dyspnea on exertion.  No cough, hemoptysis.  CARDIAC:  Negative for chest pain.  She gets occasional palpitations.  No orthopnea, PND, ankle edema.  GASTROINTESTINAL:  Negative for change in  bowel habits, bright red blood per rectum, hematemesis.  MUSCULOSKELETAL:  Significant for arthritic pain.  GENITOURINARY:  Negative for hematuria.  CONSTITUTIONAL:  Negative for fevers, night sweats, weight loss.  HEMATOLOGIC:  Negative for easy bruisability, gingival bleeding, epistaxis.    PHYSICAL EXAMINATION:    GENERAL:  She is an obese middle-aged white female in no acute distress.  ECOG performance status 0.  VITAL SIGNS:  Blood pressure 120/88, pulse 65, respirations 16, temperature 97.7.  HEENT:  Atraumatic, normocephalic.  Oropharynx nonerythematous.  LUNGS:  Clear to auscultation and percussion.  HEART:  Regular rhythm.  S1, S2 normal.  ABDOMEN:  Obese, soft, normoactive bowel sounds.  LYMPHATICS:  No cervical, supraclavicular, axillary or inguinal nodes.  EXTREMITIES:  Multiple varicosities in the right lower extremity below the knee in the popliteal region as well as the right thigh.    NEUROLOGIC:  Grossly nonfocal.    LABORATORY DATA:  CBC with white count 3.2, hemoglobin 13.0, hematocrit 39.5, platelet count 186, ANC is 1.4.  LFTs are normal.  LDH is normal.  D-dimer 0.45.    IMPRESSION:  Newly diagnosed mesenteric vein thrombosis in the setting of recurrent superficial thrombophlebitis.  The patient may have positive antiphospholipid syndrome.  We will repeat labs including MTHFR DNA ,homocysteine levels.        Mary Ojeda MD        D: 2022   T: 2022   MT: [id number]    Name:     HUMBERTO MCBRIDE  MRN:      [id number]        Account:    [id number]   :      1965           Visit Date: 2022     Document: [id number]

## 2022-04-01 LAB
ANA PAT SER IF-IMP: ABNORMAL
ANA SER QL IF: ABNORMAL
ANA TITR SER IF: ABNORMAL {TITER}

## 2022-04-04 LAB — FIBRINOGEN AG PPP IA-MCNC: 306 MG/DL

## 2022-04-05 ENCOUNTER — HOSPITAL ENCOUNTER (OUTPATIENT)
Dept: CT IMAGING | Facility: OTHER | Age: 57
Discharge: HOME | End: 2022-04-05
Attending: INTERNAL MEDICINE | Admitting: INTERNAL MEDICINE
Payer: COMMERCIAL

## 2022-04-05 DIAGNOSIS — K55.069 MESENTERIC VEIN THROMBOSIS (H): ICD-10-CM

## 2022-04-05 PROCEDURE — 250N000011 HC RX IP 250 OP 636: Performed by: INTERNAL MEDICINE

## 2022-04-05 PROCEDURE — 74177 CT ABD & PELVIS W/CONTRAST: CPT

## 2022-04-05 RX ORDER — IOPAMIDOL 755 MG/ML
150 INJECTION, SOLUTION INTRAVASCULAR ONCE
Status: COMPLETED | OUTPATIENT
Start: 2022-04-05 | End: 2022-04-05

## 2022-04-05 RX ADMIN — IOPAMIDOL 150 ML: 755 INJECTION, SOLUTION INTRAVENOUS at 17:02

## 2022-04-06 LAB — HCYS SERPL-SCNC: 5.6 UMOL/L (ref 4–12)

## 2022-04-14 ENCOUNTER — ONCOLOGY VISIT (OUTPATIENT)
Dept: ONCOLOGY | Facility: OTHER | Age: 57
End: 2022-04-14
Attending: INTERNAL MEDICINE
Payer: COMMERCIAL

## 2022-04-14 VITALS
BODY MASS INDEX: 41.61 KG/M2 | RESPIRATION RATE: 16 BRPM | DIASTOLIC BLOOD PRESSURE: 76 MMHG | TEMPERATURE: 96.9 F | SYSTOLIC BLOOD PRESSURE: 104 MMHG | HEART RATE: 63 BPM | WEIGHT: 290 LBS | OXYGEN SATURATION: 98 %

## 2022-04-14 DIAGNOSIS — D47.2 MGUS (MONOCLONAL GAMMOPATHY OF UNKNOWN SIGNIFICANCE): Primary | ICD-10-CM

## 2022-04-14 DIAGNOSIS — D68.59 PRIMARY HYPERCOAGULABLE STATE (H): ICD-10-CM

## 2022-04-14 DIAGNOSIS — K55.069 SUPERIOR MESENTERIC VEIN THROMBOSIS (H): ICD-10-CM

## 2022-04-14 LAB
ALBUMIN SERPL-MCNC: 4 G/DL (ref 3.5–5.7)
ALP SERPL-CCNC: 49 U/L (ref 34–104)
ALT SERPL W P-5'-P-CCNC: 26 U/L (ref 7–52)
ANION GAP SERPL CALCULATED.3IONS-SCNC: 6 MMOL/L (ref 3–14)
AST SERPL W P-5'-P-CCNC: 26 U/L (ref 13–39)
BASOPHILS # BLD AUTO: 0 10E3/UL (ref 0–0.2)
BASOPHILS NFR BLD AUTO: 1 %
BILIRUB SERPL-MCNC: 0.5 MG/DL (ref 0.3–1)
BUN SERPL-MCNC: 20 MG/DL (ref 7–25)
CALCIUM SERPL-MCNC: 9.4 MG/DL (ref 8.6–10.3)
CHLORIDE BLD-SCNC: 103 MMOL/L (ref 98–107)
CO2 SERPL-SCNC: 29 MMOL/L (ref 21–31)
CREAT SERPL-MCNC: 0.96 MG/DL (ref 0.6–1.2)
D DIMER PPP FEU-MCNC: 0.38 UG/ML FEU (ref 0–0.5)
EOSINOPHIL # BLD AUTO: 0 10E3/UL (ref 0–0.7)
EOSINOPHIL NFR BLD AUTO: 1 %
ERYTHROCYTE [DISTWIDTH] IN BLOOD BY AUTOMATED COUNT: 17.2 % (ref 10–15)
GFR SERPL CREATININE-BSD FRML MDRD: 68 ML/MIN/1.73M2
GLUCOSE BLD-MCNC: 96 MG/DL (ref 70–105)
HCT VFR BLD AUTO: 39.1 % (ref 35–47)
HGB BLD-MCNC: 12.5 G/DL (ref 11.7–15.7)
IMM GRANULOCYTES # BLD: 0 10E3/UL
IMM GRANULOCYTES NFR BLD: 0 %
LDH SERPL L TO P-CCNC: 198 U/L (ref 140–271)
LYMPHOCYTES # BLD AUTO: 1.4 10E3/UL (ref 0.8–5.3)
LYMPHOCYTES NFR BLD AUTO: 42 %
MCH RBC QN AUTO: 26.5 PG (ref 26.5–33)
MCHC RBC AUTO-ENTMCNC: 32 G/DL (ref 31.5–36.5)
MCV RBC AUTO: 83 FL (ref 78–100)
MONOCYTES # BLD AUTO: 0.3 10E3/UL (ref 0–1.3)
MONOCYTES NFR BLD AUTO: 9 %
NEUTROPHILS # BLD AUTO: 1.6 10E3/UL (ref 1.6–8.3)
NEUTROPHILS NFR BLD AUTO: 47 %
NRBC # BLD AUTO: 0 10E3/UL
NRBC BLD AUTO-RTO: 0 /100
PLATELET # BLD AUTO: 224 10E3/UL (ref 150–450)
POTASSIUM BLD-SCNC: 4 MMOL/L (ref 3.5–5.1)
PROT SERPL-MCNC: 7.5 G/DL (ref 6.4–8.9)
RBC # BLD AUTO: 4.72 10E6/UL (ref 3.8–5.2)
SODIUM SERPL-SCNC: 138 MMOL/L (ref 134–144)
WBC # BLD AUTO: 3.3 10E3/UL (ref 4–11)

## 2022-04-14 PROCEDURE — 83521 IG LIGHT CHAINS FREE EACH: CPT | Mod: ZL | Performed by: INTERNAL MEDICINE

## 2022-04-14 PROCEDURE — 82784 ASSAY IGA/IGD/IGG/IGM EACH: CPT | Mod: ZL | Performed by: INTERNAL MEDICINE

## 2022-04-14 PROCEDURE — 86334 IMMUNOFIX E-PHORESIS SERUM: CPT | Mod: 26

## 2022-04-14 PROCEDURE — 99215 OFFICE O/P EST HI 40 MIN: CPT | Performed by: INTERNAL MEDICINE

## 2022-04-14 PROCEDURE — 81240 F2 GENE: CPT | Mod: ZL | Performed by: INTERNAL MEDICINE

## 2022-04-14 PROCEDURE — 86334 IMMUNOFIX E-PHORESIS SERUM: CPT | Mod: ZL | Performed by: PATHOLOGY

## 2022-04-14 PROCEDURE — 84165 PROTEIN E-PHORESIS SERUM: CPT | Mod: ZL | Performed by: PATHOLOGY

## 2022-04-14 PROCEDURE — 84165 PROTEIN E-PHORESIS SERUM: CPT | Mod: 26

## 2022-04-14 PROCEDURE — 36415 COLL VENOUS BLD VENIPUNCTURE: CPT | Mod: ZL | Performed by: INTERNAL MEDICINE

## 2022-04-14 PROCEDURE — 84155 ASSAY OF PROTEIN SERUM: CPT | Mod: ZL,XU | Performed by: INTERNAL MEDICINE

## 2022-04-14 PROCEDURE — 81291 MTHFR GENE: CPT | Mod: ZL | Performed by: INTERNAL MEDICINE

## 2022-04-14 PROCEDURE — 83615 LACTATE (LD) (LDH) ENZYME: CPT | Mod: ZL | Performed by: INTERNAL MEDICINE

## 2022-04-14 PROCEDURE — 85379 FIBRIN DEGRADATION QUANT: CPT | Mod: ZL | Performed by: INTERNAL MEDICINE

## 2022-04-14 PROCEDURE — 85810 BLOOD VISCOSITY EXAMINATION: CPT | Mod: ZL | Performed by: INTERNAL MEDICINE

## 2022-04-14 PROCEDURE — 85025 COMPLETE CBC W/AUTO DIFF WBC: CPT | Mod: ZL | Performed by: INTERNAL MEDICINE

## 2022-04-14 PROCEDURE — 80053 COMPREHEN METABOLIC PANEL: CPT | Mod: ZL | Performed by: INTERNAL MEDICINE

## 2022-04-14 PROCEDURE — 82232 ASSAY OF BETA-2 PROTEIN: CPT | Mod: ZL | Performed by: INTERNAL MEDICINE

## 2022-04-14 PROCEDURE — 99417 PROLNG OP E/M EACH 15 MIN: CPT | Performed by: INTERNAL MEDICINE

## 2022-04-14 ASSESSMENT — PAIN SCALES - GENERAL: PAINLEVEL: NO PAIN (0)

## 2022-04-14 NOTE — PROGRESS NOTES
Visit Date: 04/14/2022    HISTORY OF PRESENT ILLNESS:  Ms. 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  We had seen the patient in consultation at the request of Dr. Victoria on 03/30/2022.  At that time, Mrs. Washington was a 57-year-old white female with history of recurrent superficial thrombophlebitis, status post ablation, MTHFR DNA heterozygote, on aspirin, who recently developed pain and swelling, worse in both lower legs.  She was seen by Dr. Victoria who noted right thigh superficial phlebitis extending up to the right lateral hip area. As part of the workup, Dr. Victoria ordered bilateral venous Dopplers 02/08/2022 There was no evidence of lower extremity DVT.  There was interrogation of deep venous structures and bilateral common femoral veins.  The veins of the proximal calf demonstrated a normal appearance.  There was a superficial venous thrombus identified within the varicosity of the medial right thigh.  These were repeated again on 02/18/2022 and the findings were there were thrombosed superficial varicosities present in the area.  The patient already had complaints of abdominal pain.  CT abdomen and pelvis was ordered.  This revealed the patient had a new thrombus within a couple of the mesenteric veins including the superior mesenteric vein just below the confluence of the splenic vein.  No bowel wall thickening, no definite findings of leg ischemia.  The patient was started on Xarelto by Dr. Victoria.  The patient also had been seen by rheumatology where blood work including beta 2 glycoprotein IgM antibody, which was normal.  Phospholipid IgM antibody was elevated at 19.4.  Myeloperoxidase antibodies were negative.  NARCISO was positive 120.  We had seen the patient previously for evaluation of recurrent superficial thrombophlebitis back on 12/19/2019.  We had ordered a hypercoagulability workup at that time and she was found to be  heterozygous for the MTHFR DNA C677T locus.  Recommended vitamin B6, B12, folic acid supplementation and to continue aspirin.  Since then, she had an ablation.  She said her symptoms had improved, but now has developed recurrent superficial thrombophlebitis as well as mesenteric vein thrombosis.  When we saw the patient, the plan was to continue anticoagulation and repeat the CT abdomen and pelvis.  This was repeated on 04/05/2022.  This included CT chest.  There was no evidence of residual or recurrent mesenteric vein thrombosis.  It had resolved.  There were no abnormalities in the lung fields.  Also, as part of the workup, we obtained serum protein electrophoresis and her M spike came back at 0.3 with a monoclonal IgG immunoglobulin lambda light chain type.  There is no family history of multiple myeloma.  We repeated a thrombophilia workup and there was no evidence of a thrombophilia including antithrombin III was negative.  Cardiolipin antibodies were negative.  Beta 2 glycoprotein antibodies were negative.  MTHFR DNA was not drawn.  Prothrombin 2 and factor V Leiden were not drawn.  Nonetheless, the patient says she is doing much better in terms of her abdominal pain.  She denies any leg swelling, shortness of breath, chest pain, bone pain.    PHYSICAL EXAMINATION:    GENERAL:  She is a middle-aged white female in no acute distress.  ECOG performance status is 0.  VITAL SIGNS:  Reveal blood pressure 104/76, pulse 60, respirations 16, temperature 96.9.  HEENT:  Atraumatic, normocephalic.  Oropharynx nonerythematous.  NECK:  Supple.  LUNGS:  Clear to auscultation and percussion.  HEART:  Regular rhythm.  S1, S2 normal.  ABDOMEN:  Soft, normoactive bowel sounds.  No mass, nontender.  LYMPHATICS:  No cervical, supraclavicular, axillary or inguinal nodes.  EXTREMITIES:  No edema.  NEUROLOGIC:  Nonfocal.    LABORATORY DATA:  Reveal as above.  CBC is white count 3.2, H and H 13.0 and 39.5.  Platelet count is 186.   Neutrophil count is 1.4.  LFTs are normal.  LDH is normal.  D-dimer was 0.45.  Sed rate was 13.  Protein electrophoresis revealed an M-spike of 0.3 with a monoclonal IgG immunoglobulin lambda light chain time.    IMPRESSION:  Mesenteric vein thrombosis, recurrent superficial thrombophlebitis. Thrombophilia workup has been essentially negative, although we would like to obtain a factor V Leiden, prothrombin 2, as well as a MTHFR DNA.  She does have monoclonal gammopathy or MGUS and this may be contributing to her thrombophilia.  Nonetheless, we would like to rule out myeloma by obtaining kappa lambda light chains, beta 2 microglobulin, serum viscosity, repeating serum protein electrophoresis as well as serum immunofixation.  Also will obtain a skeletal bone survey.  If she has evidence of lytic lesions and/or elevated serum kappa-lambda light chains, we may need to proceed with bone marrow aspiration biopsy to rule out multiple myeloma.  Otherwise, we will see the patient after the above workup.  She will continue anticoagulation for now.  She may need to be on anticoagulation indefinitely given her MGUS.    Seventy-two minutes were spent on this patient.  Time was spent reviewing labs, reviewing imaging results with the patient, performing a history and physical, documenting history and physical and ordering followup labs and scans.    Mary Ojeda MD        D: 2022   T: 2022   MT: [id number]    Name:     HUMBERTO MCBRIDE  MRN:      [id number]        Account:    [id number]   :      1965           Visit Date: 2022     Document: [id number]    cc:  Ya Victoria MD

## 2022-04-14 NOTE — NURSING NOTE
"Chief Complaint   Patient presents with     RECHECK     Superficial thrombophlebitis/ mesenteric vein thrombosis        Initial /76   Pulse 63   Temp 96.9  F (36.1  C) (Temporal)   Resp 16   Wt 131.5 kg (290 lb)   LMP  (LMP Unknown)   SpO2 98%   Breastfeeding No   BMI 41.61 kg/m   Estimated body mass index is 41.61 kg/m  as calculated from the following:    Height as of 3/30/22: 1.778 m (5' 10\").    Weight as of this encounter: 131.5 kg (290 lb).  Medication Reconciliation: complete    Radha Jules LPN  "

## 2022-04-15 LAB — TOTAL PROTEIN SERUM FOR ELP: 7.6 G/DL (ref 6.8–8.8)

## 2022-04-18 LAB
ALBUMIN SERPL ELPH-MCNC: 4.2 G/DL (ref 3.7–5.1)
ALPHA1 GLOB SERPL ELPH-MCNC: 0.3 G/DL (ref 0.2–0.4)
ALPHA2 GLOB SERPL ELPH-MCNC: 0.7 G/DL (ref 0.5–0.9)
B-GLOBULIN SERPL ELPH-MCNC: 0.9 G/DL (ref 0.6–1)
B2 MICROGLOB TUMOR MARKER SER-MCNC: 3.2 MG/L
GAMMA GLOB SERPL ELPH-MCNC: 1.6 G/DL (ref 0.7–1.6)
IGA SERPL-MCNC: 281 MG/DL (ref 84–499)
IGG SERPL-MCNC: 1546 MG/DL (ref 610–1616)
IGM SERPL-MCNC: 139 MG/DL (ref 35–242)
KAPPA LC FREE SER-MCNC: 4.57 MG/DL (ref 0.33–1.94)
KAPPA LC FREE/LAMBDA FREE SER NEPH: 2.19 {RATIO} (ref 0.26–1.65)
LAMBDA LC FREE SERPL-MCNC: 2.09 MG/DL (ref 0.57–2.63)
M PROTEIN SERPL ELPH-MCNC: 0.2 G/DL
PROT PATTERN SERPL ELPH-IMP: ABNORMAL
PROT PATTERN SERPL IFE-IMP: NORMAL
VISC SER: 1.7 CP (ref 1.4–1.8)

## 2022-05-17 ENCOUNTER — NURSE TRIAGE (OUTPATIENT)
Dept: FAMILY MEDICINE | Facility: OTHER | Age: 57
End: 2022-05-17
Payer: COMMERCIAL

## 2022-05-17 ENCOUNTER — HOSPITAL ENCOUNTER (EMERGENCY)
Facility: OTHER | Age: 57
Discharge: HOME | End: 2022-05-17
Attending: STUDENT IN AN ORGANIZED HEALTH CARE EDUCATION/TRAINING PROGRAM | Admitting: STUDENT IN AN ORGANIZED HEALTH CARE EDUCATION/TRAINING PROGRAM
Payer: COMMERCIAL

## 2022-05-17 ENCOUNTER — APPOINTMENT (OUTPATIENT)
Dept: CT IMAGING | Facility: OTHER | Age: 57
End: 2022-05-17
Attending: STUDENT IN AN ORGANIZED HEALTH CARE EDUCATION/TRAINING PROGRAM
Payer: COMMERCIAL

## 2022-05-17 VITALS
SYSTOLIC BLOOD PRESSURE: 119 MMHG | HEART RATE: 57 BPM | BODY MASS INDEX: 41.32 KG/M2 | OXYGEN SATURATION: 100 % | TEMPERATURE: 97.8 F | WEIGHT: 288 LBS | DIASTOLIC BLOOD PRESSURE: 79 MMHG | RESPIRATION RATE: 15 BRPM

## 2022-05-17 DIAGNOSIS — R10.84 ABDOMINAL PAIN, GENERALIZED: ICD-10-CM

## 2022-05-17 LAB
ALBUMIN SERPL-MCNC: 3.9 G/DL (ref 3.5–5.7)
ALBUMIN UR-MCNC: NEGATIVE MG/DL
ALP SERPL-CCNC: 54 U/L (ref 34–104)
ALT SERPL W P-5'-P-CCNC: 37 U/L (ref 7–52)
ANION GAP SERPL CALCULATED.3IONS-SCNC: 6 MMOL/L (ref 3–14)
APPEARANCE UR: CLEAR
AST SERPL W P-5'-P-CCNC: 34 U/L (ref 13–39)
BASOPHILS # BLD AUTO: 0 10E3/UL (ref 0–0.2)
BASOPHILS NFR BLD AUTO: 0 %
BILIRUB SERPL-MCNC: 0.4 MG/DL (ref 0.3–1)
BILIRUB UR QL STRIP: NEGATIVE
BUN SERPL-MCNC: 19 MG/DL (ref 7–25)
CALCIUM SERPL-MCNC: 9.4 MG/DL (ref 8.6–10.3)
CHLORIDE BLD-SCNC: 102 MMOL/L (ref 98–107)
CO2 SERPL-SCNC: 29 MMOL/L (ref 21–31)
COLOR UR AUTO: ABNORMAL
CREAT SERPL-MCNC: 1.1 MG/DL (ref 0.6–1.2)
EOSINOPHIL # BLD AUTO: 0 10E3/UL (ref 0–0.7)
EOSINOPHIL NFR BLD AUTO: 1 %
ERYTHROCYTE [DISTWIDTH] IN BLOOD BY AUTOMATED COUNT: 17.2 % (ref 10–15)
GFR SERPL CREATININE-BSD FRML MDRD: 58 ML/MIN/1.73M2
GLUCOSE BLD-MCNC: 92 MG/DL (ref 70–105)
GLUCOSE UR STRIP-MCNC: NEGATIVE MG/DL
HCT VFR BLD AUTO: 39.4 % (ref 35–47)
HGB BLD-MCNC: 12.6 G/DL (ref 11.7–15.7)
HGB UR QL STRIP: NEGATIVE
IMM GRANULOCYTES # BLD: 0 10E3/UL
IMM GRANULOCYTES NFR BLD: 0 %
KETONES UR STRIP-MCNC: NEGATIVE MG/DL
LACTATE SERPL-SCNC: 0.9 MMOL/L (ref 0.7–2)
LEUKOCYTE ESTERASE UR QL STRIP: NEGATIVE
LIPASE SERPL-CCNC: 20 U/L (ref 11–82)
LYMPHOCYTES # BLD AUTO: 1.5 10E3/UL (ref 0.8–5.3)
LYMPHOCYTES NFR BLD AUTO: 33 %
MCH RBC QN AUTO: 26.6 PG (ref 26.5–33)
MCHC RBC AUTO-ENTMCNC: 32 G/DL (ref 31.5–36.5)
MCV RBC AUTO: 83 FL (ref 78–100)
MONOCYTES # BLD AUTO: 0.4 10E3/UL (ref 0–1.3)
MONOCYTES NFR BLD AUTO: 8 %
MUCOUS THREADS #/AREA URNS LPF: PRESENT /LPF
NEUTROPHILS # BLD AUTO: 2.6 10E3/UL (ref 1.6–8.3)
NEUTROPHILS NFR BLD AUTO: 58 %
NITRATE UR QL: NEGATIVE
NRBC # BLD AUTO: 0 10E3/UL
NRBC BLD AUTO-RTO: 0 /100
PH UR STRIP: 6 [PH] (ref 5–9)
PLATELET # BLD AUTO: 228 10E3/UL (ref 150–450)
POTASSIUM BLD-SCNC: 3.7 MMOL/L (ref 3.5–5.1)
PROT SERPL-MCNC: 7.6 G/DL (ref 6.4–8.9)
RBC # BLD AUTO: 4.74 10E6/UL (ref 3.8–5.2)
RBC URINE: 1 /HPF
SODIUM SERPL-SCNC: 137 MMOL/L (ref 134–144)
SP GR UR STRIP: 1.02 (ref 1–1.03)
UROBILINOGEN UR STRIP-MCNC: NORMAL MG/DL
WBC # BLD AUTO: 4.5 10E3/UL (ref 4–11)
WBC URINE: 1 /HPF

## 2022-05-17 PROCEDURE — 83605 ASSAY OF LACTIC ACID: CPT | Performed by: STUDENT IN AN ORGANIZED HEALTH CARE EDUCATION/TRAINING PROGRAM

## 2022-05-17 PROCEDURE — 99283 EMERGENCY DEPT VISIT LOW MDM: CPT | Performed by: STUDENT IN AN ORGANIZED HEALTH CARE EDUCATION/TRAINING PROGRAM

## 2022-05-17 PROCEDURE — 80053 COMPREHEN METABOLIC PANEL: CPT | Performed by: STUDENT IN AN ORGANIZED HEALTH CARE EDUCATION/TRAINING PROGRAM

## 2022-05-17 PROCEDURE — 74177 CT ABD & PELVIS W/CONTRAST: CPT

## 2022-05-17 PROCEDURE — 81001 URINALYSIS AUTO W/SCOPE: CPT | Performed by: STUDENT IN AN ORGANIZED HEALTH CARE EDUCATION/TRAINING PROGRAM

## 2022-05-17 PROCEDURE — 85014 HEMATOCRIT: CPT | Performed by: STUDENT IN AN ORGANIZED HEALTH CARE EDUCATION/TRAINING PROGRAM

## 2022-05-17 PROCEDURE — 99285 EMERGENCY DEPT VISIT HI MDM: CPT | Mod: 25 | Performed by: STUDENT IN AN ORGANIZED HEALTH CARE EDUCATION/TRAINING PROGRAM

## 2022-05-17 PROCEDURE — 83690 ASSAY OF LIPASE: CPT | Performed by: STUDENT IN AN ORGANIZED HEALTH CARE EDUCATION/TRAINING PROGRAM

## 2022-05-17 PROCEDURE — 250N000011 HC RX IP 250 OP 636: Performed by: STUDENT IN AN ORGANIZED HEALTH CARE EDUCATION/TRAINING PROGRAM

## 2022-05-17 PROCEDURE — 99285 EMERGENCY DEPT VISIT HI MDM: CPT | Performed by: STUDENT IN AN ORGANIZED HEALTH CARE EDUCATION/TRAINING PROGRAM

## 2022-05-17 RX ORDER — IOPAMIDOL 755 MG/ML
150 INJECTION, SOLUTION INTRAVASCULAR ONCE
Status: COMPLETED | OUTPATIENT
Start: 2022-05-17 | End: 2022-05-17

## 2022-05-17 RX ADMIN — IOPAMIDOL 150 ML: 755 INJECTION, SOLUTION INTRAVENOUS at 16:47

## 2022-05-17 NOTE — ED TRIAGE NOTES
Patient was working in her garden bending over and started having abdominal pain in her lower abdomen near the umbilicus.  States she was diagnosed with blood clots in her abdomen in February and is currently taking Xarelto.     Triage Assessment     Row Name 05/17/22 1600       Triage Assessment (Adult)    Airway WDL WDL       Respiratory WDL    Respiratory WDL WDL       Skin Circulation/Temperature WDL    Skin Circulation/Temperature WDL WDL       Cardiac WDL    Cardiac WDL WDL       Peripheral/Neurovascular WDL    Peripheral Neurovascular WDL WDL       Cognitive/Neuro/Behavioral WDL    Cognitive/Neuro/Behavioral WDL WDL

## 2022-05-17 NOTE — DISCHARGE INSTRUCTIONS
No evidence of recurrent thrombosis on CT scan, or any other acute problems identified. Drink plenty of water and eat a well-balanced diet as tolerated. Take tylenol 1,000 mg (2 extra strength tablets) three times daily as needed for pain.    Follow up with your doctor in clinic in 2 days for a recheck if not improving.    Come back to the ER if worsening symptoms or any other acute concerns.

## 2022-05-17 NOTE — TELEPHONE ENCOUNTER
Provider Response to 2nd Level Triage Request    I have reviewed the RN documentation. My recommendation is:  Refer to ED.    Ya Victoria MD

## 2022-05-17 NOTE — TELEPHONE ENCOUNTER
"S-(situation): patient calling and states that for about 2 weeks now she has been feeling abdomen pain coming back.    B-(background): patient is on Xarelto for blood clots in abdomen    A-(assessment): patient rates abdomen pain at about 5-6/10 and has been constant for more than 2 hours.  Patient states is wondering if it is another blood clot and need another CT.      R-(recommendations): informed patient will route message to Dr. Gelacio Shirley for review and consideration..    Giulia Saha RN on 5/17/2022 at 3:02 PM    Reason for Disposition    Constant abdominal pain lasting > 2 hours    Additional Information    Negative: Passed out (i.e., fainted, collapsed and was not responding)    Negative: Shock suspected (e.g., cold/pale/clammy skin, too weak to stand, low BP, rapid pulse)    Negative: Sounds like a life-threatening emergency to the triager    Negative: Chest pain    Negative: Pain is mainly in upper abdomen (if needed ask: 'is it mainly above the belly button?')    Negative: Abdominal pain and pregnant > 20 weeks    Negative: Abdominal pain and pregnant < 20 weeks    Negative: SEVERE abdominal pain (e.g., excruciating)    Negative: Vomiting red blood or black (coffee ground) material    Negative: Bloody, black, or tarry bowel movements (Exception: chronic-unchanged black-grey bowel movements and is taking iron pills or Pepto-bismol)    Answer Assessment - Initial Assessment Questions  1. LOCATION: \"Where does it hurt?\"       Waistline down towards belly button  2. RADIATION: \"Does the pain shoot anywhere else?\" (e.g., chest, back)      denies  3. ONSET: \"When did the pain begin?\" (e.g., minutes, hours or days ago)       About 2 weeks  4. SUDDEN: \"Gradual or sudden onset?\"      gradual  5. PATTERN \"Does the pain come and go, or is it constant?\"     - If constant: \"Is it getting better, staying the same, or worsening?\"       (Note: Constant means the pain never goes away completely; most serious pain " "is constant and it progresses)      - If intermittent: \"How long does it last?\" \"Do you have pain now?\"      (Note: Intermittent means the pain goes away completely between bouts)      Comes and goes, dull ache, different every time  6. SEVERITY: \"How bad is the pain?\"  (e.g., Scale 1-10; mild, moderate, or severe)    - MILD (1-3): doesn't interfere with normal activities, abdomen soft and not tender to touch     - MODERATE (4-7): interferes with normal activities or awakens from sleep, tender to touch     - SEVERE (8-10): excruciating pain, doubled over, unable to do any normal activities       5-6  7. RECURRENT SYMPTOM: \"Have you ever had this type of abdominal pain before?\" If so, ask: \"When was the last time?\" and \"What happened that time?\"       Yes, patient had blood clot  8. CAUSE: \"What do you think is causing the abdominal pain?\"      Wondering if blood clots  9. RELIEVING/AGGRAVATING FACTORS: \"What makes it better or worse?\" (e.g., movement, antacids, bowel movement)      Tylenol helps  10. OTHER SYMPTOMS: \"Has there been any vomiting, diarrhea, constipation, or urine problems?\"        denies  11. PREGNANCY: \"Is there any chance you are pregnant?\" \"When was your last menstrual period?\"        n/a    Protocols used: ABDOMINAL PAIN - FEMALE-A-OH    "

## 2022-05-17 NOTE — TELEPHONE ENCOUNTER
Provider Recommendation Follow Up:   Reached patient/caregiver. Informed of provider's recommendations. Patient verbalized understanding and agrees with the plan.     Giulia Saha RN on 5/17/2022 at 3:21 PM

## 2022-05-17 NOTE — TELEPHONE ENCOUNTER
Had clots in abdomin and you put her medication-has been having pain again and wonders if you should up the med or if she should have a CT.

## 2022-05-17 NOTE — ED PROVIDER NOTES
"  History     Chief Complaint   Patient presents with     Abdominal Pain     HPI  Phyllis Washington is a 57 year old female with PMH notable for MGUS, superficial phlebitis and thrombosis of mesenteric vein who presents for abdominal pain. She localizes the pain broadly across the umbilicus from side to side but slightly worse on the right. Patient notes that the pain started about 2-3 weeks ago but got significantly worse today after working in her garden. She has had intermittent abdominal pain following \"blood clots in her belly\" that developed around February. Between that presentation and a few weeks ago, she felt back to normal. Since then, aside from the intermittent pain, the only symptom she has noted is mildly decreased appetite. She has not had other sick symptoms including nausea, vomiting, fevers, chest pain, shortness of breath, bowel or bladder symptoms. No one around her has been sick. Unaware of inciting factor of most recently etiology      Allergies:  No Known Allergies    Problem List:    Patient Active Problem List    Diagnosis Date Noted     Morbid obesity (H) 11/13/2021     Priority: Medium     Mitral valve annular calcification- mild with mild MI 11/25/2020     Priority: Medium     PVC's (premature ventricular contractions) 11/25/2020     Priority: Medium     SVT (supraventricular tachycardia) (H) 11/25/2020     Priority: Medium     Ventricular bigeminy 11/25/2020     Priority: Medium     Health care maintenance 10/22/2018     Priority: Medium     Pap NIL and HPV neg on 8/22/17 ([facility])  DEXA 2015 normal.       Colon polyp 09/20/2017     Priority: Medium     Overview:   Colonoscopy 9/20/17 with transverse colon polyp and sigmoid colon polyp (20 cm), both removed       Thrombosis of saphenous vein, left 11/16/2016     Priority: Medium     Disturbance in sleep behavior 07/02/2015     Priority: Medium     Overview:   [facility] Sleep Study: Showed Mild upper airway resistance. No sleep apnea or " limb movement disorder.   IMO Update       Family history of diabetes mellitus type II 04/07/2015     Priority: Medium     High risk medication use 09/10/2014     Priority: Medium     Overview:   HCQ, MTX, Humira       Cervical muscle pain 07/25/2013     Priority: Medium     Rheumatoid arthritis involving multiple sites with positive rheumatoid factor (H) 09/28/2012     Priority: Medium     Overview:   RF + 121, CCP neg; onset ~ 2011, Dx 2012  Doxy (9/8/2012- 3/2013)  Nuc Med Scan with symmetric uptake  Plaquenil trial 3/2013- ; MTX 7/2013- ; SSA 10/2014-12/2014; Humira 10/2015-   6/2014: VECTRA 42       Vitamin D deficiency 06/18/2012     Priority: Medium     Overview:   Level of 25 on 5/11/2012       Myalgia 05/11/2012     Priority: Medium     Enthesopathy of hip region 01/14/2011     Priority: Medium     Allergic rhinitis 11/02/2004     Priority: Medium     Asthma 09/22/2003     Priority: Medium     Overview:   Updated per 10/1/17 IMO import       Esophageal reflux 09/22/2003     Priority: Medium     Essential hypertension, benign 05/14/2001     Priority: Medium     Obesity 05/14/2001     Priority: Medium        Past Medical History:    Past Medical History:   Diagnosis Date     Personal history of other medical treatment (CODE)      Rheumatoid arthritis (H)        Past Surgical History:    Past Surgical History:   Procedure Laterality Date     COLONOSCOPY  2017    Dr. Fofana - 2 polyps found.  Told to f/u in 5 years.       Family History:    Family History   Problem Relation Age of Onset     Hypertension Father         Hypertension,67     Other - See Comments Maternal Grandmother         Stroke,85     Other - See Comments Paternal Grandmother         Alzheimer's 73     Breast Cancer Mother 46        Cancer-breast,now 68     Cerebrovascular Disease Mother         stroke with hemiparesis 2017     Diabetes Brother 16        Diabetes,now 41       Social History:  Marital Status:   [2]  Social History  "    Tobacco Use     Smoking status: Passive Smoke Exposure - Never Smoker     Smokeless tobacco: Never Used     Tobacco comment: In childhood home   Vaping Use     Vaping Use: Never used   Substance Use Topics     Alcohol use: Yes     Comment: 1 per month     Drug use: Never        Medications:    cyanocobalamin (VITAMIN B-12) 1000 MCG tablet  famotidine (PEPCID) 20 MG tablet  folic acid (FOLVITE) 1 MG tablet  hydroxychloroquine (PLAQUENIL) 200 MG tablet  methotrexate 50 MG/2ML injection  metoprolol succinate ER (TOPROL-XL) 50 MG 24 hr tablet  rivaroxaban ANTICOAGULANT (XARELTO ANTICOAGULANT) 20 MG TABS tablet  thiamine (B-1) 100 MG tablet  vitamin B6 (PYRIDOXINE) 200 MG tablet  B-D INSULIN SYRINGE MICROFINE 27G X 5/8\" 1 ML MISC  BD SAFETYGLIDE SYRINGE/NEEDLE 27G X 5/8\" 1 ML MISC  predniSONE (DELTASONE) 5 MG tablet          Review of Systems   Please see HPI for pertinent positives and negatives.  All other systems reviewed and found to be negative.      Physical Exam   BP: (!) 144/93  Pulse: 69  Temp: 97.8  F (36.6  C)  Resp: 15  Weight: 130.6 kg (288 lb)  SpO2: 99 %      Physical Exam  HEENT: Normocephalic and atraumatic. No scleral icterus. No conjunctival injection is noted.   NECK: Supple. Trachea is midline.   LUNGS: Breath sounds are equal and clear bilaterally. No wheezes, rhonchi, or rales.  HEART: Regular rate and rhythm with normal S1 and S2. No murmurs, gallops, or rubs.  ABDOMEN: Soft and flat. No mass, tenderness, guarding, or rebound. No organomegaly appreciated. No CVA tenderness or flank mass.  SKIN/EXTREMITIES: No cyanosis, clubbing, or edema. Warm, dry, and well perfused. Good turgor. No lesions, nodules or rashes are noted. No onychomycosis.  MSK: Grossly moves all extremities. No lower extremity edema appreciated   NEUROLOGIC: No focal sensory or motor deficits are noted. Gait is normal.   PSYCHIATRIC: The patient is awake, alert, and oriented x3. Recent and remote memory is intact. Appropriate " mood and affect.      ED Course            Procedures         Results for orders placed or performed during the hospital encounter of 05/17/22 (from the past 24 hour(s))   CBC with platelets differential    Narrative    The following orders were created for panel order CBC with platelets differential.  Procedure                               Abnormality         Status                     ---------                               -----------         ------                     CBC with platelets and d...[[id number]]  Abnormal            Final result                 Please view results for these tests on the individual orders.   Comprehensive metabolic panel   Result Value Ref Range    Sodium 137 134 - 144 mmol/L    Potassium 3.7 3.5 - 5.1 mmol/L    Chloride 102 98 - 107 mmol/L    Carbon Dioxide (CO2) 29 21 - 31 mmol/L    Anion Gap 6 3 - 14 mmol/L    Urea Nitrogen 19 7 - 25 mg/dL    Creatinine 1.10 0.60 - 1.20 mg/dL    Calcium 9.4 8.6 - 10.3 mg/dL    Glucose 92 70 - 105 mg/dL    Alkaline Phosphatase 54 34 - 104 U/L    AST 34 13 - 39 U/L    ALT 37 7 - 52 U/L    Protein Total 7.6 6.4 - 8.9 g/dL    Albumin 3.9 3.5 - 5.7 g/dL    Bilirubin Total 0.4 0.3 - 1.0 mg/dL    GFR Estimate 58 (L) >60 mL/min/1.73m2   Lipase   Result Value Ref Range    Lipase 20 11 - 82 U/L   Lactic acid whole blood   Result Value Ref Range    Lactic Acid 0.9 0.7 - 2.0 mmol/L   CBC with platelets and differential   Result Value Ref Range    WBC Count 4.5 4.0 - 11.0 10e3/uL    RBC Count 4.74 3.80 - 5.20 10e6/uL    Hemoglobin 12.6 11.7 - 15.7 g/dL    Hematocrit 39.4 35.0 - 47.0 %    MCV 83 78 - 100 fL    MCH 26.6 26.5 - 33.0 pg    MCHC 32.0 31.5 - 36.5 g/dL    RDW 17.2 (H) 10.0 - 15.0 %    Platelet Count 228 150 - 450 10e3/uL    % Neutrophils 58 %    % Lymphocytes 33 %    % Monocytes 8 %    % Eosinophils 1 %    % Basophils 0 %    % Immature Granulocytes 0 %    NRBCs per 100 WBC 0 <1 /100    Absolute Neutrophils 2.6 1.6 - 8.3 10e3/uL    Absolute Lymphocytes  1.5 0.8 - 5.3 10e3/uL    Absolute Monocytes 0.4 0.0 - 1.3 10e3/uL    Absolute Eosinophils 0.0 0.0 - 0.7 10e3/uL    Absolute Basophils 0.0 0.0 - 0.2 10e3/uL    Absolute Immature Granulocytes 0.0 <=0.4 10e3/uL    Absolute NRBCs 0.0 10e3/uL   UA with Microscopic reflex to Culture    Specimen: Urine, Clean Catch   Result Value Ref Range    Color Urine Light Yellow Colorless, Straw, Light Yellow, Yellow    Appearance Urine Clear Clear    Glucose Urine Negative Negative mg/dL    Bilirubin Urine Negative Negative    Ketones Urine Negative Negative mg/dL    Specific Gravity Urine 1.017 1.000 - 1.030    Blood Urine Negative Negative    pH Urine 6.0 5.0 - 9.0    Protein Albumin Urine Negative Negative mg/dL    Urobilinogen Urine Normal Normal, 2.0 mg/dL    Nitrite Urine Negative Negative    Leukocyte Esterase Urine Negative Negative    Mucus Urine Present (A) None Seen /LPF    RBC Urine 1 <=2 /HPF    WBC Urine 1 <=5 /HPF    Narrative    Urine Culture not indicated   CT Abdomen Pelvis w Contrast    Narrative    Exam: CT ABDOMEN PELVIS W CONTRAST    Exam reason: Abdominal pain, acute, nonlocalized, history of  mesenteric vein thrombosis, evaluate for worsening clot burden/failure  of anticoagulation    Technique: Using helical CT technique, axial images of the abdomen and  pelvis were obtained with IV contrast.  Coronal and sagittal  reconstructions also performed.    Meds/Contrast: Isovue 370, 50 mL    Comparison: 4/5/2022, 2/25/2022     FINDINGS:    ABDOMEN:    Liver: No mass or any significant abnormality.  Gallbladder: No calcified gallstones.   Bile Ducts: No biliary ductal dilation.   Spleen:  No splenomegaly or focal lesion.  Pancreas: No mass, ductal dilatation, or inflammatory changes.  Kidneys: No solid mass, hydronephrosis, or any definite calculi.   Adrenals:  No nodules.   Lymph Nodes: No adenopathy.   Vascular: No aortic aneurysm. No evidence of mesenteric vein  thrombosis.  Abdominal Wall: No acute findings.      PELVIS:   No mass or adenopathy.     Bowel/Mesentery/Peritoneum:   -No bowel obstruction.   -Normal appendix.  -No acute inflammatory findings.  -No ascites.    Visualized portions of the Chest: No pleural effusion or significant  findings.  Musculoskeletal: No acute osseous abnormalities. Bilateral L5 pars  interarticularis defects are again noted.      Impression    IMPRESSION:  No acute findings in the abdomen or pelvis. No evidence of recurrent  mesenteric vein thrombosis.    CELINA URIARTE MD         SYSTEM ID:  [id number]       Medications   iopamidol (ISOVUE-370) solution 150 mL (150 mLs Intravenous Given 5/17/22 2399)       Assessments & Plan (with Medical Decision Making)   1. Abdominal pain  Unclear etiology of patient's abdominal pain. Given history of venous clots, elected to obtain CT initially to evaluate for recurrent clots or other intra-abdominal pathology. Results of CT along with remainder of labs did not demonstrate recurrence of clots or other abnormality. Remainder of labs including WBC, LFTs, electrolytes, and UA were also unremarkable. MSK etiology seems like a strong possibility given recent endorsement of increased activity. Workup reassuring against acute pathology. Recommended to patient to follow-up closely with primary care provider for additional evaluation if needed and for further evaluation of possible coagulopathy. Patient denies desire for pain meds at this time. Warning signs discussed. Patient in agreement with plan. Follow-up if worsening.      I have reviewed the nursing notes.    I have reviewed the findings, diagnosis, plan and need for follow up with the patient.    Discharge Medication List as of 5/17/2022  5:46 PM          Final diagnoses:   Abdominal pain, generalized     Antonio Stipek - Medical Student Year 3     ATTENDING ATTESTATION   I evaluated patient independent of the medical student, pertinent results and MDM and ED course below:  ED Course as of 05/18/22  1057   Tue May 17, 2022   1630 Patient evaluated, here with abdominal pain worsening over the past couple of weeks and significant worse today. Took some tylenol with partial relief, rates pain 5/10 in severity now. No fevers, nausea/vomiting, or other complaints. No lower extremity symptoms, no chest pain or SOB to suggest PE. Possible worsening mesenteric vein thrombosis, will obtain CT abdomen w/ contrast to further evaluate, if failing xarelto will need heparin infusion and admission. Plan for basic labs as well, pain control   1653 CBC within normal limits, normal white count, hemoglobin, and platelets. Lactate normal, mesenteric ischemia unlikely   1658 Urinalysis unremarkable   1709 CMP unremarkable, lipase normal   1713 CT abdomen final report:  IMPRESSION:  No acute findings in the abdomen or pelvis. No evidence of recurrent mesenteric vein thrombosis.     Patient doing well on recheck, no acute findings on CT. Lactate normal, very low suspicion for mesenteric ischemia. Likely MSK pain from increased physical activity, plan for conservative management, close outpatient follow-up if not improving, strict return precautions reviewed.      5/17/2022   [facility] CenterJared Rincon MD  05/18/22 4630

## 2022-05-23 ENCOUNTER — HOSPITAL ENCOUNTER (OUTPATIENT)
Dept: GENERAL RADIOLOGY | Facility: OTHER | Age: 57
Discharge: HOME | End: 2022-05-23
Attending: INTERNAL MEDICINE | Admitting: INTERNAL MEDICINE
Payer: COMMERCIAL

## 2022-05-23 DIAGNOSIS — D47.2 MGUS (MONOCLONAL GAMMOPATHY OF UNKNOWN SIGNIFICANCE): ICD-10-CM

## 2022-05-23 PROCEDURE — 77075 RADEX OSSEOUS SURVEY COMPL: CPT

## 2022-05-23 NOTE — PROGRESS NOTES
Removed any patient belongings during exam?   n  Where were belongings placed? n  Belongings were returned with patient to patient's room? n

## 2022-06-01 ENCOUNTER — ONCOLOGY VISIT (OUTPATIENT)
Dept: ONCOLOGY | Facility: OTHER | Age: 57
End: 2022-06-01
Attending: INTERNAL MEDICINE
Payer: COMMERCIAL

## 2022-06-01 VITALS
OXYGEN SATURATION: 97 % | HEIGHT: 70 IN | HEART RATE: 69 BPM | BODY MASS INDEX: 41.26 KG/M2 | DIASTOLIC BLOOD PRESSURE: 80 MMHG | SYSTOLIC BLOOD PRESSURE: 116 MMHG | WEIGHT: 288.2 LBS | TEMPERATURE: 97.2 F | RESPIRATION RATE: 16 BRPM

## 2022-06-01 DIAGNOSIS — D47.2 MGUS (MONOCLONAL GAMMOPATHY OF UNKNOWN SIGNIFICANCE): Primary | ICD-10-CM

## 2022-06-01 PROCEDURE — 99215 OFFICE O/P EST HI 40 MIN: CPT | Performed by: INTERNAL MEDICINE

## 2022-06-01 PROCEDURE — 99417 PROLNG OP E/M EACH 15 MIN: CPT | Performed by: INTERNAL MEDICINE

## 2022-06-01 ASSESSMENT — PAIN SCALES - GENERAL: PAINLEVEL: MODERATE PAIN (4)

## 2022-06-01 NOTE — PROGRESS NOTES
Visit Date: 06/01/2022    HISTORY OF PRESENT ILLNESS:  Ms. 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  We had seen the patient in consultation at the request of Dr. Victoria on 5/2022 .  At that time, Ms. Washington was a 57-year-old white female with history of recurrent superficial thrombophlebitis, status post ablation, MTHFR DNA, heterozygous, on aspirin.  Recently developed pain and swelling, worse, involving the lower legs.  She was seen by Dr. Victoria, who noted right thigh superficial phlebitis extending up to the right lateral hip area.  For further workup, Dr. Victoria ordered bilateral venous Dopplers on 02/08/2022.  There was no evidence of lower extremity DVT.  There was an interrogation of deep venous structures and bilateral common femoral veins.  The veins of the proximal calf demonstrated normal appearance consistent with superficial venous thrombus identified within the varicosity in the medial right thigh.  These were repeated again on 02/18/2022 and the findings were thrombosed superficial varicosities present in the area.  The patient already had complaints of abdominal pain.  CT abdomen and pelvis was ordered, which revealed there was a new thrombus within a couple pre-mesenteric veins including the superior mesenteric vein just below the confluence of the splenic vein.  No bowel wall thickening, no definite findings of leg ischemia.  She was started on Xarelto by Dr. Victoria.  The patient also has been seen by Rheumatology where blood workup including beta 2 glycoprotein.  IgM antibody was normal.  Phospholipid IgM antibody was elevated at 19.4.  Myeloperoxidase antibodies were negative.  NARCISO was positive 01/20.  We had seen the patient previously for evaluation of recurrent superficial thrombophlebitis back on 12/19/2019.  We had ordered hypercoagulability workup.  At that time, she was found to be heterozygous for the MTHFR  DNA [id number] locus.  We recommended vitamin B6, B12, folic acid supplementation, to continue aspirin.  Since then, she had an ablation and her symptoms had improved, but now had developed recurrent superficial thrombophlebitis as well as mesenteric vein thrombosis.  When we saw the patient, the plan was to continue anticoagulation.  Repeat CT abdomen and pelvis was repeated 04/05/2022.  This included CT chest with no evidence of residual or recurrent mesenteric vein thrombosis.  It had resolved.  There were no abnormalities of the lung field.  Also, part of the workup was obtained.  Serum protein electrophoresis revealed that her M-spike was 0.3 with monoclonal IgG immunoglobulin lambda light chain type.  We repeated thrombophilia workup, and there was no evidence of thrombophilia, including antithrombin III, which was negative.  Cardiolipin antibodies were negative.  Beta 2 glycoprotein antibodies were negative.  MTHFR DNA was not drawn.  Her abdominal pain improved.  When we saw her last, given the fact she had had evidence of monoclonal gammopathy and this could be contributing to his thrombophilia we, therefore, recommended further workup including skeletal bone survey which was done on 05/23.  Findings were that there were degenerative changes of the spine and hips.  No lytic defects seen.  The patient was also seen in the Emergency Room with abdominal pain.  A CT abdomen and pelvis was performed on 05/17/2022.  There was no evidence of mesenteric vein thrombosis, no acute abnormality.  Her abdominal pain resolved but, nonetheless, we did order further workup in terms of myeloma labs.  Did order a beta 2 microglobulin, which was elevated at 3.2.  Kappa/lambda ratio was elevated at 2.19 The patient is now here for further workup.  No fevers, night sweats, weight loss, abdominal pain.    PHYSICAL EXAMINATION:    GENERAL:  She is an obese, middle-aged white female in no distress.  ECOG performance status is 0.  VITAL  SIGNS:  Reveal blood pressure 150/80, pulse 89, respirations 16.  HEENT:  Atraumatic, normocephalic.  Oropharynx nonerythematous.  NECK:  Supple.  LUNGS:  Clear to auscultation and percussion.   HEART:  Regular rate and rhythm.  S1, S2 normal.  ABDOMEN:  Soft, normoactive bowel sounds, nondistended.  LYMPHATICS:  No cervical, supraclavicular, axillary or inguinal nodes.   EXTREMITIES:  No edema.    NEUROLOGIC:  Nonfocal.    LABORATORY DATA:  Reveal CBC with white count 4.5, H and H 12.6 and 39.4.  Platelet count is 228.  LFTs are normal.    IMPRESSION AND PLAN:  Monoclonal gammopathy in the setting of mesenteric vein thrombosis.  The patient has elevated serum kappa/lambda ratio.  Skeletal bone survey was negative but, nonetheless, given her history of mesenteric fat necrosis, would like to rule out occult malignancy, i.e. multiple myeloma, by obtaining bone marrow aspiration biopsy to absolutely rule out myeloma.  We will see the patient after the above workup.  We will also repeat factor V Leiden, prothrombin 2 mutation and MTHFR DNA.  In terms of mesenteric thrombosis, she will continue Xarelto.    TIME SPENT:  Seventy-four (74) minutes were spent on this patient.  Time was spent reviewing imaging results with the patient, reviewing lab work, performing history and physical, documenting history and ordering followup bone marrow biopsy.    Mary Ojeda MD        D: 2022   T: 2022   [facility]    Name:     HUMBERTO MCBRIDE  MRN:      [id number]        Account:    [id number]   :      1965           Visit Date: 2022     Document: [id number]    cc:  Ya Victoria MD

## 2022-06-08 NOTE — PROGRESS NOTES
[facility] AND [facility]  [address]  [place] [address]  Phone: [phone]  Fax: [phone]  Primary Provider: Ya Victoria  Pre-op Performing Provider: LISETH GAONA      PREOPERATIVE EVALUATION:  Today's date: 7/6/2022    Phyllis VILLASENOR Neururer is a 57 year old female who presents for a preoperative evaluation.    Surgical Information:  Surgery/Procedure: Bone Marrow biopsy  Surgery Location: [facility]  Surgeon: Dr Vela  Surgery Date: 7/14/22  Time of Surgery: ?  Where patient plans to recover: At home with family  Fax number for surgical facility: Note does not need to be faxed, will be available electronically in Epic.    Type of Anesthesia Anticipated: sedation    Assessment & Plan     The proposed surgical procedure is considered LOW risk.      ICD-10-CM    1. Mesenteric vein thrombosis (H)  K55.069    2. MGUS (monoclonal gammopathy of unknown significance)  D47.2 Protein Immunofixation Serum     Protein electrophoresis     Macroglobulins     Immunoglobulins A G and M     Kappa and lambda light chain     Lactate Dehydrogenase     Comprehensive metabolic panel     CBC with Platelets & Differential     Beta 2 microglobulin   3. 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   Take other medications as normal. Avoid prednisone if possible prior to procedure.     RECOMMENDATION:  APPROVAL GIVEN to proceed with proposed procedure, without further diagnostic evaluation.        Subjective     HPI related to upcoming procedure: patient with MGUS in the setting of mesenteric vein thrombosis, bone marrow evaluation needed to rule out multiple myeloma.     No recent prednisone use.     Preop Questions  7/6/2022   1. Have you ever had a heart attack or stroke? No   2. Have you ever had surgery on your heart or blood vessels, such as a stent placement, a coronary artery bypass, or surgery on an artery in your head, neck, heart, or legs? UNKNOWN - legs have had a vein ablation    3. Do you have chest pain with activity? No   4. Do you have a history of  heart failure? No   5. Do you currently have a cold, bronchitis or symptoms of other infection? No   6. Do you have a cough, shortness of breath, or wheezing? No   7. Do you or anyone in your family have previous history of blood clots? YES - self, mother had a stroke    8. Do you or does anyone in your family have a serious bleeding problem such as prolonged bleeding following surgeries or cuts? No   9. Have you ever had problems with anemia or been told to take iron pills? YES - several years ago   10. Have you had any abnormal blood loss such as black, tarry or bloody stools, or abnormal vaginal bleeding? YES - few years ago abnormal vaginal bleeding   11. Have you ever had a blood transfusion? No   12. Are you willing to have a blood transfusion if it is medically needed before, during, or after your surgery? Yes   13. Have you or any of your relatives ever had problems with anesthesia? No   14. Do you have sleep apnea, excessive snoring or daytime drowsiness? No   15. Do you have any artifical heart valves or other implanted medical devices like a pacemaker, defibrillator, or continuous glucose monitor? No   16. Do you have artificial joints? No   17. Are you allergic to latex? No   18. Is there any chance that you may be pregnant? No     Health Care Directive:  Patient does not have a Health Care Directive or Living Will: Patient states has Advance Directive and will bring in a copy to clinic.    Preoperative Review of :   reviewed - no record of controlled substances prescribed.      Review of Systems  CONSTITUTIONAL: NEGATIVE for fever, chills, change in  weight  ENT/MOUTH: NEGATIVE for ear, mouth and throat problems  RESP: NEGATIVE for significant cough or SOB  CV: NEGATIVE for chest pain, palpitations or peripheral edema    Patient Active Problem List    Diagnosis Date Noted     Morbid obesity (H) 11/13/2021     Priority: Medium     Mitral valve annular calcification- mild with mild MI 11/25/2020     Priority: Medium     PVC's (premature ventricular contractions) 11/25/2020     Priority: Medium     SVT (supraventricular tachycardia) (H) 11/25/2020     Priority: Medium     Ventricular bigeminy 11/25/2020     Priority: Medium     Health care maintenance 10/22/2018     Priority: Medium     Pap NIL and HPV neg on 8/22/17 ([facility])  DEXA 2015 normal.       Colon polyp 09/20/2017     Priority: Medium     Overview:   Colonoscopy 9/20/17 with transverse colon polyp and sigmoid colon polyp (20 cm), both removed       Thrombosis of saphenous vein, left 11/16/2016     Priority: Medium     Disturbance in sleep behavior 07/02/2015     Priority: Medium     Overview:   [facility] Sleep Study: Showed Mild upper airway resistance. No sleep apnea or limb movement disorder.   IMO Update       Family history of diabetes mellitus type II 04/07/2015     Priority: Medium     High risk medication use 09/10/2014     Priority: Medium     Overview:   HCQ, MTX, Humira       Cervical muscle pain 07/25/2013     Priority: Medium     Rheumatoid arthritis involving multiple sites with positive rheumatoid factor (H) 09/28/2012     Priority: Medium     Overview:   RF + 121, CCP neg; onset ~ 2011, Dx 2012  Doxy (9/8/2012- 3/2013)  Nuc Med Scan with symmetric uptake  Plaquenil trial 3/2013- ; MTX 7/2013- ; SSA 10/2014-12/2014; Humira 10/2015-   6/2014: VECTRA 42       Vitamin D deficiency 06/18/2012     Priority: Medium     Overview:   Level of 25 on 5/11/2012       Myalgia 05/11/2012     Priority: Medium     Enthesopathy of hip region 01/14/2011     Priority: Medium     Allergic rhinitis 11/02/2004      "Priority: Medium     Asthma 09/22/2003     Priority: Medium     Overview:   Updated per 10/1/17 IMO import       Esophageal reflux 09/22/2003     Priority: Medium     Essential hypertension, benign 05/14/2001     Priority: Medium     Obesity 05/14/2001     Priority: Medium      Past Medical History:   Diagnosis Date     Personal history of other medical treatment (CODE)     3 normal childbirths     Rheumatoid arthritis (H)      Past Surgical History:   Procedure Laterality Date     COLONOSCOPY  2017    Dr. Fofana - 2 polyps found.  Told to f/u in 5 years.     Current Outpatient Medications   Medication Sig Dispense Refill     B-D INSULIN SYRINGE MICROFINE 27G X 5/8\" 1 ML MISC 1 EACH BY DOES NOT APPLY ROUTE ONE TIME A WEEK. USE TO DRAW UP METHOTREXATE 25 MG ONCE WEEKLY.       BD SAFETYGLIDE SYRINGE/NEEDLE 27G X 5/8\" 1 ML MISC 1 EACH BY DOES NOT APPLY ROUTE ONE TIME A WEEK. USE TO DRAW UP METHOTREXATE 25 MG ONCE WEEKLY.       cyanocobalamin (VITAMIN B-12) 1000 MCG tablet Take 1,000 mcg by mouth daily       famotidine (PEPCID) 20 MG tablet Take 1 tablet (20 mg) by mouth 2 times daily 180 tablet 3     folic acid (FOLVITE) 1 MG tablet Take 2 mg by mouth daily       hydroxychloroquine (PLAQUENIL) 200 MG tablet Take 400 mg by mouth daily       methotrexate 50 MG/2ML injection Inject 1 mL Subcutaneous once a week       metoprolol succinate ER (TOPROL-XL) 50 MG 24 hr tablet Take 1 tablet (50 mg) by mouth daily (with dinner) 90 tablet 3     thiamine (B-1) 100 MG tablet Take 100 mg by mouth daily        vitamin B6 (PYRIDOXINE) 200 MG tablet Take 200 mg by mouth daily       XARELTO ANTICOAGULANT 20 MG TABS tablet TAKE 1 TABLET BY MOUTH DAILY WITH DINNER 90 tablet 3     predniSONE (DELTASONE) 5 MG tablet Take 5 mg by mouth daily (Patient not taking: Reported on 7/6/2022)  0       No Known Allergies     Social History     Tobacco Use     Smoking status: Passive Smoke Exposure - Never Smoker     Smokeless tobacco: Never Used     " "Tobacco comment: In childhood home   Substance Use Topics     Alcohol use: Yes     Comment: 1 per month       History   Drug Use Unknown         Objective     /66 (BP Location: Right arm, Patient Position: Sitting, Cuff Size: Adult Large)   Pulse 64   Temp 97.5  F (36.4  C) (Tympanic)   Resp 16   Ht 1.772 m (5' 9.75\")   Wt 131.7 kg (290 lb 6.4 oz)   LMP  (LMP Unknown)   SpO2 98%   BMI 41.97 kg/m      Physical Exam  GENERAL APPEARANCE: healthy, alert and no distress  HENT: ear canals and TM's normal and nose and mouth without ulcers or lesions  RESP: lungs clear to auscultation - no rales, rhonchi or wheezes  CV: regular rate and rhythm, normal S1 S2, no S3 or S4 and no murmur, click or rub   ABDOMEN: soft, nontender, no HSM or masses and bowel sounds normal  NEURO: Normal strength and tone, sensory exam grossly normal, mentation intact and speech normal    Recent Labs   Lab Test 05/17/22  1638 04/14/22  1339 10/28/20  1535 07/03/20  1418   HGB 12.6 12.5   < > 14.0    224   < > 212   INR  --   --   --  0.98    138   < > 134   POTASSIUM 3.7 4.0   < > 3.6   CR 1.10 0.96   < > 0.96    < > = values in this interval not displayed.        Diagnostics:  Labs pending at this time.  Results will be reviewed when available.   No EKG required for low risk surgery (cataract, skin procedure, breast biopsy, etc).    Revised Cardiac Risk Index (RCRI):  The patient has the following serious cardiovascular risks for perioperative complications:   - No serious cardiac risks = 0 points     RCRI Interpretation: 0 points: Class I (very low risk - 0.4% complication rate)           Signed Electronically by: Jeniffer Minor MD  Copy of this evaluation report is provided to requesting physician.      "

## 2022-06-08 NOTE — H&P (VIEW-ONLY)
[facility] AND [facility]  [address]  [place] [address]  Phone: [phone]  Fax: [phone]  Primary Provider: Ya Victoria  Pre-op Performing Provider: LISETH GAONA      PREOPERATIVE EVALUATION:  Today's date: 7/6/2022    Phyllis VILLASENOR Neururer is a 57 year old female who presents for a preoperative evaluation.    Surgical Information:  Surgery/Procedure: Bone Marrow biopsy  Surgery Location: [facility]  Surgeon: Dr Vela  Surgery Date: 7/14/22  Time of Surgery: ?  Where patient plans to recover: At home with family  Fax number for surgical facility: Note does not need to be faxed, will be available electronically in Epic.    Type of Anesthesia Anticipated: sedation    Assessment & Plan     The proposed surgical procedure is considered LOW risk.      ICD-10-CM    1. Mesenteric vein thrombosis (H)  K55.069    2. MGUS (monoclonal gammopathy of unknown significance)  D47.2 Protein Immunofixation Serum     Protein electrophoresis     Macroglobulins     Immunoglobulins A G and M     Kappa and lambda light chain     Lactate Dehydrogenase     Comprehensive metabolic panel     CBC with Platelets & Differential     Beta 2 microglobulin   3. 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   Take other medications as normal. Avoid prednisone if possible prior to procedure.     RECOMMENDATION:  APPROVAL GIVEN to proceed with proposed procedure, without further diagnostic evaluation.        Subjective     HPI related to upcoming procedure: patient with MGUS in the setting of mesenteric vein thrombosis, bone marrow evaluation needed to rule out multiple myeloma.     No recent prednisone use.     Preop Questions  7/6/2022   1. Have you ever had a heart attack or stroke? No   2. Have you ever had surgery on your heart or blood vessels, such as a stent placement, a coronary artery bypass, or surgery on an artery in your head, neck, heart, or legs? UNKNOWN - legs have had a vein ablation    3. Do you have chest pain with activity? No   4. Do you have a history of  heart failure? No   5. Do you currently have a cold, bronchitis or symptoms of other infection? No   6. Do you have a cough, shortness of breath, or wheezing? No   7. Do you or anyone in your family have previous history of blood clots? YES - self, mother had a stroke    8. Do you or does anyone in your family have a serious bleeding problem such as prolonged bleeding following surgeries or cuts? No   9. Have you ever had problems with anemia or been told to take iron pills? YES - several years ago   10. Have you had any abnormal blood loss such as black, tarry or bloody stools, or abnormal vaginal bleeding? YES - few years ago abnormal vaginal bleeding   11. Have you ever had a blood transfusion? No   12. Are you willing to have a blood transfusion if it is medically needed before, during, or after your surgery? Yes   13. Have you or any of your relatives ever had problems with anesthesia? No   14. Do you have sleep apnea, excessive snoring or daytime drowsiness? No   15. Do you have any artifical heart valves or other implanted medical devices like a pacemaker, defibrillator, or continuous glucose monitor? No   16. Do you have artificial joints? No   17. Are you allergic to latex? No   18. Is there any chance that you may be pregnant? No     Health Care Directive:  Patient does not have a Health Care Directive or Living Will: Patient states has Advance Directive and will bring in a copy to clinic.    Preoperative Review of :   reviewed - no record of controlled substances prescribed.      Review of Systems  CONSTITUTIONAL: NEGATIVE for fever, chills, change in  weight  ENT/MOUTH: NEGATIVE for ear, mouth and throat problems  RESP: NEGATIVE for significant cough or SOB  CV: NEGATIVE for chest pain, palpitations or peripheral edema    Patient Active Problem List    Diagnosis Date Noted     Morbid obesity (H) 11/13/2021     Priority: Medium     Mitral valve annular calcification- mild with mild MI 11/25/2020     Priority: Medium     PVC's (premature ventricular contractions) 11/25/2020     Priority: Medium     SVT (supraventricular tachycardia) (H) 11/25/2020     Priority: Medium     Ventricular bigeminy 11/25/2020     Priority: Medium     Health care maintenance 10/22/2018     Priority: Medium     Pap NIL and HPV neg on 8/22/17 ([facility])  DEXA 2015 normal.       Colon polyp 09/20/2017     Priority: Medium     Overview:   Colonoscopy 9/20/17 with transverse colon polyp and sigmoid colon polyp (20 cm), both removed       Thrombosis of saphenous vein, left 11/16/2016     Priority: Medium     Disturbance in sleep behavior 07/02/2015     Priority: Medium     Overview:   [facility] Sleep Study: Showed Mild upper airway resistance. No sleep apnea or limb movement disorder.   IMO Update       Family history of diabetes mellitus type II 04/07/2015     Priority: Medium     High risk medication use 09/10/2014     Priority: Medium     Overview:   HCQ, MTX, Humira       Cervical muscle pain 07/25/2013     Priority: Medium     Rheumatoid arthritis involving multiple sites with positive rheumatoid factor (H) 09/28/2012     Priority: Medium     Overview:   RF + 121, CCP neg; onset ~ 2011, Dx 2012  Doxy (9/8/2012- 3/2013)  Nuc Med Scan with symmetric uptake  Plaquenil trial 3/2013- ; MTX 7/2013- ; SSA 10/2014-12/2014; Humira 10/2015-   6/2014: VECTRA 42       Vitamin D deficiency 06/18/2012     Priority: Medium     Overview:   Level of 25 on 5/11/2012       Myalgia 05/11/2012     Priority: Medium     Enthesopathy of hip region 01/14/2011     Priority: Medium     Allergic rhinitis 11/02/2004      "Priority: Medium     Asthma 09/22/2003     Priority: Medium     Overview:   Updated per 10/1/17 IMO import       Esophageal reflux 09/22/2003     Priority: Medium     Essential hypertension, benign 05/14/2001     Priority: Medium     Obesity 05/14/2001     Priority: Medium      Past Medical History:   Diagnosis Date     Personal history of other medical treatment (CODE)     3 normal childbirths     Rheumatoid arthritis (H)      Past Surgical History:   Procedure Laterality Date     COLONOSCOPY  2017    Dr. Fofana - 2 polyps found.  Told to f/u in 5 years.     Current Outpatient Medications   Medication Sig Dispense Refill     B-D INSULIN SYRINGE MICROFINE 27G X 5/8\" 1 ML MISC 1 EACH BY DOES NOT APPLY ROUTE ONE TIME A WEEK. USE TO DRAW UP METHOTREXATE 25 MG ONCE WEEKLY.       BD SAFETYGLIDE SYRINGE/NEEDLE 27G X 5/8\" 1 ML MISC 1 EACH BY DOES NOT APPLY ROUTE ONE TIME A WEEK. USE TO DRAW UP METHOTREXATE 25 MG ONCE WEEKLY.       cyanocobalamin (VITAMIN B-12) 1000 MCG tablet Take 1,000 mcg by mouth daily       famotidine (PEPCID) 20 MG tablet Take 1 tablet (20 mg) by mouth 2 times daily 180 tablet 3     folic acid (FOLVITE) 1 MG tablet Take 2 mg by mouth daily       hydroxychloroquine (PLAQUENIL) 200 MG tablet Take 400 mg by mouth daily       methotrexate 50 MG/2ML injection Inject 1 mL Subcutaneous once a week       metoprolol succinate ER (TOPROL-XL) 50 MG 24 hr tablet Take 1 tablet (50 mg) by mouth daily (with dinner) 90 tablet 3     thiamine (B-1) 100 MG tablet Take 100 mg by mouth daily        vitamin B6 (PYRIDOXINE) 200 MG tablet Take 200 mg by mouth daily       XARELTO ANTICOAGULANT 20 MG TABS tablet TAKE 1 TABLET BY MOUTH DAILY WITH DINNER 90 tablet 3     predniSONE (DELTASONE) 5 MG tablet Take 5 mg by mouth daily (Patient not taking: Reported on 7/6/2022)  0       No Known Allergies     Social History     Tobacco Use     Smoking status: Passive Smoke Exposure - Never Smoker     Smokeless tobacco: Never Used     " "Tobacco comment: In childhood home   Substance Use Topics     Alcohol use: Yes     Comment: 1 per month       History   Drug Use Unknown         Objective     /66 (BP Location: Right arm, Patient Position: Sitting, Cuff Size: Adult Large)   Pulse 64   Temp 97.5  F (36.4  C) (Tympanic)   Resp 16   Ht 1.772 m (5' 9.75\")   Wt 131.7 kg (290 lb 6.4 oz)   LMP  (LMP Unknown)   SpO2 98%   BMI 41.97 kg/m      Physical Exam  GENERAL APPEARANCE: healthy, alert and no distress  HENT: ear canals and TM's normal and nose and mouth without ulcers or lesions  RESP: lungs clear to auscultation - no rales, rhonchi or wheezes  CV: regular rate and rhythm, normal S1 S2, no S3 or S4 and no murmur, click or rub   ABDOMEN: soft, nontender, no HSM or masses and bowel sounds normal  NEURO: Normal strength and tone, sensory exam grossly normal, mentation intact and speech normal    Recent Labs   Lab Test 05/17/22  1638 04/14/22  1339 10/28/20  1535 07/03/20  1418   HGB 12.6 12.5   < > 14.0    224   < > 212   INR  --   --   --  0.98    138   < > 134   POTASSIUM 3.7 4.0   < > 3.6   CR 1.10 0.96   < > 0.96    < > = values in this interval not displayed.        Diagnostics:  Labs pending at this time.  Results will be reviewed when available.   No EKG required for low risk surgery (cataract, skin procedure, breast biopsy, etc).    Revised Cardiac Risk Index (RCRI):  The patient has the following serious cardiovascular risks for perioperative complications:   - No serious cardiac risks = 0 points     RCRI Interpretation: 0 points: Class I (very low risk - 0.4% complication rate)           Signed Electronically by: Jeniffer Minor MD  Copy of this evaluation report is provided to requesting physician.      "

## 2022-07-06 ENCOUNTER — OFFICE VISIT (OUTPATIENT)
Dept: FAMILY MEDICINE | Facility: OTHER | Age: 57
End: 2022-07-06
Attending: FAMILY MEDICINE
Payer: COMMERCIAL

## 2022-07-06 VITALS
HEART RATE: 64 BPM | RESPIRATION RATE: 16 BRPM | TEMPERATURE: 97.5 F | HEIGHT: 70 IN | BODY MASS INDEX: 41.57 KG/M2 | DIASTOLIC BLOOD PRESSURE: 66 MMHG | SYSTOLIC BLOOD PRESSURE: 114 MMHG | OXYGEN SATURATION: 98 % | WEIGHT: 290.4 LBS

## 2022-07-06 DIAGNOSIS — D47.2 MGUS (MONOCLONAL GAMMOPATHY OF UNKNOWN SIGNIFICANCE): ICD-10-CM

## 2022-07-06 DIAGNOSIS — K55.069 MESENTERIC VEIN THROMBOSIS (H): Primary | ICD-10-CM

## 2022-07-06 DIAGNOSIS — K55.069 SUPERIOR MESENTERIC VEIN THROMBOSIS (H): ICD-10-CM

## 2022-07-06 LAB
ALBUMIN SERPL-MCNC: 3.9 G/DL (ref 3.5–5.7)
ALP SERPL-CCNC: 46 U/L (ref 34–104)
ALT SERPL W P-5'-P-CCNC: 22 U/L (ref 7–52)
ANION GAP SERPL CALCULATED.3IONS-SCNC: 6 MMOL/L (ref 3–14)
AST SERPL W P-5'-P-CCNC: 30 U/L (ref 13–39)
BASOPHILS # BLD AUTO: 0 10E3/UL (ref 0–0.2)
BASOPHILS NFR BLD AUTO: 1 %
BILIRUB SERPL-MCNC: 0.4 MG/DL (ref 0.3–1)
BUN SERPL-MCNC: 23 MG/DL (ref 7–25)
CALCIUM SERPL-MCNC: 9.2 MG/DL (ref 8.6–10.3)
CHLORIDE BLD-SCNC: 104 MMOL/L (ref 98–107)
CO2 SERPL-SCNC: 28 MMOL/L (ref 21–31)
CREAT SERPL-MCNC: 1.01 MG/DL (ref 0.6–1.2)
EOSINOPHIL # BLD AUTO: 0 10E3/UL (ref 0–0.7)
EOSINOPHIL NFR BLD AUTO: 1 %
ERYTHROCYTE [DISTWIDTH] IN BLOOD BY AUTOMATED COUNT: 16.5 % (ref 10–15)
GFR SERPL CREATININE-BSD FRML MDRD: 64 ML/MIN/1.73M2
GLUCOSE BLD-MCNC: 85 MG/DL (ref 70–105)
HCT VFR BLD AUTO: 37.7 % (ref 35–47)
HGB BLD-MCNC: 12.3 G/DL (ref 11.7–15.7)
IMM GRANULOCYTES # BLD: 0 10E3/UL
IMM GRANULOCYTES NFR BLD: 0 %
LDH SERPL L TO P-CCNC: 189 U/L (ref 140–271)
LYMPHOCYTES # BLD AUTO: 1.9 10E3/UL (ref 0.8–5.3)
LYMPHOCYTES NFR BLD AUTO: 46 %
MCH RBC QN AUTO: 27.3 PG (ref 26.5–33)
MCHC RBC AUTO-ENTMCNC: 32.6 G/DL (ref 31.5–36.5)
MCV RBC AUTO: 84 FL (ref 78–100)
MONOCYTES # BLD AUTO: 0.5 10E3/UL (ref 0–1.3)
MONOCYTES NFR BLD AUTO: 11 %
NEUTROPHILS # BLD AUTO: 1.6 10E3/UL (ref 1.6–8.3)
NEUTROPHILS NFR BLD AUTO: 41 %
NRBC # BLD AUTO: 0 10E3/UL
NRBC BLD AUTO-RTO: 0 /100
PLATELET # BLD AUTO: 218 10E3/UL (ref 150–450)
POTASSIUM BLD-SCNC: 4 MMOL/L (ref 3.5–5.1)
PROT SERPL-MCNC: 6.9 G/DL (ref 6.4–8.9)
RBC # BLD AUTO: 4.51 10E6/UL (ref 3.8–5.2)
SODIUM SERPL-SCNC: 138 MMOL/L (ref 134–144)
WBC # BLD AUTO: 4 10E3/UL (ref 4–11)

## 2022-07-06 PROCEDURE — 36415 COLL VENOUS BLD VENIPUNCTURE: CPT | Mod: ZL | Performed by: FAMILY MEDICINE

## 2022-07-06 PROCEDURE — 85810 BLOOD VISCOSITY EXAMINATION: CPT | Mod: ZL | Performed by: FAMILY MEDICINE

## 2022-07-06 PROCEDURE — 80053 COMPREHEN METABOLIC PANEL: CPT | Mod: ZL | Performed by: FAMILY MEDICINE

## 2022-07-06 PROCEDURE — 84155 ASSAY OF PROTEIN SERUM: CPT | Mod: ZL | Performed by: FAMILY MEDICINE

## 2022-07-06 PROCEDURE — 83521 IG LIGHT CHAINS FREE EACH: CPT | Mod: ZL | Performed by: FAMILY MEDICINE

## 2022-07-06 PROCEDURE — 84165 PROTEIN E-PHORESIS SERUM: CPT | Mod: 26 | Performed by: PATHOLOGY

## 2022-07-06 PROCEDURE — 82232 ASSAY OF BETA-2 PROTEIN: CPT | Mod: ZL | Performed by: FAMILY MEDICINE

## 2022-07-06 PROCEDURE — 83615 LACTATE (LD) (LDH) ENZYME: CPT | Mod: ZL | Performed by: FAMILY MEDICINE

## 2022-07-06 PROCEDURE — 84165 PROTEIN E-PHORESIS SERUM: CPT | Mod: ZL | Performed by: PATHOLOGY

## 2022-07-06 PROCEDURE — 82784 ASSAY IGA/IGD/IGG/IGM EACH: CPT | Mod: ZL,91 | Performed by: FAMILY MEDICINE

## 2022-07-06 PROCEDURE — 86334 IMMUNOFIX E-PHORESIS SERUM: CPT | Mod: ZL | Performed by: PATHOLOGY

## 2022-07-06 PROCEDURE — 85025 COMPLETE CBC W/AUTO DIFF WBC: CPT | Mod: ZL | Performed by: FAMILY MEDICINE

## 2022-07-06 PROCEDURE — 99214 OFFICE O/P EST MOD 30 MIN: CPT | Performed by: FAMILY MEDICINE

## 2022-07-06 PROCEDURE — 86334 IMMUNOFIX E-PHORESIS SERUM: CPT | Mod: 26 | Performed by: PATHOLOGY

## 2022-07-06 ASSESSMENT — PAIN SCALES - GENERAL: PAINLEVEL: NO PAIN (0)

## 2022-07-06 NOTE — NURSING NOTE
"Chief Complaint   Patient presents with     Pre-Op Exam     7/14/22 surgery       Initial /66 (BP Location: Right arm, Patient Position: Sitting, Cuff Size: Adult Large)   Pulse 64   Temp 97.5  F (36.4  C) (Tympanic)   Resp 16   Ht 1.772 m (5' 9.75\")   Wt 131.7 kg (290 lb 6.4 oz)   LMP  (LMP Unknown)   SpO2 98%   BMI 41.97 kg/m   Estimated body mass index is 41.97 kg/m  as calculated from the following:    Height as of this encounter: 1.772 m (5' 9.75\").    Weight as of this encounter: 131.7 kg (290 lb 6.4 oz).  Medication Reconciliation: complete    Malissa Kunz LPN    Advance Care Directive reviewed    "

## 2022-07-07 LAB — TOTAL PROTEIN SERUM FOR ELP: 6.9 G/DL (ref 6.4–8.3)

## 2022-07-08 LAB
ALBUMIN SERPL ELPH-MCNC: 3.8 G/DL (ref 3.7–5.1)
ALPHA1 GLOB SERPL ELPH-MCNC: 0.3 G/DL (ref 0.2–0.4)
ALPHA2 GLOB SERPL ELPH-MCNC: 0.6 G/DL (ref 0.5–0.9)
B-GLOBULIN SERPL ELPH-MCNC: 0.8 G/DL (ref 0.6–1)
B2 MICROGLOB TUMOR MARKER SER-MCNC: 3.4 MG/L
GAMMA GLOB SERPL ELPH-MCNC: 1.4 G/DL (ref 0.7–1.6)
IGA SERPL-MCNC: 269 MG/DL (ref 84–499)
IGG SERPL-MCNC: 1406 MG/DL (ref 610–1616)
IGM SERPL-MCNC: 112 MG/DL (ref 35–242)
KAPPA LC FREE SER-MCNC: 4.54 MG/DL (ref 0.33–1.94)
KAPPA LC FREE/LAMBDA FREE SER NEPH: 2.13 {RATIO} (ref 0.26–1.65)
LAMBDA LC FREE SERPL-MCNC: 2.13 MG/DL (ref 0.57–2.63)
M PROTEIN SERPL ELPH-MCNC: 0 G/DL
PROT PATTERN SERPL ELPH-IMP: NORMAL
PROT PATTERN SERPL IFE-IMP: NORMAL
VISC SER: 1.5 CP (ref 1.4–1.8)

## 2022-07-11 ENCOUNTER — ALLIED HEALTH/NURSE VISIT (OUTPATIENT)
Dept: FAMILY MEDICINE | Facility: OTHER | Age: 57
End: 2022-07-11
Attending: INTERNAL MEDICINE
Payer: COMMERCIAL

## 2022-07-11 DIAGNOSIS — Z20.822 COVID-19 RULED OUT: Primary | ICD-10-CM

## 2022-07-11 PROCEDURE — C9803 HOPD COVID-19 SPEC COLLECT: HCPCS

## 2022-07-11 PROCEDURE — U0003 INFECTIOUS AGENT DETECTION BY NUCLEIC ACID (DNA OR RNA); SEVERE ACUTE RESPIRATORY SYNDROME CORONAVIRUS 2 (SARS-COV-2) (CORONAVIRUS DISEASE [COVID-19]), AMPLIFIED PROBE TECHNIQUE, MAKING USE OF HIGH THROUGHPUT TECHNOLOGIES AS DESCRIBED BY CMS-2020-01-R: HCPCS | Mod: ZL

## 2022-07-11 NOTE — PROGRESS NOTES
Patient here today for Covid test. BMB on 7/14/22.     Lisa Adames CNA .............................on 7/11/2022 at 8:35 AM

## 2022-07-12 LAB — SARS-COV-2 RNA RESP QL NAA+PROBE: NEGATIVE

## 2022-07-13 ENCOUNTER — ANESTHESIA EVENT (OUTPATIENT)
Dept: SURGERY | Facility: OTHER | Age: 57
End: 2022-07-13
Payer: COMMERCIAL

## 2022-07-14 ENCOUNTER — HOSPITAL ENCOUNTER (OUTPATIENT)
Facility: OTHER | Age: 57
Discharge: HOME | End: 2022-07-14
Attending: PATHOLOGY | Admitting: PATHOLOGY
Payer: COMMERCIAL

## 2022-07-14 ENCOUNTER — ANESTHESIA (OUTPATIENT)
Dept: SURGERY | Facility: OTHER | Age: 57
End: 2022-07-14
Payer: COMMERCIAL

## 2022-07-14 VITALS
BODY MASS INDEX: 42.95 KG/M2 | SYSTOLIC BLOOD PRESSURE: 119 MMHG | OXYGEN SATURATION: 100 % | WEIGHT: 290 LBS | HEART RATE: 55 BPM | TEMPERATURE: 96.8 F | HEIGHT: 69 IN | RESPIRATION RATE: 14 BRPM | DIASTOLIC BLOOD PRESSURE: 76 MMHG

## 2022-07-14 DIAGNOSIS — I82.812 THROMBOSIS OF SAPHENOUS VEIN, LEFT: Primary | ICD-10-CM

## 2022-07-14 PROCEDURE — 258N000003 HC RX IP 258 OP 636: Performed by: NURSE ANESTHETIST, CERTIFIED REGISTERED

## 2022-07-14 PROCEDURE — 85025 COMPLETE CBC W/AUTO DIFF WBC: CPT

## 2022-07-14 PROCEDURE — 250N000011 HC RX IP 250 OP 636: Performed by: NURSE ANESTHETIST, CERTIFIED REGISTERED

## 2022-07-14 PROCEDURE — 88311 DECALCIFY TISSUE: CPT

## 2022-07-14 PROCEDURE — 88360 TUMOR IMMUNOHISTOCHEM/MANUAL: CPT

## 2022-07-14 PROCEDURE — 88184 FLOWCYTOMETRY/ TC 1 MARKER: CPT

## 2022-07-14 PROCEDURE — 88342 IMHCHEM/IMCYTCHM 1ST ANTB: CPT

## 2022-07-14 PROCEDURE — 710N000012 HC RECOVERY PHASE 2, PER MINUTE: Performed by: PATHOLOGY

## 2022-07-14 PROCEDURE — 88185 FLOWCYTOMETRY/TC ADD-ON: CPT

## 2022-07-14 PROCEDURE — 88305 TISSUE EXAM BY PATHOLOGIST: CPT

## 2022-07-14 PROCEDURE — 88313 SPECIAL STAINS GROUP 2: CPT

## 2022-07-14 PROCEDURE — 360N000075 HC SURGERY LEVEL 2, PER MIN: Performed by: PATHOLOGY

## 2022-07-14 PROCEDURE — 999N000141 HC STATISTIC PRE-PROCEDURE NURSING ASSESSMENT: Performed by: PATHOLOGY

## 2022-07-14 PROCEDURE — 370N000017 HC ANESTHESIA TECHNICAL FEE, PER MIN: Performed by: PATHOLOGY

## 2022-07-14 PROCEDURE — 250N000009 HC RX 250: Performed by: NURSE ANESTHETIST, CERTIFIED REGISTERED

## 2022-07-14 PROCEDURE — 88299 UNLISTED CYTOGENETIC STUDY: CPT

## 2022-07-14 PROCEDURE — 85045 AUTOMATED RETICULOCYTE COUNT: CPT

## 2022-07-14 PROCEDURE — 88264 CHROMOSOME ANALYSIS 20-25: CPT

## 2022-07-14 PROCEDURE — 38222 DX BONE MARROW BX & ASPIR: CPT | Performed by: NURSE ANESTHETIST, CERTIFIED REGISTERED

## 2022-07-14 PROCEDURE — 88237 TISSUE CULTURE BONE MARROW: CPT

## 2022-07-14 RX ORDER — NALOXONE HYDROCHLORIDE 0.4 MG/ML
0.4 INJECTION, SOLUTION INTRAMUSCULAR; INTRAVENOUS; SUBCUTANEOUS
Status: DISCONTINUED | OUTPATIENT
Start: 2022-07-14 | End: 2022-07-14 | Stop reason: HOSPADM

## 2022-07-14 RX ORDER — OXYCODONE HYDROCHLORIDE 5 MG/1
5 TABLET ORAL EVERY 4 HOURS PRN
Status: DISCONTINUED | OUTPATIENT
Start: 2022-07-14 | End: 2022-07-14 | Stop reason: HOSPADM

## 2022-07-14 RX ORDER — PROPOFOL 10 MG/ML
INJECTION, EMULSION INTRAVENOUS PRN
Status: DISCONTINUED | OUTPATIENT
Start: 2022-07-14 | End: 2022-07-14

## 2022-07-14 RX ORDER — SODIUM CHLORIDE, SODIUM LACTATE, POTASSIUM CHLORIDE, CALCIUM CHLORIDE 600; 310; 30; 20 MG/100ML; MG/100ML; MG/100ML; MG/100ML
INJECTION, SOLUTION INTRAVENOUS CONTINUOUS
Status: DISCONTINUED | OUTPATIENT
Start: 2022-07-14 | End: 2022-07-14 | Stop reason: HOSPADM

## 2022-07-14 RX ORDER — LIDOCAINE HYDROCHLORIDE 10 MG/ML
8-10 INJECTION, SOLUTION EPIDURAL; INFILTRATION; INTRACAUDAL; PERINEURAL
Status: DISCONTINUED | OUTPATIENT
Start: 2022-07-14 | End: 2022-07-14 | Stop reason: HOSPADM

## 2022-07-14 RX ORDER — LIDOCAINE 40 MG/G
CREAM TOPICAL
Status: DISCONTINUED | OUTPATIENT
Start: 2022-07-14 | End: 2022-07-14 | Stop reason: HOSPADM

## 2022-07-14 RX ORDER — MEPERIDINE HYDROCHLORIDE 50 MG/ML
12.5 INJECTION INTRAMUSCULAR; INTRAVENOUS; SUBCUTANEOUS
Status: DISCONTINUED | OUTPATIENT
Start: 2022-07-14 | End: 2022-07-14 | Stop reason: HOSPADM

## 2022-07-14 RX ORDER — NALOXONE HYDROCHLORIDE 0.4 MG/ML
0.2 INJECTION, SOLUTION INTRAMUSCULAR; INTRAVENOUS; SUBCUTANEOUS
Status: DISCONTINUED | OUTPATIENT
Start: 2022-07-14 | End: 2022-07-14 | Stop reason: HOSPADM

## 2022-07-14 RX ORDER — PROPOFOL 10 MG/ML
INJECTION, EMULSION INTRAVENOUS CONTINUOUS PRN
Status: DISCONTINUED | OUTPATIENT
Start: 2022-07-14 | End: 2022-07-14

## 2022-07-14 RX ORDER — ONDANSETRON 2 MG/ML
4 INJECTION INTRAMUSCULAR; INTRAVENOUS EVERY 30 MIN PRN
Status: DISCONTINUED | OUTPATIENT
Start: 2022-07-14 | End: 2022-07-14 | Stop reason: HOSPADM

## 2022-07-14 RX ORDER — LIDOCAINE HYDROCHLORIDE 20 MG/ML
INJECTION, SOLUTION INFILTRATION; PERINEURAL PRN
Status: DISCONTINUED | OUTPATIENT
Start: 2022-07-14 | End: 2022-07-14

## 2022-07-14 RX ORDER — ONDANSETRON 4 MG/1
4 TABLET, ORALLY DISINTEGRATING ORAL EVERY 30 MIN PRN
Status: DISCONTINUED | OUTPATIENT
Start: 2022-07-14 | End: 2022-07-14 | Stop reason: HOSPADM

## 2022-07-14 RX ADMIN — PROPOFOL 100 MG: 10 INJECTION, EMULSION INTRAVENOUS at 09:53

## 2022-07-14 RX ADMIN — LIDOCAINE HYDROCHLORIDE 60 MG: 20 INJECTION, SOLUTION INFILTRATION; PERINEURAL at 09:53

## 2022-07-14 RX ADMIN — SODIUM CHLORIDE, SODIUM LACTATE, POTASSIUM CHLORIDE, AND CALCIUM CHLORIDE: 600; 310; 30; 20 INJECTION, SOLUTION INTRAVENOUS at 09:50

## 2022-07-14 RX ADMIN — PROPOFOL 140 MCG/KG/MIN: 10 INJECTION, EMULSION INTRAVENOUS at 09:53

## 2022-07-14 ASSESSMENT — ENCOUNTER SYMPTOMS: DYSRHYTHMIAS: 1

## 2022-07-14 NOTE — OR NURSING
Lab staff present during procedure to collect, document and transport specimens to lab.      6mL Lidocaine 1% from bone marrow kit injected by  at 10:09 AM.

## 2022-07-14 NOTE — OP NOTE
Bone Marrow Note:     INDICATION:  MGUS    The risks and benefit of the procedure were explained to the patient and questions were answered. Consent was obtained.     [place] protocol was followed. TIME OUT conducted just prior to starting the procedure confirmed patient identity, site/side, procedure, patient position, and availability of correct equipment.   YES    MEDICATIONS:  Bone marrow performed with anesthesia medication per anesthesiologist     The patient was prepped and draped in the usual sterile fashion. Approximately 2-5 ml of 1% lidocaine was used for local anesthesia. A LEFT trephine biopsy was obtained from posterior iliac crest with an 8 gauge manual core biopsy needle. A unilateral aspirate was obtained from the LEFT posterior crest with an 8 gauge manual biopsy needle.      Hemostasis was achieved using digital pressure and the wound was dressed in the usual fashion. The patient tolerated the procedure well without complications.     The patient was taken to the recovery room and was instructed to lie on her back with pressure on the biopsy site(s) for 30 minutes prior to discharge, if no bleeding present. She was counseled on driving and operating machinery per anesthesia guidelines. She should watch for signed of bleeding and infection and seek medical attention if they occur. The patient should keep the dressing dry for at least 24 hours (no bath or shower) and avoid heavy lifting and vigorous activities for 24 hours. After at least 24 hours, the pressure bandages can be removed and replaced with band-aids.

## 2022-07-14 NOTE — BRIEF OP NOTE
[facility] [place]    Brief Operative Note    Pre-operative diagnosis: Monoclonal gammopathy [D47.2]  Post-operative diagnosis Monoclonal gammopathy    Procedure: Procedure(s):  BIOPSY, BONE MARROW  Surgeon: Surgeon(s) and Role:     * Glory Baig MD - Primary  Anesthesia: Monitor Anesthesia Care   Estimated Blood Loss: Less than 10 ml    Drains: None  Specimens: Bone marrow core biopsy and aspirate   Findings:   None.  Complications: None.  Implants: * No implants in log *

## 2022-07-14 NOTE — DISCHARGE INSTRUCTIONS
[facility]  Adult Discharge Orders & Instructions    ________________________________________________________________          For 12 hours after surgery  Get plenty of rest.  A responsible adult must stay with you for at least 12 hours after you leave the hospital.   You may feel lightheaded.  IF so, sit for a few minutes before standing.  Have someone help you get up.   You may have a slight fever. Call the doctor if your fever is over 101 F (38.3 C) (taken under the tongue) or lasts longer than 24 hours.  You may have a dry mouth, a sore throat, muscle aches or trouble sleeping.  These should go away after 24 hours.  Do not make important or legal decisions.  6.   Do not drive or use heavy equipment.  If you have weakness or tingling, don't drive or use heavy equipment until this feeling goes away.    To contact a doctor, call   [phone]_______________________

## 2022-07-14 NOTE — ANESTHESIA PREPROCEDURE EVALUATION
Anesthesia Pre-Procedure Evaluation    Patient: Phyllis VILLASENOR Neururer   MRN: [id number] : 1965        Procedure : Procedure(s):  BIOPSY, BONE MARROW          Past Medical History:   Diagnosis Date     Personal history of other medical treatment (CODE)     3 normal childbirths     Rheumatoid arthritis (H)       Past Surgical History:   Procedure Laterality Date     COLONOSCOPY      Dr. Fofana - 2 polyps found.  Told to f/u in 5 years.      No Known Allergies   Social History     Tobacco Use     Smoking status: Passive Smoke Exposure - Never Smoker     Smokeless tobacco: Never Used     Tobacco comment: In childhood home   Substance Use Topics     Alcohol use: Yes     Comment: 1 per month      Wt Readings from Last 1 Encounters:   22 131.5 kg (290 lb)        Anesthesia Evaluation   Pt has had prior anesthetic.     No history of anesthetic complications       ROS/MED HX  ENT/Pulmonary:  - neg pulmonary ROS     Neurologic:  - neg neurologic ROS     Cardiovascular:     (+) hypertension-----Taking blood thinners dysrhythmias, Previous cardiac testing   Echo: Date: 21 Results:    Stress Test: Date: Results:    ECG Reviewed: Date: 2/15/22 Results:    Cath: Date: Results:      METS/Exercise Tolerance:     Hematologic:     (+) History of blood clots,     Musculoskeletal:   (+) arthritis,     GI/Hepatic:     (+) GERD, Asymptomatic on medication,     Renal/Genitourinary:  - neg Renal ROS     Endo:     (+) Obesity,     Psychiatric/Substance Use:  - neg psychiatric ROS     Infectious Disease:  - neg infectious disease ROS     Malignancy:  - neg malignancy ROS     Other:  - neg other ROS          Physical Exam    Airway        Mallampati: I   TM distance: > 3 FB   Neck ROM: full   Mouth opening: > 3 cm    Respiratory Devices and Support         Dental  no notable dental history         Cardiovascular   cardiovascular exam normal       Rhythm and rate: regular and normal     Pulmonary   pulmonary exam normal         breath sounds clear to auscultation           OUTSIDE LABS:  CBC:   Lab Results   Component Value Date    WBC 4.0 07/06/2022    WBC 4.5 05/17/2022    HGB 12.3 07/06/2022    HGB 12.6 05/17/2022    HCT 37.7 07/06/2022    HCT 39.4 05/17/2022     07/06/2022     05/17/2022     BMP:   Lab Results   Component Value Date     07/06/2022     05/17/2022    POTASSIUM 4.0 07/06/2022    POTASSIUM 3.7 05/17/2022    CHLORIDE 104 07/06/2022    CHLORIDE 102 05/17/2022    CO2 28 07/06/2022    CO2 29 05/17/2022    BUN 23 07/06/2022    BUN 19 05/17/2022    CR 1.01 07/06/2022    CR 1.10 05/17/2022    GLC 85 07/06/2022    GLC 92 05/17/2022     COAGS:   Lab Results   Component Value Date    INR 0.98 07/03/2020     POC: No results found for: BGM, HCG, HCGS  HEPATIC:   Lab Results   Component Value Date    ALBUMIN 3.9 07/06/2022    PROTTOTAL 6.9 07/06/2022    ALT 22 07/06/2022    AST 30 07/06/2022    ALKPHOS 46 07/06/2022    BILITOTAL 0.4 07/06/2022     OTHER:   Lab Results   Component Value Date    LACT 0.9 05/17/2022    BELKIS 9.2 07/06/2022    MAG 2.0 10/28/2020    LIPASE 20 05/17/2022    TSH 2.23 11/12/2021    CRP 0.1 09/17/2018    SED 13 03/30/2022       Anesthesia Plan    ASA Status:  3   NPO Status:  NPO Appropriate    Anesthesia Type: MAC.     - Reason for MAC: straight local not clinically adequate   Induction: Intravenous.           Consents    Anesthesia Plan(s) and associated risks, benefits, and realistic alternatives discussed. Questions answered and patient/representative(s) expressed understanding.     - Discussed: Risks, Benefits and Alternatives for BOTH SEDATION and the PROCEDURE were discussed     - Discussed with:  Patient         Postoperative Care            Comments:                MISSY DIXON CRNA

## 2022-07-14 NOTE — ANESTHESIA CARE TRANSFER NOTE
Patient: Phyllis VILLASENOR Neururer    Procedure: Procedure(s):  BIOPSY, BONE MARROW       Diagnosis: Monoclonal gammopathy [D47.2]  Diagnosis Additional Information: No value filed.    Anesthesia Type:   MAC     Note:    Oropharynx: oropharynx clear of all foreign objects  Level of Consciousness: awake  Oxygen Supplementation: nasal cannula  Level of Supplemental Oxygen (L/min / FiO2): 2  Independent Airway: airway patency satisfactory and stable  Dentition: dentition unchanged  Vital Signs Stable: post-procedure vital signs reviewed and stable  Report to RN Given: handoff report given  Patient transferred to: Phase II    Handoff Report: Identifed the Patient, Identified the Reponsible Provider, Reviewed the pertinent medical history, Discussed the surgical course, Reviewed Intra-OP anesthesia mangement and issues during anesthesia, Set expectations for post-procedure period and Allowed opportunity for questions and acknowledgement of understanding      Vitals:  Vitals Value Taken Time   BP     Temp     Pulse     Resp     SpO2         Electronically Signed By: MISSY Fernandez CRNA  July 14, 2022  10:17 AM

## 2022-07-14 NOTE — INTERVAL H&P NOTE
"I have reviewed the surgical (or preoperative) H&P that is linked to this encounter, and examined the patient. There are no significant changes    Clinical Conditions Present on Arrival:  Clinically Significant Risk Factors Present on Admission                 # Coagulation Defect: home medication list includes an anticoagulant medication   # Severe Obesity: Estimated body mass index is 42.83 kg/m  as calculated from the following:    Height as of this encounter: 1.753 m (5' 9\").    Weight as of this encounter: 131.5 kg (290 lb).       "

## 2022-07-14 NOTE — OR NURSING
Phyllis has been discharged to home at 1130 via ambulatory accompanied by  and staff    Written discharge instructions were provided to  and wife.  Prescriptions were none.      Patient and adult caring for them verbalize understanding of discharge instructions including no driving until tomorrow and no longer taking narcotic pain medications - no operating mechanical equipment and no making any important decisions.They understand reason for discharge, and necessary follow-up appointments.      Padmaja Quintanilla RN on 7/14/2022 at 12:42 PM

## 2022-07-14 NOTE — ANESTHESIA POSTPROCEDURE EVALUATION
Patient: Phyllis VILLASENOR Neururer    Procedure: Procedure(s):  BIOPSY, BONE MARROW       Anesthesia Type:  MAC    Note:  Disposition: Outpatient   Postop Pain Control: Uneventful            Sign Out: Well controlled pain   PONV: No   Neuro/Psych: Uneventful            Sign Out: Acceptable/Baseline neuro status   Airway/Respiratory: Uneventful            Sign Out: Acceptable/Baseline resp. status   CV/Hemodynamics: Uneventful            Sign Out: Acceptable CV status; No obvious hypovolemia; No obvious fluid overload   Other NRE: NONE   DID A NON-ROUTINE EVENT OCCUR? No           Last vitals:  Vitals Value Taken Time   /86 07/14/22 1151   Temp 96.8  F (36  C) 07/14/22 1100   Pulse 88 07/14/22 1151   Resp 14 07/14/22 1045   SpO2 98 % 07/14/22 1201   Vitals shown include unvalidated device data.    Electronically Signed By: MISSY BARROW CRNA  July 14, 2022  12:47 PM

## 2022-07-28 LAB — PATH REPORT.FINAL DX SPEC: NORMAL

## 2022-07-29 ENCOUNTER — ONCOLOGY VISIT (OUTPATIENT)
Dept: ONCOLOGY | Facility: OTHER | Age: 57
End: 2022-07-29
Attending: INTERNAL MEDICINE
Payer: COMMERCIAL

## 2022-07-29 VITALS
DIASTOLIC BLOOD PRESSURE: 82 MMHG | WEIGHT: 284 LBS | RESPIRATION RATE: 16 BRPM | BODY MASS INDEX: 41.94 KG/M2 | TEMPERATURE: 97.9 F | OXYGEN SATURATION: 98 % | HEART RATE: 60 BPM | SYSTOLIC BLOOD PRESSURE: 130 MMHG

## 2022-07-29 DIAGNOSIS — D47.2 MGUS (MONOCLONAL GAMMOPATHY OF UNKNOWN SIGNIFICANCE): Primary | ICD-10-CM

## 2022-07-29 DIAGNOSIS — K55.069 SUPERIOR MESENTERIC VEIN THROMBOSIS (H): ICD-10-CM

## 2022-07-29 PROCEDURE — 99417 PROLNG OP E/M EACH 15 MIN: CPT | Performed by: INTERNAL MEDICINE

## 2022-07-29 PROCEDURE — 99215 OFFICE O/P EST HI 40 MIN: CPT | Performed by: INTERNAL MEDICINE

## 2022-07-29 ASSESSMENT — PAIN SCALES - GENERAL: PAINLEVEL: MILD PAIN (2)

## 2022-07-29 NOTE — PROGRESS NOTES
Visit Date: 07/29/2022    HISTORY OF PRESENT ILLNESS:  Mrs. Washington returns for followup of deep vein thrombosis, rule out hypercoagulable state.  We had seen the patient in consultation at the request of Dr. Victoria in 05/2020.  At that time, Ms. Washington was a 57-year-old white female with history of recurrent superficial thrombophlebitis status post ablation, heterozygous for the MTHFR DNA, on aspirin, recently developed pain and swelling worsening involving the legs.  She was seen by Dr. Victoria, who noted right thigh superficial phlebitis continuing up to the right lateral hip area.  Bilateral venous Dopplers were performed 02/08/2022, and there was no evidence of lower extremity DVT.  There was an interrogation of deep venous structures in bilateral common femoral veins.  The veins of the proximal calf demonstrated normal appearance consistent with superficial venous thrombus identified within the varicosity in the medial right thigh.  These were repeated again on 02/18/2022, and the findings were that there were thrombosed superficial varicosities present in the area.  The patient already had complaints of abdominal pain.  CT abdomen and pelvis was ordered, and it was revealed that there was a new thrombus within a couple premesenteric veins, including the superior mesenteric vein just below the confluence of the splenic vein.  No bowel wall thickening, no definite findings of leg ischemia.  She was started on Xarelto by Dr. Victoria.  The patient also had been seen by Rheumatology, where blood workup including beta-2 glycoprotein IgM antibody was normal.  Phospholipid IgM antibody was elevated at 19.4.  Myeloperoxidase antibodies were negative.  NARCISO was positive.  We had seen the patient previously for evaluation of recurrent superficial thrombophlebitis back on 12/19/2019.  We had ordered hypercoagulability workup at that time, and she was found to be heterozygous for the MTHFR DNA C677T  locus.  We recommended vitamin B6, B12, folic acid supplementation and continue aspirin.  Since then, she had an ablation.  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  When we saw the patient, the plan was to continue anticoagulation.  Repeat CT abdomen and pelvis was repeated on 04/05/2022.  This included CT chest with no evidence of residual or recurrent mesenteric vein thrombosis -- it had resolved.  There were no abnormalities in the lung fields.  Also further workup was obtained including serum protein electrophoresis, which revealed that her M-spike was 0.3 with monoclonal IgG immunoglobulin lambda light chain.  At the time, we repeated thrombophilia workup, and there was no evidence of thrombophilia, including antithrombin III which was negative.  Cardiolipin antibodies were negative.  Beta-2 glycoprotein antibodies were negative.  Abdominal pain improved.  When we saw her subsequently, given the fact she had a monoclonal gammopathy that could be contributing thrombophilia, we therefore recommended further workup including skeletal bone survey, which was done on 05/23/2022.  Findings were that there were degenerative changes of the spine and hips, no lytic defects.  The patient was seen in the Emergency Room with abdominal pain.  CT abdomen and pelvis was performed on 05/17/2022.  There was no evidence of mesenteric vein thrombosis, no acute abnormality.  Her abdominal pain, resolved.  Nonetheless, we did order further workup.  In terms of myeloma labs, we did order beta-2 microglobulin, which was elevated at 3.2.  Kappa lambda ratio was elevated at 2.1.  When we saw the patient last on 06/01/2022, we wanted to absolutely rule out myeloma by obtaining bone marrow aspiration biopsy.  This was performed on 07/14/2022 and was read as negative for involvement by plasma cell neoplasm or B-cell lymphoma.  There was normocellular  bone marrow for age.  Storage iron could not be assessed.  Cytogenetics were negative for clonal chromosomal abnormality.  The patient is otherwise here for followup.  She continues on Xarelto.  She denies any leg swelling.  She gets intermittent abdominal pain, otherwise no fevers, night sweats, weight loss, bruising episodes.    PHYSICAL EXAMINATION:    GENERAL:  She is a middle-aged white female in no acute distress.  ECOG performance status 0.  VITAL SIGNS:  Reveal blood pressure 130/82, pulse 60, respirations 16, temperature 97.9.  HEENT:  Atraumatic, normocephalic.  Oropharynx nonerythematous.  NECK:  Supple, no thyromegaly.  LUNGS:  Clear to auscultation and percussion.  HEART:  Regular rhythm.  S1, S2 normal.  ABDOMEN:  Soft.  Normoactive bowel sounds.  No mass, nontender.  LYMPHATICS:  No supraclavicular, axillary or inguinal nodes.  EXTREMITIES:  With trace ankle edema.  NEUROLOGIC:  Nonfocal.    LABORATORY DATA:  Reveal kappa lambda ratio was 2.13.  Serum protein electrophoresis revealed an M-spike of 0 with a very small monoclonal IgM globulin of lambda light type.    IMPRESSION:    1.  Monoclonal gammopathy of undetermined significance:  Bone marrow aspiration biopsy was negative for myeloma.  Skeletal bone survey was negative for lytic defects.  Plan is to continue surveillance and see the patient in 4 months and repeat myeloma labs.  2.  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  She does have a history of MTHFR DNA mutation.  She continues on B6, B12, folic acid supplementation.  Otherwise, we will repeat CT abdomen and pelvis in 4 months.    TIME:  Seventy minutes were spent on this patient.  Time was spent reviewing bone marrow aspiration biopsy results with pathology, reviewing results with the patient, reviewing lab results, performing history and physical, documenting history and physical, ordering followup  labs and scans.    Mary Ojeda MD        D: 2022   T: 2022   MT: ZOË    Name:     HUMBERTO MCBRIDE  MRN:      [id number]        Account:    [id number]   :      1965           Visit Date: 2022     Document: [id number]    cc:  Ya Victoria MD

## 2022-07-29 NOTE — NURSING NOTE
Chief Complaint   Patient presents with     Oncology Clinic Visit     F/U MGUS     Medication Reconciliation: complete    Yessenia Peng CMA (AAMA)

## 2022-08-01 NOTE — PROGRESS NOTES
[facility]    Outpatient Occupational Therapy Discharge Note  Patient: Phyllis VILLASENOR Neururer  : 1965    Beginning/End Dates of Reporting Period:  1/3/2022 to 2/3/2022    Referring Provider: Dr. Brizuela-Casper    Therapy Diagnosis:  Left elbow pain    Client Self Report: Phyllis reports that her elbow has been feeling a lot better even since last session.    Objective Measurements:  See Prog note from 1/3/2022      Goals:   Goal Identifier Strength   Goal Description Patient will have at least 55lbs of L  strength for improved ability to  and hold her grandchildren.   Target Date 01/10/22   Date Met   1/3/2022   Progress (detail required for progress note):  Patient has max 80#  strength and reports improved ability to  her grand children.  Goal met.        Goal Identifier Pain   Goal Description Patient will report pain levels of no higher than 2/10 during completion of all daily activities during 2 consecutive sessions.   Target Date 01/10/22   Date Met   1/3/2022   Progress (detail required for progress note):  Phyllis reports less than 2/10 pain for the last 2 sessions.  Goal met.  Discontinue.       Goal Identifier Functional Use   Goal Description Patient will report decreased difficulty opening a tight or new jar from current rating of moderate difficulty to mild-no difficulty for improved ability to complete simple meal preparations.   Target Date 01/10/22   Date Met      Progress (detail required for progress note):  Patient reports mild difficulty on 50% of trials. Monitor goal.       Goal Identifier Sleep Hygiene   Goal Description Patient will report decreased difficulty sleeping due to LUE pain from current rating of moderate difficulty to mild-no difficulty for improved sleep hygiene.   Target Date 01/10/22   Date Met      Progress (detail required for progress note):  Patient  reports that she has been able to sleep without waking from elbow pain for at least a week now.  Monitor goal.              Plan:  Discharge from therapy.    Discharge:    Reason for Discharge: No further expectation of progress.  Patient has failed to schedule further appointments.        Equipment Issued: n/a    Discharge Plan: Patient to continue home program.

## 2022-08-10 ENCOUNTER — IMMUNIZATION (OUTPATIENT)
Dept: FAMILY MEDICINE | Facility: OTHER | Age: 57
End: 2022-08-10
Payer: COMMERCIAL

## 2022-08-10 DIAGNOSIS — Z23 HIGH PRIORITY FOR 2019-NCOV VACCINE: Primary | ICD-10-CM

## 2022-08-10 PROCEDURE — 0054A COVID-19,PF,PFIZER (12+ YRS): CPT

## 2022-08-10 PROCEDURE — 91305 COVID-19,PF,PFIZER (12+ YRS): CPT

## 2022-08-19 ENCOUNTER — MYC MEDICAL ADVICE (OUTPATIENT)
Dept: FAMILY MEDICINE | Facility: OTHER | Age: 57
End: 2022-08-19

## 2022-08-30 ENCOUNTER — OFFICE VISIT (OUTPATIENT)
Dept: FAMILY MEDICINE | Facility: OTHER | Age: 57
End: 2022-08-30
Attending: NURSE PRACTITIONER
Payer: COMMERCIAL

## 2022-08-30 VITALS
WEIGHT: 286 LBS | TEMPERATURE: 97.6 F | DIASTOLIC BLOOD PRESSURE: 88 MMHG | RESPIRATION RATE: 18 BRPM | OXYGEN SATURATION: 99 % | BODY MASS INDEX: 40.94 KG/M2 | SYSTOLIC BLOOD PRESSURE: 118 MMHG | HEART RATE: 58 BPM | HEIGHT: 70 IN

## 2022-08-30 DIAGNOSIS — L03.316 CELLULITIS OF UMBILICUS: Primary | ICD-10-CM

## 2022-08-30 PROCEDURE — 87070 CULTURE OTHR SPECIMN AEROBIC: CPT | Mod: ZL | Performed by: NURSE PRACTITIONER

## 2022-08-30 PROCEDURE — 99213 OFFICE O/P EST LOW 20 MIN: CPT | Performed by: NURSE PRACTITIONER

## 2022-08-30 PROCEDURE — 87205 SMEAR GRAM STAIN: CPT | Mod: ZL | Performed by: NURSE PRACTITIONER

## 2022-08-30 RX ORDER — CEPHALEXIN 500 MG/1
500 CAPSULE ORAL 4 TIMES DAILY
Qty: 40 CAPSULE | Refills: 0 | Status: SHIPPED | OUTPATIENT
Start: 2022-08-30 | End: 2022-09-09

## 2022-08-30 RX ORDER — DOXYCYCLINE 100 MG/1
100 CAPSULE ORAL 2 TIMES DAILY
Qty: 20 CAPSULE | Refills: 0 | Status: SHIPPED | OUTPATIENT
Start: 2022-08-30 | End: 2022-09-09

## 2022-08-30 ASSESSMENT — ENCOUNTER SYMPTOMS
NUMBNESS: 0
MUSCULOSKELETAL NEGATIVE: 1
DIAPHORESIS: 0
HEARTBURN: 0
HEADACHES: 0
FATIGUE: 0
EYES NEGATIVE: 1
PALPITATIONS: 0
CHILLS: 0
DIZZINESS: 0
COUGH: 0
ENDOCRINE NEGATIVE: 1
COLOR CHANGE: 1
SHORTNESS OF BREATH: 0
VOMITING: 0
ADENOPATHY: 0
ANAL BLEEDING: 0
NAUSEA: 0
BRUISES/BLEEDS EASILY: 1
DIARRHEA: 0
CONSTIPATION: 0
HEMATOCHEZIA: 0
FEVER: 0
PSYCHIATRIC NEGATIVE: 1
WOUND: 1
LIGHT-HEADEDNESS: 0
RECTAL PAIN: 0
ABDOMINAL DISTENTION: 0
ABDOMINAL PAIN: 1
CHEST TIGHTNESS: 0

## 2022-08-30 ASSESSMENT — ASTHMA QUESTIONNAIRES
QUESTION_1 LAST FOUR WEEKS HOW MUCH OF THE TIME DID YOUR ASTHMA KEEP YOU FROM GETTING AS MUCH DONE AT WORK, SCHOOL OR AT HOME: NONE OF THE TIME
QUESTION_2 LAST FOUR WEEKS HOW OFTEN HAVE YOU HAD SHORTNESS OF BREATH: NOT AT ALL
ACT_TOTALSCORE: 25
QUESTION_5 LAST FOUR WEEKS HOW WOULD YOU RATE YOUR ASTHMA CONTROL: COMPLETELY CONTROLLED
ACT_TOTALSCORE: 25
QUESTION_3 LAST FOUR WEEKS HOW OFTEN DID YOUR ASTHMA SYMPTOMS (WHEEZING, COUGHING, SHORTNESS OF BREATH, CHEST TIGHTNESS OR PAIN) WAKE YOU UP AT NIGHT OR EARLIER THAN USUAL IN THE MORNING: NOT AT ALL
QUESTION_4 LAST FOUR WEEKS HOW OFTEN HAVE YOU USED YOUR RESCUE INHALER OR NEBULIZER MEDICATION (SUCH AS ALBUTEROL): NOT AT ALL

## 2022-08-30 ASSESSMENT — PAIN SCALES - GENERAL: PAINLEVEL: MILD PAIN (2)

## 2022-08-30 NOTE — NURSING NOTE
"Patient states she suspects an infection of the navel with symptoms present for five weeks.     Chief Complaint   Patient presents with     Derm Problem     naval       Initial /88 (BP Location: Right arm, Patient Position: Sitting, Cuff Size: Adult Regular)   Pulse 58   Temp 97.6  F (36.4  C) (Temporal)   Resp 18   Ht 1.765 m (5' 9.5\")   Wt 129.7 kg (286 lb)   LMP  (LMP Unknown)   SpO2 99%   BMI 41.63 kg/m   Estimated body mass index is 41.63 kg/m  as calculated from the following:    Height as of this encounter: 1.765 m (5' 9.5\").    Weight as of this encounter: 129.7 kg (286 lb).  Medication Review: complete    Ericka Gómez RN      FOOD SECURITY SCREENING QUESTIONS:    The next two questions are to help us understand your food security.  If you are feeling you need any assistance in this area, we have resources available to support you today.    Hunger Vital Signs:  Within the past 12 months we worried whether our food would run out before we got money to buy more. Never  Within the past 12 months the food we bought just didn't last and we didn't have money to get more. Never  Ericka Gómez RN,LPN on 8/30/2022 at 9:33 AM      "

## 2022-08-30 NOTE — PROGRESS NOTES
Assessment & Plan   Phyllis is a 57 year old presenting for the following health issues:  Derm Problem (naval)        ICD-10-CM    1. Cellulitis of umbilicus  L03.316 Wound Aerobic Bacterial Culture Routine with Gram Stain     doxycycline hyclate (VIBRAMYCIN) 100 MG capsule     cephALEXin (KEFLEX) 500 MG capsule   Patient presents for five week history of inner umbilicus erythema and drainage. Drainage has subsided slightly but erythema and pain around the area have persisted.    Vital signs stable. Physical exam consistent with umbilical cellulitis. Due to patient's immunocompromised status with taking methotrexate once weekly and the duration of infection antibiotic therapy is indicated.  Plan to start doxycycline for MRSA coverage, 100 mg: take one capsule by mouth twice  daily for 10 days. Patient has tolerated doxycycline in the past and would like to use this medication today for MRSA coverage in the setting of umbilical cellulitis. Cephalexin 500 mg, take one capsule by mouth four times daily for 10 days.     Will send wound culture with Gram stain to lab.    May wash umbilicus with warm water and soap. Rinse clean, dry area thoroughly.       Has follow-up appointment with PCP September 12. Discussed signs and symptoms that would warrant urgent/emergent follow-up. AVS reviewed with patient, all questions and concerns addressed during visit.            Encouraged weight loss and regular exercise.    Return in about 13 days (around 9/12/2022) for Return for follow up with PCP, Appointments [phone].    MISSY [facility]      Subjective     Redness and pain started 5 weeks ago. Usually gets 1-2 a year that she treats with neosporin and it resolves.     She has been using neosporin for 5 weeks as well as a  topical antifungal 2 weeks ago and that helped with the redness, but she is still having pus, redness and pain deeper into the umbilicus.    History of Present  "Illness       Reason for visit:  [place] infection  Symptom onset:  More than a month  Symptoms include:  Redness, pus, odor  Symptom intensity:  Mild  Symptom progression:  Improving  Had these symptoms before:  Yes  Has tried/received treatment for these symptoms:  No  What makes it worse:  No  What makes it better:  No    She eats 4 or more servings of fruits and vegetables daily.She consumes 1 sweetened beverage(s) daily.She exercises with enough effort to increase her heart rate 30 to 60 minutes per day.  She exercises with enough effort to increase her heart rate 4 days per week.   She is taking medications regularly.             Review of Systems   Constitutional: Negative for chills, diaphoresis, fatigue and fever.   HENT: Negative.    Eyes: Negative.    Respiratory: Negative for cough, chest tightness and shortness of breath.    Cardiovascular: Negative for chest pain, palpitations and peripheral edema.   Gastrointestinal: Positive for abdominal pain (inner umbilicus). Negative for abdominal distention, anal bleeding, constipation, diarrhea, heartburn, hematochezia, nausea, rectal pain and vomiting.   Endocrine: Negative.    Musculoskeletal: Negative.    Skin: Positive for color change and wound.   Allergic/Immunologic: Positive for immunocompromised state (taking methotrexate once weekly). Negative for environmental allergies and food allergies.   Neurological: Negative for dizziness, light-headedness, numbness and headaches.   Hematological: Negative for adenopathy. Bruises/bleeds easily.   Psychiatric/Behavioral: Negative.             Objective    /88 (BP Location: Right arm, Patient Position: Sitting, Cuff Size: Adult Regular)   Pulse 58   Temp 97.6  F (36.4  C) (Temporal)   Resp 18   Ht 1.765 m (5' 9.5\")   Wt 129.7 kg (286 lb)   LMP  (LMP Unknown)   SpO2 99%   BMI 41.63 kg/m    Body mass index is 41.63 kg/m .  Physical Exam  Vitals and nursing note reviewed.   Constitutional:       " Appearance: She is obese.   Cardiovascular:      Rate and Rhythm: Normal rate and regular rhythm.      Pulses: Normal pulses.      Heart sounds: Normal heart sounds.   Pulmonary:      Effort: Pulmonary effort is normal.      Breath sounds: Normal breath sounds.   Abdominal:      General: Abdomen is protuberant.      Palpations: Abdomen is soft.      Tenderness: There is abdominal tenderness (when inserting the culture swab) in the periumbilical area.      Hernia: No hernia is present.   Musculoskeletal:         General: Normal range of motion.      Cervical back: Normal range of motion.   Skin:     General: Skin is warm and dry.      Capillary Refill: Capillary refill takes less than 2 seconds.      Findings: Erythema and wound (inner umbilicus, purulent drainage noted on culture swab) present.   Neurological:      General: No focal deficit present.      Mental Status: She is alert and oriented to person, place, and time.

## 2022-09-01 LAB
BACTERIA WND CULT: ABNORMAL
GRAM STAIN RESULT: ABNORMAL

## 2022-09-03 DIAGNOSIS — R00.2 PALPITATIONS: ICD-10-CM

## 2022-09-03 DIAGNOSIS — I47.10 SVT (SUPRAVENTRICULAR TACHYCARDIA) (H): ICD-10-CM

## 2022-09-03 DIAGNOSIS — I49.8 VENTRICULAR BIGEMINY: ICD-10-CM

## 2022-09-03 DIAGNOSIS — I49.3 PVC'S (PREMATURE VENTRICULAR CONTRACTIONS): ICD-10-CM

## 2022-09-06 RX ORDER — METOPROLOL SUCCINATE 50 MG/1
50 TABLET, EXTENDED RELEASE ORAL
Qty: 90 TABLET | Refills: 2 | Status: SHIPPED | OUTPATIENT
Start: 2022-09-06 | End: 2023-05-08

## 2022-09-06 NOTE — TELEPHONE ENCOUNTER
"[facility] [id number] [facility] [employer]  sent Rx request for the following:      Requested Prescriptions   Pending Prescriptions Disp Refills     metoprolol succinate ER (TOPROL XL) 50 MG 24 hr tablet [Pharmacy Med Name: METOPROLOL SUCC ER 50 MG TAB] 90 tablet 2     Sig: TAKE 1 TABLET (50 MG) BY MOUTH DAILY (WITH DINNER)       Beta-Blockers Protocol Passed - 9/6/2022  8:16 AM        Passed - Blood pressure under 140/90 in past 12 months     BP Readings from Last 3 Encounters:   08/30/22 118/88   07/29/22 130/82   07/14/22 119/76           Passed - Patient is age 6 or older        Passed - Recent (12 mo) or future (30 days) visit within the authorizing provider's specialty     Patient has had an office visit with the authorizing provider or a provider within the authorizing providers department within the previous 12 mos or has a future within next 30 days. See \"Patient Info\" tab in inbasket, or \"Choose Columns\" in Meds & Orders section of the refill encounter.          Passed - Medication is active on med list     Last Prescription Date:   09/08/21  Last Fill Qty/Refills:         90, R-3  Last Office Visit:              03/28/22 (Cardiology Visit)    Future Office visit:             Next 5 appointments (look out 90 days)    Sep 12, 2022  9:00 AM  Pre-Op physical with Ya Victoria MD  [facility] ([facility] ) [address]  [place] [address]  [phone]   Oct 03, 2022  9:00 AM  Nurse Only with  SEAN  [facility] ([facility] ) [address]  [place] [address]  [phone]   Nov 14, 2022  1:00 PM  MyChart Physical Adult with Ya Victoria MD  [facility] ([facility] ) [address]  [place] [address]  [phone]   Nov 29, 2022  8:30 AM  Return Visit with Mary Ojeda, " MD  [facility] ([facility] ) [address]  [place] [address]  [phone]        Eusebia Wiseman RN on 9/6/2022 at 10:07 AM

## 2022-09-08 NOTE — PROGRESS NOTES
[facility]  [address]  [place] [address]  Phone: [phone]  Fax: [phone]  Primary Provider: Ya Martínez  Pre-op Performing Provider: YA MARTÍNEZ      PREOPERATIVE EVALUATION:  Today's date: 9/12/2022    Phyllis VILLASENOR Neururer is a 57 year old female who presents for a preoperative evaluation.    Surgical Information:  Surgery/Procedure: Rt tarsal tunnel release  Surgery Location: [facility]  Surgeon: Dr. Finch  Surgery Date: 10/6/2022   Time of Surgery: TBD  Where patient plans to recover: At home with family  Fax number for surgical facility: Note does not need to be faxed, will be available electronically in Epic.    Type of Anesthesia Anticipated: to be determined    Assessment & Plan     The proposed surgical procedure is considered LOW risk.    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  She is going to call their office.  Labs as noted.    - Comprehensive metabolic panel; Future  - CBC and Differential; Future  - INR; Future    Superior mesenteric vein thrombosis (H)  See above.  Would recommend resuming Xarelto later the day of her surgery.    Superficial phlebitis  See above.    Needs flu shot  Flu shot updated today.  - INFLUENZA QUAD, RECOMBINANT, P-FREE (RIV4) (FLUBLOK)         Risks and Recommendations:  The patient has the following additional risks and recommendations for perioperative complications:   - No identified additional risk factors other than previously addressed      No ibuprofen, aleve or aspirin for at least 7 days prior to surgery.    No vitamins or supplements for at least 7 days prior to surgery.    Stop Xarelto 1 days prior to surgery and resume once you are home.    The morning of surgery, take only plaquenil, pepcid and metoprolol with a small sip  of water.    RECOMMENDATION:  APPROVAL GIVEN to proceed with proposed procedure, without further diagnostic evaluation.              Subjective     HPI related to upcoming procedure:     Phyllis has had chronic right foot pain and has been found to have tarsal tunnel syndrome.      Preop Questions 9/12/2022   1. Have you ever had a heart attack or stroke? No   2. Have you ever had surgery on your heart or blood vessels, such as a stent placement, a coronary artery bypass, or surgery on an artery in your head, neck, heart, or legs? No   3. Do you have chest pain with activity? No   4. Do you have a history of  heart failure? No   5. Do you currently have a cold, bronchitis or symptoms of other infection? No   6. Do you have a cough, shortness of breath, or wheezing? No   7. Do you or anyone in your family have previous history of blood clots? YES - superficial phlebitis and superior mesenteric vein thrombosis.   8. Do you or does anyone in your family have a serious bleeding problem such as prolonged bleeding following surgeries or cuts? No   9. Have you ever had problems with anemia or been told to take iron pills? YES - years ago during childbearing years.   10. Have you had any abnormal blood loss such as black, tarry or bloody stools, or abnormal vaginal bleeding? No   11. Have you ever had a blood transfusion? No   12. Are you willing to have a blood transfusion if it is medically needed before, during, or after your surgery? Yes   13. Have you or any of your relatives ever had problems with anesthesia? No   14. Do you have sleep apnea, excessive snoring or daytime drowsiness? No   15. Do you have any artifical heart valves or other implanted medical devices like a pacemaker, defibrillator, or continuous glucose monitor? No   16. Do you have artificial joints? No   17. Are you allergic to latex? No   18. Is there any chance that you may be pregnant? No     Health Care Directive:  Patient does not have a Health Care  Directive or Living Will:     Preoperative Review of :   reviewed - no record of controlled substances prescribed.          Review of Systems  CONSTITUTIONAL: NEGATIVE for fever, chills, change in weight  INTEGUMENTARY/SKIN: NEGATIVE for worrisome rashes, moles or lesions  EYES: NEGATIVE for vision changes or irritation  ENT/MOUTH: NEGATIVE for ear, mouth and throat problems  RESP: NEGATIVE for significant cough or SOB  CV: NEGATIVE for chest pain, palpitations or peripheral edema  GI: NEGATIVE for nausea, abdominal pain, heartburn, or change in bowel habits  : NEGATIVE for frequency, dysuria, or hematuria  MUSCULOSKELETAL: NEGATIVE for significant arthralgias or myalgia  NEURO: NEGATIVE for weakness, dizziness or paresthesias  ENDOCRINE: NEGATIVE for temperature intolerance, skin/hair changes  HEME: NEGATIVE for bleeding problems  PSYCHIATRIC: NEGATIVE for changes in mood or affect    Patient Active Problem List    Diagnosis Date Noted     Morbid obesity (H) 11/13/2021     Priority: Medium     Mitral valve annular calcification- mild with mild MI 11/25/2020     Priority: Medium     PVC's (premature ventricular contractions) 11/25/2020     Priority: Medium     SVT (supraventricular tachycardia) (H) 11/25/2020     Priority: Medium     Ventricular bigeminy 11/25/2020     Priority: Medium     Health care maintenance 10/22/2018     Priority: Medium     Pap NIL and HPV neg on 8/22/17 ([facility])  DEXA 2015 normal.       Colon polyp 09/20/2017     Priority: Medium     Overview:   Colonoscopy 9/20/17 with transverse colon polyp and sigmoid colon polyp (20 cm), both removed       Thrombosis of saphenous vein, left 11/16/2016     Priority: Medium     Disturbance in sleep behavior 07/02/2015     Priority: Medium     Overview:   [facility] Sleep Study: Showed Mild upper airway resistance. No sleep apnea or limb movement disorder.   IMO Update       Family history of diabetes mellitus type II 04/07/2015     Priority: Medium  "    High risk medication use 09/10/2014     Priority: Medium     Overview:   HCQ, MTX, Humira       Cervical muscle pain 07/25/2013     Priority: Medium     Rheumatoid arthritis involving multiple sites with positive rheumatoid factor (H) 09/28/2012     Priority: Medium     Overview:   RF + 121, CCP neg; onset ~ 2011, Dx 2012  Doxy (9/8/2012- 3/2013)  Nuc Med Scan with symmetric uptake  Plaquenil trial 3/2013- ; MTX 7/2013- ; SSA 10/2014-12/2014; Humira 10/2015-   6/2014: VECTRA 42       Vitamin D deficiency 06/18/2012     Priority: Medium     Overview:   Level of 25 on 5/11/2012       Myalgia 05/11/2012     Priority: Medium     Enthesopathy of hip region 01/14/2011     Priority: Medium     Allergic rhinitis 11/02/2004     Priority: Medium     Asthma 09/22/2003     Priority: Medium     Overview:   Updated per 10/1/17 IMO import       Esophageal reflux 09/22/2003     Priority: Medium     Essential hypertension, benign 05/14/2001     Priority: Medium     Obesity 05/14/2001     Priority: Medium      Past Medical History:   Diagnosis Date     Personal history of other medical treatment (CODE)     3 normal childbirths     Rheumatoid arthritis (H)      Past Surgical History:   Procedure Laterality Date     BONE MARROW BIOPSY, BONE SPECIMEN, NEEDLE/TROCAR Left 7/14/2022    Procedure: BIOPSY, BONE MARROW;  Surgeon: Glory Baig MD;  Location:  OR     COLONOSCOPY  2017    Dr. Fofana - 2 polyps found.  Told to f/u in 5 years.     Current Outpatient Medications   Medication Sig Dispense Refill     B-D INSULIN SYRINGE MICROFINE 27G X 5/8\" 1 ML MISC 1 EACH BY DOES NOT APPLY ROUTE ONE TIME A WEEK. USE TO DRAW UP METHOTREXATE 25 MG ONCE WEEKLY.       BD SAFETYGLIDE SYRINGE/NEEDLE 27G X 5/8\" 1 ML MISC 1 EACH BY DOES NOT APPLY ROUTE ONE TIME A WEEK. USE TO DRAW UP METHOTREXATE 25 MG ONCE WEEKLY.       cyanocobalamin (VITAMIN B-12) 1000 MCG tablet Take 1,000 mcg by mouth daily       famotidine (PEPCID) 20 MG tablet Take 1 tablet " "(20 mg) by mouth 2 times daily 180 tablet 3     folic acid (FOLVITE) 1 MG tablet Take 2 mg by mouth daily       hydroxychloroquine (PLAQUENIL) 200 MG tablet Take 400 mg by mouth daily       methotrexate 50 MG/2ML injection Inject 1 mL Subcutaneous once a week       metoprolol succinate ER (TOPROL XL) 50 MG 24 hr tablet TAKE 1 TABLET (50 MG) BY MOUTH DAILY (WITH DINNER) 90 tablet 2     predniSONE (DELTASONE) 5 MG tablet Take 5 mg by mouth daily  0     thiamine (B-1) 100 MG tablet Take 100 mg by mouth daily        vitamin B6 (PYRIDOXINE) 200 MG tablet Take 200 mg by mouth daily       XARELTO ANTICOAGULANT 20 MG TABS tablet TAKE 1 TABLET BY MOUTH DAILY WITH DINNER 90 tablet 3       No Known Allergies     Social History     Tobacco Use     Smoking status: Passive Smoke Exposure - Never Smoker     Smokeless tobacco: Never Used     Tobacco comment: In childhood home   Substance Use Topics     Alcohol use: Yes     Comment: 1 per month     Family History   Problem Relation Age of Onset     Hypertension Father         Hypertension,67     Other - See Comments Maternal Grandmother         Stroke,85     Other - See Comments Paternal Grandmother         Alzheimer's 73     Breast Cancer Mother 46        Cancer-breast,now 68     Cerebrovascular Disease Mother         stroke with hemiparesis 2017     Diabetes Brother 16        Diabetes,now 41     History   Drug Use Unknown         Objective     /80 (BP Location: Right arm, Patient Position: Sitting, Cuff Size: Adult Regular)   Pulse 58   Temp 97  F (36.1  C) (Tympanic)   Resp 16   Ht 1.753 m (5' 9\")   Wt 130.7 kg (288 lb 3.2 oz)   LMP  (LMP Unknown)   SpO2 94%   Breastfeeding No   BMI 42.56 kg/m      Physical Exam    GENERAL APPEARANCE: healthy, alert and no distress     EYES: EOMI, PERRL     HENT: ear canals and TM's normal and nose and mouth without ulcers or lesions     NECK: no adenopathy, no asymmetry, masses, or scars and thyroid normal to palpation     RESP: " lungs clear to auscultation - no rales, rhonchi or wheezes     CV: regular rates and rhythm, normal S1 S2, no S3 or S4 and no murmur, click or rub     ABDOMEN:  soft, nontender, no HSM or masses and bowel sounds normal     MS: extremities normal- no gross deformities noted, no evidence of inflammation in joints, FROM in all extremities.     SKIN: no suspicious lesions or rashes     NEURO: Normal strength and tone, sensory exam grossly normal, mentation intact and speech normal     PSYCH: mentation appears normal. and affect normal/bright     LYMPHATICS: No cervical adenopathy    Recent Labs   Lab Test 07/06/22  1552 05/17/22  1638   HGB 12.3 12.6    228    137   POTASSIUM 4.0 3.7   CR 1.01 1.10        Diagnostics:  Results for orders placed or performed in visit on 09/12/22   INR     Status: Abnormal   Result Value Ref Range    INR 1.49 (H) 0.85 - 1.15   Comprehensive metabolic panel     Status: Normal   Result Value Ref Range    Sodium 139 134 - 144 mmol/L    Potassium 4.6 3.5 - 5.1 mmol/L    Chloride 102 98 - 107 mmol/L    Carbon Dioxide (CO2) 30 21 - 31 mmol/L    Anion Gap 7 3 - 14 mmol/L    Urea Nitrogen 18 7 - 25 mg/dL    Creatinine 0.88 0.60 - 1.20 mg/dL    Calcium 9.6 8.6 - 10.3 mg/dL    Glucose 95 70 - 105 mg/dL    Alkaline Phosphatase 56 34 - 104 U/L    AST 25 13 - 39 U/L    ALT 21 7 - 52 U/L    Protein Total 7.1 6.4 - 8.9 g/dL    Albumin 3.9 3.5 - 5.7 g/dL    Bilirubin Total 0.4 0.3 - 1.0 mg/dL    GFR Estimate 76 >60 mL/min/1.73m2   CBC with platelets and differential     Status: Abnormal   Result Value Ref Range    WBC Count 4.8 4.0 - 11.0 10e3/uL    RBC Count 4.73 3.80 - 5.20 10e6/uL    Hemoglobin 13.4 11.7 - 15.7 g/dL    Hematocrit 40.1 35.0 - 47.0 %    MCV 85 78 - 100 fL    MCH 28.3 26.5 - 33.0 pg    MCHC 33.4 31.5 - 36.5 g/dL    RDW 15.5 (H) 10.0 - 15.0 %    Platelet Count 228 150 - 450 10e3/uL    % Neutrophils 45 %    % Lymphocytes 42 %    % Monocytes 11 %    % Eosinophils 1 %    %  Basophils 1 %    % Immature Granulocytes 0 %    NRBCs per 100 WBC 0 <1 /100    Absolute Neutrophils 2.2 1.6 - 8.3 10e3/uL    Absolute Lymphocytes 2.0 0.8 - 5.3 10e3/uL    Absolute Monocytes 0.5 0.0 - 1.3 10e3/uL    Absolute Eosinophils 0.0 0.0 - 0.7 10e3/uL    Absolute Basophils 0.0 0.0 - 0.2 10e3/uL    Absolute Immature Granulocytes 0.0 <=0.4 10e3/uL    Absolute NRBCs 0.0 10e3/uL   CBC and Differential     Status: Abnormal    Narrative    The following orders were created for panel order CBC and Differential.  Procedure                               Abnormality         Status                     ---------                               -----------         ------                     CBC with platelets and d...[[id number]]  Abnormal            Final result                 Please view results for these tests on the individual orders.         Last EKG from 2/15/22:      Component Ref Range & Units 2/15/22 10:01 AM 10/28/20  2:21 PM    Systolic Blood Pressure mmHg      Diastolic Blood Pressure mmHg      Ventricular Rate BPM 83      Atrial Rate BPM 83      VA Interval ms 154      QRS Duration ms 76      QT ms 354      QTc ms 415      P Axis degrees 42      R AXIS degrees 37      T Axis degrees 28      Interpretation ECG  Sinus rhythm   Low voltage QRS   Borderline ECG   When compared with ECG of 28-OCT-2020 14:21,   No significant change was found   Confirmed by MD RYAN, TOM ([id number]) on 2/16/2022 8:32:44 AM  Click View Image link to view waveform and result               Specimen Collected: 02/15/22 10:01 AM Last Resulted: 02/16/22               Revised Cardiac Risk Index (RCRI):  The patient has the following serious cardiovascular risks for perioperative complications:   - No serious cardiac risks = 0 points     RCRI Interpretation: 1 point: Class II (low risk - 0.9% complication rate)           Signed Electronically by: Ya Victoria MD  Copy of this evaluation report is provided to requesting  physician.      Answers for HPI/ROS submitted by the patient on 9/12/2022  Do you have a cough?: No  Are you experiencing any wheezing in your chest?: No  Do you have any shortness of breath?: No  Use of rescue inhaler:: never  Taking Asthma medication as prescribed:: I do not have an asthma controller medication  Asthma triggers:: none  Have you had any Emergency Room visits, Urgent Care visits, or Hospital Admissions since your last office visit?: No

## 2022-09-08 NOTE — H&P (VIEW-ONLY)
[facility] AND [facility]  [address]  [place] [address]  Phone: [phone]  Fax: [phone]  Primary Provider: Ya Martínez  Pre-op Performing Provider: YA MARTÍNEZ      PREOPERATIVE EVALUATION:  Today's date: 9/12/2022    Phyllis VILLASENOR Neururer is a 57 year old female who presents for a preoperative evaluation.    Surgical Information:  Surgery/Procedure: Rt tarsal tunnel release  Surgery Location: [facility]  Surgeon: Dr. Finch  Surgery Date: 10/6/2022   Time of Surgery: TBD  Where patient plans to recover: At home with family  Fax number for surgical facility: Note does not need to be faxed, will be available electronically in Epic.    Type of Anesthesia Anticipated: to be determined    Assessment & Plan     The proposed surgical procedure is considered LOW risk.    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  She is going to call their office.  Labs as noted.    - Comprehensive metabolic panel; Future  - CBC and Differential; Future  - INR; Future    Superior mesenteric vein thrombosis (H)  See above.  Would recommend resuming Xarelto later the day of her surgery.    Superficial phlebitis  See above.    Needs flu shot  Flu shot updated today.  - INFLUENZA QUAD, RECOMBINANT, P-FREE (RIV4) (FLUBLOK)         Risks and Recommendations:  The patient has the following additional risks and recommendations for perioperative complications:   - No identified additional risk factors other than previously addressed      No ibuprofen, aleve or aspirin for at least 7 days prior to surgery.    No vitamins or supplements for at least 7 days prior to surgery.    Stop Xarelto 1 days prior to surgery and resume once you are home.    The morning of surgery, take only plaquenil, pepcid and metoprolol with a small sip  of water.    RECOMMENDATION:  APPROVAL GIVEN to proceed with proposed procedure, without further diagnostic evaluation.              Subjective     HPI related to upcoming procedure:     Phyllis has had chronic right foot pain and has been found to have tarsal tunnel syndrome.      Preop Questions 9/12/2022   1. Have you ever had a heart attack or stroke? No   2. Have you ever had surgery on your heart or blood vessels, such as a stent placement, a coronary artery bypass, or surgery on an artery in your head, neck, heart, or legs? No   3. Do you have chest pain with activity? No   4. Do you have a history of  heart failure? No   5. Do you currently have a cold, bronchitis or symptoms of other infection? No   6. Do you have a cough, shortness of breath, or wheezing? No   7. Do you or anyone in your family have previous history of blood clots? YES - superficial phlebitis and superior mesenteric vein thrombosis.   8. Do you or does anyone in your family have a serious bleeding problem such as prolonged bleeding following surgeries or cuts? No   9. Have you ever had problems with anemia or been told to take iron pills? YES - years ago during childbearing years.   10. Have you had any abnormal blood loss such as black, tarry or bloody stools, or abnormal vaginal bleeding? No   11. Have you ever had a blood transfusion? No   12. Are you willing to have a blood transfusion if it is medically needed before, during, or after your surgery? Yes   13. Have you or any of your relatives ever had problems with anesthesia? No   14. Do you have sleep apnea, excessive snoring or daytime drowsiness? No   15. Do you have any artifical heart valves or other implanted medical devices like a pacemaker, defibrillator, or continuous glucose monitor? No   16. Do you have artificial joints? No   17. Are you allergic to latex? No   18. Is there any chance that you may be pregnant? No     Health Care Directive:  Patient does not have a Health Care  Directive or Living Will:     Preoperative Review of :   reviewed - no record of controlled substances prescribed.          Review of Systems  CONSTITUTIONAL: NEGATIVE for fever, chills, change in weight  INTEGUMENTARY/SKIN: NEGATIVE for worrisome rashes, moles or lesions  EYES: NEGATIVE for vision changes or irritation  ENT/MOUTH: NEGATIVE for ear, mouth and throat problems  RESP: NEGATIVE for significant cough or SOB  CV: NEGATIVE for chest pain, palpitations or peripheral edema  GI: NEGATIVE for nausea, abdominal pain, heartburn, or change in bowel habits  : NEGATIVE for frequency, dysuria, or hematuria  MUSCULOSKELETAL: NEGATIVE for significant arthralgias or myalgia  NEURO: NEGATIVE for weakness, dizziness or paresthesias  ENDOCRINE: NEGATIVE for temperature intolerance, skin/hair changes  HEME: NEGATIVE for bleeding problems  PSYCHIATRIC: NEGATIVE for changes in mood or affect    Patient Active Problem List    Diagnosis Date Noted     Morbid obesity (H) 11/13/2021     Priority: Medium     Mitral valve annular calcification- mild with mild MI 11/25/2020     Priority: Medium     PVC's (premature ventricular contractions) 11/25/2020     Priority: Medium     SVT (supraventricular tachycardia) (H) 11/25/2020     Priority: Medium     Ventricular bigeminy 11/25/2020     Priority: Medium     Health care maintenance 10/22/2018     Priority: Medium     Pap NIL and HPV neg on 8/22/17 ([facility])  DEXA 2015 normal.       Colon polyp 09/20/2017     Priority: Medium     Overview:   Colonoscopy 9/20/17 with transverse colon polyp and sigmoid colon polyp (20 cm), both removed       Thrombosis of saphenous vein, left 11/16/2016     Priority: Medium     Disturbance in sleep behavior 07/02/2015     Priority: Medium     Overview:   [facility] Sleep Study: Showed Mild upper airway resistance. No sleep apnea or limb movement disorder.   IMO Update       Family history of diabetes mellitus type II 04/07/2015     Priority: Medium  "    High risk medication use 09/10/2014     Priority: Medium     Overview:   HCQ, MTX, Humira       Cervical muscle pain 07/25/2013     Priority: Medium     Rheumatoid arthritis involving multiple sites with positive rheumatoid factor (H) 09/28/2012     Priority: Medium     Overview:   RF + 121, CCP neg; onset ~ 2011, Dx 2012  Doxy (9/8/2012- 3/2013)  Nuc Med Scan with symmetric uptake  Plaquenil trial 3/2013- ; MTX 7/2013- ; SSA 10/2014-12/2014; Humira 10/2015-   6/2014: VECTRA 42       Vitamin D deficiency 06/18/2012     Priority: Medium     Overview:   Level of 25 on 5/11/2012       Myalgia 05/11/2012     Priority: Medium     Enthesopathy of hip region 01/14/2011     Priority: Medium     Allergic rhinitis 11/02/2004     Priority: Medium     Asthma 09/22/2003     Priority: Medium     Overview:   Updated per 10/1/17 IMO import       Esophageal reflux 09/22/2003     Priority: Medium     Essential hypertension, benign 05/14/2001     Priority: Medium     Obesity 05/14/2001     Priority: Medium      Past Medical History:   Diagnosis Date     Personal history of other medical treatment (CODE)     3 normal childbirths     Rheumatoid arthritis (H)      Past Surgical History:   Procedure Laterality Date     BONE MARROW BIOPSY, BONE SPECIMEN, NEEDLE/TROCAR Left 7/14/2022    Procedure: BIOPSY, BONE MARROW;  Surgeon: Glory Baig MD;  Location:  OR     COLONOSCOPY  2017    Dr. Fofana - 2 polyps found.  Told to f/u in 5 years.     Current Outpatient Medications   Medication Sig Dispense Refill     B-D INSULIN SYRINGE MICROFINE 27G X 5/8\" 1 ML MISC 1 EACH BY DOES NOT APPLY ROUTE ONE TIME A WEEK. USE TO DRAW UP METHOTREXATE 25 MG ONCE WEEKLY.       BD SAFETYGLIDE SYRINGE/NEEDLE 27G X 5/8\" 1 ML MISC 1 EACH BY DOES NOT APPLY ROUTE ONE TIME A WEEK. USE TO DRAW UP METHOTREXATE 25 MG ONCE WEEKLY.       cyanocobalamin (VITAMIN B-12) 1000 MCG tablet Take 1,000 mcg by mouth daily       famotidine (PEPCID) 20 MG tablet Take 1 tablet " "(20 mg) by mouth 2 times daily 180 tablet 3     folic acid (FOLVITE) 1 MG tablet Take 2 mg by mouth daily       hydroxychloroquine (PLAQUENIL) 200 MG tablet Take 400 mg by mouth daily       methotrexate 50 MG/2ML injection Inject 1 mL Subcutaneous once a week       metoprolol succinate ER (TOPROL XL) 50 MG 24 hr tablet TAKE 1 TABLET (50 MG) BY MOUTH DAILY (WITH DINNER) 90 tablet 2     predniSONE (DELTASONE) 5 MG tablet Take 5 mg by mouth daily  0     thiamine (B-1) 100 MG tablet Take 100 mg by mouth daily        vitamin B6 (PYRIDOXINE) 200 MG tablet Take 200 mg by mouth daily       XARELTO ANTICOAGULANT 20 MG TABS tablet TAKE 1 TABLET BY MOUTH DAILY WITH DINNER 90 tablet 3       No Known Allergies     Social History     Tobacco Use     Smoking status: Passive Smoke Exposure - Never Smoker     Smokeless tobacco: Never Used     Tobacco comment: In childhood home   Substance Use Topics     Alcohol use: Yes     Comment: 1 per month     Family History   Problem Relation Age of Onset     Hypertension Father         Hypertension,67     Other - See Comments Maternal Grandmother         Stroke,85     Other - See Comments Paternal Grandmother         Alzheimer's 73     Breast Cancer Mother 46        Cancer-breast,now 68     Cerebrovascular Disease Mother         stroke with hemiparesis 2017     Diabetes Brother 16        Diabetes,now 41     History   Drug Use Unknown         Objective     /80 (BP Location: Right arm, Patient Position: Sitting, Cuff Size: Adult Regular)   Pulse 58   Temp 97  F (36.1  C) (Tympanic)   Resp 16   Ht 1.753 m (5' 9\")   Wt 130.7 kg (288 lb 3.2 oz)   LMP  (LMP Unknown)   SpO2 94%   Breastfeeding No   BMI 42.56 kg/m      Physical Exam    GENERAL APPEARANCE: healthy, alert and no distress     EYES: EOMI, PERRL     HENT: ear canals and TM's normal and nose and mouth without ulcers or lesions     NECK: no adenopathy, no asymmetry, masses, or scars and thyroid normal to palpation     RESP: " lungs clear to auscultation - no rales, rhonchi or wheezes     CV: regular rates and rhythm, normal S1 S2, no S3 or S4 and no murmur, click or rub     ABDOMEN:  soft, nontender, no HSM or masses and bowel sounds normal     MS: extremities normal- no gross deformities noted, no evidence of inflammation in joints, FROM in all extremities.     SKIN: no suspicious lesions or rashes     NEURO: Normal strength and tone, sensory exam grossly normal, mentation intact and speech normal     PSYCH: mentation appears normal. and affect normal/bright     LYMPHATICS: No cervical adenopathy    Recent Labs   Lab Test 07/06/22  1552 05/17/22  1638   HGB 12.3 12.6    228    137   POTASSIUM 4.0 3.7   CR 1.01 1.10        Diagnostics:  Results for orders placed or performed in visit on 09/12/22   INR     Status: Abnormal   Result Value Ref Range    INR 1.49 (H) 0.85 - 1.15   Comprehensive metabolic panel     Status: Normal   Result Value Ref Range    Sodium 139 134 - 144 mmol/L    Potassium 4.6 3.5 - 5.1 mmol/L    Chloride 102 98 - 107 mmol/L    Carbon Dioxide (CO2) 30 21 - 31 mmol/L    Anion Gap 7 3 - 14 mmol/L    Urea Nitrogen 18 7 - 25 mg/dL    Creatinine 0.88 0.60 - 1.20 mg/dL    Calcium 9.6 8.6 - 10.3 mg/dL    Glucose 95 70 - 105 mg/dL    Alkaline Phosphatase 56 34 - 104 U/L    AST 25 13 - 39 U/L    ALT 21 7 - 52 U/L    Protein Total 7.1 6.4 - 8.9 g/dL    Albumin 3.9 3.5 - 5.7 g/dL    Bilirubin Total 0.4 0.3 - 1.0 mg/dL    GFR Estimate 76 >60 mL/min/1.73m2   CBC with platelets and differential     Status: Abnormal   Result Value Ref Range    WBC Count 4.8 4.0 - 11.0 10e3/uL    RBC Count 4.73 3.80 - 5.20 10e6/uL    Hemoglobin 13.4 11.7 - 15.7 g/dL    Hematocrit 40.1 35.0 - 47.0 %    MCV 85 78 - 100 fL    MCH 28.3 26.5 - 33.0 pg    MCHC 33.4 31.5 - 36.5 g/dL    RDW 15.5 (H) 10.0 - 15.0 %    Platelet Count 228 150 - 450 10e3/uL    % Neutrophils 45 %    % Lymphocytes 42 %    % Monocytes 11 %    % Eosinophils 1 %    %  Basophils 1 %    % Immature Granulocytes 0 %    NRBCs per 100 WBC 0 <1 /100    Absolute Neutrophils 2.2 1.6 - 8.3 10e3/uL    Absolute Lymphocytes 2.0 0.8 - 5.3 10e3/uL    Absolute Monocytes 0.5 0.0 - 1.3 10e3/uL    Absolute Eosinophils 0.0 0.0 - 0.7 10e3/uL    Absolute Basophils 0.0 0.0 - 0.2 10e3/uL    Absolute Immature Granulocytes 0.0 <=0.4 10e3/uL    Absolute NRBCs 0.0 10e3/uL   CBC and Differential     Status: Abnormal    Narrative    The following orders were created for panel order CBC and Differential.  Procedure                               Abnormality         Status                     ---------                               -----------         ------                     CBC with platelets and d...[[id number]]  Abnormal            Final result                 Please view results for these tests on the individual orders.         Last EKG from 2/15/22:      Component Ref Range & Units 2/15/22 10:01 AM 10/28/20  2:21 PM    Systolic Blood Pressure mmHg      Diastolic Blood Pressure mmHg      Ventricular Rate BPM 83      Atrial Rate BPM 83      OK Interval ms 154      QRS Duration ms 76      QT ms 354      QTc ms 415      P Axis degrees 42      R AXIS degrees 37      T Axis degrees 28      Interpretation ECG  Sinus rhythm   Low voltage QRS   Borderline ECG   When compared with ECG of 28-OCT-2020 14:21,   No significant change was found   Confirmed by MD RYAN, TOM ([id number]) on 2/16/2022 8:32:44 AM  Click View Image link to view waveform and result               Specimen Collected: 02/15/22 10:01 AM Last Resulted: 02/16/22               Revised Cardiac Risk Index (RCRI):  The patient has the following serious cardiovascular risks for perioperative complications:   - No serious cardiac risks = 0 points     RCRI Interpretation: 1 point: Class II (low risk - 0.9% complication rate)           Signed Electronically by: Ya Victoria MD  Copy of this evaluation report is provided to requesting  physician.      Answers for HPI/ROS submitted by the patient on 9/12/2022  Do you have a cough?: No  Are you experiencing any wheezing in your chest?: No  Do you have any shortness of breath?: No  Use of rescue inhaler:: never  Taking Asthma medication as prescribed:: I do not have an asthma controller medication  Asthma triggers:: none  Have you had any Emergency Room visits, Urgent Care visits, or Hospital Admissions since your last office visit?: No

## 2022-09-12 ENCOUNTER — OFFICE VISIT (OUTPATIENT)
Dept: FAMILY MEDICINE | Facility: OTHER | Age: 57
End: 2022-09-12
Attending: FAMILY MEDICINE
Payer: COMMERCIAL

## 2022-09-12 VITALS
RESPIRATION RATE: 16 BRPM | WEIGHT: 288.2 LBS | OXYGEN SATURATION: 94 % | HEART RATE: 58 BPM | BODY MASS INDEX: 42.69 KG/M2 | TEMPERATURE: 97 F | HEIGHT: 69 IN | SYSTOLIC BLOOD PRESSURE: 122 MMHG | DIASTOLIC BLOOD PRESSURE: 80 MMHG

## 2022-09-12 DIAGNOSIS — I80.9 SUPERFICIAL PHLEBITIS: ICD-10-CM

## 2022-09-12 DIAGNOSIS — Z01.818 PREOP GENERAL PHYSICAL EXAM: Primary | ICD-10-CM

## 2022-09-12 DIAGNOSIS — Z23 NEEDS FLU SHOT: ICD-10-CM

## 2022-09-12 DIAGNOSIS — K55.069 SUPERIOR MESENTERIC VEIN THROMBOSIS (H): ICD-10-CM

## 2022-09-12 LAB
ALBUMIN SERPL-MCNC: 3.9 G/DL (ref 3.5–5.7)
ALP SERPL-CCNC: 56 U/L (ref 34–104)
ALT SERPL W P-5'-P-CCNC: 21 U/L (ref 7–52)
ANION GAP SERPL CALCULATED.3IONS-SCNC: 7 MMOL/L (ref 3–14)
AST SERPL W P-5'-P-CCNC: 25 U/L (ref 13–39)
BASOPHILS # BLD AUTO: 0 10E3/UL (ref 0–0.2)
BASOPHILS NFR BLD AUTO: 1 %
BILIRUB SERPL-MCNC: 0.4 MG/DL (ref 0.3–1)
BUN SERPL-MCNC: 18 MG/DL (ref 7–25)
CALCIUM SERPL-MCNC: 9.6 MG/DL (ref 8.6–10.3)
CHLORIDE BLD-SCNC: 102 MMOL/L (ref 98–107)
CO2 SERPL-SCNC: 30 MMOL/L (ref 21–31)
CREAT SERPL-MCNC: 0.88 MG/DL (ref 0.6–1.2)
EOSINOPHIL # BLD AUTO: 0 10E3/UL (ref 0–0.7)
EOSINOPHIL NFR BLD AUTO: 1 %
ERYTHROCYTE [DISTWIDTH] IN BLOOD BY AUTOMATED COUNT: 15.5 % (ref 10–15)
GFR SERPL CREATININE-BSD FRML MDRD: 76 ML/MIN/1.73M2
GLUCOSE BLD-MCNC: 95 MG/DL (ref 70–105)
HCT VFR BLD AUTO: 40.1 % (ref 35–47)
HGB BLD-MCNC: 13.4 G/DL (ref 11.7–15.7)
IMM GRANULOCYTES # BLD: 0 10E3/UL
IMM GRANULOCYTES NFR BLD: 0 %
INR PPP: 1.49 (ref 0.85–1.15)
LYMPHOCYTES # BLD AUTO: 2 10E3/UL (ref 0.8–5.3)
LYMPHOCYTES NFR BLD AUTO: 42 %
MCH RBC QN AUTO: 28.3 PG (ref 26.5–33)
MCHC RBC AUTO-ENTMCNC: 33.4 G/DL (ref 31.5–36.5)
MCV RBC AUTO: 85 FL (ref 78–100)
MONOCYTES # BLD AUTO: 0.5 10E3/UL (ref 0–1.3)
MONOCYTES NFR BLD AUTO: 11 %
NEUTROPHILS # BLD AUTO: 2.2 10E3/UL (ref 1.6–8.3)
NEUTROPHILS NFR BLD AUTO: 45 %
NRBC # BLD AUTO: 0 10E3/UL
NRBC BLD AUTO-RTO: 0 /100
PLATELET # BLD AUTO: 228 10E3/UL (ref 150–450)
POTASSIUM BLD-SCNC: 4.6 MMOL/L (ref 3.5–5.1)
PROT SERPL-MCNC: 7.1 G/DL (ref 6.4–8.9)
RBC # BLD AUTO: 4.73 10E6/UL (ref 3.8–5.2)
SODIUM SERPL-SCNC: 139 MMOL/L (ref 134–144)
WBC # BLD AUTO: 4.8 10E3/UL (ref 4–11)

## 2022-09-12 PROCEDURE — 90682 RIV4 VACC RECOMBINANT DNA IM: CPT | Performed by: FAMILY MEDICINE

## 2022-09-12 PROCEDURE — 85610 PROTHROMBIN TIME: CPT | Mod: ZL | Performed by: FAMILY MEDICINE

## 2022-09-12 PROCEDURE — 99214 OFFICE O/P EST MOD 30 MIN: CPT | Mod: 25 | Performed by: FAMILY MEDICINE

## 2022-09-12 PROCEDURE — 36415 COLL VENOUS BLD VENIPUNCTURE: CPT | Mod: ZL | Performed by: FAMILY MEDICINE

## 2022-09-12 PROCEDURE — 85025 COMPLETE CBC W/AUTO DIFF WBC: CPT | Mod: ZL | Performed by: FAMILY MEDICINE

## 2022-09-12 PROCEDURE — 80053 COMPREHEN METABOLIC PANEL: CPT | Mod: ZL | Performed by: FAMILY MEDICINE

## 2022-09-12 PROCEDURE — 90471 IMMUNIZATION ADMIN: CPT | Performed by: FAMILY MEDICINE

## 2022-09-12 ASSESSMENT — PAIN SCALES - GENERAL: PAINLEVEL: NO PAIN (0)

## 2022-09-12 NOTE — PATIENT INSTRUCTIONS
Preparing for Your Surgery  Getting started  A nurse will call you to review your health history and instructions. They will give you an arrival time based on your scheduled surgery time. Please be ready to share:  Your doctor's clinic name and phone number  Your medical, surgical and anesthesia history  A list of allergies and sensitivities  A list of medicines, including herbal treatments and over-the-counter drugs  Whether the patient has a legal guardian (ask how to send us the papers in advance)  Please tell us if you're pregnant--or if there's any chance you might be pregnant. Some surgeries may injure a fetus (unborn baby), so they require a pregnancy test. Surgeries that are safe for a fetus don't always need a test, and you can choose whether to have one.   If you have a child who's having surgery, please ask for a copy of Preparing for Your Child's Surgery.    Preparing for surgery  Within 30 days of surgery: Have a pre-op exam (sometimes called an H&P, or History and Physical). This can be done at a clinic or pre-operative center.  If you're having a , you may not need this exam. Talk to your care team.  At your pre-op exam, talk to your care team about all medicines you take. If you need to stop any medicines before surgery, ask when to start taking them again.  We do this for your safety. Many medicines can make you bleed too much during surgery. Some change how well surgery (anesthesia) drugs work.  Call your insurance company to let them know you're having surgery. (If you don't have insurance, call [phone].)  Call your clinic if there's any change in your health. This includes signs of a cold or flu (sore throat, runny nose, cough, rash, fever). It also includes a scrape or scratch near the surgery site.  If you have questions on the day of surgery, call your hospital or surgery center.  COVID testing  You may need to be tested for COVID-19 before having surgery. If so, we will give  you instructions.  Eating and drinking guidelines  For your safety: Unless your surgeon tells you otherwise, follow the guidelines below.  Eat and drink as usual until 8 hours before surgery. After that, no food or milk.  Drink clear liquids until 2 hours before surgery. These are liquids you can see through, like water, Gatorade and Propel Water. You may also have black coffee and tea (no cream or milk).  Nothing by mouth within 2 hours of surgery. This includes gum, candy and breath mints.  If you drink alcohol: Stop drinking it the night before surgery.  If your care team tells you to take medicine on the morning of surgery, it's okay to take it with a sip of water.  Preventing infection  Shower or bathe the night before and morning of your surgery. Follow the instructions your clinic gave you. (If no instructions, use regular soap.)  Don't shave or clip hair near your surgery site. We'll remove the hair if needed.  Don't smoke or vape the morning of surgery. You may chew nicotine gum up to 2 hours before surgery. A nicotine patch is okay.  Note: Some surgeries require you to completely quit smoking and nicotine. Check with your surgeon.  Your care team will make every effort to keep you safe from infection. We will:  Clean our hands often with soap and water (or an alcohol-based hand rub).  Clean the skin at your surgery site with a special soap that kills germs.  Give you a special gown to keep you warm. (Cold raises the risk of infection.)  Wear special hair covers, masks, gowns and gloves during surgery.  Give antibiotic medicine, if prescribed. Not all surgeries need antibiotics.  What to bring on the day of surgery  Photo ID and insurance card  Copy of your health care directive, if you have one  Glasses and hearing aides (bring cases)  You can't wear contacts during surgery  Inhaler and eye drops, if you use them (tell us about these when you arrive)  CPAP machine or breathing device, if you use them  A  few personal items, if spending the night  If you have . . .  A pacemaker, ICD (cardiac defibrillator) or other implant: Bring the ID card.  An implanted stimulator: Bring the remote control.  A legal guardian: Bring a copy of the certified (court-stamped) guardianship papers.  Please remove any jewelry, including body piercings. Leave jewelry and other valuables at home.  If you're going home the day of surgery  You must have a responsible adult drive you home. They should stay with you overnight as well.  If you don't have someone to stay with you, and you aren't safe to go home alone, we may keep you overnight. Insurance often won't pay for this.  After surgery  If it's hard to control your pain or you need more pain medicine, please call your surgeon's office.  Questions?   If you have any questions for your care team, list them here: _________________________________________________________________________________________________________________________________________________________________________ ____________________________________ ____________________________________ ____________________________________  For informational purposes only. Not to replace the advice of your health care provider. Copyright   2003, 2019 "[employer] Services. All rights reserved. Clinically reviewed by Nette Buenrostro MD. [employer] 621497 - REV 07/21.    No ibuprofen, aleve or aspirin for at least 7 days prior to surgery.  No vitamins or supplements for at least 7 days prior to surgery.  Stop Xarelto 1 days prior to surgery and resume once you are home.  The morning of surgery, take only plaquenil, pepcid and metoprolol with a small sip of water.

## 2022-10-03 ENCOUNTER — ALLIED HEALTH/NURSE VISIT (OUTPATIENT)
Dept: FAMILY MEDICINE | Facility: OTHER | Age: 57
End: 2022-10-03
Attending: FAMILY MEDICINE
Payer: COMMERCIAL

## 2022-10-03 DIAGNOSIS — Z20.822 ENCOUNTER FOR LABORATORY TESTING FOR COVID-19 VIRUS: Primary | ICD-10-CM

## 2022-10-03 PROCEDURE — U0003 INFECTIOUS AGENT DETECTION BY NUCLEIC ACID (DNA OR RNA); SEVERE ACUTE RESPIRATORY SYNDROME CORONAVIRUS 2 (SARS-COV-2) (CORONAVIRUS DISEASE [COVID-19]), AMPLIFIED PROBE TECHNIQUE, MAKING USE OF HIGH THROUGHPUT TECHNOLOGIES AS DESCRIBED BY CMS-2020-01-R: HCPCS | Mod: ZL

## 2022-10-03 PROCEDURE — C9803 HOPD COVID-19 SPEC COLLECT: HCPCS

## 2022-10-04 ENCOUNTER — ANESTHESIA EVENT (OUTPATIENT)
Dept: SURGERY | Facility: OTHER | Age: 57
End: 2022-10-04
Payer: COMMERCIAL

## 2022-10-04 LAB — SARS-COV-2 RNA RESP QL NAA+PROBE: NEGATIVE

## 2022-10-05 ENCOUNTER — HOSPITAL ENCOUNTER (OUTPATIENT)
Facility: OTHER | Age: 57
Discharge: HOME | End: 2022-10-05
Attending: PODIATRIST | Admitting: PODIATRIST
Payer: COMMERCIAL

## 2022-10-05 ENCOUNTER — ANESTHESIA (OUTPATIENT)
Dept: SURGERY | Facility: OTHER | Age: 57
End: 2022-10-05
Payer: COMMERCIAL

## 2022-10-05 VITALS
BODY MASS INDEX: 42.65 KG/M2 | HEART RATE: 60 BPM | OXYGEN SATURATION: 98 % | WEIGHT: 288 LBS | SYSTOLIC BLOOD PRESSURE: 112 MMHG | HEIGHT: 69 IN | DIASTOLIC BLOOD PRESSURE: 84 MMHG | TEMPERATURE: 97.4 F | RESPIRATION RATE: 16 BRPM

## 2022-10-05 DIAGNOSIS — G89.18 POST-OP PAIN: Primary | ICD-10-CM

## 2022-10-05 LAB — GLUCOSE BLDC GLUCOMTR-MCNC: 88 MG/DL (ref 70–99)

## 2022-10-05 PROCEDURE — 370N000017 HC ANESTHESIA TECHNICAL FEE, PER MIN: Performed by: PODIATRIST

## 2022-10-05 PROCEDURE — 250N000011 HC RX IP 250 OP 636: Performed by: PODIATRIST

## 2022-10-05 PROCEDURE — 28035 DECOMPRESSION OF TIBIA NERVE: CPT | Performed by: NURSE ANESTHETIST, CERTIFIED REGISTERED

## 2022-10-05 PROCEDURE — 28035 DECOMPRESSION OF TIBIA NERVE: CPT | Mod: RT | Performed by: PODIATRIST

## 2022-10-05 PROCEDURE — 710N000012 HC RECOVERY PHASE 2, PER MINUTE: Performed by: PODIATRIST

## 2022-10-05 PROCEDURE — 28008 INCISION OF FOOT FASCIA: CPT | Mod: RT | Performed by: PODIATRIST

## 2022-10-05 PROCEDURE — 999N000141 HC STATISTIC PRE-PROCEDURE NURSING ASSESSMENT: Performed by: PODIATRIST

## 2022-10-05 PROCEDURE — 258N000003 HC RX IP 258 OP 636: Performed by: NURSE ANESTHETIST, CERTIFIED REGISTERED

## 2022-10-05 PROCEDURE — 250N000009 HC RX 250: Performed by: PODIATRIST

## 2022-10-05 PROCEDURE — 250N000011 HC RX IP 250 OP 636: Performed by: NURSE ANESTHETIST, CERTIFIED REGISTERED

## 2022-10-05 PROCEDURE — 710N000010 HC RECOVERY PHASE 1, LEVEL 2, PER MIN: Performed by: PODIATRIST

## 2022-10-05 PROCEDURE — 250N000009 HC RX 250: Performed by: NURSE ANESTHETIST, CERTIFIED REGISTERED

## 2022-10-05 PROCEDURE — 250N000026 HC DESFLURANE, PER MIN: Performed by: PODIATRIST

## 2022-10-05 PROCEDURE — 360N000075 HC SURGERY LEVEL 2, PER MIN: Performed by: PODIATRIST

## 2022-10-05 PROCEDURE — 82962 GLUCOSE BLOOD TEST: CPT

## 2022-10-05 PROCEDURE — 272N000001 HC OR GENERAL SUPPLY STERILE: Performed by: PODIATRIST

## 2022-10-05 RX ORDER — ONDANSETRON 2 MG/ML
4 INJECTION INTRAMUSCULAR; INTRAVENOUS EVERY 30 MIN PRN
Status: DISCONTINUED | OUTPATIENT
Start: 2022-10-05 | End: 2022-10-05 | Stop reason: HOSPADM

## 2022-10-05 RX ORDER — SODIUM CHLORIDE, SODIUM LACTATE, POTASSIUM CHLORIDE, CALCIUM CHLORIDE 600; 310; 30; 20 MG/100ML; MG/100ML; MG/100ML; MG/100ML
INJECTION, SOLUTION INTRAVENOUS CONTINUOUS
Status: DISCONTINUED | OUTPATIENT
Start: 2022-10-05 | End: 2022-10-05 | Stop reason: HOSPADM

## 2022-10-05 RX ORDER — OXYCODONE HYDROCHLORIDE 5 MG/1
5 TABLET ORAL
Status: DISCONTINUED | OUTPATIENT
Start: 2022-10-05 | End: 2022-10-05 | Stop reason: HOSPADM

## 2022-10-05 RX ORDER — HYDROMORPHONE HYDROCHLORIDE 1 MG/ML
0.4 INJECTION, SOLUTION INTRAMUSCULAR; INTRAVENOUS; SUBCUTANEOUS EVERY 5 MIN PRN
Status: DISCONTINUED | OUTPATIENT
Start: 2022-10-05 | End: 2022-10-05 | Stop reason: HOSPADM

## 2022-10-05 RX ORDER — ONDANSETRON 2 MG/ML
INJECTION INTRAMUSCULAR; INTRAVENOUS PRN
Status: DISCONTINUED | OUTPATIENT
Start: 2022-10-05 | End: 2022-10-05

## 2022-10-05 RX ORDER — BUPIVACAINE HYDROCHLORIDE 5 MG/ML
INJECTION, SOLUTION PERINEURAL PRN
Status: DISCONTINUED | OUTPATIENT
Start: 2022-10-05 | End: 2022-10-05 | Stop reason: HOSPADM

## 2022-10-05 RX ORDER — OXYCODONE HYDROCHLORIDE 5 MG/1
5 TABLET ORAL EVERY 6 HOURS PRN
Qty: 12 TABLET | Refills: 0 | Status: SHIPPED | OUTPATIENT
Start: 2022-10-05 | End: 2022-11-14

## 2022-10-05 RX ORDER — DEXAMETHASONE SODIUM PHOSPHATE 4 MG/ML
INJECTION, SOLUTION INTRA-ARTICULAR; INTRALESIONAL; INTRAMUSCULAR; INTRAVENOUS; SOFT TISSUE PRN
Status: DISCONTINUED | OUTPATIENT
Start: 2022-10-05 | End: 2022-10-05

## 2022-10-05 RX ORDER — ONDANSETRON 4 MG/1
4 TABLET, ORALLY DISINTEGRATING ORAL EVERY 30 MIN PRN
Status: DISCONTINUED | OUTPATIENT
Start: 2022-10-05 | End: 2022-10-05 | Stop reason: HOSPADM

## 2022-10-05 RX ORDER — IBUPROFEN 600 MG/1
600 TABLET, FILM COATED ORAL EVERY 6 HOURS
Qty: 12 TABLET | Refills: 0 | Status: SHIPPED | OUTPATIENT
Start: 2022-10-05 | End: 2022-10-08

## 2022-10-05 RX ORDER — ACETAMINOPHEN 325 MG/1
1000 TABLET ORAL EVERY 8 HOURS
Qty: 45 TABLET | Refills: 0 | Status: SHIPPED | OUTPATIENT
Start: 2022-10-05 | End: 2022-10-10

## 2022-10-05 RX ORDER — LIDOCAINE 40 MG/G
CREAM TOPICAL
Status: DISCONTINUED | OUTPATIENT
Start: 2022-10-05 | End: 2022-10-05 | Stop reason: HOSPADM

## 2022-10-05 RX ORDER — PROPOFOL 10 MG/ML
INJECTION, EMULSION INTRAVENOUS CONTINUOUS PRN
Status: DISCONTINUED | OUTPATIENT
Start: 2022-10-05 | End: 2022-10-05

## 2022-10-05 RX ORDER — LIDOCAINE HYDROCHLORIDE 20 MG/ML
INJECTION, SOLUTION INFILTRATION; PERINEURAL PRN
Status: DISCONTINUED | OUTPATIENT
Start: 2022-10-05 | End: 2022-10-05

## 2022-10-05 RX ORDER — FENTANYL CITRATE 50 UG/ML
INJECTION, SOLUTION INTRAMUSCULAR; INTRAVENOUS PRN
Status: DISCONTINUED | OUTPATIENT
Start: 2022-10-05 | End: 2022-10-05

## 2022-10-05 RX ORDER — CEFAZOLIN SODIUM/WATER 3 G/30 ML
3 SYRINGE (ML) INTRAVENOUS SEE ADMIN INSTRUCTIONS
Status: DISCONTINUED | OUTPATIENT
Start: 2022-10-05 | End: 2022-10-05 | Stop reason: HOSPADM

## 2022-10-05 RX ORDER — OXYCODONE HYDROCHLORIDE 5 MG/1
5 TABLET ORAL EVERY 4 HOURS PRN
Status: DISCONTINUED | OUTPATIENT
Start: 2022-10-05 | End: 2022-10-05 | Stop reason: HOSPADM

## 2022-10-05 RX ORDER — FENTANYL CITRATE 50 UG/ML
50 INJECTION, SOLUTION INTRAMUSCULAR; INTRAVENOUS EVERY 5 MIN PRN
Status: DISCONTINUED | OUTPATIENT
Start: 2022-10-05 | End: 2022-10-05 | Stop reason: HOSPADM

## 2022-10-05 RX ORDER — FENTANYL CITRATE 50 UG/ML
50 INJECTION, SOLUTION INTRAMUSCULAR; INTRAVENOUS
Status: DISCONTINUED | OUTPATIENT
Start: 2022-10-05 | End: 2022-10-05 | Stop reason: HOSPADM

## 2022-10-05 RX ORDER — PROPOFOL 10 MG/ML
INJECTION, EMULSION INTRAVENOUS PRN
Status: DISCONTINUED | OUTPATIENT
Start: 2022-10-05 | End: 2022-10-05

## 2022-10-05 RX ORDER — CEFAZOLIN SODIUM/WATER 3 G/30 ML
3 SYRINGE (ML) INTRAVENOUS
Status: DISCONTINUED | OUTPATIENT
Start: 2022-10-05 | End: 2022-10-05 | Stop reason: HOSPADM

## 2022-10-05 RX ORDER — MAGNESIUM HYDROXIDE 1200 MG/15ML
LIQUID ORAL PRN
Status: DISCONTINUED | OUTPATIENT
Start: 2022-10-05 | End: 2022-10-05 | Stop reason: HOSPADM

## 2022-10-05 RX ORDER — MEPERIDINE HYDROCHLORIDE 50 MG/ML
12.5 INJECTION INTRAMUSCULAR; INTRAVENOUS; SUBCUTANEOUS
Status: DISCONTINUED | OUTPATIENT
Start: 2022-10-05 | End: 2022-10-05 | Stop reason: HOSPADM

## 2022-10-05 RX ORDER — HYDROXYZINE HYDROCHLORIDE 10 MG/1
10 TABLET, FILM COATED ORAL
Status: DISCONTINUED | OUTPATIENT
Start: 2022-10-05 | End: 2022-10-05 | Stop reason: HOSPADM

## 2022-10-05 RX ADMIN — FENTANYL CITRATE 50 MCG: 50 INJECTION, SOLUTION INTRAMUSCULAR; INTRAVENOUS at 10:26

## 2022-10-05 RX ADMIN — SODIUM CHLORIDE, POTASSIUM CHLORIDE, SODIUM LACTATE AND CALCIUM CHLORIDE: 600; 310; 30; 20 INJECTION, SOLUTION INTRAVENOUS at 09:29

## 2022-10-05 RX ADMIN — FENTANYL CITRATE 50 MCG: 50 INJECTION, SOLUTION INTRAMUSCULAR; INTRAVENOUS at 10:15

## 2022-10-05 RX ADMIN — ONDANSETRON HYDROCHLORIDE 4 MG: 2 SOLUTION INTRAMUSCULAR; INTRAVENOUS at 10:12

## 2022-10-05 RX ADMIN — LIDOCAINE HYDROCHLORIDE 40 MG: 20 INJECTION, SOLUTION INFILTRATION; PERINEURAL at 10:12

## 2022-10-05 RX ADMIN — PROPOFOL 75 MCG/KG/MIN: 10 INJECTION, EMULSION INTRAVENOUS at 10:12

## 2022-10-05 RX ADMIN — DEXAMETHASONE SODIUM PHOSPHATE 4 MG: 4 INJECTION, SOLUTION INTRA-ARTICULAR; INTRALESIONAL; INTRAMUSCULAR; INTRAVENOUS; SOFT TISSUE at 10:15

## 2022-10-05 RX ADMIN — PROPOFOL 200 MG: 10 INJECTION, EMULSION INTRAVENOUS at 10:12

## 2022-10-05 RX ADMIN — MIDAZOLAM HYDROCHLORIDE 2 MG: 1 INJECTION, SOLUTION INTRAMUSCULAR; INTRAVENOUS at 10:09

## 2022-10-05 RX ADMIN — Medication 3 G: at 09:31

## 2022-10-05 ASSESSMENT — ACTIVITIES OF DAILY LIVING (ADL)
ADLS_ACUITY_SCORE: 35
ADLS_ACUITY_SCORE: 35
ADLS_ACUITY_SCORE: 33

## 2022-10-05 ASSESSMENT — ENCOUNTER SYMPTOMS: DYSRHYTHMIAS: 1

## 2022-10-05 NOTE — DISCHARGE INSTRUCTIONS
[facility]  Adult Discharge Orders & Instructions      For 24 hours after surgery:  Get plenty of rest.  A responsible adult must stay with you for at least 24 hours after you leave the hospital.   You may feel lightheaded.  IF so, sit for a few minutes before standing.  Have someone help you get up.   You may have a slight fever. Call the doctor if your fever is over 101 F (38.3 C) (taken under the tongue) or lasts longer than 24 hours.  You may have a dry mouth, a sore throat, muscle aches or trouble sleeping.  These should go away after 24 hours.  Do not make important or legal decisions.  6.   Do not drive or use heavy equipment.  If you have weakness or tingling, don't drive or use heavy equipment until this feeling goes away.                   Surgeon Contact Information  Orthopedic Physical Therapy/Occupational Therapy Order faxed to patients preferred therapist *** and copy sent home with patient for reference.  If you have questions or concerns related to your procedure please contact your surgeon through [facility] at [phone].                                                                                                                                                       To contact a doctor, call    [phone]______________

## 2022-10-05 NOTE — OP NOTE
Procedure Date: 10/05/2022    SURGEON:  Shiva Finch DPM    ASSISTANT:  Jackelin Dee PA-C.     A skilled first assistant was necessary due to technical complexity of the procedure and for the patient's safety.  The assistant helped with positioning, retraction, visualization of the operative field and moreover helped to complete the procedure in a technically safe and efficient manner.      PREOPERATIVE DIAGNOSIS:  Right tarsal tunnel impingement.    POSTOPERATIVE DIAGNOSES:  Right tarsal tunnel impingement.    PROCEDURE:   1.  Tarsal tunnel decompression.  2.  Medial plantar fasciotomy.    ANESTHESIA:  General with local.    HEMOSTASIS:  High ankle tourniquet and electrocautery.    ESTIMATED BLOOD LOSS:  Minimal.    INDICATIONS FOR PROCEDURE:  The patient has longstanding pain associated with described pathology, failed nonoperative management, would like to proceed with surgery, after detailed discussion of surgery, recovery, risks, potential complications, alternatives and benefits.    DESCRIPTION OF PROCEDURE:  In the supine position, utilized general anesthesia, local block performed.  Right lower extremity prepped and draped in the usual sterile fashion.  High ankle tourniquet utilized for a portion of the case.    Attention directed to posterior medial ankle, incision made along the tarsal tunnel, neurovascular bundle, deepened using primarily a blunt dissection.  Fascia and retinaculum overlying the tarsal tunnel was exposed in an expansile fashion.  This was cut and decompressed with careful blunt deep dissection and protecting neurovascular bundle all the way up into the lower leg and then down into the plantar fascia.  Given the plantar fascia was quite tight and caused some impingement intraoperatively, we also released the medial half of this, again allowing more room for branches of the tibial nerve.  Had excellent decompression.  Again, we did perform the additional plantar fasciotomy due to  tightness underneath here and we wanted to completely decompress this.  Flushed with normal saline.  Subcutaneous tissue repaired with 2-0 Vicryl and skin with nonabsorbable suture.  Placed in sterile dressing.  Cam boot, partial weightbearing with walker assist.  Follow up as scheduled.    Shiva Finch DPM        D: 10/05/2022   T: 10/05/2022   MT: SHIRA    Name:     DENYHUMBERTO BARONGreta  MRN:      [id number]        Account:        [id number]   :      1965           Procedure Date: 10/05/2022     Document: [id number]

## 2022-10-05 NOTE — INTERVAL H&P NOTE
"I have reviewed the surgical (or preoperative) H&P that is linked to this encounter, and examined the patient. There are no significant changes    Clinical Conditions Present on Arrival:  Clinically Significant Risk Factors Present on Admission                 # Coagulation Defect: home medication list includes an anticoagulant medication   # Severe Obesity: Estimated body mass index is 42.53 kg/m  as calculated from the following:    Height as of this encounter: 1.753 m (5' 9\").    Weight as of this encounter: 130.6 kg (288 lb).       "

## 2022-10-05 NOTE — ANESTHESIA PROCEDURE NOTES
Airway       Patient location during procedure: OR       Procedure Start/Stop Times: 10/5/2022 10:13 AM  Staff -        CRNA: Eugenia Miller APRN CRNA       Performed By: CRNA  Consent for Airway        Urgency: elective  Indications and Patient Condition       Indications for airway management: shruthi-procedural       Induction type:intravenous       Mask difficulty assessment: 0 - not attempted    Final Airway Details       Final airway type: supraglottic airway    Supraglottic Airway Details        Type: LMA       Brand: I-Gel       LMA size: 4    Post intubation assessment        Placement verified by: capnometry, equal breath sounds and chest rise        Number of attempts at approach: 1       Number of other approaches attempted: 0       Secured with: silk tape       Ease of procedure: easy       Dentition: Intact and Unchanged    Medication(s) Administered   Medication Administration Time: 10/5/2022 10:13 AM

## 2022-10-05 NOTE — ANESTHESIA PREPROCEDURE EVALUATION
Anesthesia Pre-Procedure Evaluation    Patient: Phyllis VILLASENOR Neururer   MRN: [id number] : 1965        Procedure : Procedure(s):  RELEASE, TARSAL TUNNEL          Past Medical History:   Diagnosis Date     Personal history of other medical treatment (CODE)     3 normal childbirths     Rheumatoid arthritis (H)       Past Surgical History:   Procedure Laterality Date     BONE MARROW BIOPSY, BONE SPECIMEN, NEEDLE/TROCAR Left 2022    Procedure: BIOPSY, BONE MARROW;  Surgeon: Glory Baig MD;  Location: [facility] OR     COLONOSCOPY  2017    Dr. Fofana - 2 polyps found.  Told to f/u in 5 years.      No Known Allergies   Social History     Tobacco Use     Smoking status: Passive Smoke Exposure - Never Smoker     Smokeless tobacco: Never Used     Tobacco comment: In childhood home   Substance Use Topics     Alcohol use: Yes     Comment: 1 per month      Wt Readings from Last 1 Encounters:   10/05/22 130.6 kg (288 lb)        Anesthesia Evaluation   Pt has had prior anesthetic.     No history of anesthetic complications       ROS/MED HX  ENT/Pulmonary:     (+) allergic rhinitis, Intermittent, asthma     Neurologic:  - neg neurologic ROS     Cardiovascular:     (+) hypertension-----Taking blood thinners dysrhythmias, Previous cardiac testing   Echo: Date: 21 Results:    Stress Test: Date: Results:    ECG Reviewed: Date: 2/15/22 Results:    Cath: Date: Results:      METS/Exercise Tolerance: 4 - Raking leaves, gardening    Hematologic:     (+) History of blood clots,     Musculoskeletal:   (+) arthritis,     GI/Hepatic:     (+) GERD, Asymptomatic on medication,     Renal/Genitourinary:  - neg Renal ROS     Endo:     (+) Obesity,     Psychiatric/Substance Use:  - neg psychiatric ROS     Infectious Disease:  - neg infectious disease ROS     Malignancy:  - neg malignancy ROS     Other:  - neg other ROS          Physical Exam    Airway        Mallampati: I   TM distance: > 3 FB   Neck ROM: full   Mouth opening: > 3  cm    Respiratory Devices and Support         Dental  no notable dental history         Cardiovascular   cardiovascular exam normal       Rhythm and rate: regular and normal     Pulmonary   pulmonary exam normal        breath sounds clear to auscultation           OUTSIDE LABS:  CBC:   Lab Results   Component Value Date    WBC 4.8 09/12/2022    WBC 4.0 07/06/2022    HGB 13.4 09/12/2022    HGB 12.3 07/06/2022    HCT 40.1 09/12/2022    HCT 37.7 07/06/2022     09/12/2022     07/06/2022     BMP:   Lab Results   Component Value Date     09/12/2022     07/06/2022    POTASSIUM 4.6 09/12/2022    POTASSIUM 4.0 07/06/2022    CHLORIDE 102 09/12/2022    CHLORIDE 104 07/06/2022    CO2 30 09/12/2022    CO2 28 07/06/2022    BUN 18 09/12/2022    BUN 23 07/06/2022    CR 0.88 09/12/2022    CR 1.01 07/06/2022    GLC 95 09/12/2022    GLC 85 07/06/2022     COAGS:   Lab Results   Component Value Date    INR 1.49 (H) 09/12/2022     POC: No results found for: BGM, HCG, HCGS  HEPATIC:   Lab Results   Component Value Date    ALBUMIN 3.9 09/12/2022    PROTTOTAL 7.1 09/12/2022    ALT 21 09/12/2022    AST 25 09/12/2022    ALKPHOS 56 09/12/2022    BILITOTAL 0.4 09/12/2022     OTHER:   Lab Results   Component Value Date    LACT 0.9 05/17/2022    BELKIS 9.6 09/12/2022    MAG 2.0 10/28/2020    LIPASE 20 05/17/2022    TSH 2.23 11/12/2021    CRP 0.1 09/17/2018    SED 13 03/30/2022       Anesthesia Plan    ASA Status:  3   NPO Status:  NPO Appropriate    Anesthesia Type: General.     - Airway: LMA   Induction: Intravenous.   Maintenance: Balanced.        Consents    Anesthesia Plan(s) and associated risks, benefits, and realistic alternatives discussed. Questions answered and patient/representative(s) expressed understanding.     - Discussed: Risks, Benefits and Alternatives for BOTH SEDATION and the PROCEDURE were discussed     - Discussed with:  Patient, Spouse      - Extended Intubation/Ventilatory Support Discussed: No.       - Patient is DNR/DNI Status: No    Use of blood products discussed: No .     Postoperative Care    Pain management: IV analgesics, Multi-modal analgesia.   PONV prophylaxis: Ondansetron (or other 5HT-3), Dexamethasone or Solumedrol     Comments:    Other Comments: Risks, benefits and alternatives discussed and would like to proceed.             MISSY Fernandez CRNA

## 2022-10-05 NOTE — ANESTHESIA POSTPROCEDURE EVALUATION
Patient: Phyllis VILLASENOR Neururer    Procedure: Procedure(s):  RELEASE, TARSAL TUNNEL AND PLANTAT FASCIOTOMY       Anesthesia Type:  General    Note:  Disposition: Outpatient   Postop Pain Control: Uneventful            Sign Out: Well controlled pain   PONV: No   Neuro/Psych: Uneventful            Sign Out: Acceptable/Baseline neuro status   Airway/Respiratory: Uneventful            Sign Out: Acceptable/Baseline resp. status   CV/Hemodynamics: Uneventful            Sign Out: Acceptable CV status   Other NRE: NONE   DID A NON-ROUTINE EVENT OCCUR? No           Last vitals:  Vitals Value Taken Time   /79 10/05/22 1105   Temp 96.5  F (35.8  C) 10/05/22 1100   Pulse 63 10/05/22 1109   Resp 14 10/05/22 1109   SpO2 94 % 10/05/22 1109   Vitals shown include unvalidated device data.    Electronically Signed By: MISSY Fernandez CRNA  October 5, 2022  1:30 PM

## 2022-10-05 NOTE — OR NURSING
.PACU Respiratory Event Documentation     1) Episodes of Apnea greater than or equal to 10 seconds: no    2) Bradypnea - less than 8 breaths per minute: no    3) Pain score on 0 to 10 scale: tolerable    4) Pain-sedation mismatch (yes or no): no    5) Repeated 02 desaturation less than 90% (yes or no): no    Anesthesia notified? (yes or no): no    Any of the above events occuring repeatedly in separate 30 minute intervals may be considered recurrent PACU respiratory events.

## 2022-10-05 NOTE — BRIEF OP NOTE
[facility]    Brief Operative Note    Pre-operative diagnosis: Tarsal tunnel syndrome of right side [G57.51]  Post-operative diagnosis Same as pre-operative diagnosis    Procedure: Procedure(s):  RELEASE, TARSAL TUNNEL AND PLANTAT FASCIOTOMY  Surgeon: Surgeon(s) and Role:     * Shiva Finch DPM - Primary     * Jackelin Dee PA-C - Assisting  Anesthesia: General   Estimated Blood Loss: Minimal    Drains: None  Specimens: * No specimens in log *  Findings:   None.  Complications: None.  Implants: * No implants in log *

## 2022-10-05 NOTE — ANESTHESIA CARE TRANSFER NOTE
Patient: Phyllis VILLASENOR Neururer    Procedure: Procedure(s):  RELEASE, TARSAL TUNNEL AND PLANTAT FASCIOTOMY       Diagnosis: Tarsal tunnel syndrome of right side [G57.51]  Diagnosis Additional Information: No value filed.    Anesthesia Type:   General     Note:    Oropharynx: oropharynx clear of all foreign objects  Level of Consciousness: drowsy  Oxygen Supplementation: face mask  Level of Supplemental Oxygen (L/min / FiO2): 8  Independent Airway: airway patency satisfactory and stable  Dentition: dentition unchanged  Vital Signs Stable: post-procedure vital signs reviewed and stable  Report to RN Given: handoff report given  Patient transferred to: PACU    Handoff Report: Identifed the Patient, Identified the Reponsible Provider, Reviewed the pertinent medical history, Discussed the surgical course, Reviewed Intra-OP anesthesia mangement and issues during anesthesia, Set expectations for post-procedure period and Allowed opportunity for questions and acknowledgement of understanding      Vitals:  Vitals Value Taken Time   BP     Temp     Pulse     Resp     SpO2         Electronically Signed By: MISSY FORRESTER CRNA  October 5, 2022  10:54 AM

## 2022-10-13 ENCOUNTER — OFFICE VISIT (OUTPATIENT)
Dept: ORTHOPEDICS | Facility: OTHER | Age: 57
End: 2022-10-13
Attending: PODIATRIST
Payer: COMMERCIAL

## 2022-10-13 DIAGNOSIS — Z09 POSTOPERATIVE FOLLOW-UP: Primary | ICD-10-CM

## 2022-10-13 PROCEDURE — 99024 POSTOP FOLLOW-UP VISIT: CPT | Performed by: PHYSICIAN ASSISTANT

## 2022-10-13 NOTE — NURSING NOTE
"Chief Complaint   Patient presents with     RECHECK     Right TTR Post Op    Pt is here today for a recheck following a right ttr.     Shilpi Booker LPN on 10/13/2022 at 9:47 AM       Initial LMP  (LMP Unknown)  Estimated body mass index is 42.53 kg/m  as calculated from the following:    Height as of 10/5/22: 1.753 m (5' 9\").    Weight as of 10/5/22: 130.6 kg (288 lb).  Medication Reconciliation: complete    Shilpi Booker LPN  "

## 2022-10-13 NOTE — PROGRESS NOTES
Pt is here today for a recheck following a right ttr.     Shilpi Booker LPN on 10/13/2022 at 9:47 AM

## 2022-10-13 NOTE — PROGRESS NOTES
Visit Date: 10/13/2022    HISTORY OF PRESENT ILLNESS:  Phyllis comes in today for followup of her right ankle.  She is status post right tarsal tunnel decompression and medial plantar fasciotomy by Dr. Finch on 10/05/2022.  The patient states that she is doing well.  Minimal pain at this time.  She just takes over-the-counter Tylenol for pain management.  She has a CAM walking boot that she has been weightbearing with a walker.  She states that she is doing well with that.  She has no acute concerns today.    PHYSICAL EXAMINATION:  On exam today, the incision is clean and dry.  Sutures were intact; those were removed and Steri-Stripped today.  She has minimal swelling.  She is slowly getting a sensation on the plantar aspect of her foot that was numb prior to surgery and she denies any calf pain at this time.  She has good range of motion, plantar and dorsiflexion.    ASSESSMENT:  Status post tarsal tunnel decompression and medial plantar fasciotomy.    PLAN:  We will have her continue using the CAM walking boot for another week or so and then she can transition to a good supportive shoe.  She is also going to use her walker and wean herself off of that as well.  Work on range of motion and increasing her activities.  She will follow up in 4 weeks if necessary, otherwise p.r.n.  She has no questions.    Shiva Finch DPM    As Dictated by JAQUI FELIPE        D: 10/13/2022   T: 10/13/2022   MT: CAMILA    Name:     PHYLLIS MCBRIDE  MRN:      [id number]        Account:    [id number]   :      1965           Visit Date: 10/13/2022     Document: [id number]

## 2022-11-07 ENCOUNTER — HOSPITAL ENCOUNTER (OUTPATIENT)
Dept: CARDIOLOGY | Facility: OTHER | Age: 57
Discharge: HOME | End: 2022-11-07
Attending: NURSE PRACTITIONER | Admitting: NURSE PRACTITIONER
Payer: COMMERCIAL

## 2022-11-07 DIAGNOSIS — K55.069 MESENTERIC VEIN THROMBOSIS (H): ICD-10-CM

## 2022-11-07 DIAGNOSIS — R00.2 PALPITATIONS: ICD-10-CM

## 2022-11-07 DIAGNOSIS — Z86.718 PERSONAL HISTORY OF DVT (DEEP VEIN THROMBOSIS): ICD-10-CM

## 2022-11-07 DIAGNOSIS — I82.91 RECURRENT THROMBUS: ICD-10-CM

## 2022-11-07 DIAGNOSIS — I34.81 MITRAL ANNULAR CALCIFICATION: ICD-10-CM

## 2022-11-07 LAB — LVEF ECHO: NORMAL

## 2022-11-07 PROCEDURE — 999N000208 ECHOCARDIOGRAM COMPLETE

## 2022-11-07 PROCEDURE — 255N000002 HC RX 255 OP 636: Performed by: NURSE PRACTITIONER

## 2022-11-07 PROCEDURE — 93306 TTE W/DOPPLER COMPLETE: CPT | Mod: 26 | Performed by: INTERNAL MEDICINE

## 2022-11-07 RX ADMIN — PERFLUTREN 5 ML: 6.52 INJECTION, SUSPENSION INTRAVENOUS at 09:24

## 2022-11-07 NOTE — PROGRESS NOTES
Agitated saline test performed with echocardiogram per order per protocol.  Patient instructed on procedure and verbal consent obtained.  Patient tolerated procedure.

## 2022-11-14 ENCOUNTER — OFFICE VISIT (OUTPATIENT)
Dept: FAMILY MEDICINE | Facility: OTHER | Age: 57
End: 2022-11-14
Attending: FAMILY MEDICINE
Payer: COMMERCIAL

## 2022-11-14 VITALS
TEMPERATURE: 97 F | SYSTOLIC BLOOD PRESSURE: 120 MMHG | HEART RATE: 64 BPM | BODY MASS INDEX: 43.4 KG/M2 | DIASTOLIC BLOOD PRESSURE: 84 MMHG | RESPIRATION RATE: 16 BRPM | OXYGEN SATURATION: 99 % | WEIGHT: 293 LBS | HEIGHT: 69 IN

## 2022-11-14 DIAGNOSIS — Z12.31 VISIT FOR SCREENING MAMMOGRAM: ICD-10-CM

## 2022-11-14 DIAGNOSIS — Z12.4 SCREENING FOR CERVICAL CANCER: ICD-10-CM

## 2022-11-14 DIAGNOSIS — Z79.899 IMMUNODEFICIENCY DUE TO DRUG THERAPY (H): ICD-10-CM

## 2022-11-14 DIAGNOSIS — M06.9 RHEUMATOID ARTHRITIS, RHEUMATOID FACTOR STATUS UNKNOWN (H): ICD-10-CM

## 2022-11-14 DIAGNOSIS — Z78.0 POSTMENOPAUSE: ICD-10-CM

## 2022-11-14 DIAGNOSIS — D84.821 IMMUNODEFICIENCY DUE TO DRUG THERAPY (H): ICD-10-CM

## 2022-11-14 DIAGNOSIS — Z00.00 HEALTH CARE MAINTENANCE: Primary | ICD-10-CM

## 2022-11-14 DIAGNOSIS — E23.6 PITUITARY CYST (H): ICD-10-CM

## 2022-11-14 DIAGNOSIS — D68.59 PRIMARY HYPERCOAGULABLE STATE (H): ICD-10-CM

## 2022-11-14 DIAGNOSIS — E66.01 MORBID OBESITY (H): ICD-10-CM

## 2022-11-14 PROCEDURE — 99396 PREV VISIT EST AGE 40-64: CPT | Performed by: FAMILY MEDICINE

## 2022-11-14 PROCEDURE — G0123 SCREEN CERV/VAG THIN LAYER: HCPCS | Performed by: FAMILY MEDICINE

## 2022-11-14 PROCEDURE — 87624 HPV HI-RISK TYP POOLED RSLT: CPT | Mod: ZL | Performed by: FAMILY MEDICINE

## 2022-11-14 ASSESSMENT — ENCOUNTER SYMPTOMS
DIZZINESS: 0
WEAKNESS: 0
CONSTIPATION: 0
EYE PAIN: 0
HEMATURIA: 0
HEADACHES: 0
BREAST MASS: 0
PALPITATIONS: 0
FEVER: 0
DIARRHEA: 0
DYSURIA: 0
HEARTBURN: 0
COUGH: 0
PARESTHESIAS: 0
CHILLS: 0
SORE THROAT: 0
NERVOUS/ANXIOUS: 0
FREQUENCY: 0
ARTHRALGIAS: 0
MYALGIAS: 0
NAUSEA: 0
ABDOMINAL PAIN: 0
JOINT SWELLING: 0
SHORTNESS OF BREATH: 0
HEMATOCHEZIA: 0

## 2022-11-14 ASSESSMENT — ASTHMA QUESTIONNAIRES
QUESTION_1 LAST FOUR WEEKS HOW MUCH OF THE TIME DID YOUR ASTHMA KEEP YOU FROM GETTING AS MUCH DONE AT WORK, SCHOOL OR AT HOME: NONE OF THE TIME
ACT_TOTALSCORE: 25
QUESTION_3 LAST FOUR WEEKS HOW OFTEN DID YOUR ASTHMA SYMPTOMS (WHEEZING, COUGHING, SHORTNESS OF BREATH, CHEST TIGHTNESS OR PAIN) WAKE YOU UP AT NIGHT OR EARLIER THAN USUAL IN THE MORNING: NOT AT ALL
QUESTION_5 LAST FOUR WEEKS HOW WOULD YOU RATE YOUR ASTHMA CONTROL: COMPLETELY CONTROLLED
ACT_TOTALSCORE: 25
QUESTION_2 LAST FOUR WEEKS HOW OFTEN HAVE YOU HAD SHORTNESS OF BREATH: NOT AT ALL
QUESTION_4 LAST FOUR WEEKS HOW OFTEN HAVE YOU USED YOUR RESCUE INHALER OR NEBULIZER MEDICATION (SUCH AS ALBUTEROL): NOT AT ALL

## 2022-11-14 ASSESSMENT — PAIN SCALES - GENERAL: PAINLEVEL: MILD PAIN (3)

## 2022-11-14 NOTE — NURSING NOTE
Chief Complaint   Patient presents with     Physical       Medication Reconciliation: complete    Allyssa Kay, LPN

## 2022-11-14 NOTE — LETTER
My Asthma Action Plan    Name: Phyllis Washington   YOB: 1965  Date: 11/14/2022   My doctor: Ya Victoria MD   My clinic: [facility]""        My Rescue Medicine:   Albuterol inhaler (Proair/Ventolin/Proventil HFA)  2-4 puffs EVERY 4 HOURS as needed. Use a spacer if recommended by your provider.   My Asthma Severity:   Intermittent / Exercise Induced  Know your asthma triggers:              GREEN ZONE   Good Control    I feel good    No cough or wheeze    Can work, sleep and play without asthma symptoms       Take your asthma control medicine every day.     1. If exercise triggers your asthma, take your rescue medication    15 minutes before exercise or sports, and    During exercise if you have asthma symptoms  2. Spacer to use with inhaler: If you have a spacer, make sure to use it with your inhaler             YELLOW ZONE Getting Worse  I have ANY of these:    I do not feel good    Cough or wheeze    Chest feels tight    Wake up at night   1. Keep taking your Green Zone medications  2. Start taking your rescue medicine:    every 20 minutes for up to 1 hour. Then every 4 hours for 24-48 hours.  3. If you stay in the Yellow Zone for more than 12-24 hours, contact your doctor.  4. If you do not return to the Green Zone in 12-24 hours or you get worse, start taking your oral steroid medicine if prescribed by your provider.           RED ZONE Medical Alert - Get Help  I have ANY of these:    I feel awful    Medicine is not helping    Breathing getting harder    Trouble walking or talking    Nose opens wide to breathe       1. Take your rescue medicine NOW  2. If your provider has prescribed an oral steroid medicine, start taking it NOW  3. Call your doctor NOW  4. If you are still in the Red Zone after 20 minutes and you have not reached your doctor:    Take your rescue medicine again and    Call 911 or go to the emergency room right away    See your regular doctor within 2  weeks of an Emergency Room or Urgent Care visit for follow-up treatment.          Annual Reminders:  Meet with Asthma Educator,  Flu Shot in the Fall, consider Pneumonia Vaccination for patients with asthma (aged 19 and older).    Pharmacy: [facility] [id number] [place] [address]    Electronically signed by Ya Victoria MD   Date: 11/14/22                    Asthma Triggers  How To Control Things That Make Your Asthma Worse    Triggers are things that make your asthma worse.  Look at the list below to help you find your triggers and   what you can do about them. You can help prevent asthma flare-ups by staying away from your triggers.      Trigger                                                          What you can do   Cigarette Smoke  Tobacco smoke can make asthma worse. Do not allow smoking in your home, car or around you.  Be sure no one smokes at a child s day care or school.  If you smoke, ask your health care provider for ways to help you quit.  Ask family members to quit too.  Ask your health care provider for a referral to Quit Plan to help you quit smoking, or call 5-443-656-PLAN.     Colds, Flu, Bronchitis  These are common triggers of asthma. Wash your hands often.  Don t touch your eyes, nose or mouth.  Get a flu shot every year.     Dust Mites  These are tiny bugs that live in cloth or carpet. They are too small to see. Wash sheets and blankets in hot water every week.   Encase pillows and mattress in dust mite proof covers.  Avoid having carpet if you can. If you have carpet, vacuum weekly.   Use a dust mask and HEPA vacuum.   Pollen and Outdoor Mold  Some people are allergic to trees, grass, or weed pollen, or molds. Try to keep your windows closed.  Limit time out doors when pollen count is high.   Ask you health care provider about taking medicine during allergy season.     Animal Dander  Some people are allergic to skin flakes, urine or saliva from pets with fur or  feathers. Keep pets with fur or feathers out of your home.    If you can t keep the pet outdoors, then keep the pet out of your bedroom.  Keep the bedroom door closed.  Keep pets off cloth furniture and away from stuffed toys.     Mice, Rats, and Cockroaches  Some people are allergic to the waste from these pests.   Cover food and garbage.  Clean up spills and food crumbs.  Store grease in the refrigerator.   Keep food out of the bedroom.   Indoor Mold  This can be a trigger if your home has high moisture. Fix leaking faucets, pipes, or other sources of water.   Clean moldy surfaces.  Dehumidify basement if it is damp and smelly.   Smoke, Strong Odors, and Sprays  These can reduce air quality. Stay away from strong odors and sprays, such as perfume, powder, hair spray, paints, smoke incense, paint, cleaning products, candles and new carpet.   Exercise or Sports  Some people with asthma have this trigger. Be active!  Ask your doctor about taking medicine before sports or exercise to prevent symptoms.    Warm up for 5-10 minutes before and after sports or exercise.     Other Triggers of Asthma  Cold air:  Cover your nose and mouth with a scarf.  Sometimes laughing or crying can be a trigger.  Some medicines and food can trigger asthma.

## 2022-11-14 NOTE — PROGRESS NOTES
Nursing Notes:   Allyssa Kay LPN  11/14/2022  1:04 PM  Sign at exiting of workspace  Chief Complaint   Patient presents with     Physical       Medication Reconciliation: complete    Allyssa Kay LPN       SUBJECTIVE:   CC: Phyllis is an 57 year old who presents for preventive health visit.       A week after she had foot surgery, she started having some leg pains.  She was having some pain in her right medial leg where she has had superficial thrombosis in the past.  She had not been wearing her compression stockings due to surgery and had been off of xarelto for a day prior to her surgery. She has a colonoscopy scheduled in 3 days.  She did not take it yesterday or today.  The pain in her leg is better now.  The foot surgery was October 6.  She never was able to feel a ropy sensation in any of her superficial veins like she had previously with her superficial phlebitis.    Patient has been advised of split billing requirements and indicates understanding: Yes     Healthy Habits:     Getting at least 3 servings of Calcium per day:  Yes    Bi-annual eye exam:  Yes    Dental care twice a year:  Yes    Sleep apnea or symptoms of sleep apnea:  None    Diet:  Regular (no restrictions)    Frequency of exercise:  2-3 days/week    Duration of exercise:  15-30 minutes    Taking medications regularly:  Yes    Medication side effects:  None    PHQ-2 Total Score: 0    Additional concerns today:  No     ACT Total Scores 8/30/2022 8/30/2022 11/14/2022   ACT TOTAL SCORE (Goal Greater than or Equal to 20) 25 25 25   In the past 12 months, how many times did you visit the emergency room for your asthma without being admitted to the hospital? 0 0 0   In the past 12 months, how many times were you hospitalized overnight because of your asthma? 0 0 0               Today's PHQ-2 Score:   PHQ-2 ( 1999 [employer]) 11/14/2022   Q1: Little interest or pleasure in doing things 0   Q2: Feeling down, depressed or hopeless 0   PHQ-2 Score 0    PHQ-2 Total Score (12-17 Years)- Positive if 3 or more points; Administer PHQ-A if positive -   Q1: Little interest or pleasure in doing things Not at all   Q2: Feeling down, depressed or hopeless Not at all   PHQ-2 Score 0       Abuse: Current or Past (Physical, Sexual or Emotional) - No  Do you feel safe in your environment? Yes        Social History     Tobacco Use     Smoking status: Never     Passive exposure: Yes     Smokeless tobacco: Never     Tobacco comments:     In childhood home   Substance Use Topics     Alcohol use: Yes     Comment: 1 per month     If you drink alcohol do you typically have >3 drinks per day or >7 drinks per week? No    Alcohol Use 11/14/2022   Prescreen: >3 drinks/day or >7 drinks/week? No   Prescreen: >3 drinks/day or >7 drinks/week? -       Reviewed orders with patient.  Reviewed health maintenance and updated orders accordingly - Yes  Labs reviewed in EPIC  BP Readings from Last 3 Encounters:   11/14/22 120/84   10/05/22 112/84   09/12/22 122/80    Wt Readings from Last 3 Encounters:   11/14/22 133.4 kg (294 lb)   10/05/22 130.6 kg (288 lb)   09/12/22 130.7 kg (288 lb 3.2 oz)                  Patient Active Problem List   Diagnosis     Allergic rhinitis     Asthma     Esophageal reflux     Essential hypertension, benign     Obesity     Enthesopathy of hip region     Cervical muscle pain     Colon polyp     Disturbance in sleep behavior     Family history of diabetes mellitus type II     High risk medication use     Myalgia     Rheumatoid arthritis involving multiple sites with positive rheumatoid factor (H)     Thrombosis of saphenous vein, left     Vitamin D deficiency     Health care maintenance     Mitral valve annular calcification- mild with mild MI     PVC's (premature ventricular contractions)     SVT (supraventricular tachycardia) (H)     Ventricular bigeminy     Morbid obesity (H)     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 Fofana - 2 polyps found.  Told to f/u in 5 years.     RELEASE TARSAL TUNNEL FOOT Right 10/5/2022    Procedure: RELEASE, TARSAL TUNNEL AND PLANTAT FASCIOTOMY;  Surgeon: Shiva Finch DPM;  Location:  OR       Social History     Tobacco Use     Smoking status: Never     Passive exposure: Yes     Smokeless tobacco: Never     Tobacco comments:     In childhood home   Substance Use Topics     Alcohol use: Yes     Comment: 1 per month     Family History   Problem Relation Age of Onset     Hypertension Father         Hypertension,67     Other - See Comments Maternal Grandmother         Stroke,85     Other - See Comments Paternal Grandmother         Alzheimer's 73     Breast Cancer Mother 46        Cancer-breast,now 68     Cerebrovascular Disease Mother         stroke with hemiparesis 2017     Diabetes Brother 16        Diabetes,now 41         Current Outpatient Medications   Medication Sig Dispense Refill     B-D INSULIN SYRINGE MICROFINE 27G X 5/8\" 1 ML MISC 1 EACH BY DOES NOT APPLY ROUTE ONE TIME A WEEK. USE TO DRAW UP METHOTREXATE 25 MG ONCE WEEKLY.       BD SAFETYGLIDE SYRINGE/NEEDLE 27G X 5/8\" 1 ML MISC 1 EACH BY DOES NOT APPLY ROUTE ONE TIME A WEEK. USE TO DRAW UP METHOTREXATE 25 MG ONCE WEEKLY.       cyanocobalamin (VITAMIN B-12) 1000 MCG tablet Take 1,000 mcg by mouth daily       famotidine (PEPCID) 20 MG tablet Take 1 tablet (20 mg) by mouth 2 times daily 180 tablet 3     folic acid (FOLVITE) 1 MG tablet Take 2 mg by mouth daily       hydroxychloroquine (PLAQUENIL) 200 MG tablet Take 400 mg by mouth daily       methotrexate 50 MG/2ML injection Inject 1 mL Subcutaneous once a week       metoprolol succinate ER (TOPROL XL) 50 MG 24 hr tablet TAKE 1 TABLET " (50 MG) BY MOUTH DAILY (WITH DINNER) 90 tablet 2     predniSONE (DELTASONE) 5 MG tablet Take 5 mg by mouth daily  0     thiamine (B-1) 100 MG tablet Take 100 mg by mouth daily        vitamin B6 (PYRIDOXINE) 200 MG tablet Take 200 mg by mouth daily       XARELTO ANTICOAGULANT 20 MG TABS tablet TAKE 1 TABLET BY MOUTH DAILY WITH DINNER 90 tablet 3     No Known Allergies    Breast Cancer Screening:    FHS-7:   Breast CA Risk Assessment (FHS-7) 12/13/2021   Did any of your first-degree relatives have breast or ovarian cancer? No   Did any of your relatives have bilateral breast cancer? No   Did any man in your family have breast cancer? No   Did any woman in your family have breast and ovarian cancer? No   Did any woman in your family have breast cancer before age 50 y? No   Do you have 2 or more relatives with breast and/or ovarian cancer? No   Do you have 2 or more relatives with breast and/or bowel cancer? No       Mammogram Screening: Recommended mammography every 1-2 years with patient discussion and risk factor consideration  Pertinent mammograms are reviewed under the imaging tab.    History of abnormal Pap smear: NO - age 30-65 PAP every 5 years with negative HPV co-testing recommended     Reviewed and updated as needed this visit by clinical staff   Tobacco  Allergies  Meds              Reviewed and updated as needed this visit by Provider                 Past Medical History:   Diagnosis Date     Personal history of other medical treatment (CODE)     3 normal childbirths     Rheumatoid arthritis (H)       Past Surgical History:   Procedure Laterality Date     BONE MARROW BIOPSY, BONE SPECIMEN, NEEDLE/TROCAR Left 7/14/2022    Procedure: BIOPSY, BONE MARROW;  Surgeon: Glory Baig MD;  Location: [facility]     COLONOSCOPY  2017    Dr. Fofana - 2 polyps found.  Told to f/u in 5 years.     RELEASE TARSAL TUNNEL FOOT Right 10/5/2022    Procedure: RELEASE, TARSAL TUNNEL AND PLANTAT FASCIOTOMY;  Surgeon: Stef  "ERASMO Shannon;  Location:  OR       Review of Systems   Constitutional: Negative for chills and fever.   HENT: Negative for congestion, ear pain, hearing loss and sore throat.    Eyes: Negative for pain and visual disturbance.   Respiratory: Negative for cough and shortness of breath.    Cardiovascular: Negative for chest pain, palpitations and peripheral edema.   Gastrointestinal: Negative for abdominal pain, constipation, diarrhea, heartburn, hematochezia and nausea.   Breasts:  Negative for tenderness, breast mass and discharge.   Genitourinary: Negative for dysuria, frequency, genital sores, hematuria, pelvic pain, urgency, vaginal bleeding and vaginal discharge.   Musculoskeletal: Negative for arthralgias, joint swelling and myalgias.   Skin: Negative for rash.   Neurological: Negative for dizziness, weakness, headaches and paresthesias.   Psychiatric/Behavioral: Negative for mood changes. The patient is not nervous/anxious.           OBJECTIVE:   /84   Pulse 64   Temp 97  F (36.1  C) (Tympanic)   Resp 16   Ht 1.753 m (5' 9\")   Wt 133.4 kg (294 lb)   LMP  (LMP Unknown)   SpO2 99%   Breastfeeding No   BMI 43.42 kg/m    Physical Exam  Constitutional:       General: She is not in acute distress.     Appearance: She is well-developed.   HENT:      Head: Normocephalic.      Right Ear: Tympanic membrane and external ear normal.      Left Ear: Tympanic membrane and external ear normal.      Nose: Nose normal.      Mouth/Throat:      Pharynx: No oropharyngeal exudate.   Eyes:      General:         Right eye: No discharge.         Left eye: No discharge.      Conjunctiva/sclera: Conjunctivae normal.      Pupils: Pupils are equal, round, and reactive to light.   Neck:      Thyroid: No thyromegaly.      Trachea: No tracheal deviation.   Cardiovascular:      Rate and Rhythm: Normal rate and regular rhythm.      Pulses: Normal pulses.      Heart sounds: Normal heart sounds, S1 normal and S2 normal. No murmur " heard.    No friction rub. No gallop. No S3 or S4 sounds.   Pulmonary:      Effort: Pulmonary effort is normal. No respiratory distress.      Breath sounds: Normal breath sounds. No wheezing or rales.      Comments: Breast exam:  No masses palpable bilaterally.  No skin changes, tethering or axillary lymphadenopathy bilaterally.    Abdominal:      General: Bowel sounds are normal. There is no distension.      Palpations: Abdomen is soft. There is no mass.      Tenderness: There is no abdominal tenderness.   Genitourinary:     Comments: Pelvic Exam:  Vulva: No external lesions, normal hair distribution, no adenopathy  Vagina: Moist, pink, no abnormal discharge, well rugated, no lesions  Cervix: Pap smear obtained.  Cervix is parous, smooth, pink, no visible lesions  Uterus: Normal size, anteverted, non-tender, mobile  Ovaries: No mass, non-tender, mobile  Musculoskeletal:         General: Normal range of motion.      Cervical back: Neck supple.   Lymphadenopathy:      Cervical: No cervical adenopathy.   Skin:     General: Skin is warm and dry.      Findings: No rash.   Neurological:      Mental Status: She is alert and oriented to person, place, and time.      Motor: No abnormal muscle tone.      Deep Tendon Reflexes: Reflexes are normal and symmetric.   Psychiatric:         Thought Content: Thought content normal.         Judgment: Judgment normal.           ASSESSMENT/PLAN:       ICD-10-CM    1. Health care maintenance  Z00.00       2. Screening for cervical cancer  Z12.4 Pap Screen Thin Prep with HPV - recommended age 30 - 65 years (select HPV order below)      3. Visit for screening mammogram  Z12.31 MA Screen Bilateral w/Nathan      4. Postmenopause  Z78.0 DX Hip/Pelvis/Spine      5. Rheumatoid arthritis, rheumatoid factor status unknown (H)  M06.9       6. Immunodeficiency due to drug therapy (H)  D84.821     Z79.899       7. Primary hypercoagulable state (H)  D68.59       8. Morbid obesity (H)  E66.01       9.  "Pituitary cyst (H)  E23.6         1.  Mammogram ordered (last completed 21).  Colonoscopy is scheduled later this week on 22 with Dr. Fofana.  Pap Smear/HPV completed today.  DEXA last completed in  was normal.  Will repeat this.  prevnar & pneumovax previously completed.  Discussed that due to her rheumatoid arthritis, she would be eligible for another pneumovax since it has been over 5 years from her last.  She wishes to due this at a later date with upcoming appointment for colonoscopy.  Also plans to get covid booster at a later date as well.  Flu, tdap and shingrix are up to date.  2.  See #1.  3.  See #1.  4.  See #1.  5.  Follows with Rheumatology.    6.  See #5.  Repeat pneumovax recommended due to immune suppression with rheumatoid arthritis treatment.  7.  She continues on Xarelto.  Has underlying MGUS that is likely the source of her hypercoagulable states.  Follows with oncology for this.  Will need to be off of xarelto until her colonoscopy on 22.  8.  Stable.  Healthy eating and exercise recommended.  9.  Stable.  Last imaging 2 years ago felt that this was a normal variant.    Patient has been advised of split billing requirements and indicates understanding: Yes      COUNSELING:  Reviewed preventive health counseling, as reflected in patient instructions       Regular exercise       Healthy diet/nutrition       Vision screening       Osteoporosis prevention/bone health       Colorectal Cancer Screening       Consider Hep C screening for all patients one time for ages 18-79 years       HIV screeninx in teen years, 1x in adult years, and at intervals if high risk    Estimated body mass index is 43.42 kg/m  as calculated from the following:    Height as of this encounter: 1.753 m (5' 9\").    Weight as of this encounter: 133.4 kg (294 lb).    Weight management plan: Discussed healthy diet and exercise guidelines    She reports that she has never smoked. She has been exposed to " tobacco smoke. She has never used smokeless tobacco.      Counseling Resources:  ATP IV Guidelines  Pooled Cohorts Equation Calculator  Breast Cancer Risk Calculator  BRCA-Related Cancer Risk Assessment: FHS-7 Tool  FRAX Risk Assessment  ICSI Preventive Guidelines  Dietary Guidelines for Americans, 2010  USDA's MyPlate  ASA Prophylaxis  Lung CA Screening    Ya Victoria MD  [facility] AND [facility]

## 2022-11-16 LAB
BKR LAB AP GYN ADEQUACY: NORMAL
BKR LAB AP GYN INTERPRETATION: NORMAL
PATH REPORT.COMMENTS IMP SPEC: NORMAL
PATH REPORT.COMMENTS IMP SPEC: NORMAL

## 2022-11-17 ENCOUNTER — TRANSFERRED RECORDS (OUTPATIENT)
Dept: HEALTH INFORMATION MANAGEMENT | Facility: OTHER | Age: 57
End: 2022-11-17

## 2022-11-22 LAB
HUMAN PAPILLOMA VIRUS 16 DNA: NEGATIVE
HUMAN PAPILLOMA VIRUS 18 DNA: NEGATIVE
HUMAN PAPILLOMA VIRUS FINAL DIAGNOSIS: NORMAL
HUMAN PAPILLOMA VIRUS OTHER HR: NEGATIVE

## 2022-11-29 ENCOUNTER — HOSPITAL ENCOUNTER (OUTPATIENT)
Dept: CT IMAGING | Facility: OTHER | Age: 57
Discharge: HOME | End: 2022-11-29
Attending: INTERNAL MEDICINE
Payer: COMMERCIAL

## 2022-11-29 ENCOUNTER — LAB (OUTPATIENT)
Dept: LAB | Facility: OTHER | Age: 57
End: 2022-11-29
Attending: INTERNAL MEDICINE
Payer: COMMERCIAL

## 2022-11-29 DIAGNOSIS — K55.069 SUPERIOR MESENTERIC VEIN THROMBOSIS (H): ICD-10-CM

## 2022-11-29 DIAGNOSIS — D47.2 MGUS (MONOCLONAL GAMMOPATHY OF UNKNOWN SIGNIFICANCE): ICD-10-CM

## 2022-11-29 LAB
ALBUMIN SERPL BCG-MCNC: 3.7 G/DL (ref 3.5–5.2)
ALP SERPL-CCNC: 82 U/L (ref 35–104)
ALT SERPL W P-5'-P-CCNC: 22 U/L (ref 10–35)
ANION GAP SERPL CALCULATED.3IONS-SCNC: 7 MMOL/L (ref 7–15)
AST SERPL W P-5'-P-CCNC: 25 U/L (ref 10–35)
BASOPHILS # BLD AUTO: 0 10E3/UL (ref 0–0.2)
BASOPHILS NFR BLD AUTO: 0 %
BILIRUB SERPL-MCNC: 0.3 MG/DL
BUN SERPL-MCNC: 19.9 MG/DL (ref 6–20)
CALCIUM SERPL-MCNC: 9.4 MG/DL (ref 8.6–10)
CHLORIDE SERPL-SCNC: 101 MMOL/L (ref 98–107)
CREAT SERPL-MCNC: 0.84 MG/DL (ref 0.51–0.95)
D DIMER PPP FEU-MCNC: 0.42 UG/ML FEU (ref 0–0.5)
DEPRECATED HCO3 PLAS-SCNC: 30 MMOL/L (ref 22–29)
EOSINOPHIL # BLD AUTO: 0 10E3/UL (ref 0–0.7)
EOSINOPHIL NFR BLD AUTO: 0 %
ERYTHROCYTE [DISTWIDTH] IN BLOOD BY AUTOMATED COUNT: 14.2 % (ref 10–15)
GFR SERPL CREATININE-BSD FRML MDRD: 80 ML/MIN/1.73M2
GLUCOSE SERPL-MCNC: 90 MG/DL (ref 70–99)
HCT VFR BLD AUTO: 41.2 % (ref 35–47)
HGB BLD-MCNC: 13.4 G/DL (ref 11.7–15.7)
IMM GRANULOCYTES # BLD: 0 10E3/UL
IMM GRANULOCYTES NFR BLD: 0 %
LDH SERPL L TO P-CCNC: 197 U/L (ref 0–250)
LYMPHOCYTES # BLD AUTO: 2.2 10E3/UL (ref 0.8–5.3)
LYMPHOCYTES NFR BLD AUTO: 45 %
MCH RBC QN AUTO: 27.5 PG (ref 26.5–33)
MCHC RBC AUTO-ENTMCNC: 32.5 G/DL (ref 31.5–36.5)
MCV RBC AUTO: 85 FL (ref 78–100)
MONOCYTES # BLD AUTO: 0.5 10E3/UL (ref 0–1.3)
MONOCYTES NFR BLD AUTO: 9 %
NEUTROPHILS # BLD AUTO: 2.3 10E3/UL (ref 1.6–8.3)
NEUTROPHILS NFR BLD AUTO: 46 %
NRBC # BLD AUTO: 0 10E3/UL
NRBC BLD AUTO-RTO: 0 /100
PLATELET # BLD AUTO: 280 10E3/UL (ref 150–450)
POTASSIUM SERPL-SCNC: 4.9 MMOL/L (ref 3.4–5.3)
PROT SERPL-MCNC: 7.5 G/DL (ref 6.4–8.3)
RBC # BLD AUTO: 4.87 10E6/UL (ref 3.8–5.2)
SODIUM SERPL-SCNC: 138 MMOL/L (ref 136–145)
TOTAL PROTEIN SERUM FOR ELP: 7.1 G/DL (ref 6.4–8.3)
WBC # BLD AUTO: 5 10E3/UL (ref 4–11)

## 2022-11-29 PROCEDURE — 86334 IMMUNOFIX E-PHORESIS SERUM: CPT | Mod: 26

## 2022-11-29 PROCEDURE — 83615 LACTATE (LD) (LDH) ENZYME: CPT | Mod: ZL

## 2022-11-29 PROCEDURE — 82232 ASSAY OF BETA-2 PROTEIN: CPT | Mod: ZL

## 2022-11-29 PROCEDURE — 36415 COLL VENOUS BLD VENIPUNCTURE: CPT | Mod: ZL

## 2022-11-29 PROCEDURE — 74177 CT ABD & PELVIS W/CONTRAST: CPT

## 2022-11-29 PROCEDURE — 84165 PROTEIN E-PHORESIS SERUM: CPT | Mod: 26

## 2022-11-29 PROCEDURE — 250N000011 HC RX IP 250 OP 636: Performed by: INTERNAL MEDICINE

## 2022-11-29 PROCEDURE — 84155 ASSAY OF PROTEIN SERUM: CPT | Mod: ZL

## 2022-11-29 PROCEDURE — 86334 IMMUNOFIX E-PHORESIS SERUM: CPT | Mod: ZL | Performed by: PATHOLOGY

## 2022-11-29 PROCEDURE — 85379 FIBRIN DEGRADATION QUANT: CPT | Mod: ZL

## 2022-11-29 PROCEDURE — 83521 IG LIGHT CHAINS FREE EACH: CPT | Mod: ZL,91

## 2022-11-29 PROCEDURE — 84165 PROTEIN E-PHORESIS SERUM: CPT | Mod: TC,ZL | Performed by: PATHOLOGY

## 2022-11-29 PROCEDURE — 80053 COMPREHEN METABOLIC PANEL: CPT | Mod: ZL

## 2022-11-29 PROCEDURE — 85025 COMPLETE CBC W/AUTO DIFF WBC: CPT | Mod: ZL

## 2022-11-29 PROCEDURE — 85810 BLOOD VISCOSITY EXAMINATION: CPT | Mod: ZL

## 2022-11-29 RX ORDER — IOPAMIDOL 755 MG/ML
150 INJECTION, SOLUTION INTRAVASCULAR ONCE
Status: COMPLETED | OUTPATIENT
Start: 2022-11-29 | End: 2022-11-29

## 2022-11-29 RX ADMIN — IOPAMIDOL 149 ML: 755 INJECTION, SOLUTION INTRAVENOUS at 12:46

## 2022-11-30 LAB
ALBUMIN SERPL ELPH-MCNC: 3.6 G/DL (ref 3.7–5.1)
ALPHA1 GLOB SERPL ELPH-MCNC: 0.3 G/DL (ref 0.2–0.4)
ALPHA2 GLOB SERPL ELPH-MCNC: 0.9 G/DL (ref 0.5–0.9)
B-GLOBULIN SERPL ELPH-MCNC: 0.8 G/DL (ref 0.6–1)
B2 MICROGLOB TUMOR MARKER SER-MCNC: 3 MG/L
GAMMA GLOB SERPL ELPH-MCNC: 1.4 G/DL (ref 0.7–1.6)
KAPPA LC FREE SER-MCNC: 6.41 MG/DL (ref 0.33–1.94)
KAPPA LC FREE/LAMBDA FREE SER NEPH: 2.47 {RATIO} (ref 0.26–1.65)
LAMBDA LC FREE SERPL-MCNC: 2.59 MG/DL (ref 0.57–2.63)
M PROTEIN SERPL ELPH-MCNC: 0.1 G/DL
PROT PATTERN SERPL ELPH-IMP: ABNORMAL
PROT PATTERN SERPL IFE-IMP: NORMAL
VISC SER: 1.7 CP (ref 1.4–1.8)

## 2022-12-06 ENCOUNTER — ONCOLOGY VISIT (OUTPATIENT)
Dept: ONCOLOGY | Facility: OTHER | Age: 57
End: 2022-12-06
Attending: INTERNAL MEDICINE
Payer: COMMERCIAL

## 2022-12-06 VITALS
RESPIRATION RATE: 16 BRPM | WEIGHT: 293 LBS | BODY MASS INDEX: 43.27 KG/M2 | TEMPERATURE: 97.2 F | SYSTOLIC BLOOD PRESSURE: 126 MMHG | HEART RATE: 58 BPM | OXYGEN SATURATION: 97 % | DIASTOLIC BLOOD PRESSURE: 86 MMHG

## 2022-12-06 DIAGNOSIS — K55.069 SUPERIOR MESENTERIC VEIN THROMBOSIS (H): ICD-10-CM

## 2022-12-06 DIAGNOSIS — D47.2 MGUS (MONOCLONAL GAMMOPATHY OF UNKNOWN SIGNIFICANCE): Primary | ICD-10-CM

## 2022-12-06 PROCEDURE — 99215 OFFICE O/P EST HI 40 MIN: CPT | Performed by: INTERNAL MEDICINE

## 2022-12-06 PROCEDURE — 99417 PROLNG OP E/M EACH 15 MIN: CPT | Performed by: INTERNAL MEDICINE

## 2022-12-06 ASSESSMENT — PAIN SCALES - GENERAL: PAINLEVEL: MILD PAIN (2)

## 2022-12-06 NOTE — NURSING NOTE
Chief Complaint   Patient presents with     Hematology     F/U MGUS & Thrombosis     Medication Reconciliation: complete    Yessenia Peng CMA ([facility])

## 2022-12-06 NOTE — PROGRESS NOTES
Visit Date: 12/06/2022    HEMATOLOGY/ONCOLOGY CLINIC NOTE     HISTORY OF PRESENT ILLNESS:  Mrs. Washington returns for followup of deep vein thrombosis, rule out hypercoagulable state.  We had seen the patient in consultation at the request of Dr. Victoria in 05/2020.  At that time, Ms. Washington was a 57-year-old white female with history of recurrent superficial thrombophlebitis, status post ablation, heterozygous for the MTHFR DNA, on aspirin.  She had developed pain and swelling, worsening involving the legs.  She was seen by Dr. Victoria, who noted right thigh superficial phlebitis continuing up to the right lateral hip area.  Bilateral venous Dopplers were performed on 02/08/2022, and there was no evidence of lower extremity DVT.  There was interrogation of deep venous structures in bilateral common femoral veins.  The veins of the proximal calf demonstrated normal appearance consistent with superficial venous thrombus identified within the varicosity in the medial right thigh.  These were repeated again on 02/18/2022, and the findings were that there were thrombosed superficial varicosities present in the area.  The patient already had complaints of abdominal pain.  CT abdomen and pelvis was ordered, and it was revealed that there was a new thrombus within a couple premesenteric veins, including the superior mesenteric vein just below the confluence of the splenic vein.  No bowel wall thickening, no definite findings of leg ischemia.  She was started on Xarelto by Dr. Victoria.  The patient also had been seen by Rheumatology, where blood workup including beta-2 glycoprotein IgM antibody was normal.  Phospholipid IgM antibody was elevated at 19.4.  Myeloperoxidase antibodies were negative.  NARCISO was positive.  We had seen the patient previously before for evaluation of recurrent superficial thrombophlebitis back on 12/19/2019.  We had ordered hypercoagulability workup at that time, and she was found to  be heterozygous for the MTHFR DNA C677T locus.  We recommended vitamin B6, B12, folic acid supplementation and continue aspirin.  Since then, she had an ablation.  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  When we saw the patient, the plan was to continue anticoagulation.  CT abdomen and pelvis was repeated on 04/05/2022.  This included CT chest with no evidence of residual or recurrent mesenteric vein thrombosis -- it had resolved.  There were no abnormalities in the lung fields.  Also further workup was obtained including serum protein electrophoresis, which revealed that her M spike was 0.3.  Monoclonal IgG immunoglobulin was lambda light chain.  At the time, we repeated thrombophilia workup, and there was no evidence of thrombophilia, including antithrombin III which was negative.  Cardiolipin antibodies were negative.  Beta-2 glycoprotein antibodies were negative contributing to thrombophilia.  When we saw her subsequently, given the fact she had a monoclonal gammopathy that could be contributing to thrombophilia, we therefore recommended further workup including skeletal bone survey, which was done on 05/23/2022.  Findings were that there were degenerative changes of the spine and hips, no lytic defects.  The patient was seen in the emergency room with abdominal pain.  CT abdomen and pelvis was performed on 05/17/2022.  There was no evidence of mesenteric vein thrombosis, no acute abnormality.  Her abdominal pain resolved.  Nonetheless, we did order further workup.  In terms of myeloma labs, we did order beta-2 microglobulin, which was elevated at 3.2.  Kappa-lambda ratio was elevated at 2.1.  When we saw the patient on 06/01/2022, we wanted to absolutely rule out myeloma by obtaining bone marrow aspiration biopsy.  This was performed on 07/14/2022 and was read as negative for involvement by plasma cell neoplasm or B-cell  lymphoma.  There was normocellular bone marrow for age.  Storage iron could not be assessed.  Cytogenetics were negative for clonal chromosomal abnormality.  The patient continued on Xarelto.      When we saw her last on 07/29/2022, the plan was to repeat CT abdomen and pelvis and continue Xarelto indefinitely.  We felt that her mesenteric vein thrombosis was likely due to hypercoagulable state including the MTHFR DNA mutation as well as underlying MGUS. CT abdomen and pelvis was performed on 11/29/2022 and the findings were that there was unchanged CT appearance of the abdomen and pelvis.  There was no evidence of mesenteric vein thrombosis, it had resolved.  The patient otherwise is asymptomatic.  She feels well.  She denies any abdominal pain, fevers, night sweats, weight loss, leg swelling, chest pain.  She continues to follow up with Dr. Victoria for general medical care.    PHYSICAL EXAMINATION:    GENERAL:  She is an obese middle-aged white female in no acute distress.  ECOG performance status is 0.  VITAL SIGNS:  Blood pressure 126/86, pulse 58, respirations 16, temperature 97.2.  HEENT:  Atraumatic, normocephalic.  Oropharynx nonerythematous.  NECK:  Supple, no thyromegaly.  LUNGS:  Clear to auscultation and percussion.  HEART:  Regular rhythm, S1, S2 normal.  ABDOMEN:  Obese, soft, normoactive bowel sounds.  No mass, nontender.  LYMPHATICS:  No cervical, supraclavicular, axillary or inguinal nodes.  EXTREMITIES:  Trace ankle edema.  NEUROLOGIC:  Nonfocal.    LABORATORY DATA:  M spike of 0.1 on serum protein electrophoresis with evidence of a very small monoclonal IgM globulin of lambda light type.  Kappa free light chains were 6.41.  Kappa-lambda ratio is 2.47.  CBC with white count 5.0, H and H 13.4 and 41.2, platelet count is 280.  D-dimer is 0.42.  She recently had a colonoscopy with tubular adenoma found in the cecum.    IMPRESSION:     1.  MGUS.  Bone marrow aspiration biopsy was negative for  myeloma.  Skeletal bone survey was negative for lytic defects. M spike remains relatively stable.  No evidence of end-organ damage.  Plan is to continue surveillance.  We will see the patient in 6 months and repeat myeloma labs.  2.  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  No evidence of metastatic process.  The plan is to see the patient in 6 months and repeat D-dimer.  She will continue on Xarelto indefinitely.    Seventy minutes was spent on this patient.  Time was spent reviewing multiple physician provider notes, lab results, imaging results, performing history and physical, documenting history and physical, and ordering followup labs.    Mary Ojeda MD        D: 2022   T: 2022   MT: MERI    Name:     HUMBERTO MCBRIDE  MRN:      [id number]        Account:    [id number]   :      1965           Visit Date: 2022     Document: [id number]

## 2022-12-16 ENCOUNTER — HOSPITAL ENCOUNTER (OUTPATIENT)
Dept: MAMMOGRAPHY | Facility: OTHER | Age: 57
Discharge: HOME | End: 2022-12-16
Attending: FAMILY MEDICINE
Payer: COMMERCIAL

## 2022-12-16 ENCOUNTER — HOSPITAL ENCOUNTER (OUTPATIENT)
Dept: BONE DENSITY | Facility: OTHER | Age: 57
Discharge: HOME | End: 2022-12-16
Attending: FAMILY MEDICINE
Payer: COMMERCIAL

## 2022-12-16 DIAGNOSIS — Z78.0 POSTMENOPAUSE: ICD-10-CM

## 2022-12-16 DIAGNOSIS — Z12.31 VISIT FOR SCREENING MAMMOGRAM: ICD-10-CM

## 2022-12-16 PROCEDURE — 77067 SCR MAMMO BI INCL CAD: CPT

## 2022-12-16 PROCEDURE — 77080 DXA BONE DENSITY AXIAL: CPT

## 2022-12-29 ENCOUNTER — IMMUNIZATION (OUTPATIENT)
Dept: FAMILY MEDICINE | Facility: OTHER | Age: 57
End: 2022-12-29
Attending: FAMILY MEDICINE
Payer: COMMERCIAL

## 2022-12-29 ENCOUNTER — MYC MEDICAL ADVICE (OUTPATIENT)
Dept: FAMILY MEDICINE | Facility: OTHER | Age: 57
End: 2022-12-29

## 2022-12-29 DIAGNOSIS — Z23 HIGH PRIORITY FOR 2019-NCOV VACCINE: Primary | ICD-10-CM

## 2022-12-29 PROCEDURE — 91312 COVID-19 VACCINE BIVALENT BOOSTER 12+ (PFIZER): CPT

## 2022-12-29 PROCEDURE — 0124A COVID-19 VACCINE BIVALENT BOOSTER 12+ (PFIZER): CPT

## 2022-12-30 ENCOUNTER — ALLIED HEALTH/NURSE VISIT (OUTPATIENT)
Dept: FAMILY MEDICINE | Facility: OTHER | Age: 57
End: 2022-12-30
Attending: FAMILY MEDICINE
Payer: COMMERCIAL

## 2022-12-30 DIAGNOSIS — Z23 NEED FOR VACCINATION: Primary | ICD-10-CM

## 2022-12-30 DIAGNOSIS — Z23 NEED FOR VACCINATION FOR PNEUMOCOCCUS: Primary | ICD-10-CM

## 2022-12-30 PROCEDURE — 90471 IMMUNIZATION ADMIN: CPT

## 2022-12-30 PROCEDURE — 90732 PPSV23 VACC 2 YRS+ SUBQ/IM: CPT

## 2022-12-30 NOTE — PROGRESS NOTES
Per Care Gaps, she is due for a pneumococcal pneumonia vaccine.  The Care Gaps specifies prevnar, but she had this in 2020.   She had her last Pneumovax >5 years ago on 11/15/2005.  Since she is on immunosuppressive medications, she should have another dose of Pneumovax at this time.  [facility] does not take into account her immunosuppressed state and therefore is not reliable to know if this is due.  Pneumovax ordered.  Ya Victoria MD

## 2022-12-30 NOTE — PROGRESS NOTES
Pt is presenting to clinic for Pneumo vax. Per pt [facility], pt is not due for injection. If appropriate please place order for which Pneumo vax patient should receive.     Thank you     Halle Hernandez RN on 12/30/2022 at 8:17 AM

## 2022-12-30 NOTE — PROGRESS NOTES
Immunization Documentation  Verified patient's first and last name, and . Stated reason for visit today is to receive PNEUMO 23 vaccine. Accompained to clinic visit with SELF. Denied any concerns with previous immunizations. Allergies reviewed. VIS handout(s) reviewed and given to take home. VACCINE prepared and administered per standing order. Administration documented in IMMUNIZATIONS (see flowsheet and order for further information).  Instructed to wait in lobby for 15 minutes post-injection and notify staff immediately of any reaction.     Halle Hernandez RN ....................  2022   10:00 AM

## 2023-01-31 ENCOUNTER — MYC MEDICAL ADVICE (OUTPATIENT)
Dept: FAMILY MEDICINE | Facility: OTHER | Age: 58
End: 2023-01-31
Payer: COMMERCIAL

## 2023-01-31 DIAGNOSIS — M06.9 RHEUMATOID ARTHRITIS, RHEUMATOID FACTOR STATUS UNKNOWN (H): Primary | ICD-10-CM

## 2023-02-02 RX ORDER — HYDROXYCHLOROQUINE SULFATE 200 MG/1
400 TABLET, FILM COATED ORAL DAILY
Qty: 90 TABLET | Refills: 0 | Status: SHIPPED | OUTPATIENT
Start: 2023-02-02

## 2023-03-27 ENCOUNTER — OFFICE VISIT (OUTPATIENT)
Dept: CARDIOLOGY | Facility: OTHER | Age: 58
End: 2023-03-27
Attending: NURSE PRACTITIONER
Payer: COMMERCIAL

## 2023-03-27 VITALS
DIASTOLIC BLOOD PRESSURE: 80 MMHG | OXYGEN SATURATION: 100 % | BODY MASS INDEX: 43.12 KG/M2 | WEIGHT: 292 LBS | HEART RATE: 72 BPM | RESPIRATION RATE: 16 BRPM | SYSTOLIC BLOOD PRESSURE: 124 MMHG | TEMPERATURE: 97.9 F

## 2023-03-27 DIAGNOSIS — Z86.718 PERSONAL HISTORY OF DVT (DEEP VEIN THROMBOSIS): ICD-10-CM

## 2023-03-27 DIAGNOSIS — I47.10 SVT (SUPRAVENTRICULAR TACHYCARDIA) (H): ICD-10-CM

## 2023-03-27 DIAGNOSIS — R10.12 ABDOMINAL PAIN, LEFT UPPER QUADRANT: ICD-10-CM

## 2023-03-27 DIAGNOSIS — K55.069 MESENTERIC VEIN THROMBOSIS (H): ICD-10-CM

## 2023-03-27 DIAGNOSIS — R00.2 PALPITATIONS: Primary | ICD-10-CM

## 2023-03-27 DIAGNOSIS — E78.5 MILD HYPERLIPIDEMIA: ICD-10-CM

## 2023-03-27 DIAGNOSIS — I34.81 MITRAL ANNULAR CALCIFICATION: ICD-10-CM

## 2023-03-27 DIAGNOSIS — I49.3 PVC'S (PREMATURE VENTRICULAR CONTRACTIONS): ICD-10-CM

## 2023-03-27 PROBLEM — Z00.00 HEALTH CARE MAINTENANCE: Status: RESOLVED | Noted: 2018-10-22 | Resolved: 2023-03-27

## 2023-03-27 LAB
ATRIAL RATE - MUSE: 58 BPM
D DIMER PPP FEU-MCNC: 0.38 UG/ML FEU (ref 0–0.5)
DIASTOLIC BLOOD PRESSURE - MUSE: NORMAL MMHG
HOLD SPECIMEN: NORMAL
INTERPRETATION ECG - MUSE: NORMAL
P AXIS - MUSE: 26 DEGREES
PR INTERVAL - MUSE: 158 MS
QRS DURATION - MUSE: 82 MS
QT - MUSE: 386 MS
QTC - MUSE: 378 MS
R AXIS - MUSE: 20 DEGREES
SYSTOLIC BLOOD PRESSURE - MUSE: NORMAL MMHG
T AXIS - MUSE: 24 DEGREES
VENTRICULAR RATE- MUSE: 58 BPM

## 2023-03-27 PROCEDURE — 36415 COLL VENOUS BLD VENIPUNCTURE: CPT | Mod: ZL | Performed by: NURSE PRACTITIONER

## 2023-03-27 PROCEDURE — 99214 OFFICE O/P EST MOD 30 MIN: CPT | Performed by: NURSE PRACTITIONER

## 2023-03-27 PROCEDURE — 85379 FIBRIN DEGRADATION QUANT: CPT | Mod: ZL | Performed by: NURSE PRACTITIONER

## 2023-03-27 PROCEDURE — 93000 ELECTROCARDIOGRAM COMPLETE: CPT | Performed by: INTERNAL MEDICINE

## 2023-03-27 ASSESSMENT — PAIN SCALES - GENERAL: PAINLEVEL: MILD PAIN (2)

## 2023-03-27 NOTE — NURSING NOTE
"Chief Complaint   Patient presents with     Follow Up     Yearly follow up       Initial /80 (BP Location: Right arm, Patient Position: Sitting, Cuff Size: Adult Large)   Pulse 72   Temp 97.9  F (36.6  C) (Tympanic)   Resp 16   Wt 132.5 kg (292 lb)   LMP  (LMP Unknown)   SpO2 100%   BMI 43.12 kg/m   Estimated body mass index is 43.12 kg/m  as calculated from the following:    Height as of 11/14/22: 1.753 m (5' 9\").    Weight as of this encounter: 132.5 kg (292 lb).  Meds Reconciled: complete  Pt is not on Aspirin  Pt is not on a Statin  PHQ and/or GUERITA reviewed. Pt referred to PCP/MH Provider as appropriate.    Ambreen Odonnell LPN      "

## 2023-03-27 NOTE — PATIENT INSTRUCTIONS
Blood work today, we notify you of results.   Please let us know if recurrence of prolonged palpitations, we could consider adding on a short acting beta blocker as needed for these episodes.   Follow-up with cardiology clinic in one year, sooner if needed.

## 2023-03-27 NOTE — PROGRESS NOTES
[facility]   CARDIOLOGY PROGRESS NOTE    Phyllis Washington   [address] [facility] [id number]    [place]     Chief Complaint   Patient presents with     Follow Up     Yearly follow up        HPI:   Ms. Washington is a 58 year old female who presents for cardiology follow-up to visit on 3/28/22 with history of palpitations. Evidence of PSVT and symptomatic ectopy on previous cardiac monitoring. Patient has a PMH significant for hypertension, venous phlebitis, cervicalgia, rheumatoid arthritis, vitamin D deficiency, chronic myalgias, allergic rhinitis, reactive airway, GERD and obesity. She has followed hematology with history of DVT and mesenteric vein thrombosis, suspected hypercoagulable state- currently on Xarelto indefiantely. Underlying MGUS, bone marrow biopsy negative for myeloma.     In June 2020 patient was experiencing a new arm pain in right arm which prompted stress echocardiogram.  She underwent a dobutamine stress echocardiogram on 7/20/2020.  This was a normal dobutamine stress test without evidence of inducible ischemia.  No regional wall motion abnormalities at rest.  With stress, the LVEF increased from 55 to 60% up to greater than 65% and the LV size decreased appropriately.  No subjective symptoms of ischemia.  Resting EKG was normal, with initial infusion, there were frequent PVCs including bigeminy and at higher dobutamine doses, the PVCs resolved.  Minimal nonspecific ST and T wave changes were seen which were nondiagnostic.  Postinfusion, she had occasional PVCs and ST segments returned to normal baseline.     Given her increased palpitations patient completed ZIO (9/23/2020 through 10/7/2020) revealing predominant sinus rhythm with an average heart rate of 73 bpm, minimum heart rate 43 bpm maximum heart rate of 176 bpm.  There were 17 episodes of supraventricular tachycardia with the longest lasting 17.6 seconds.  No ventricular tachycardia.  No atrial  fibrillation or atrial tachycardia.  No pauses or high-grade AV block.  Occasional isolated ventricular ectopy at a 1.6 burden, rare ventricular couplets and triplets.  Rare supraventricular ectopy, less than 1% of all beats.  Ventricular bigeminy and ventricular trigeminy was present.  Her longest episode of ventricular bigeminy lasted 22.3 seconds, longest ventricular trigeminy episode lasting 1 minute and 17 seconds.  She had 66 patient triggered events and 50 diary entries.  She was mainly symptomatic with ventricular ectopy, ventricular trigeminy and ventricular bigeminy.  She was also symptomatic with SVE and symptomatic with 1 episode of SVT.    Occasional ventricular ectopy with no significant ectopy burden.  She has been significantly symptomatic with these ectopic beats and therefore, she was started on diltiazem 120 mg daily.  She did describe benefit with starting this medication, reduction in the palpitations.  She had been feeling palpitations more in the evening after dinner, although has been less than in the past prior to starting diltiazem for suppression.     At recent visit, she reported increased palpitations again. No improvement with uptitration of Diltiazem, lack of response. She was transitioned to beta blocker for suppression of palpitations, ectopy and PSVT. Symptoms have been very well controlled on Toprol-XL 50 mg in the evening.    Interval History:  Today, patient admits that she has been doing very well. She has on single episode of prolonged palpitations this last December occurring in the evening, felt unwell with skipped beat palpitations for about two hours and then symptoms calmed. No recurrence since then. No other persistent palpitations, occasional  brief flutter sensation in chest. No chest pain or pressure, no increased dyspnea. No increased edema, weight stable. She did recently return home from [place] vacation. She did have some abdominal pain when she returned home, similar  to that when she had the mesenteric vein thrombosis.  She does describe the symptoms gradually improving.  She has continued on daily, uninterrupted Xarelto.    RELEVANT TESTING REVIEWED:  Echocardiogram 11/7/2022:  Interpretation Summary  Global and regional left ventricular function is normal with an EF of 55-60%.  Right ventricular function, chamber size, wall motion, and thickness are  normal.  Both atria appear normal.  The atrial septum is intact as assessed by agitated saline bubble study .  Pulmonary artery systolic pressure is normal.  The inferior vena cava is normal.  No pericardial effusion is present.    PAST MEDICAL HISTORY:   Past Medical History:   Diagnosis Date     Personal history of other medical treatment (CODE)     3 normal childbirths     Rheumatoid arthritis (H)           FAMILY HISTORY:   Family History   Problem Relation Age of Onset     Hypertension Father         Hypertension,67     Other - See Comments Maternal Grandmother         Stroke,85     Other - See Comments Paternal Grandmother         Alzheimer's 73     Breast Cancer Mother 46        Cancer-breast,now 68     Cerebrovascular Disease Mother         stroke with hemiparesis 2017     Diabetes Brother 16        Diabetes,now 41          PAST SURGICAL HISTORY:   Past Surgical History:   Procedure Laterality Date     BONE MARROW BIOPSY, BONE SPECIMEN, NEEDLE/TROCAR Left 07/14/2022    Procedure: BIOPSY, BONE MARROW;  Surgeon: Glory Baig MD;  Location:  OR     COLONOSCOPY  2017    Dr. Fofana - 2 polyps found.  Told to f/u in 5 years.     COLONOSCOPY  11/17/2022    Ct - polyp with path pending - 5 year f/u rec'd     RELEASE TARSAL TUNNEL FOOT Right 10/05/2022    Procedure: RELEASE, TARSAL TUNNEL AND PLANTAT FASCIOTOMY;  Surgeon: Shiva Finch DPM;  Location:  OR          SOCIAL HISTORY:   Social History     Socioeconomic History     Marital status:      Spouse name: Not on file     Number of children: Not on file      "Years of education: Not on file     Highest education level: Not on file   Occupational History     Not on file   Social Needs     Financial resource strain: Not on file     Food insecurity     Worry: Not on file     Inability: Not on file     Transportation needs     Medical: Not on file     Non-medical: Not on file   Tobacco Use     Smoking status: Never Smoker     Smokeless tobacco: Never Used   Substance and Sexual Activity     Alcohol use: No     Frequency: Never     Comment: Rarely     Drug use: No     Sexual activity: Yes     Partners: Male     Birth control/protection: Rhythm   Lifestyle     Physical activity     Days per week: Not on file     Minutes per session: Not on file     Stress: Not on file   Relationships     Social connections     Talks on phone: Not on file     Gets together: Not on file     Attends Caodaism service: Not on file     Active member of club or organization: Not on file     Attends meetings of clubs or organizations: Not on file     Relationship status: Not on file     Intimate partner violence     Fear of current or ex partner: Not on file     Emotionally abused: Not on file     Physically abused: Not on file     Forced sexual activity: Not on file   Other Topics Concern     Parent/sibling w/ CABG, MI or angioplasty before 65F 55M? Not Asked   Social History Narrative    , 3 adult children;     Homemaker; hobbies - reading, crafts.     Works at frame shop part-time.     Walks 3 miles a day     - Demetrio              CURRENT MEDICATIONS:   Current Outpatient Medications   Medication     B-D INSULIN SYRINGE MICROFINE 27G X 5/8\" 1 ML MISC     BD SAFETYGLIDE SYRINGE/NEEDLE 27G X 5/8\" 1 ML MISC     cyanocobalamin (VITAMIN B-12) 1000 MCG tablet     famotidine (PEPCID) 20 MG tablet     folic acid (FOLVITE) 1 MG tablet     hydroxychloroquine (PLAQUENIL) 200 MG tablet     methotrexate 50 MG/2ML injection     metoprolol succinate ER (TOPROL XL) 50 MG 24 hr tablet     thiamine " (B-1) 100 MG tablet     vitamin B6 (PYRIDOXINE) 200 MG tablet     XARELTO ANTICOAGULANT 20 MG TABS tablet     predniSONE (DELTASONE) 5 MG tablet     No current facility-administered medications for this visit.         ALLERGIES:   No Known Allergies       ROS:   CONSTITUTIONAL: No reported fever or chills. No changes in weight.  ENT: No visual disturbance, ear ache, epistaxis or sore throat.   CARDIOVASCULAR: No chest pain, chest pressure or chest discomfort.  Occasional palpitations, improved on Toprol-XL.  No increased edema, +history of venous insufficiency and recent anticoagulation for superficial thrombophlebitis and mesenteric thrombus  RESPIRATORY: Denies dyspnea, dyspnea on exertion. No cough, wheezing or hemoptysis.  No orthopnea or PND.  GI: No reported abdominal pain, melena or hematochezia.  : No reported hematuria or dysuria.   NEUROLOGICAL: No lightheadedness, dizziness, syncope, ataxia, paresthesias or weakness.   HEMATOLOGIC: No history of anemia. No bleeding or excessive bruising.  Positive for history of DVT.  MUSCULOSKELETAL: Positive for chronic joint pain with arthritis and chronic myalgias- follows with rheumatology  ENDOCRINOLOGIC: No temperature intolerance. No hair or skin changes.  SKIN: No abnormal rashes or sores, no unusual itching.  PSYCHIATRIC: No history of depression or anxiety. No changes in mood, feeling down or anxious.      PHYSICAL EXAM:   /80 (BP Location: Right arm, Patient Position: Sitting, Cuff Size: Adult Large)   Pulse 72   Temp 97.9  F (36.6  C) (Tympanic)   Resp 16   Wt 132.5 kg (292 lb)   LMP  (LMP Unknown)   SpO2 100%   BMI 43.12 kg/m    GENERAL: The patient is a well-developed, well-nourished, in no apparent distress.  HEENT: Head is normocephalic and atraumatic. Eyes are symmetrical with normal visual tracking. No icterus, no xanthelasmas. Nares appeared normal without nasal drainage. Mucous membranes are moist, no cyanosis.  NECK: Supple.  No  carotid bruits, no JVD.  CHEST/ LUNGS: Lungs clear to auscultation, no rales, rhonchi or wheezes, no use of accessory muscles, no retractions, respirations unlabored and normal respiratory rate.   CARDIO: Regular rate and rhythm normal with S1 and S2, no S3 or S4 and no murmur, click or rub.  ABD: Abdomen is nondistended.   EXTREMITIES: No lower extremity edema  MUSCULOSKELETAL: No visible joint swelling.   NEUROLOGIC: Alert and oriented X3. Normal speech, gait and affect. No focal neurologic deficits.   SKIN: No jaundice. No rashes or visible skin lesions present. No ecchymosis.       LAB RESULTS:   Lab on 11/29/2022   Component Date Value Ref Range Status     D-Dimer Quantitative 11/29/2022 0.42  0.00 - 0.50 ug/mL FEU Final     Immunofixation ELP 11/29/2022 Very small monoclonal IgG immunoglobulin of lambda light chain type. Pathologic significance requires clinical correlation.  NEFTALI Holcomb M.D., Ph.D., Pathologist ()   Final     Viscosity Index 11/29/2022 1.7  1.4 - 1.8 Final     Lactate Dehydrogenase 11/29/2022 197  0 - 250 U/L Final     Kappa Free Light Chains 11/29/2022 6.41 (H)  0.33 - 1.94 mg/dL Final     Lambda Free Light Chains 11/29/2022 2.59  0.57 - 2.63 mg/dL Final     Kappa /Lambda Ratio 11/29/2022 2.47 (H)  0.26 - 1.65 Final     Sodium 11/29/2022 138  136 - 145 mmol/L Final     Potassium 11/29/2022 4.9  3.4 - 5.3 mmol/L Final     Chloride 11/29/2022 101  98 - 107 mmol/L Final     Carbon Dioxide (CO2) 11/29/2022 30 (H)  22 - 29 mmol/L Final     Anion Gap 11/29/2022 7  7 - 15 mmol/L Final     Urea Nitrogen 11/29/2022 19.9  6.0 - 20.0 mg/dL Final     Creatinine 11/29/2022 0.84  0.51 - 0.95 mg/dL Final     Calcium 11/29/2022 9.4  8.6 - 10.0 mg/dL Final     Glucose 11/29/2022 90  70 - 99 mg/dL Final     Alkaline Phosphatase 11/29/2022 82  35 - 104 U/L Final     AST 11/29/2022 25  10 - 35 U/L Final     ALT 11/29/2022 22  10 - 35 U/L Final     Protein Total 11/29/2022 7.5  6.4 - 8.3 g/dL  Final     Albumin 11/29/2022 3.7  3.5 - 5.2 g/dL Final     Bilirubin Total 11/29/2022 0.3  <=1.2 mg/dL Final     GFR Estimate 11/29/2022 80  >60 mL/min/1.73m2 Final    Effective December 21, 2021 eGFRcr in adults is calculated using the 2021 CKD-EPI creatinine equation which includes age and gender (Malou et al., [facility], DOI: 10.[facility]6/IIYXvu5441719)     Beta-2-Microglobulin 11/29/2022 3.0 (H)  <2.3 mg/L Final     Total Protein Serum for ELP 11/29/2022 7.1  6.4 - 8.3 g/dL Final     Albumin 11/29/2022 3.6 (L)  3.7 - 5.1 g/dL Final     Alpha 1 11/29/2022 0.3  0.2 - 0.4 g/dL Final     Alpha 2 11/29/2022 0.9  0.5 - 0.9 g/dL Final     Beta Globulin 11/29/2022 0.8  0.6 - 1.0 g/dL Final     Gamma Globulin 11/29/2022 1.4  0.7 - 1.6 g/dL Final     Monoclonal Peak 11/29/2022 0.1 (H)  <=0.0 g/dL Final     ELP Interpretation 11/29/2022 Very small monoclonal protein (about 0.1 g/dL) seen in the gamma fraction. See immunofixation report on same specimen. Slight hypoalbuminemia. Pathologic significance requires clinical correlation. NEFTALI Holcomb M.D., Ph.D., Pathologist ().   Final     WBC Count 11/29/2022 5.0  4.0 - 11.0 10e3/uL Final     RBC Count 11/29/2022 4.87  3.80 - 5.20 10e6/uL Final     Hemoglobin 11/29/2022 13.4  11.7 - 15.7 g/dL Final     Hematocrit 11/29/2022 41.2  35.0 - 47.0 % Final     MCV 11/29/2022 85  78 - 100 fL Final     MCH 11/29/2022 27.5  26.5 - 33.0 pg Final     MCHC 11/29/2022 32.5  31.5 - 36.5 g/dL Final     RDW 11/29/2022 14.2  10.0 - 15.0 % Final     Platelet Count 11/29/2022 280  150 - 450 10e3/uL Final     % Neutrophils 11/29/2022 46  % Final     % Lymphocytes 11/29/2022 45  % Final     % Monocytes 11/29/2022 9  % Final     % Eosinophils 11/29/2022 0  % Final     % Basophils 11/29/2022 0  % Final     % Immature Granulocytes 11/29/2022 0  % Final     NRBCs per 100 WBC 11/29/2022 0  <1 /100 Final     Absolute Neutrophils 11/29/2022 2.3  1.6 - 8.3 10e3/uL Final     Absolute Lymphocytes  11/29/2022 2.2  0.8 - 5.3 10e3/uL Final     Absolute Monocytes 11/29/2022 0.5  0.0 - 1.3 10e3/uL Final     Absolute Eosinophils 11/29/2022 0.0  0.0 - 0.7 10e3/uL Final     Absolute Basophils 11/29/2022 0.0  0.0 - 0.2 10e3/uL Final     Absolute Immature Granulocytes 11/29/2022 0.0  <=0.4 10e3/uL Final     Absolute NRBCs 11/29/2022 0.0  10e3/uL Final       ASSESSMENT:   Phyllis Washington presents for  cardiology follow-up to visit on 3/28/22 with history of palpitations. Evidence of PSVT and symptomatic ectopy on previous cardiac monitoring. Patient has a PMH significant for hypertension, venous phlebitis, cervicalgia, rheumatoid arthritis, vitamin D deficiency, chronic myalgias, allergic rhinitis, reactive airway, GERD and obesity. She has followed hematology with history of DVT and mesenteric vein thrombosis, suspected hypercoagulable state- currently on Xarelto indefiantely. Underlying MGUS, bone marrow biopsy negative for myeloma.   Today, patient admits that she has been doing very well. She has on single episode of prolonged palpitations this last December occurring in the evening, felt unwell with skipped beat palpitations for about two hours and then symptoms calmed. No recurrence since then. No other persistent palpitations, occasional  brief flutter sensation in chest. No chest pain or pressure, no increased dyspnea. No increased edema, weight stable. She did recently return home from [place] vacation. She did have some abdominal pain when she returned home, similar to that when she had the mesenteric vein thrombosis.  She does describe the symptoms gradually improving.  She has continued on daily, uninterrupted Xarelto.    1. Palpitations  2. PVC's (premature ventricular contractions)  3. SVT (supraventricular tachycardia) (H)  4. Mitral annular calcification  5. Mild hyperlipidemia  6. Mesenteric vein thrombosis (H)- Historic  7. Personal history of DVT (deep vein thrombosis)  8. Abdominal pain, left upper  quadrant      PLAN:   1. Patient stable on Toprol-XL 50 mg daily, good suppression with limited palpitations. MIKAYLAO previously reviewed with symptoms identified to be associated to ventricular ectopy and ventricular bigeminy.   2.  She had 1 single occurrence of sustained palpitations this past December.  If recurrence of sustained palpitations, consider adding on metoprolol tartrate 25 mg up to 2 times daily as needed for palpitations.  She would take this in addition to her daily Toprol-XL.  3. She has avoided caffeine and no stimulants, no ETOH.  Denied any symptoms of sleep apnea which may contribute.  Previously reviewed that her Plaquenil and methotrexate may be contributing to her level of palpitations and ectopy.  4. Continue with xarelto 20 mg daily indefinitely.  She is followed hematology with history of DVT and mesenteric thrombosis.  Recently returned home from a trip in [place], she did have some recurrence of abdominal pain similar to that experienced with prior mesenteric thrombosis. she does admit that symptoms are improving gradually.  Her D-dimer is stable today.    5.  Reviewed results of echocardiogram from 11/7/2022.  Normal biventricular function, no evidence of PFO, no hemodynamically significant valvular abnormalities.  No pulmonary hypertension and no pericardial effusion.  6. Follow-up with cardiology in 12 months, certainly sooner if needed.    Thank you for allowing me to participate in the care of your patient. Please do not hesitate to contact me if you have any questions.       Chelly Ruffin, MISSY CNP CHFN

## 2023-04-18 ENCOUNTER — LAB (OUTPATIENT)
Dept: LAB | Facility: OTHER | Age: 58
End: 2023-04-18
Attending: NURSE PRACTITIONER
Payer: COMMERCIAL

## 2023-04-18 DIAGNOSIS — Z79.899 ENCOUNTER FOR LONG-TERM (CURRENT) USE OF MEDICATIONS: ICD-10-CM

## 2023-04-18 DIAGNOSIS — M05.79 SEROPOSITIVE RHEUMATOID ARTHRITIS OF MULTIPLE SITES (H): Primary | ICD-10-CM

## 2023-04-18 LAB
ALBUMIN SERPL BCG-MCNC: 3.8 G/DL (ref 3.5–5.2)
ALP SERPL-CCNC: 82 U/L (ref 35–104)
ALT SERPL W P-5'-P-CCNC: 31 U/L (ref 10–35)
AST SERPL W P-5'-P-CCNC: 31 U/L (ref 10–35)
BASOPHILS # BLD AUTO: 0 10E3/UL (ref 0–0.2)
BASOPHILS NFR BLD AUTO: 1 %
BILIRUB DIRECT SERPL-MCNC: <0.2 MG/DL (ref 0–0.3)
BILIRUB SERPL-MCNC: 0.3 MG/DL
CREAT SERPL-MCNC: 0.96 MG/DL (ref 0.51–0.95)
CRP SERPL-MCNC: 3.72 MG/L
EOSINOPHIL # BLD AUTO: 0 10E3/UL (ref 0–0.7)
EOSINOPHIL NFR BLD AUTO: 1 %
ERYTHROCYTE [DISTWIDTH] IN BLOOD BY AUTOMATED COUNT: 14.6 % (ref 10–15)
ERYTHROCYTE [SEDIMENTATION RATE] IN BLOOD BY WESTERGREN METHOD: 14 MM/HR (ref 0–30)
GFR SERPL CREATININE-BSD FRML MDRD: 68 ML/MIN/1.73M2
HCT VFR BLD AUTO: 40.8 % (ref 35–47)
HGB BLD-MCNC: 13.3 G/DL (ref 11.7–15.7)
IMM GRANULOCYTES # BLD: 0 10E3/UL
IMM GRANULOCYTES NFR BLD: 0 %
LYMPHOCYTES # BLD AUTO: 2.4 10E3/UL (ref 0.8–5.3)
LYMPHOCYTES NFR BLD AUTO: 50 %
MCH RBC QN AUTO: 26.5 PG (ref 26.5–33)
MCHC RBC AUTO-ENTMCNC: 32.6 G/DL (ref 31.5–36.5)
MCV RBC AUTO: 81 FL (ref 78–100)
MONOCYTES # BLD AUTO: 0.4 10E3/UL (ref 0–1.3)
MONOCYTES NFR BLD AUTO: 8 %
NEUTROPHILS # BLD AUTO: 1.9 10E3/UL (ref 1.6–8.3)
NEUTROPHILS NFR BLD AUTO: 40 %
NRBC # BLD AUTO: 0 10E3/UL
NRBC BLD AUTO-RTO: 0 /100
PLATELET # BLD AUTO: 232 10E3/UL (ref 150–450)
PROT SERPL-MCNC: 7.3 G/DL (ref 6.4–8.3)
RBC # BLD AUTO: 5.02 10E6/UL (ref 3.8–5.2)
WBC # BLD AUTO: 4.8 10E3/UL (ref 4–11)

## 2023-04-18 PROCEDURE — 86140 C-REACTIVE PROTEIN: CPT | Mod: ZL

## 2023-04-18 PROCEDURE — 80076 HEPATIC FUNCTION PANEL: CPT | Mod: ZL

## 2023-04-18 PROCEDURE — 82565 ASSAY OF CREATININE: CPT | Mod: ZL

## 2023-04-18 PROCEDURE — 85652 RBC SED RATE AUTOMATED: CPT | Mod: ZL

## 2023-04-18 PROCEDURE — 36415 COLL VENOUS BLD VENIPUNCTURE: CPT | Mod: ZL

## 2023-04-18 PROCEDURE — 85025 COMPLETE CBC W/AUTO DIFF WBC: CPT | Mod: ZL

## 2023-05-07 DIAGNOSIS — I47.10 SVT (SUPRAVENTRICULAR TACHYCARDIA) (H): ICD-10-CM

## 2023-05-07 DIAGNOSIS — I49.8 VENTRICULAR BIGEMINY: ICD-10-CM

## 2023-05-07 DIAGNOSIS — I49.3 PVC'S (PREMATURE VENTRICULAR CONTRACTIONS): ICD-10-CM

## 2023-05-07 DIAGNOSIS — R00.2 PALPITATIONS: ICD-10-CM

## 2023-05-08 RX ORDER — METOPROLOL SUCCINATE 50 MG/1
TABLET, EXTENDED RELEASE ORAL
Qty: 90 TABLET | Refills: 2 | Status: SHIPPED | OUTPATIENT
Start: 2023-05-08 | End: 2024-01-19

## 2023-05-08 NOTE — TELEPHONE ENCOUNTER
" [id number] - [place], [place] - [address]     Requested Prescriptions   Pending Prescriptions Disp Refills     metoprolol succinate ER (TOPROL XL) 50 MG 24 hr tablet [Pharmacy Med Name: METOPROLOL ER SUCCINATE 50MG TABS] 90 tablet 2     Sig: TAKE 1 TABLET BY MOUTH EVERY DAY       Beta-Blockers Protocol Passed - 5/8/2023  9:26 AM        Passed - Blood pressure under 140/90 in past 12 months     BP Readings from Last 3 Encounters:   03/27/23 124/80   12/06/22 126/86   11/14/22 120/84           Passed - Patient is age 6 or older        Passed - Recent (12 mo) or future (30 days) visit within the authorizing provider's specialty     Patient has had an office visit with the authorizing provider or a provider within the authorizing providers department within the previous 12 mos or has a future within next 30 days. See \"Patient Info\" tab in inbasket, or \"Choose Columns\" in Meds & Orders section of the refill encounter.              Passed - Medication is active on med list             Last Prescription Date:  9/6/22  Last Fill Qty/Refills:         90, R-2    Last Office Visit:            3/27/23  Future Office visit:          3/27/24    Daysi Bennett RN on 5/8/2023 at 12:02 PM        "

## 2023-05-12 ENCOUNTER — MYC MEDICAL ADVICE (OUTPATIENT)
Dept: FAMILY MEDICINE | Facility: OTHER | Age: 58
End: 2023-05-12
Payer: COMMERCIAL

## 2023-06-06 ENCOUNTER — LAB (OUTPATIENT)
Dept: LAB | Facility: OTHER | Age: 58
End: 2023-06-06
Attending: INTERNAL MEDICINE
Payer: COMMERCIAL

## 2023-06-06 DIAGNOSIS — K55.069 SUPERIOR MESENTERIC VEIN THROMBOSIS (H): ICD-10-CM

## 2023-06-06 DIAGNOSIS — D47.2 MGUS (MONOCLONAL GAMMOPATHY OF UNKNOWN SIGNIFICANCE): ICD-10-CM

## 2023-06-06 LAB
ALBUMIN SERPL BCG-MCNC: 4.1 G/DL (ref 3.5–5.2)
ALP SERPL-CCNC: 70 U/L (ref 35–104)
ALT SERPL W P-5'-P-CCNC: 24 U/L (ref 10–35)
ANION GAP SERPL CALCULATED.3IONS-SCNC: 7 MMOL/L (ref 7–15)
AST SERPL W P-5'-P-CCNC: 28 U/L (ref 10–35)
BASOPHILS # BLD AUTO: 0 10E3/UL (ref 0–0.2)
BASOPHILS NFR BLD AUTO: 1 %
BILIRUB SERPL-MCNC: 0.4 MG/DL
BUN SERPL-MCNC: 17.1 MG/DL (ref 8–23)
CALCIUM SERPL-MCNC: 9.3 MG/DL (ref 8.6–10)
CHLORIDE SERPL-SCNC: 104 MMOL/L (ref 98–107)
CREAT SERPL-MCNC: 0.94 MG/DL (ref 0.51–0.95)
D DIMER PPP FEU-MCNC: <=0.27 UG/ML FEU (ref 0–0.5)
DEPRECATED HCO3 PLAS-SCNC: 29 MMOL/L (ref 22–29)
EOSINOPHIL # BLD AUTO: 0 10E3/UL (ref 0–0.7)
EOSINOPHIL NFR BLD AUTO: 1 %
ERYTHROCYTE [DISTWIDTH] IN BLOOD BY AUTOMATED COUNT: 16.9 % (ref 10–15)
GFR SERPL CREATININE-BSD FRML MDRD: 70 ML/MIN/1.73M2
GLUCOSE SERPL-MCNC: 67 MG/DL (ref 70–99)
HCT VFR BLD AUTO: 39.6 % (ref 35–47)
HGB BLD-MCNC: 13.2 G/DL (ref 11.7–15.7)
IMM GRANULOCYTES # BLD: 0 10E3/UL
IMM GRANULOCYTES NFR BLD: 0 %
LDH SERPL L TO P-CCNC: 211 U/L (ref 0–250)
LYMPHOCYTES # BLD AUTO: 1.8 10E3/UL (ref 0.8–5.3)
LYMPHOCYTES NFR BLD AUTO: 49 %
MCH RBC QN AUTO: 27.5 PG (ref 26.5–33)
MCHC RBC AUTO-ENTMCNC: 33.3 G/DL (ref 31.5–36.5)
MCV RBC AUTO: 83 FL (ref 78–100)
MONOCYTES # BLD AUTO: 0.4 10E3/UL (ref 0–1.3)
MONOCYTES NFR BLD AUTO: 9 %
NEUTROPHILS # BLD AUTO: 1.5 10E3/UL (ref 1.6–8.3)
NEUTROPHILS NFR BLD AUTO: 40 %
NRBC # BLD AUTO: 0 10E3/UL
NRBC BLD AUTO-RTO: 0 /100
PLATELET # BLD AUTO: 214 10E3/UL (ref 150–450)
POTASSIUM SERPL-SCNC: 4.2 MMOL/L (ref 3.4–5.3)
PROT SERPL-MCNC: 7.1 G/DL (ref 6.4–8.3)
RBC # BLD AUTO: 4.8 10E6/UL (ref 3.8–5.2)
SODIUM SERPL-SCNC: 140 MMOL/L (ref 136–145)
WBC # BLD AUTO: 3.7 10E3/UL (ref 4–11)

## 2023-06-06 PROCEDURE — 86334 IMMUNOFIX E-PHORESIS SERUM: CPT | Mod: 26

## 2023-06-06 PROCEDURE — 82784 ASSAY IGA/IGD/IGG/IGM EACH: CPT | Mod: ZL

## 2023-06-06 PROCEDURE — 85018 HEMOGLOBIN: CPT | Mod: ZL

## 2023-06-06 PROCEDURE — 82232 ASSAY OF BETA-2 PROTEIN: CPT | Mod: ZL

## 2023-06-06 PROCEDURE — 85041 AUTOMATED RBC COUNT: CPT | Mod: ZL

## 2023-06-06 PROCEDURE — 85379 FIBRIN DEGRADATION QUANT: CPT | Mod: ZL

## 2023-06-06 PROCEDURE — 36415 COLL VENOUS BLD VENIPUNCTURE: CPT | Mod: ZL

## 2023-06-06 PROCEDURE — 84165 PROTEIN E-PHORESIS SERUM: CPT | Mod: 26

## 2023-06-06 PROCEDURE — 82310 ASSAY OF CALCIUM: CPT | Mod: ZL

## 2023-06-06 PROCEDURE — 83521 IG LIGHT CHAINS FREE EACH: CPT | Mod: ZL

## 2023-06-06 PROCEDURE — 84155 ASSAY OF PROTEIN SERUM: CPT | Mod: ZL

## 2023-06-06 PROCEDURE — 85810 BLOOD VISCOSITY EXAMINATION: CPT | Mod: ZL

## 2023-06-06 PROCEDURE — 84165 PROTEIN E-PHORESIS SERUM: CPT | Mod: TC,ZL | Performed by: PATHOLOGY

## 2023-06-06 PROCEDURE — 83615 LACTATE (LD) (LDH) ENZYME: CPT | Mod: ZL

## 2023-06-06 PROCEDURE — 86334 IMMUNOFIX E-PHORESIS SERUM: CPT | Mod: ZL | Performed by: PATHOLOGY

## 2023-06-07 LAB
ALBUMIN SERPL ELPH-MCNC: 3.7 G/DL (ref 3.7–5.1)
ALPHA1 GLOB SERPL ELPH-MCNC: 0.3 G/DL (ref 0.2–0.4)
ALPHA2 GLOB SERPL ELPH-MCNC: 0.7 G/DL (ref 0.5–0.9)
B-GLOBULIN SERPL ELPH-MCNC: 0.8 G/DL (ref 0.6–1)
B2 MICROGLOB TUMOR MARKER SER-MCNC: 2.4 MG/L
GAMMA GLOB SERPL ELPH-MCNC: 1.3 G/DL (ref 0.7–1.6)
IGA SERPL-MCNC: 271 MG/DL (ref 84–499)
IGG SERPL-MCNC: 1276 MG/DL (ref 610–1616)
IGM SERPL-MCNC: 118 MG/DL (ref 35–242)
KAPPA LC FREE SER-MCNC: 4.54 MG/DL (ref 0.33–1.94)
KAPPA LC FREE/LAMBDA FREE SER NEPH: 2.34 {RATIO} (ref 0.26–1.65)
LAMBDA LC FREE SERPL-MCNC: 1.94 MG/DL (ref 0.57–2.63)
M PROTEIN SERPL ELPH-MCNC: 0 G/DL
PROT PATTERN SERPL ELPH-IMP: NORMAL
PROT PATTERN SERPL IFE-IMP: NORMAL
TOTAL PROTEIN SERUM FOR ELP: 6.7 G/DL (ref 6.4–8.3)
VISC SER: 1.5 CP (ref 1.4–1.8)

## 2023-06-09 ENCOUNTER — ONCOLOGY VISIT (OUTPATIENT)
Dept: ONCOLOGY | Facility: OTHER | Age: 58
End: 2023-06-09
Attending: NURSE PRACTITIONER
Payer: COMMERCIAL

## 2023-06-09 VITALS
BODY MASS INDEX: 41.95 KG/M2 | OXYGEN SATURATION: 99 % | DIASTOLIC BLOOD PRESSURE: 82 MMHG | HEIGHT: 70 IN | HEART RATE: 55 BPM | RESPIRATION RATE: 18 BRPM | SYSTOLIC BLOOD PRESSURE: 120 MMHG | TEMPERATURE: 96.9 F | WEIGHT: 293 LBS

## 2023-06-09 DIAGNOSIS — D47.2 MGUS (MONOCLONAL GAMMOPATHY OF UNKNOWN SIGNIFICANCE): Primary | ICD-10-CM

## 2023-06-09 DIAGNOSIS — K55.069 SUPERIOR MESENTERIC VEIN THROMBOSIS (H): ICD-10-CM

## 2023-06-09 DIAGNOSIS — D70.8 OTHER NEUTROPENIA (H): ICD-10-CM

## 2023-06-09 PROCEDURE — 99214 OFFICE O/P EST MOD 30 MIN: CPT | Performed by: NURSE PRACTITIONER

## 2023-06-09 ASSESSMENT — PAIN SCALES - GENERAL: PAINLEVEL: NO PAIN (0)

## 2023-06-09 NOTE — PROGRESS NOTES
Oncology Follow-up Visit:  June 9, 2023  Diagnosis:MGUS    History Of Present Illness:  Patient here for followup of MGUS  Patient was seen in consultation at the request of Dr. Victoria in 05/2020.  At that time, Ms. Washington was a 57-year-old white female with history of recurrent superficial thrombophlebitis, status post ablation, heterozygous for the MTHFR DNA, on aspirin.  She had developed pain and swelling, worsening involving the legs.  She was seen by Dr. Victoria, who noted right thigh superficial phlebitis continuing up to the right lateral hip area.  Bilateral venous Dopplers were performed on 02/08/2022, and there was no evidence of lower extremity DVT.  There was interrogation of deep venous structures in bilateral common femoral veins.  The veins of the proximal calf demonstrated normal appearance consistent with superficial venous thrombus identified within the varicosity in the medial right thigh.  These were repeated again on 02/18/2022, and the findings were that there were thrombosed superficial varicosities present in the area.  The patient already had complaints of abdominal pain.  CT abdomen and pelvis was ordered, and it was revealed that there was a new thrombus within a couple premesenteric veins, including the superior mesenteric vein just below the confluence of the splenic vein.  No bowel wall thickening, no definite findings of leg ischemia.  She was started on Xarelto by Dr. Victoria.  The patient also had been seen by Rheumatology, where blood workup including beta-2 glycoprotein IgM antibody was normal.  Phospholipid IgM antibody was elevated at 19.4.  Myeloperoxidase antibodies were negative.  NARCISO was positive.  We had seen the patient previously before for evaluation of recurrent superficial thrombophlebitis back on 12/19/2019.  We had ordered hypercoagulability workup at that time, and she was found to be heterozygous for the MTHFR DNA [id number] locus.  We recommended  vitamin B6, B12, folic acid supplementation and continue aspirin.  Since then, she had an ablation.  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  When we saw the patient, the plan was to continue anticoagulation.  CT abdomen and pelvis was repeated on 04/05/2022.  This included CT chest with no evidence of residual or recurrent mesenteric vein thrombosis as it had resolved.  There were no abnormalities in the lung fields.  Also further workup was obtained including serum protein electrophoresis, which revealed that her M spike was 0.3.  Monoclonal IgG immunoglobulin was lambda light chain.  At the time, we repeated thrombophilia workup, and there was no evidence of thrombophilia, including antithrombin III which was negative.  Cardiolipin antibodies were negative.  Beta-2 glycoprotein antibodies were negative contributing to thrombophilia. A skeletal bone survey was done on 05/23/2022.  Findings were that there were degenerative changes of the spine and hips, no lytic defects.  The patient was seen in the emergency room with abdominal pain.  CT abdomen and pelvis was performed on 05/17/2022.  There was no evidence of mesenteric vein thrombosis, no acute abnormality.  Her abdominal pain resolved.  When we saw the patient on 06/01/2022, we wanted to absolutely rule out myeloma by obtaining bone marrow aspiration biopsy.  This was performed on 07/14/2022 and was read as negative for involvement by plasma cell neoplasm or B-cell lymphoma.  There was normocellular bone marrow for age.  Storage iron could not be assessed.  Cytogenetics were negative for clonal chromosomal abnormality.  The patient continued on Xarelto.  When patient was seen on 07/29/2022, the plan was to repeat CT abdomen and pelvis and continue Xarelto indefinitely. We felt that her mesenteric vein thrombosis was likely due to hypercoagulable state including the MTHFR  "DNA mutation as well as underlying MGUS. CT abdomen and pelvis was performed on 11/29/2022 and the findings were that there was unchanged CT appearance of the abdomen and pelvis. There was no evidence of mesenteric vein thrombosis, it had resolved. Patient had some abdominal pain, pain around her waistband after flying to [place]. This lasted about 3 weeks. She did have her d-dimer check and this was normal. Patient states she does have this pain intermittently. Labs done on 6/6/23 show a normal d-dimer. M-spike is normal at 0.0. WBC is slightly decreased at 3.7, ANC is 1.5. All other labs are stable. Patient has no new concerns at this time.    Review Of Systems:  Review Of Systems  Ears/Nose/Throat: reports recently having slight nasal congestion  Respiratory: No shortness of breath, dyspnea on exertion, cough  Cardiovascular: denies chest pain  Gastrointestinal: reports occasional abdominal pain, occasional loose stools  Genitourinary: denies dysuria or hematuria  Musculoskeletal: denies new bone pain  Neurologic: denies headaches, no dizziness  Hematologic/Lymphatic/Immunologic: denies easy bruising, no fevers or night sweats      Nursing Notes:   Rekha Carmona LPN  6/9/2023  9:06 AM  Sign at exiting of workspace  Chief Complaint   Patient presents with     Follow Up     6 Month Follow Up        Initial /82 (BP Location: Right arm, Patient Position: Chair, Cuff Size: Adult Large)   Pulse 55   Temp 96.9  F (36.1  C) (Tympanic)   Resp 18   Ht 1.765 m (5' 9.5\")   Wt 133.5 kg (294 lb 6.4 oz)   LMP  (LMP Unknown)   SpO2 99%   BMI 42.85 kg/m   Estimated body mass index is 42.85 kg/m  as calculated from the following:    Height as of this encounter: 1.765 m (5' 9.5\").    Weight as of this encounter: 133.5 kg (294 lb 6.4 oz).  Medication Reconciliation: complete      FOOD SECURITY SCREENING QUESTIONS:    The next two questions are to help us understand your food security.  If you are feeling you " "need any assistance in this area, we have resources available to support you today.    Hunger Vital Signs:  Within the past 12 months we worried whether our food would run out before we got money to buy more. Never  Within the past 12 months the food we bought just didn't last and we didn't have money to get more. Never  Rekha Carmona LPN on 6/9/2023 at 9:06 AM       Past medical, social, surgical, and family histories reviewed.    Allergies:  Allergies as of 06/09/2023     (No Known Allergies)       Current Medications:  Current Outpatient Medications   Medication Sig Dispense Refill     B-D INSULIN SYRINGE MICROFINE 27G X 5/8\" 1 ML MISC 1 EACH BY DOES NOT APPLY ROUTE ONE TIME A WEEK. USE TO DRAW UP METHOTREXATE 25 MG ONCE WEEKLY.       BD SAFETYGLIDE SYRINGE/NEEDLE 27G X 5/8\" 1 ML MISC 1 EACH BY DOES NOT APPLY ROUTE ONE TIME A WEEK. USE TO DRAW UP METHOTREXATE 25 MG ONCE WEEKLY.       cyanocobalamin (VITAMIN B-12) 1000 MCG tablet Take 1,000 mcg by mouth daily       famotidine (PEPCID) 20 MG tablet Take 1 tablet (20 mg) by mouth 2 times daily 180 tablet 3     folic acid (FOLVITE) 1 MG tablet Take 2 mg by mouth daily       hydroxychloroquine (PLAQUENIL) 200 MG tablet Take 2 tablets (400 mg) by mouth daily Take 400 mg by mouth daily 90 tablet 0     methotrexate 50 MG/2ML injection Inject 1 mL Subcutaneous once a week       metoprolol succinate ER (TOPROL XL) 50 MG 24 hr tablet TAKE 1 TABLET BY MOUTH EVERY DAY 90 tablet 2     predniSONE (DELTASONE) 5 MG tablet Take 5 mg by mouth daily  0     rivaroxaban ANTICOAGULANT (XARELTO ANTICOAGULANT) 20 MG TABS tablet Take 1 tablet (20 mg) by mouth daily (with dinner) 90 tablet 3     thiamine (B-1) 100 MG tablet Take 100 mg by mouth daily        vitamin B6 (PYRIDOXINE) 200 MG tablet Take 200 mg by mouth daily          Physical Exam:  /82 (BP Location: Right arm, Patient Position: Chair, Cuff Size: Adult Large)   Pulse 55   Temp 96.9  F (36.1  C) (Tympanic)   Resp " "18   Ht 1.765 m (5' 9.5\")   Wt 133.5 kg (294 lb 6.4 oz)   LMP  (LMP Unknown)   SpO2 99%   BMI 42.85 kg/m      GENERAL APPEARANCE: 58 year old female, alert and no distress     NECK: no adenopathy, no asymmetry or masses     LYMPHATICS: No cervical, supraclavicular, axillary lymphadenopathy     RESP: lungs clear to auscultation - no rales, rhonchi or wheezes     CARDIOVASCULAR: regular rates and rhythm, normal S1 S2     ABDOMEN:  soft, nontender, bowel sounds normal     MUSCULOSKELETAL: extremities normal- no gross deformities noted, No edema b/l LE.     SKIN: no suspicious lesions or rashes on exposed skin     PSYCHIATRIC: mentation appears normal and affect normal    Laboratory/Imaging Studies  Lab on 06/06/2023   Component Date Value Ref Range Status     D-Dimer Quantitative 06/06/2023 <=0.27  0.00 - 0.50 ug/mL FEU Final     Immunofixation ELP 06/06/2023 No monoclonal protein seen on immunofixation. Pathologic significance requires clinical correlation.  NEFTALI Holcomb M.D., Ph.D., Pathologist ()   Final     Viscosity Index 06/06/2023 1.5  1.4 - 1.8 Final     Immunoglobulin G 06/06/2023 1,276  610 - 1,616 mg/dL Final     Immunoglobulin A 06/06/2023 271  84 - 499 mg/dL Final     Immunoglobulin M 06/06/2023 118  35 - 242 mg/dL Final     Kappa Free Light Chains 06/06/2023 4.54 (H)  0.33 - 1.94 mg/dL Final     Lambda Free Light Chains 06/06/2023 1.94  0.57 - 2.63 mg/dL Final     Kappa /Lambda Ratio 06/06/2023 2.34 (H)  0.26 - 1.65 Final     Lactate Dehydrogenase 06/06/2023 211  0 - 250 U/L Final     Sodium 06/06/2023 140  136 - 145 mmol/L Final     Potassium 06/06/2023 4.2  3.4 - 5.3 mmol/L Final     Chloride 06/06/2023 104  98 - 107 mmol/L Final     Carbon Dioxide (CO2) 06/06/2023 29  22 - 29 mmol/L Final     Anion Gap 06/06/2023 7  7 - 15 mmol/L Final     Urea Nitrogen 06/06/2023 17.1  8.0 - 23.0 mg/dL Final     Creatinine 06/06/2023 0.94  0.51 - 0.95 mg/dL Final     Calcium 06/06/2023 9.3  8.6 - " 10.0 mg/dL Final     Glucose 06/06/2023 67 (L)  70 - 99 mg/dL Final     Alkaline Phosphatase 06/06/2023 70  35 - 104 U/L Final     AST 06/06/2023 28  10 - 35 U/L Final     ALT 06/06/2023 24  10 - 35 U/L Final     Protein Total 06/06/2023 7.1  6.4 - 8.3 g/dL Final     Albumin 06/06/2023 4.1  3.5 - 5.2 g/dL Final     Bilirubin Total 06/06/2023 0.4  <=1.2 mg/dL Final     GFR Estimate 06/06/2023 70  >60 mL/min/1.73m2 Final    eGFR calculated using 2021 CKD-EPI equation.     Beta-2-Microglobulin 06/06/2023 2.4 (H)  <2.3 mg/L Final     Total Protein Serum for ELP 06/06/2023 6.7  6.4 - 8.3 g/dL Final     Albumin 06/06/2023 3.7  3.7 - 5.1 g/dL Final     Alpha 1 06/06/2023 0.3  0.2 - 0.4 g/dL Final     Alpha 2 06/06/2023 0.7  0.5 - 0.9 g/dL Final     Beta Globulin 06/06/2023 0.8  0.6 - 1.0 g/dL Final     Gamma Globulin 06/06/2023 1.3  0.7 - 1.6 g/dL Final     Monoclonal Peak 06/06/2023 0.0  <=0.0 g/dL Final     ELP Interpretation 06/06/2023 Essentially normal electrophoretic pattern. No obvious monoclonal proteins seen. Pathologic significance requires clinical correlation. NEFTALI Holcomb M.D., Ph.D., Pathologist ().   Final     WBC Count 06/06/2023 3.7 (L)  4.0 - 11.0 10e3/uL Final     RBC Count 06/06/2023 4.80  3.80 - 5.20 10e6/uL Final     Hemoglobin 06/06/2023 13.2  11.7 - 15.7 g/dL Final     Hematocrit 06/06/2023 39.6  35.0 - 47.0 % Final     MCV 06/06/2023 83  78 - 100 fL Final     MCH 06/06/2023 27.5  26.5 - 33.0 pg Final     MCHC 06/06/2023 33.3  31.5 - 36.5 g/dL Final     RDW 06/06/2023 16.9 (H)  10.0 - 15.0 % Final     Platelet Count 06/06/2023 214  150 - 450 10e3/uL Final     % Neutrophils 06/06/2023 40  % Final     % Lymphocytes 06/06/2023 49  % Final     % Monocytes 06/06/2023 9  % Final     % Eosinophils 06/06/2023 1  % Final     % Basophils 06/06/2023 1  % Final     % Immature Granulocytes 06/06/2023 0  % Final     NRBCs per 100 WBC 06/06/2023 0  <1 /100 Final     Absolute Neutrophils 06/06/2023  1.5 (L)  1.6 - 8.3 10e3/uL Final     Absolute Lymphocytes 06/06/2023 1.8  0.8 - 5.3 10e3/uL Final     Absolute Monocytes 06/06/2023 0.4  0.0 - 1.3 10e3/uL Final     Absolute Eosinophils 06/06/2023 0.0  0.0 - 0.7 10e3/uL Final     Absolute Basophils 06/06/2023 0.0  0.0 - 0.2 10e3/uL Final     Absolute Immature Granulocytes 06/06/2023 0.0  <=0.4 10e3/uL Final     Absolute NRBCs 06/06/2023 0.0  10e3/uL Final        ASSESSMENT/PLAN:  1.  MGUS.  Bone marrow aspiration biopsy was negative for myeloma.  Skeletal bone survey was negative for lytic defects. M spike is now normal at 0.0.  No evidence of end-organ damage.  Plan is to continue surveillance.  We will see the patient in 6 months and repeat MGUS labs.    2.  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  No evidence of metastatic process.  The plan is to see the patient in 6 months and repeat D-dimer. She will continue on Xarelto indefinitely.    3. Neutropenia. Labs done on 6/6/23 show a slightly decreased wbc and anc. Will repeat CBC when patient comes for labs on 6/22/23. Will notify patient of results.    Thirty four minutes spent with this encounter with time spent reviewing patient records, counseling patient regarding disease process, interpretation and review of labs with patient, discussing plan for ongoing surveillance, obtaining a review of systems, performing a physical exam, ordering tests, documenting in EHR and coordination of care

## 2023-06-09 NOTE — PATIENT INSTRUCTIONS
Follow up with Malissa Hawkins NP  in 6 months. Please come prior for lab work.    Recheck CBC-lab only on 6/22/23    
Medical Necessity Information: It is in your best interest to select a reason for this procedure from the list below. All of these items fulfill various CMS LCD requirements except the new and changing color options.
Anesthesia Volume In Cc: 0
Medical Necessity Clause: This procedure was medically necessary because the lesions that were treated were:
Include Z78.9 (Other Specified Conditions Influencing Health Status) As An Associated Diagnosis?: No
Consent: The patient's consent was obtained including but not limited to risks of crusting, scabbing, blistering, scarring, darker or lighter pigmentary change, recurrence, incomplete removal and infection.
Detail Level: Detailed
Post-Care Instructions: I reviewed with the patient in detail post-care instructions. Patient is to wear sunprotection, and avoid picking at any of the treated lesions. Pt may apply Vaseline to crusted or scabbing areas.

## 2023-06-09 NOTE — NURSING NOTE
"Chief Complaint   Patient presents with     Follow Up     6 Month Follow Up      Patient states that she would like to discuss lower abdominal pain that she had back in March. She has had this intermittently as well.      Initial /82 (BP Location: Right arm, Patient Position: Chair, Cuff Size: Adult Large)   Pulse 55   Temp 96.9  F (36.1  C) (Tympanic)   Resp 18   Ht 1.765 m (5' 9.5\")   Wt 133.5 kg (294 lb 6.4 oz)   LMP  (LMP Unknown)   SpO2 99%   BMI 42.85 kg/m   Estimated body mass index is 42.85 kg/m  as calculated from the following:    Height as of this encounter: 1.765 m (5' 9.5\").    Weight as of this encounter: 133.5 kg (294 lb 6.4 oz).  Medication Reconciliation: complete      FOOD SECURITY SCREENING QUESTIONS:    The next two questions are to help us understand your food security.  If you are feeling you need any assistance in this area, we have resources available to support you today.    Hunger Vital Signs:  Within the past 12 months we worried whether our food would run out before we got money to buy more. Never  Within the past 12 months the food we bought just didn't last and we didn't have money to get more. Never  Rekha Carmona LPN on 6/9/2023 at 9:06 AM   "

## 2023-06-22 ENCOUNTER — LAB (OUTPATIENT)
Dept: LAB | Facility: OTHER | Age: 58
End: 2023-06-22
Attending: NURSE PRACTITIONER
Payer: COMMERCIAL

## 2023-06-22 DIAGNOSIS — M05.79 SEROPOSITIVE RHEUMATOID ARTHRITIS OF MULTIPLE SITES (H): ICD-10-CM

## 2023-06-22 DIAGNOSIS — Z79.899 ENCOUNTER FOR LONG-TERM (CURRENT) USE OF MEDICATIONS: ICD-10-CM

## 2023-06-22 LAB
ALBUMIN SERPL BCG-MCNC: 3.8 G/DL (ref 3.5–5.2)
ALP SERPL-CCNC: 61 U/L (ref 35–104)
ALT SERPL W P-5'-P-CCNC: 24 U/L (ref 0–50)
AST SERPL W P-5'-P-CCNC: 29 U/L (ref 0–45)
BASOPHILS # BLD AUTO: 0 10E3/UL (ref 0–0.2)
BASOPHILS NFR BLD AUTO: 1 %
BILIRUB DIRECT SERPL-MCNC: <0.2 MG/DL (ref 0–0.3)
BILIRUB SERPL-MCNC: 0.4 MG/DL
CREAT SERPL-MCNC: 0.89 MG/DL (ref 0.51–0.95)
CRP SERPL-MCNC: <3 MG/L
EOSINOPHIL # BLD AUTO: 0 10E3/UL (ref 0–0.7)
EOSINOPHIL NFR BLD AUTO: 0 %
ERYTHROCYTE [DISTWIDTH] IN BLOOD BY AUTOMATED COUNT: 17.1 % (ref 10–15)
ERYTHROCYTE [SEDIMENTATION RATE] IN BLOOD BY WESTERGREN METHOD: 12 MM/HR (ref 0–30)
GFR SERPL CREATININE-BSD FRML MDRD: 74 ML/MIN/1.73M2
HCT VFR BLD AUTO: 39 % (ref 35–47)
HGB BLD-MCNC: 12.9 G/DL (ref 11.7–15.7)
IMM GRANULOCYTES # BLD: 0 10E3/UL
IMM GRANULOCYTES NFR BLD: 0 %
LYMPHOCYTES # BLD AUTO: 2.3 10E3/UL (ref 0.8–5.3)
LYMPHOCYTES NFR BLD AUTO: 47 %
MCH RBC QN AUTO: 27.4 PG (ref 26.5–33)
MCHC RBC AUTO-ENTMCNC: 33.1 G/DL (ref 31.5–36.5)
MCV RBC AUTO: 83 FL (ref 78–100)
MONOCYTES # BLD AUTO: 0.4 10E3/UL (ref 0–1.3)
MONOCYTES NFR BLD AUTO: 8 %
NEUTROPHILS # BLD AUTO: 2.1 10E3/UL (ref 1.6–8.3)
NEUTROPHILS NFR BLD AUTO: 44 %
NRBC # BLD AUTO: 0 10E3/UL
NRBC BLD AUTO-RTO: 0 /100
PLATELET # BLD AUTO: 202 10E3/UL (ref 150–450)
PROT SERPL-MCNC: 7 G/DL (ref 6.4–8.3)
RBC # BLD AUTO: 4.71 10E6/UL (ref 3.8–5.2)
WBC # BLD AUTO: 4.8 10E3/UL (ref 4–11)

## 2023-06-22 PROCEDURE — 85652 RBC SED RATE AUTOMATED: CPT | Mod: ZL

## 2023-06-22 PROCEDURE — 36415 COLL VENOUS BLD VENIPUNCTURE: CPT | Mod: ZL

## 2023-06-22 PROCEDURE — 82565 ASSAY OF CREATININE: CPT | Mod: ZL

## 2023-06-22 PROCEDURE — 86140 C-REACTIVE PROTEIN: CPT | Mod: ZL

## 2023-06-22 PROCEDURE — 85025 COMPLETE CBC W/AUTO DIFF WBC: CPT | Mod: ZL

## 2023-06-22 PROCEDURE — 80076 HEPATIC FUNCTION PANEL: CPT | Mod: ZL

## 2023-08-02 NOTE — ADDENDUM NOTE
Addended by: BETHANY STEWART on: 3/31/2022 08:11 AM     Modules accepted: Orders     Klisyri Pregnancy And Lactation Text: It is unknown if this medication can harm a developing fetus or if it is excreted in breast milk.

## 2023-08-05 DIAGNOSIS — I80.9 SUPERFICIAL PHLEBITIS: ICD-10-CM

## 2023-08-08 RX ORDER — RIVAROXABAN 20 MG/1
20 TABLET, FILM COATED ORAL
Qty: 90 TABLET | Refills: 3 | OUTPATIENT
Start: 2023-08-08

## 2023-08-08 NOTE — TELEPHONE ENCOUNTER
[facility] sent Rx request for the following:    XARELTO 20MG TABLETS   Last Prescription Date:   5/16/23  Last Fill Qty/Refills:         90, R-3    Last Office Visit:              11/14/22   Future Office visit:           none    Call to [facility], verified name and date of birth of patient.   Request they transfer order from [facility] in [place], [place]. Pharmacist to request transfer.    Zandra Daly RN on 8/8/2023 at 10:20 AM

## 2023-10-18 ENCOUNTER — ALLIED HEALTH/NURSE VISIT (OUTPATIENT)
Dept: FAMILY MEDICINE | Facility: OTHER | Age: 58
End: 2023-10-18
Attending: FAMILY MEDICINE
Payer: COMMERCIAL

## 2023-10-18 DIAGNOSIS — Z23 HIGH PRIORITY FOR 2019-NCOV VACCINE: ICD-10-CM

## 2023-10-18 DIAGNOSIS — Z23 NEED FOR PROPHYLACTIC VACCINATION AND INOCULATION AGAINST INFLUENZA: Primary | ICD-10-CM

## 2023-10-18 PROCEDURE — 90471 IMMUNIZATION ADMIN: CPT

## 2023-10-18 PROCEDURE — 90682 RIV4 VACC RECOMBINANT DNA IM: CPT

## 2023-10-18 PROCEDURE — 91320 SARSCV2 VAC 30MCG TRS-SUC IM: CPT

## 2023-10-18 PROCEDURE — 90480 ADMN SARSCOV2 VAC 1/ONLY CMP: CPT

## 2023-10-30 DIAGNOSIS — I80.9 SUPERFICIAL PHLEBITIS: ICD-10-CM

## 2023-11-02 ENCOUNTER — MEDICAL CORRESPONDENCE (OUTPATIENT)
Dept: HEALTH INFORMATION MANAGEMENT | Facility: OTHER | Age: 58
End: 2023-11-02
Payer: COMMERCIAL

## 2023-11-03 RX ORDER — RIVAROXABAN 20 MG/1
20 TABLET, FILM COATED ORAL DAILY
Qty: 90 TABLET | Refills: 0 | OUTPATIENT
Start: 2023-11-03

## 2023-11-03 NOTE — TELEPHONE ENCOUNTER
[facility] sent Rx request for the following:      Requested Prescriptions   Pending Prescriptions Disp Refills    XARELTO ANTICOAGULANT 20 MG TABS tablet [Pharmacy Med Name: XARELTO 20 MG TABLET] 90 tablet 0     Sig: TAKE 1 TABLET BY MOUTH EVERY DAY       Direct Oral Anticoagulant Agents Failed - 10/30/2023  5:10 AM     Last Prescription Date:   5/16/23  Last Fill Qty/Refills:         90, R-3    Last Office Visit:              11/14/22   Future Office visit:           12/1/23  E-Prescribing Status: Receipt confirmed by pharmacy (5/16/2023  3:59 PM CDT) Redundant refill request refused: Too soon:  Zandra Daly RN on 11/3/2023 at 9:05 AM

## 2023-12-01 ASSESSMENT — ASTHMA QUESTIONNAIRES
QUESTION_3 LAST FOUR WEEKS HOW OFTEN DID YOUR ASTHMA SYMPTOMS (WHEEZING, COUGHING, SHORTNESS OF BREATH, CHEST TIGHTNESS OR PAIN) WAKE YOU UP AT NIGHT OR EARLIER THAN USUAL IN THE MORNING: NOT AT ALL
ACT_TOTALSCORE: 25
QUESTION_5 LAST FOUR WEEKS HOW WOULD YOU RATE YOUR ASTHMA CONTROL: COMPLETELY CONTROLLED
QUESTION_2 LAST FOUR WEEKS HOW OFTEN HAVE YOU HAD SHORTNESS OF BREATH: NOT AT ALL
QUESTION_4 LAST FOUR WEEKS HOW OFTEN HAVE YOU USED YOUR RESCUE INHALER OR NEBULIZER MEDICATION (SUCH AS ALBUTEROL): NOT AT ALL
QUESTION_1 LAST FOUR WEEKS HOW MUCH OF THE TIME DID YOUR ASTHMA KEEP YOU FROM GETTING AS MUCH DONE AT WORK, SCHOOL OR AT HOME: NONE OF THE TIME
ACT_TOTALSCORE: 25

## 2023-12-11 ENCOUNTER — LAB (OUTPATIENT)
Dept: LAB | Facility: OTHER | Age: 58
End: 2023-12-11
Payer: COMMERCIAL

## 2023-12-11 DIAGNOSIS — Z79.899 ENCOUNTER FOR LONG-TERM (CURRENT) USE OF MEDICATIONS: ICD-10-CM

## 2023-12-11 DIAGNOSIS — M05.79 SEROPOSITIVE RHEUMATOID ARTHRITIS OF MULTIPLE SITES (H): ICD-10-CM

## 2023-12-11 LAB
ALBUMIN SERPL BCG-MCNC: 3.9 G/DL (ref 3.5–5.2)
ALP SERPL-CCNC: 73 U/L (ref 40–150)
ALT SERPL W P-5'-P-CCNC: 24 U/L (ref 0–50)
AST SERPL W P-5'-P-CCNC: 28 U/L (ref 0–45)
BASOPHILS # BLD AUTO: 0.1 10E3/UL (ref 0–0.2)
BASOPHILS NFR BLD AUTO: 1 %
BILIRUB DIRECT SERPL-MCNC: <0.2 MG/DL (ref 0–0.3)
BILIRUB SERPL-MCNC: 0.3 MG/DL
CREAT SERPL-MCNC: 1.28 MG/DL (ref 0.51–0.95)
CRP SERPL-MCNC: 3.35 MG/L
EGFRCR SERPLBLD CKD-EPI 2021: 48 ML/MIN/1.73M2
EOSINOPHIL # BLD AUTO: 0.1 10E3/UL (ref 0–0.7)
EOSINOPHIL NFR BLD AUTO: 1 %
ERYTHROCYTE [DISTWIDTH] IN BLOOD BY AUTOMATED COUNT: 14.9 % (ref 10–15)
ERYTHROCYTE [SEDIMENTATION RATE] IN BLOOD BY WESTERGREN METHOD: 16 MM/HR (ref 0–30)
HCT VFR BLD AUTO: 38.4 % (ref 35–47)
HGB BLD-MCNC: 13.4 G/DL (ref 11.7–15.7)
IMM GRANULOCYTES # BLD: 0 10E3/UL
IMM GRANULOCYTES NFR BLD: 0 %
LYMPHOCYTES # BLD AUTO: 2.8 10E3/UL (ref 0.8–5.3)
LYMPHOCYTES NFR BLD AUTO: 44 %
MCH RBC QN AUTO: 30.5 PG (ref 26.5–33)
MCHC RBC AUTO-ENTMCNC: 34.9 G/DL (ref 31.5–36.5)
MCV RBC AUTO: 88 FL (ref 78–100)
MONOCYTES # BLD AUTO: 0.5 10E3/UL (ref 0–1.3)
MONOCYTES NFR BLD AUTO: 8 %
NEUTROPHILS # BLD AUTO: 3 10E3/UL (ref 1.6–8.3)
NEUTROPHILS NFR BLD AUTO: 46 %
NRBC # BLD AUTO: 0 10E3/UL
NRBC BLD AUTO-RTO: 0 /100
PLATELET # BLD AUTO: 255 10E3/UL (ref 150–450)
PROT SERPL-MCNC: 7.3 G/DL (ref 6.4–8.3)
RBC # BLD AUTO: 4.39 10E6/UL (ref 3.8–5.2)
WBC # BLD AUTO: 6.5 10E3/UL (ref 4–11)

## 2023-12-11 PROCEDURE — 80076 HEPATIC FUNCTION PANEL: CPT | Mod: ZL

## 2023-12-11 PROCEDURE — 85025 COMPLETE CBC W/AUTO DIFF WBC: CPT | Mod: ZL

## 2023-12-11 PROCEDURE — 36415 COLL VENOUS BLD VENIPUNCTURE: CPT | Mod: ZL

## 2023-12-11 PROCEDURE — 85652 RBC SED RATE AUTOMATED: CPT | Mod: ZL

## 2023-12-11 PROCEDURE — 86140 C-REACTIVE PROTEIN: CPT | Mod: ZL

## 2023-12-11 PROCEDURE — 82565 ASSAY OF CREATININE: CPT | Mod: ZL

## 2023-12-17 ENCOUNTER — HEALTH MAINTENANCE LETTER (OUTPATIENT)
Age: 58
End: 2023-12-17

## 2023-12-22 ENCOUNTER — LAB (OUTPATIENT)
Dept: LAB | Facility: OTHER | Age: 58
End: 2023-12-22
Attending: NURSE PRACTITIONER
Payer: COMMERCIAL

## 2023-12-22 DIAGNOSIS — D47.2 MGUS (MONOCLONAL GAMMOPATHY OF UNKNOWN SIGNIFICANCE): ICD-10-CM

## 2023-12-22 DIAGNOSIS — K55.069 SUPERIOR MESENTERIC VEIN THROMBOSIS (H): ICD-10-CM

## 2023-12-22 LAB
ALBUMIN SERPL BCG-MCNC: 4 G/DL (ref 3.5–5.2)
ALP SERPL-CCNC: 74 U/L (ref 40–150)
ALT SERPL W P-5'-P-CCNC: 22 U/L (ref 0–50)
ANION GAP SERPL CALCULATED.3IONS-SCNC: 8 MMOL/L (ref 7–15)
AST SERPL W P-5'-P-CCNC: 25 U/L (ref 0–45)
BASOPHILS # BLD AUTO: 0 10E3/UL (ref 0–0.2)
BASOPHILS NFR BLD AUTO: 1 %
BILIRUB SERPL-MCNC: 0.4 MG/DL
BUN SERPL-MCNC: 20.3 MG/DL (ref 8–23)
CALCIUM SERPL-MCNC: 9.6 MG/DL (ref 8.6–10)
CHLORIDE SERPL-SCNC: 103 MMOL/L (ref 98–107)
CREAT SERPL-MCNC: 1.04 MG/DL (ref 0.51–0.95)
D DIMER PPP FEU-MCNC: <=0.27 UG/ML FEU (ref 0–0.5)
DEPRECATED HCO3 PLAS-SCNC: 28 MMOL/L (ref 22–29)
EGFRCR SERPLBLD CKD-EPI 2021: 62 ML/MIN/1.73M2
EOSINOPHIL # BLD AUTO: 0 10E3/UL (ref 0–0.7)
EOSINOPHIL NFR BLD AUTO: 1 %
ERYTHROCYTE [DISTWIDTH] IN BLOOD BY AUTOMATED COUNT: 14.4 % (ref 10–15)
GLUCOSE SERPL-MCNC: 88 MG/DL (ref 70–99)
HCT VFR BLD AUTO: 40.6 % (ref 35–47)
HGB BLD-MCNC: 13.7 G/DL (ref 11.7–15.7)
IMM GRANULOCYTES # BLD: 0 10E3/UL
IMM GRANULOCYTES NFR BLD: 0 %
LDH SERPL L TO P-CCNC: 188 U/L (ref 0–250)
LYMPHOCYTES # BLD AUTO: 1.7 10E3/UL (ref 0.8–5.3)
LYMPHOCYTES NFR BLD AUTO: 39 %
MCH RBC QN AUTO: 28.8 PG (ref 26.5–33)
MCHC RBC AUTO-ENTMCNC: 33.7 G/DL (ref 31.5–36.5)
MCV RBC AUTO: 85 FL (ref 78–100)
MONOCYTES # BLD AUTO: 0.4 10E3/UL (ref 0–1.3)
MONOCYTES NFR BLD AUTO: 9 %
NEUTROPHILS # BLD AUTO: 2.2 10E3/UL (ref 1.6–8.3)
NEUTROPHILS NFR BLD AUTO: 50 %
NRBC # BLD AUTO: 0 10E3/UL
NRBC BLD AUTO-RTO: 0 /100
PLATELET # BLD AUTO: 220 10E3/UL (ref 150–450)
POTASSIUM SERPL-SCNC: 4.4 MMOL/L (ref 3.4–5.3)
PROT SERPL-MCNC: 7.5 G/DL (ref 6.4–8.3)
RBC # BLD AUTO: 4.76 10E6/UL (ref 3.8–5.2)
SODIUM SERPL-SCNC: 139 MMOL/L (ref 135–145)
WBC # BLD AUTO: 4.3 10E3/UL (ref 4–11)

## 2023-12-22 PROCEDURE — 85379 FIBRIN DEGRADATION QUANT: CPT | Mod: ZL

## 2023-12-22 PROCEDURE — 86334 IMMUNOFIX E-PHORESIS SERUM: CPT | Mod: 26 | Performed by: PATHOLOGY

## 2023-12-22 PROCEDURE — 80053 COMPREHEN METABOLIC PANEL: CPT | Mod: ZL

## 2023-12-22 PROCEDURE — 82784 ASSAY IGA/IGD/IGG/IGM EACH: CPT | Mod: ZL

## 2023-12-22 PROCEDURE — 86334 IMMUNOFIX E-PHORESIS SERUM: CPT | Mod: ZL | Performed by: PATHOLOGY

## 2023-12-22 PROCEDURE — 36415 COLL VENOUS BLD VENIPUNCTURE: CPT | Mod: ZL

## 2023-12-22 PROCEDURE — 83521 IG LIGHT CHAINS FREE EACH: CPT | Mod: ZL

## 2023-12-22 PROCEDURE — 83615 LACTATE (LD) (LDH) ENZYME: CPT | Mod: ZL

## 2023-12-22 PROCEDURE — 82232 ASSAY OF BETA-2 PROTEIN: CPT | Mod: ZL

## 2023-12-22 PROCEDURE — 85810 BLOOD VISCOSITY EXAMINATION: CPT | Mod: ZL

## 2023-12-22 PROCEDURE — 85004 AUTOMATED DIFF WBC COUNT: CPT | Mod: ZL

## 2023-12-22 PROCEDURE — 84155 ASSAY OF PROTEIN SERUM: CPT | Mod: ZL

## 2023-12-22 PROCEDURE — 84165 PROTEIN E-PHORESIS SERUM: CPT | Mod: 26 | Performed by: PATHOLOGY

## 2023-12-22 PROCEDURE — 84165 PROTEIN E-PHORESIS SERUM: CPT | Mod: TC,ZL | Performed by: PATHOLOGY

## 2023-12-23 LAB — TOTAL PROTEIN SERUM FOR ELP: 6.9 G/DL (ref 6.4–8.3)

## 2023-12-26 LAB
ALBUMIN SERPL ELPH-MCNC: 3.7 G/DL (ref 3.7–5.1)
ALPHA1 GLOB SERPL ELPH-MCNC: 0.3 G/DL (ref 0.2–0.4)
ALPHA2 GLOB SERPL ELPH-MCNC: 0.8 G/DL (ref 0.5–0.9)
B-GLOBULIN SERPL ELPH-MCNC: 0.9 G/DL (ref 0.6–1)
B2 MICROGLOB TUMOR MARKER SER-MCNC: 2.9 MG/L
GAMMA GLOB SERPL ELPH-MCNC: 1.3 G/DL (ref 0.7–1.6)
IGA SERPL-MCNC: 310 MG/DL (ref 84–499)
IGG SERPL-MCNC: 1334 MG/DL (ref 610–1616)
IGM SERPL-MCNC: 142 MG/DL (ref 35–242)
KAPPA LC FREE SER-MCNC: 4.82 MG/DL (ref 0.33–1.94)
KAPPA LC FREE/LAMBDA FREE SER NEPH: 2.45 {RATIO} (ref 0.26–1.65)
LAMBDA LC FREE SERPL-MCNC: 1.97 MG/DL (ref 0.57–2.63)
LOCATION OF TASK: NORMAL
LOCATION OF TASK: NORMAL
M PROTEIN SERPL ELPH-MCNC: 0 G/DL
PROT PATTERN SERPL ELPH-IMP: NORMAL
PROT PATTERN SERPL IFE-IMP: NORMAL
VISC SER: 1.6 CP (ref 1.4–1.8)

## 2023-12-29 ENCOUNTER — VIRTUAL VISIT (OUTPATIENT)
Dept: ONCOLOGY | Facility: OTHER | Age: 58
End: 2023-12-29
Attending: NURSE PRACTITIONER
Payer: COMMERCIAL

## 2023-12-29 DIAGNOSIS — D47.2 MGUS (MONOCLONAL GAMMOPATHY OF UNKNOWN SIGNIFICANCE): Primary | ICD-10-CM

## 2023-12-29 DIAGNOSIS — K55.069 SUPERIOR MESENTERIC VEIN THROMBOSIS (H): ICD-10-CM

## 2023-12-29 PROCEDURE — 99214 OFFICE O/P EST MOD 30 MIN: CPT | Mod: 95 | Performed by: NURSE PRACTITIONER

## 2023-12-29 ASSESSMENT — PAIN SCALES - GENERAL: PAINLEVEL: NO PAIN (0)

## 2023-12-29 NOTE — PROGRESS NOTES
Oncology Follow-up Visit:  December 29, 2023  Diagnosis:MGUS, mesenteric vein thrombosis     History Of Present Illness:  Telephone encounter for followup of MGUS  Patient was seen in consultation at the request of Dr. Victoria in 05/2020.  At that time, Ms. Washington was a 57-year-old white female with history of recurrent superficial thrombophlebitis, status post ablation, heterozygous for the MTHFR DNA, on aspirin.  She had developed pain and swelling, worsening involving the legs.  She was seen by Dr. Victoria, who noted right thigh superficial phlebitis continuing up to the right lateral hip area.  Bilateral venous Dopplers were performed on 02/08/2022, and there was no evidence of lower extremity DVT.  There was interrogation of deep venous structures in bilateral common femoral veins.  The veins of the proximal calf demonstrated normal appearance consistent with superficial venous thrombus identified within the varicosity in the medial right thigh.  These were repeated again on 02/18/2022, and the findings were that there were thrombosed superficial varicosities present in the area.  The patient already had complaints of abdominal pain.  CT abdomen and pelvis was ordered, and it was revealed that there was a new thrombus within a couple premesenteric veins, including the superior mesenteric vein just below the confluence of the splenic vein.  No bowel wall thickening, no definite findings of leg ischemia.  She was started on Xarelto by Dr. Victoria.  The patient also had been seen by Rheumatology, where blood workup including beta-2 glycoprotein IgM antibody was normal.  Phospholipid IgM antibody was elevated at 19.4.  Myeloperoxidase antibodies were negative.  NARCISO was positive.  We had seen the patient previously before for evaluation of recurrent superficial thrombophlebitis back on 12/19/2019.  We had ordered hypercoagulability workup at that time, and she was found to be heterozygous for the  MTHFR DNA [id number] locus.  We recommended vitamin B6, B12, folic acid supplementation and continue aspirin.  Since then, she had an ablation.  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  When we saw the patient, the plan was to continue anticoagulation.  CT abdomen and pelvis was repeated on 04/05/2022.  This included CT chest with no evidence of residual or recurrent mesenteric vein thrombosis as it had resolved.  There were no abnormalities in the lung fields.  Also further workup was obtained including serum protein electrophoresis, which revealed that her M spike was 0.3.  Monoclonal IgG immunoglobulin was lambda light chain.  At the time, we repeated thrombophilia workup, and there was no evidence of thrombophilia, including antithrombin III which was negative.  Cardiolipin antibodies were negative.  Beta-2 glycoprotein antibodies were negative contributing to thrombophilia. A skeletal bone survey was done on 05/23/2022.  Findings were that there were degenerative changes of the spine and hips, no lytic defects.  The patient was seen in the emergency room with abdominal pain.  CT abdomen and pelvis was performed on 05/17/2022.  There was no evidence of mesenteric vein thrombosis, no acute abnormality.  Her abdominal pain resolved.  When we saw the patient on 06/01/2022, we wanted to absolutely rule out myeloma by obtaining bone marrow aspiration biopsy.  This was performed on 07/14/2022 and was read as negative for involvement by plasma cell neoplasm or B-cell lymphoma.  There was normocellular bone marrow for age.  Storage iron could not be assessed.  Cytogenetics were negative for clonal chromosomal abnormality.  The patient continued on Xarelto.  When patient was seen on 07/29/2022, the plan was to repeat CT abdomen and pelvis and continue Xarelto indefinitely. We felt that her mesenteric vein thrombosis was likely due to  hypercoagulable state including the MTHFR DNA mutation as well as underlying MGUS. CT abdomen and pelvis was performed on 11/29/2022 and the findings were that there was unchanged CT appearance of the abdomen and pelvis. There was no evidence of mesenteric vein thrombosis, it had resolved. When patient was seen in June 2023, she had some abdominal pain, pain around her waistband after flying to [place]. This lasted about 3 weeks. She did have her d-dimer check and this was normal. Patient states she does have this pain occasionally but it has improved. She has noticed enlarged vericose veins in her legs with some tenderness. She fernando pain or swelling of the lower extremities. Patient does wear her compression stockings. Labs done on 6/6/23 show a normal d-dimer. M-spike is normal at 0.0. WBC is slightly decreased at 3.7, ANC is 1.5. D-dimer is normal.  All other labs are stable.       Review Of Systems:  Review Of Systems  Respiratory: reports occasional dry cough, no shortness of breath  Cardiovascular: denies chest pain  Gastrointestinal: reports occasional loose stool  Genitourinary: denies dysuria or hematuria  Musculoskeletal: denies new bone pain  Neurologic: denies headaches, no dizziness  Hematologic/Lymphatic/Immunologic: reports recent URI, resolving      Nursing Notes:   Nina Morin CMA  12/29/2023  8:50 AM  Signed  Patient requests telephone visit today for follow up on MGUS.  Medication list reviewed.  Nina Morin [facility])..................12/29/2023   8:24 AM       Past medical, social, surgical, and family histories reviewed.    Allergies:  Allergies as of 12/29/2023    (No Known Allergies)       Current Medications:  Current Outpatient Medications   Medication Sig Dispense Refill    cyanocobalamin (VITAMIN B-12) 1000 MCG tablet Take 1,000 mcg by mouth daily      famotidine (PEPCID) 20 MG tablet Take 1 tablet (20 mg) by mouth 2 times daily 180 tablet 3    folic acid (FOLVITE) 1 MG  "tablet Take 2 mg by mouth daily      hydroxychloroquine (PLAQUENIL) 200 MG tablet Take 2 tablets (400 mg) by mouth daily Take 400 mg by mouth daily 90 tablet 0    methotrexate 50 MG/2ML injection Inject 1 mL Subcutaneous once a week      metoprolol succinate ER (TOPROL XL) 50 MG 24 hr tablet TAKE 1 TABLET BY MOUTH EVERY DAY 90 tablet 2    rivaroxaban ANTICOAGULANT (XARELTO ANTICOAGULANT) 20 MG TABS tablet Take 1 tablet (20 mg) by mouth daily (with dinner) 90 tablet 3    thiamine (B-1) 100 MG tablet Take 100 mg by mouth daily       vitamin B6 (PYRIDOXINE) 200 MG tablet Take 200 mg by mouth daily      B-D INSULIN SYRINGE MICROFINE 27G X 5/8\" 1 ML MISC 1 EACH BY DOES NOT APPLY ROUTE ONE TIME A WEEK. USE TO DRAW UP METHOTREXATE 25 MG ONCE WEEKLY.      BD SAFETYGLIDE SYRINGE/NEEDLE 27G X 5/8\" 1 ML MISC 1 EACH BY DOES NOT APPLY ROUTE ONE TIME A WEEK. USE TO DRAW UP METHOTREXATE 25 MG ONCE WEEKLY.      predniSONE (DELTASONE) 5 MG tablet Take 5 mg by mouth daily  0        Physical Exam:  Unable to perform due to telephone encounter      Laboratory/Imaging Studies  Lab on 12/22/2023   Component Date Value Ref Range Status    D-Dimer Quantitative 12/22/2023 <=0.27  0.00 - 0.50 ug/mL FEU Final    Viscosity Index 12/22/2023 1.6  1.4 - 1.8 Final    Immunoglobulin G 12/22/2023 1,334  610 - 1,616 mg/dL Final    Immunoglobulin A 12/22/2023 310  84 - 499 mg/dL Final    Immunoglobulin M 12/22/2023 142  35 - 242 mg/dL Final    Immunofixation ELP 12/22/2023 No monoclonal protein seen on immunofixation. Pathologic significance requires clinical correlation.  NEFTALI Holcomb M.D., Ph.D., Pathologist ()   Final    Signout Location if Remote 12/22/2023 [id number]   Final    Or[place] Free Light Chains 12/22/2023 4.82 (H)  0.33 - 1.94 mg/dL Final    Lambda Free Light Chains 12/22/2023 1.97  0.57 - 2.63 mg/dL Final    Kappa /Lambda Ratio 12/22/2023 2.45 (H)  0.26 - 1.65 Final    Beta-2-Microglobulin 12/22/2023 2.9 (H)  <2.3 mg/L Final    " Lactate Dehydrogenase 12/22/2023 188  0 - 250 U/L Final    Sodium 12/22/2023 139  135 - 145 mmol/L Final    Reference intervals for this test were updated on 09/26/2023 to more accurately reflect our healthy population. There may be differences in the flagging of prior results with similar values performed with this method. Interpretation of those prior results can be made in the context of the updated reference intervals.     Potassium 12/22/2023 4.4  3.4 - 5.3 mmol/L Final    Carbon Dioxide (CO2) 12/22/2023 28  22 - 29 mmol/L Final    Anion Gap 12/22/2023 8  7 - 15 mmol/L Final    Urea Nitrogen 12/22/2023 20.3  8.0 - 23.0 mg/dL Final    Creatinine 12/22/2023 1.04 (H)  0.51 - 0.95 mg/dL Final    GFR Estimate 12/22/2023 62  >60 mL/min/1.73m2 Final    Calcium 12/22/2023 9.6  8.6 - 10.0 mg/dL Final    Chloride 12/22/2023 103  98 - 107 mmol/L Final    Glucose 12/22/2023 88  70 - 99 mg/dL Final    Alkaline Phosphatase 12/22/2023 74  40 - 150 U/L Final    Reference intervals for this test were updated on 11/14/2023 to more accurately reflect our healthy population. There may be differences in the flagging of prior results with similar values performed with this method. Interpretation of those prior results can be made in the context of the updated reference intervals.    AST 12/22/2023 25  0 - 45 U/L Final    Reference intervals for this test were updated on 6/12/2023 to more accurately reflect our healthy population. There may be differences in the flagging of prior results with similar values performed with this method. Interpretation of those prior results can be made in the context of the updated reference intervals.    ALT 12/22/2023 22  0 - 50 U/L Final    Reference intervals for this test were updated on 6/12/2023 to more accurately reflect our healthy population. There may be differences in the flagging of prior results with similar values performed with this method. Interpretation of those prior results can be  made in the context of the updated reference intervals.      Protein Total 12/22/2023 7.5  6.4 - 8.3 g/dL Final    Albumin 12/22/2023 4.0  3.5 - 5.2 g/dL Final    Bilirubin Total 12/22/2023 0.4  <=1.2 mg/dL Final    Total Protein Serum for ELP 12/22/2023 6.9  6.4 - 8.3 g/dL Final    Albumin 12/22/2023 3.7  3.7 - 5.1 g/dL Final    Alpha 1 12/22/2023 0.3  0.2 - 0.4 g/dL Final    Alpha 2 12/22/2023 0.8  0.5 - 0.9 g/dL Final    Beta Globulin 12/22/2023 0.9  0.6 - 1.0 g/dL Final    Gamma Globulin 12/22/2023 1.3  0.7 - 1.6 g/dL Final    Monoclonal Peak 12/22/2023 0.0  <=0.0 g/dL Final    ELP Interpretation 12/22/2023 Essentially normal electrophoretic pattern. No obvious monoclonal proteins seen. Pathologic significance requires clinical correlation. NEFTALI Holcomb M.D., Ph.D., Pathologist ().   Final    Signout Location if Remote 12/22/2023 [id number]   Final    WBC Count 12/22/2023 4.3  4.0 - 11.0 10e3/uL Final    RBC Count 12/22/2023 4.76  3.80 - 5.20 10e6/uL Final    Hemoglobin 12/22/2023 13.7  11.7 - 15.7 g/dL Final    Hematocrit 12/22/2023 40.6  35.0 - 47.0 % Final    MCV 12/22/2023 85  78 - 100 fL Final    MCH 12/22/2023 28.8  26.5 - 33.0 pg Final    MCHC 12/22/2023 33.7  31.5 - 36.5 g/dL Final    RDW 12/22/2023 14.4  10.0 - 15.0 % Final    Platelet Count 12/22/2023 220  150 - 450 10e3/uL Final    % Neutrophils 12/22/2023 50  % Final    % Lymphocytes 12/22/2023 39  % Final    % Monocytes 12/22/2023 9  % Final    % Eosinophils 12/22/2023 1  % Final    % Basophils 12/22/2023 1  % Final    % Immature Granulocytes 12/22/2023 0  % Final    NRBCs per 100 WBC 12/22/2023 0  <1 /100 Final    Absolute Neutrophils 12/22/2023 2.2  1.6 - 8.3 10e3/uL Final    Absolute Lymphocytes 12/22/2023 1.7  0.8 - 5.3 10e3/uL Final    Absolute Monocytes 12/22/2023 0.4  0.0 - 1.3 10e3/uL Final    Absolute Eosinophils 12/22/2023 0.0  0.0 - 0.7 10e3/uL Final    Absolute Basophils 12/22/2023 0.0  0.0 - 0.2 10e3/uL Final    Absolute  Immature Granulocytes 12/22/2023 0.0  <=0.4 10e3/uL Final    Absolute NRBCs 12/22/2023 0.0  10e3/uL Final        ASSESSMENT/PLAN:  1.  MGUS.  Bone marrow aspiration biopsy was negative for myeloma.  Skeletal bone survey was negative for lytic defects. M spike remains normal at 0.0.  No evidence of end-organ damage.  Plan is to continue surveillance.  We will see the patient in 6 months and repeat MGUS labs.     2.  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 D-dimer is normal. The plan is to see the patient in 6 months and repeat D-dimer. She will continue on Xarelto indefinitely.     Twenty three minutes spent with this encounter with time spent reviewing patient records, counseling patient regarding disease process, interpretation and review of labs with patient, discussing plan for ongoing follow up, obtaining a review of systems, ordering tests, documenting in EHR and coordination of care

## 2023-12-29 NOTE — NURSING NOTE
Patient requests telephone visit today for follow up on MGUS.  Medication list reviewed.  Nina Morin CMA([facility])..................12/29/2023   8:24 AM

## 2024-01-18 ENCOUNTER — OFFICE VISIT (OUTPATIENT)
Dept: FAMILY MEDICINE | Facility: OTHER | Age: 59
End: 2024-01-18
Attending: FAMILY MEDICINE
Payer: COMMERCIAL

## 2024-01-18 VITALS
OXYGEN SATURATION: 99 % | HEIGHT: 70 IN | TEMPERATURE: 96.9 F | DIASTOLIC BLOOD PRESSURE: 83 MMHG | HEART RATE: 56 BPM | BODY MASS INDEX: 41.95 KG/M2 | SYSTOLIC BLOOD PRESSURE: 130 MMHG | RESPIRATION RATE: 18 BRPM | WEIGHT: 293 LBS

## 2024-01-18 DIAGNOSIS — I47.10 SVT (SUPRAVENTRICULAR TACHYCARDIA) (H): ICD-10-CM

## 2024-01-18 DIAGNOSIS — B37.0 THRUSH: ICD-10-CM

## 2024-01-18 DIAGNOSIS — R00.2 PALPITATIONS: ICD-10-CM

## 2024-01-18 DIAGNOSIS — I49.3 PVC'S (PREMATURE VENTRICULAR CONTRACTIONS): ICD-10-CM

## 2024-01-18 DIAGNOSIS — I49.8 VENTRICULAR BIGEMINY: ICD-10-CM

## 2024-01-18 DIAGNOSIS — I80.9 SUPERFICIAL PHLEBITIS: ICD-10-CM

## 2024-01-18 DIAGNOSIS — Z13.29 SCREENING FOR THYROID DISORDER: ICD-10-CM

## 2024-01-18 DIAGNOSIS — Z00.00 HEALTH CARE MAINTENANCE: Primary | ICD-10-CM

## 2024-01-18 PROCEDURE — 99213 OFFICE O/P EST LOW 20 MIN: CPT | Mod: 25 | Performed by: FAMILY MEDICINE

## 2024-01-18 PROCEDURE — 99396 PREV VISIT EST AGE 40-64: CPT | Performed by: FAMILY MEDICINE

## 2024-01-18 RX ORDER — NYSTATIN 100000/ML
500000 SUSPENSION, ORAL (FINAL DOSE FORM) ORAL 4 TIMES DAILY
Qty: 280 ML | Refills: 0 | Status: SHIPPED | OUTPATIENT
Start: 2024-01-18 | End: 2024-02-01

## 2024-01-18 ASSESSMENT — ENCOUNTER SYMPTOMS
FEVER: 0
PARESTHESIAS: 0
MYALGIAS: 0
ABDOMINAL PAIN: 0
FREQUENCY: 0
SHORTNESS OF BREATH: 0
NAUSEA: 0
DIZZINESS: 0
EYE PAIN: 0
DIARRHEA: 0
HEMATOCHEZIA: 0
CHILLS: 0
CONSTIPATION: 0
BREAST MASS: 0
HEADACHES: 0
WEAKNESS: 0
SORE THROAT: 0
NERVOUS/ANXIOUS: 0
COUGH: 0
PALPITATIONS: 0
DYSURIA: 0
ARTHRALGIAS: 0
HEMATURIA: 0
HEARTBURN: 0
JOINT SWELLING: 0

## 2024-01-18 ASSESSMENT — PAIN SCALES - GENERAL: PAINLEVEL: NO PAIN (0)

## 2024-01-18 NOTE — NURSING NOTE
"Chief Complaint   Patient presents with    Physical       Initial /83 (BP Location: Right arm, Patient Position: Sitting, Cuff Size: Adult Regular)   Pulse 56   Temp 96.9  F (36.1  C) (Tympanic)   Resp 18   Ht 1.765 m (5' 9.5\")   Wt 134.8 kg (297 lb 3.2 oz)   LMP  (LMP Unknown)   SpO2 99%   BMI 43.26 kg/m   Estimated body mass index is 43.26 kg/m  as calculated from the following:    Height as of this encounter: 1.765 m (5' 9.5\").    Weight as of this encounter: 134.8 kg (297 lb 3.2 oz).  Medication Review: complete    The next two questions are to help us understand your food security.  If you are feeling you need any assistance in this area, we have resources available to support you today.          1/18/2024   SDOH- Food Insecurity   Within the past 12 months, did you worry that your food would run out before you got money to buy more? N   Within the past 12 months, did the food you bought just not last and you didn t have money to get more? N         Health Care Directive:  Patient does not have a Health Care Directive or Living Will: Discussed advance care planning with patient; information given to patient to review.    Alicja Rodriguez      "

## 2024-01-18 NOTE — PROGRESS NOTES
Preventive Care Visit  [facility]  Ya Victoria MD, Family Medicine  Jan 18, 2024       SUBJECTIVE:   Phyllis is a 58 year old, presenting for the following:  Physical        1/18/2024     8:20 AM   Additional Questions   Roomed by Alicja   Accompanied by self       Phyllis is here today for a physical.      Last March, they had gone to [place] on a plan.  Had some abdominal pain after that.  Wondered if she had a vein irritated.  Took a few months to resolve.  Had been to the clinic a week or two after that and had a d-dimer, which was normal, so doesn't feel it was a clot.  Her legs at times will be more achy as well over her varicose veins.  She had an endovenous ablation in both legs.  There are days in her right leg where her veins are more achy.  Had not been constipated at the time her abdominal pain was occurring.  The pain was right where her pants were hitting.  Didn't notice any swelling in her legs or vein distention at that time.  Didn't have any pain with movement in her hip.  This has not bothering her lately, but took all summer to resolve.  Has not bothered her for a few months.  Wears her compression stockings all the time during the day, even when at home.  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  Still following with hematology/oncology.    She had a bad cold last month.  Since then, she has had a chronic irritation in her mouth and wonders if she could have thrush.  She is on immunosuppressants for her rheumatoid arthritis through Rheumatology.        8/30/2022     9:35 AM 11/14/2022    12:45 PM 11/30/2023     1:51 PM   ACT Total Scores   ACT TOTAL SCORE (Goal Greater than or Equal to 20) 25 25 25    25   In the past 12 months, how many times did you visit the emergency room for your asthma without being admitted to the hospital? 0 0 0   In the past 12 months, how many times were you  hospitalized overnight because of your asthma? 0 0 0       Healthy Habits:     Getting at least 3 servings of Calcium per day:  Yes    Bi-annual eye exam:  Yes    Dental care twice a year:  Yes    Sleep apnea or symptoms of sleep apnea:  None    Diet:  Regular (no restrictions)    Frequency of exercise:  2-3 days/week    Duration of exercise:  30-45 minutes    Taking medications regularly:  Yes    Medication side effects:  None      Today's PHQ-2 Score:       1/18/2024     8:21 AM   PHQ-2 ( 1999 [employer])   Q1: Little interest or pleasure in doing things 0   Q2: Feeling down, depressed or hopeless 0   PHQ-2 Score 0   Q1: Little interest or pleasure in doing things Not at all   Q2: Feeling down, depressed or hopeless Not at all   PHQ-2 Score 0           Social History     Tobacco Use    Smoking status: Never     Passive exposure: Past    Smokeless tobacco: Never    Tobacco comments:     In childhood home   Substance Use Topics    Alcohol use: Yes     Comment: 1 per month             1/18/2024     8:21 AM   Alcohol Use   Prescreen: >3 drinks/day or >7 drinks/week? No     Reviewed orders with patient.  Reviewed health maintenance and updated orders accordingly - Yes  BP Readings from Last 3 Encounters:   01/18/24 130/83   06/09/23 120/82   03/27/23 124/80    Wt Readings from Last 3 Encounters:   01/18/24 134.8 kg (297 lb 3.2 oz)   06/09/23 133.5 kg (294 lb 6.4 oz)   03/27/23 132.5 kg (292 lb)                  Patient Active Problem List   Diagnosis    Allergic rhinitis    Asthma    Esophageal reflux    Essential hypertension, benign    Obesity    Enthesopathy of hip region    Cervical muscle pain    Colon polyp    Disturbance in sleep behavior    Family history of diabetes mellitus type II    High risk medication use    Myalgia    Rheumatoid arthritis involving multiple sites with positive rheumatoid factor (H)    Thrombosis of saphenous vein, left    Vitamin D deficiency    Mitral valve annular calcification- mild with  "mild MI    PVC's (premature ventricular contractions)    SVT (supraventricular tachycardia)    Ventricular bigeminy    Morbid obesity (H)    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 Fofana - 2 polyps found.  Told to f/u in 5 years.    COLONOSCOPY  11/17/2022    Ct - polyp with path pending - 5 year f/u rec'd    RELEASE TARSAL TUNNEL FOOT Right 10/05/2022    Procedure: RELEASE, TARSAL TUNNEL AND PLANTAT FASCIOTOMY;  Surgeon: Shiva Finch DPM;  Location:  OR       Social History     Tobacco Use    Smoking status: Never     Passive exposure: Past    Smokeless tobacco: Never    Tobacco comments:     In childhood home   Substance Use Topics    Alcohol use: Yes     Comment: 1 per month     Family History   Problem Relation Age of Onset    Hypertension Father         Hypertension,67    Other - See Comments Maternal Grandmother         Stroke,85    Other - See Comments Paternal Grandmother         Alzheimer's 73    Breast Cancer Mother 46        Cancer-breast,now 68    Cerebrovascular Disease Mother         stroke with hemiparesis 2017    Diabetes Brother 16        Diabetes,now 41         Current Outpatient Medications   Medication Sig Dispense Refill    B-D INSULIN SYRINGE MICROFINE 27G X 5/8\" 1 ML MISC 1 EACH BY DOES NOT APPLY ROUTE ONE TIME A WEEK. USE TO DRAW UP METHOTREXATE 25 MG ONCE WEEKLY.      BD SAFETYGLIDE SYRINGE/NEEDLE 27G X 5/8\" 1 ML MISC 1 EACH BY DOES NOT APPLY ROUTE ONE TIME A WEEK. USE TO DRAW UP METHOTREXATE 25 MG ONCE WEEKLY.      cyanocobalamin (VITAMIN B-12) 1000 MCG tablet Take 1,000 mcg by mouth daily      famotidine (PEPCID) 20 MG tablet Take 1 tablet (20 mg) by mouth 2 times daily 180 tablet 3    " folic acid (FOLVITE) 1 MG tablet Take 2 mg by mouth daily      hydroxychloroquine (PLAQUENIL) 200 MG tablet Take 2 tablets (400 mg) by mouth daily Take 400 mg by mouth daily 90 tablet 0    methotrexate 50 MG/2ML injection Inject 1 mL Subcutaneous once a week      metoprolol succinate ER (TOPROL XL) 50 MG 24 hr tablet TAKE 1 TABLET BY MOUTH EVERY DAY 90 tablet 2    nystatin (MYCOSTATIN) 556355 UNIT/ML suspension Take 5 mLs (500,000 Units) by mouth 4 times daily for 14 days 280 mL 0    predniSONE (DELTASONE) 5 MG tablet Take 5 mg by mouth daily  0    rivaroxaban ANTICOAGULANT (XARELTO ANTICOAGULANT) 20 MG TABS tablet Take 1 tablet (20 mg) by mouth daily (with dinner) 90 tablet 3    thiamine (B-1) 100 MG tablet Take 100 mg by mouth daily       vitamin B6 (PYRIDOXINE) 200 MG tablet Take 200 mg by mouth daily       No Known Allergies    Breast Cancer Screening:    FHS-7:       12/13/2021    10:39 AM   Breast CA Risk Assessment (FHS-7)   Did any of your first-degree relatives have breast or ovarian cancer? No   Did any of your relatives have bilateral breast cancer? No   Did any man in your family have breast cancer? No   Did any woman in your family have breast and ovarian cancer? No   Did any woman in your family have breast cancer before age 50 y? No   Do you have 2 or more relatives with breast and/or ovarian cancer? No   Do you have 2 or more relatives with breast and/or bowel cancer? No       Mammogram Screening: Recommended mammography every 1-2 years with patient discussion and risk factor consideration  Pertinent mammograms are reviewed under the imaging tab.    History of abnormal Pap smear: NO - age 30-65 PAP every 5 years with negative HPV co-testing recommended      Latest Ref Rng & Units 11/16/2022     9:25 AM 11/14/2022     1:23 PM   PAP / HPV   PAP   Negative for Intraepithelial Lesion or Malignancy (NILM)    HPV 16 DNA Negative Negative     HPV 18 DNA Negative Negative     Other HR HPV Negative Negative    "    Reviewed and updated as needed this visit by clinical staff   Tobacco  Allergies  Meds   Med Hx  Surg Hx  Fam Hx          Reviewed and updated as needed this visit by Provider                  Past Medical History:   Diagnosis Date    Personal history of other medical treatment (CODE)     3 normal childbirths    Rheumatoid arthritis (H)       Past Surgical History:   Procedure Laterality Date    BONE MARROW BIOPSY, BONE SPECIMEN, NEEDLE/TROCAR Left 07/14/2022    Procedure: BIOPSY, BONE MARROW;  Surgeon: Glory Baig MD;  Location:  OR    COLONOSCOPY  2017    Dr. Fofana - 2 polyps found.  Told to f/u in 5 years.    COLONOSCOPY  11/17/2022    Ct - polyp with path pending - 5 year f/u rec'd    RELEASE TARSAL TUNNEL FOOT Right 10/05/2022    Procedure: RELEASE, TARSAL TUNNEL AND PLANTAT FASCIOTOMY;  Surgeon: Shiva Finch DPM;  Location:  OR       OBJECTIVE:   /83 (BP Location: Right arm, Patient Position: Sitting, Cuff Size: Adult Regular)   Pulse 56   Temp 96.9  F (36.1  C) (Tympanic)   Resp 18   Ht 1.765 m (5' 9.5\")   Wt 134.8 kg (297 lb 3.2 oz)   LMP  (LMP Unknown)   SpO2 99%   BMI 43.26 kg/m     Estimated body mass index is 43.26 kg/m  as calculated from the following:    Height as of this encounter: 1.765 m (5' 9.5\").    Weight as of this encounter: 134.8 kg (297 lb 3.2 oz).  Review of Systems   Constitutional:  Negative for chills and fever.   HENT:  Negative for congestion, ear pain, hearing loss and sore throat.    Eyes:  Negative for pain and visual disturbance.   Respiratory:  Negative for cough and shortness of breath.    Cardiovascular:  Negative for chest pain and palpitations.   Gastrointestinal:  Negative for abdominal pain, constipation, diarrhea and nausea.   Genitourinary:  Negative for dysuria, frequency, genital sores, hematuria, pelvic pain, urgency, vaginal bleeding and vaginal discharge.   Musculoskeletal:  Negative for arthralgias, joint swelling and myalgias. "   Skin:  Negative for rash.   Neurological:  Negative for dizziness, weakness and headaches.   Psychiatric/Behavioral:  The patient is not nervous/anxious.        Physical Exam  Constitutional:       General: She is not in acute distress.     Appearance: She is well-developed.   HENT:      Head: Normocephalic.      Right Ear: Tympanic membrane and external ear normal.      Left Ear: Tympanic membrane and external ear normal.      Nose: Nose normal.      Mouth/Throat:      Mouth: Mucous membranes are moist.      Pharynx: Oropharynx is clear. No oropharyngeal exudate or posterior oropharyngeal erythema.   Eyes:      General:         Right eye: No discharge.         Left eye: No discharge.      Conjunctiva/sclera: Conjunctivae normal.      Pupils: Pupils are equal, round, and reactive to light.   Neck:      Thyroid: No thyromegaly.      Trachea: No tracheal deviation.   Cardiovascular:      Rate and Rhythm: Normal rate and regular rhythm.      Pulses: Normal pulses.      Heart sounds: Normal heart sounds, S1 normal and S2 normal. No murmur heard.     No friction rub. No gallop. No S3 or S4 sounds.   Pulmonary:      Effort: Pulmonary effort is normal. No respiratory distress.      Breath sounds: Normal breath sounds. No wheezing or rales.      Comments: Breast exam:  No masses palpable bilaterally.  No skin changes, tethering or axillary lymphadenopathy bilaterally.    Abdominal:      General: Bowel sounds are normal. There is no distension.      Palpations: Abdomen is soft. There is no mass.      Tenderness: There is no abdominal tenderness. There is no guarding or rebound.      Hernia: No hernia is present.   Genitourinary:     Comments: Pelvic/Rectal exams deferred per patient.  Musculoskeletal:         General: Normal range of motion.      Cervical back: Neck supple.   Lymphadenopathy:      Cervical: No cervical adenopathy.   Skin:     General: Skin is warm and dry.      Findings: No rash.   Neurological:       "Mental Status: She is alert and oriented to person, place, and time.      Motor: No abnormal muscle tone.      Deep Tendon Reflexes: Reflexes are normal and symmetric.   Psychiatric:         Mood and Affect: Mood normal.         Thought Content: Thought content normal.         Judgment: Judgment normal.           Diagnostic Test Results:  Labs reviewed in Epic    ASSESSMENT/PLAN:   Health care maintenance  Mammogram scheduled 1/22/24.  Colonoscopy last completed 11/17/22 and 5 year follow up recommended.  Pap Smear/HPV last completed 11/14/22 and were both normal.  DEXA completed 12/16/22 was normal.  Pneumococcal vaccines, Flu, Tdap, Shingrix and covid are up to date.  Declines additional Covid vaccine today since she just had her last on 10/18/23.    Superficial phlebitis  Stable.  No recurrence at this time.  Refilled Xarelto.  If abdominal pain returns, recommend follow up to consider imaging.   - rivaroxaban ANTICOAGULANT (XARELTO ANTICOAGULANT) 20 MG TABS tablet; Take 1 tablet (20 mg) by mouth daily (with dinner)    Screening for thyroid disorder  TSH ordered, which she will do in the future when doing labs for either Rheumatology or hematology.  - TSH with free T4 reflex; Future    Thrush  Likely related to immunosuppressants she is on for rheumatoid arthritis.  Trial of nystatin to see if it helped  - nystatin (MYCOSTATIN) 114923 UNIT/ML suspension; Take 5 mLs (500,000 Units) by mouth 4 times daily for 14 days    Patient has been advised of split billing requirements and indicates understanding: Yes      Counseling  Reviewed preventive health counseling, as reflected in patient instructions       Regular exercise       Healthy diet/nutrition       Vision screening       Pneumococcal Vaccine          Osteoporosis prevention/bone health       Colorectal Cancer Screening      BMI  Estimated body mass index is 43.26 kg/m  as calculated from the following:    Height as of this encounter: 1.765 m (5' 9.5\").    " Weight as of this encounter: 134.8 kg (297 lb 3.2 oz).   Weight management plan: Discussed healthy diet and exercise guidelines      She reports that she has never smoked. She has been exposed to tobacco smoke. She has never used smokeless tobacco.        Signed Electronically by: Ya Victoria MD

## 2024-01-19 ENCOUNTER — HOSPITAL ENCOUNTER (OUTPATIENT)
Dept: MAMMOGRAPHY | Facility: OTHER | Age: 59
Discharge: HOME | End: 2024-01-19
Attending: FAMILY MEDICINE | Admitting: FAMILY MEDICINE
Payer: COMMERCIAL

## 2024-01-19 DIAGNOSIS — Z12.31 VISIT FOR SCREENING MAMMOGRAM: ICD-10-CM

## 2024-01-19 PROCEDURE — 77063 BREAST TOMOSYNTHESIS BI: CPT

## 2024-01-19 RX ORDER — METOPROLOL SUCCINATE 50 MG/1
TABLET, EXTENDED RELEASE ORAL
Qty: 90 TABLET | Refills: 2 | Status: SHIPPED | OUTPATIENT
Start: 2024-01-19

## 2024-01-19 NOTE — TELEPHONE ENCOUNTER
[facility] sent Rx request for the following:      Requested Prescriptions   Pending Prescriptions Disp Refills    metoprolol succinate ER (TOPROL XL) 50 MG 24 hr tablet [Pharmacy Med Name: METOPROLOL ER SUCCINATE 50MG TABS] 90 tablet 2     Sig: TAKE 1 TABLET BY MOUTH EVERY DAY       Beta-Blockers Protocol Failed - 1/18/2024  4:27 PM        Failed - Medication indicated for associated diagnosis     Medication is associated with one or more of the following diagnoses:     Hypertension (HTN)   Atrial fibrillation/flutter   Angina   ASCVD   Migraine   Heart Failure   Tremor   Anxiety   Ocular hypertension   Glaucoma       Last Prescription Date:   5/8/23  Last Fill Qty/Refills:         90, R-2    Last Office Visit:              3/27/23   Future Office visit:           3/27/24    Routing to MISSY Haynes CNP for refill consideration.  Donna Rodriguez RN on 1/19/2024 at 10:32 AM

## 2024-01-30 ENCOUNTER — MYC MEDICAL ADVICE (OUTPATIENT)
Dept: FAMILY MEDICINE | Facility: OTHER | Age: 59
End: 2024-01-30
Payer: COMMERCIAL

## 2024-01-30 DIAGNOSIS — B37.0 THRUSH: Primary | ICD-10-CM

## 2024-01-30 RX ORDER — FLUCONAZOLE 100 MG/1
100 TABLET ORAL DAILY
Qty: 15 TABLET | Refills: 0 | Status: SHIPPED | OUTPATIENT
Start: 2024-01-30 | End: 2024-02-14

## 2024-01-30 NOTE — TELEPHONE ENCOUNTER
LOV 1/18/2024 with Dr. Gelacio Shirley  From office visit note    Adriana Wright RN on 1/30/2024 at 10:17 AM

## 2024-02-13 NOTE — PROGRESS NOTES
Assessment & Plan     1. Irritation of oral cavity  2+ month history of irritation throughout oral cavity that has been treated for presumed oral thrush due to immunocompromising medications (methotrexate) with both nystatin solution as well as 14-day course of oral Diflucan without improvement.  Exam showing mild diffuse irritation, no open sores, lesions, white patches, signs of infection.  Discussed options including symptomatic management versus ENT referral.  Patient would like ENT referral for additional evaluation.  - Adult ENT  Referral; Future      No follow-ups on file.    Steph Ferguson is a 58 year old, presenting for the following health issues:  Mouth/Lip Problem    History of Present Illness       Reason for visit:  Oral infection  Symptom onset:  More than a month  Symptoms include:  Oral  Symptom intensity:  Moderate  Symptom progression:  Staying the same  Had these symptoms before:  No  What makes it worse:  No  What makes it better:  No    She eats 2-3 servings of fruits and vegetables daily.She consumes 1 sweetened beverage(s) daily.She exercises with enough effort to increase her heart rate 30 to 60 minutes per day.  She exercises with enough effort to increase her heart rate 4 days per week.   She is taking medications regularly.     Here for follow-up on oral irritation.  Patient reports symptom onset approximate 2+ month ago.  At end of December she was struggling with viral upper respiratory infection, reports she took home COVID test and was negative.  She followed up in the clinic at end of January he was diagnosed with possible oral thrush due to her immunocompromise state being on methotrexate.  She was initially treated with nystatin orally.  [facility] with persistent symptoms.  PCP sent in 14-day course of oral fluconazole.  Patient again noticed no change.  She continues to note irritation throughout her  "mouth, feeling sandpaperlike.  No white patches, lesions, open sores.  No other new foods, medications, environmental exposures.      PAST MEDICAL HISTORY:   Past Medical History:   Diagnosis Date    Personal history of other medical treatment (CODE)     3 normal childbirths    Rheumatoid arthritis (H)        PAST SURGICAL HISTORY:   Past Surgical History:   Procedure Laterality Date    BONE MARROW BIOPSY, BONE SPECIMEN, NEEDLE/TROCAR Left 07/14/2022    Procedure: BIOPSY, BONE MARROW;  Surgeon: Glory Baig MD;  Location:  OR    COLONOSCOPY  2017    Dr. Fofana - 2 polyps found.  Told to f/u in 5 years.    COLONOSCOPY  11/17/2022    Ct - polyp with path pending - 5 year f/u rec'd    RELEASE TARSAL TUNNEL FOOT Right 10/05/2022    Procedure: RELEASE, TARSAL TUNNEL AND PLANTAT FASCIOTOMY;  Surgeon: Shiva Finch DPM;  Location:  OR       FAMILY HISTORY:   Family History   Problem Relation Age of Onset    Hypertension Father         Hypertension,67    Other - See Comments Maternal Grandmother         Stroke,85    Other - See Comments Paternal Grandmother         Alzheimer's 73    Breast Cancer Mother 46        Cancer-breast,now 68    Cerebrovascular Disease Mother         stroke with hemiparesis 2017    Diabetes Brother 16        Diabetes,now 41       SOCIAL HISTORY:   Social History     Tobacco Use    Smoking status: Never     Passive exposure: Past    Smokeless tobacco: Never    Tobacco comments:     In childhood home   Substance Use Topics    Alcohol use: Yes     Comment: 1 per month      No Known Allergies  Current Outpatient Medications   Medication    B-D INSULIN SYRINGE MICROFINE 27G X 5/8\" 1 ML MISC    BD SAFETYGLIDE SYRINGE/NEEDLE 27G X 5/8\" 1 ML MISC    cyanocobalamin (VITAMIN B-12) 1000 MCG tablet    famotidine (PEPCID) 20 MG tablet    fluconazole (DIFLUCAN) 100 MG tablet    folic acid (FOLVITE) 1 MG tablet    hydroxychloroquine (PLAQUENIL) 200 MG tablet    methotrexate 50 MG/2ML injection    " "metoprolol succinate ER (TOPROL XL) 50 MG 24 hr tablet    predniSONE (DELTASONE) 5 MG tablet    rivaroxaban ANTICOAGULANT (XARELTO ANTICOAGULANT) 20 MG TABS tablet    thiamine (B-1) 100 MG tablet    vitamin B6 (PYRIDOXINE) 200 MG tablet     No current facility-administered medications for this visit.       Review of Systems  Per HPI        Objective    /80   Pulse 53   Temp (!) 96.7  F (35.9  C)   Resp 12   Ht 1.765 m (5' 9.5\")   Wt 136.6 kg (301 lb 3.2 oz)   LMP  (LMP Unknown)   SpO2 98%   BMI 43.84 kg/m    Body mass index is 43.84 kg/m .  Physical Exam   General: Pleasant, in no apparent distress.  Eyes: Sclera are white and conjunctiva are clear bilaterally. Lacrimal apparatus free of erythema, edema, and discharge bilaterally.  Ears: External ears without erythema or edema.   Nose: External nose is symmetrical and free of lesions and deformities.   Oropharynx: Oral mucosa is pink and without ulcers, nodules, and white patches. Tongue is symmetrical, pink, and without masses or lesions. Pharynx is pink, symmetrical, and without lesions. Uvula is midline. Tonsils are pink, symmetrical, and without edema, ulcers, or exudates, and 1+ bilaterally.  Neck: No cervical lymphadenopathy on inspection and palpation.  Psych: Appropriate mood and affect.      Signed Electronically by: Fallon Garcia PA-C    "

## 2024-02-14 ENCOUNTER — OFFICE VISIT (OUTPATIENT)
Dept: FAMILY MEDICINE | Facility: OTHER | Age: 59
End: 2024-02-14
Attending: PHYSICIAN ASSISTANT
Payer: COMMERCIAL

## 2024-02-14 VITALS
WEIGHT: 293 LBS | TEMPERATURE: 96.7 F | HEIGHT: 70 IN | OXYGEN SATURATION: 98 % | BODY MASS INDEX: 41.95 KG/M2 | HEART RATE: 53 BPM | DIASTOLIC BLOOD PRESSURE: 80 MMHG | RESPIRATION RATE: 12 BRPM | SYSTOLIC BLOOD PRESSURE: 132 MMHG

## 2024-02-14 DIAGNOSIS — K13.6 IRRITATION OF ORAL CAVITY: Primary | ICD-10-CM

## 2024-02-14 PROCEDURE — 99213 OFFICE O/P EST LOW 20 MIN: CPT | Performed by: PHYSICIAN ASSISTANT

## 2024-02-14 ASSESSMENT — PAIN SCALES - GENERAL: PAINLEVEL: MODERATE PAIN (4)

## 2024-02-14 NOTE — NURSING NOTE
Patient presents to clinic with continued mouth issues. She was treated for thrush but it did not clear up.  Madyson Simms LPN ....................  2/14/2024   8:59 AM  EXT [id number]

## 2024-03-12 ENCOUNTER — LAB (OUTPATIENT)
Dept: LAB | Facility: OTHER | Age: 59
End: 2024-03-12
Attending: FAMILY MEDICINE
Payer: COMMERCIAL

## 2024-03-12 DIAGNOSIS — M05.79 SEROPOSITIVE RHEUMATOID ARTHRITIS OF MULTIPLE SITES (H): ICD-10-CM

## 2024-03-12 DIAGNOSIS — Z13.29 SCREENING FOR THYROID DISORDER: ICD-10-CM

## 2024-03-12 DIAGNOSIS — Z79.899 ENCOUNTER FOR LONG-TERM (CURRENT) USE OF MEDICATIONS: ICD-10-CM

## 2024-03-12 LAB
ALBUMIN SERPL BCG-MCNC: 3.9 G/DL (ref 3.5–5.2)
ALP SERPL-CCNC: 71 U/L (ref 40–150)
ALT SERPL W P-5'-P-CCNC: 26 U/L (ref 0–50)
AST SERPL W P-5'-P-CCNC: 28 U/L (ref 0–45)
BASOPHILS # BLD AUTO: 0 10E3/UL (ref 0–0.2)
BASOPHILS NFR BLD AUTO: 0 %
BILIRUB DIRECT SERPL-MCNC: <0.2 MG/DL (ref 0–0.3)
BILIRUB SERPL-MCNC: 0.2 MG/DL
CREAT SERPL-MCNC: 1.01 MG/DL (ref 0.51–0.95)
CRP SERPL-MCNC: 3.51 MG/L
EGFRCR SERPLBLD CKD-EPI 2021: 63 ML/MIN/1.73M2
EOSINOPHIL # BLD AUTO: 0 10E3/UL (ref 0–0.7)
EOSINOPHIL NFR BLD AUTO: 0 %
ERYTHROCYTE [DISTWIDTH] IN BLOOD BY AUTOMATED COUNT: 14.9 % (ref 10–15)
ERYTHROCYTE [SEDIMENTATION RATE] IN BLOOD BY WESTERGREN METHOD: 8 MM/HR (ref 0–30)
HCT VFR BLD AUTO: 40.8 % (ref 35–47)
HGB BLD-MCNC: 13 G/DL (ref 11.7–15.7)
IMM GRANULOCYTES # BLD: 0 10E3/UL
IMM GRANULOCYTES NFR BLD: 0 %
LYMPHOCYTES # BLD AUTO: 2.2 10E3/UL (ref 0–5.3)
LYMPHOCYTES NFR BLD AUTO: 42 %
MCH RBC QN AUTO: 26.9 PG (ref 26.5–33)
MCHC RBC AUTO-ENTMCNC: 31.9 G/DL (ref 31.5–36.5)
MCV RBC AUTO: 85 FL (ref 78–100)
MONOCYTES # BLD AUTO: 0.4 10E3/UL (ref 0–1.3)
MONOCYTES NFR BLD AUTO: 8 %
NEUTROPHILS # BLD AUTO: 2.6 10E3/UL (ref 1.6–8.3)
NEUTROPHILS NFR BLD AUTO: 50 %
NRBC # BLD AUTO: 0 10E3/UL
NRBC BLD AUTO-RTO: 0 /100
PLATELET # BLD AUTO: 233 10E3/UL (ref 150–450)
PROT SERPL-MCNC: 7.2 G/DL (ref 6.4–8.3)
RBC # BLD AUTO: 4.83 10E6/UL (ref 3.8–5.2)
TSH SERPL DL<=0.005 MIU/L-ACNC: 2.12 UIU/ML (ref 0.3–4.2)
WBC # BLD AUTO: 5.2 10E3/UL (ref 4–11)

## 2024-03-12 PROCEDURE — 80076 HEPATIC FUNCTION PANEL: CPT | Mod: ZL

## 2024-03-12 PROCEDURE — 36415 COLL VENOUS BLD VENIPUNCTURE: CPT | Mod: ZL

## 2024-03-12 PROCEDURE — 86140 C-REACTIVE PROTEIN: CPT | Mod: ZL

## 2024-03-12 PROCEDURE — 85025 COMPLETE CBC W/AUTO DIFF WBC: CPT | Mod: ZL

## 2024-03-12 PROCEDURE — 82565 ASSAY OF CREATININE: CPT | Mod: ZL

## 2024-03-12 PROCEDURE — 85652 RBC SED RATE AUTOMATED: CPT | Mod: ZL

## 2024-03-12 PROCEDURE — 84443 ASSAY THYROID STIM HORMONE: CPT | Mod: ZL

## 2024-03-27 ENCOUNTER — OFFICE VISIT (OUTPATIENT)
Dept: CARDIOLOGY | Facility: OTHER | Age: 59
End: 2024-03-27
Attending: NURSE PRACTITIONER
Payer: COMMERCIAL

## 2024-03-27 VITALS
TEMPERATURE: 97.1 F | HEART RATE: 53 BPM | SYSTOLIC BLOOD PRESSURE: 126 MMHG | RESPIRATION RATE: 18 BRPM | OXYGEN SATURATION: 98 % | BODY MASS INDEX: 41.95 KG/M2 | DIASTOLIC BLOOD PRESSURE: 78 MMHG | HEIGHT: 70 IN | WEIGHT: 293 LBS

## 2024-03-27 DIAGNOSIS — E78.5 MILD HYPERLIPIDEMIA: ICD-10-CM

## 2024-03-27 DIAGNOSIS — R00.2 PALPITATIONS: Primary | ICD-10-CM

## 2024-03-27 DIAGNOSIS — Z86.718 PERSONAL HISTORY OF DVT (DEEP VEIN THROMBOSIS): ICD-10-CM

## 2024-03-27 DIAGNOSIS — K55.069 MESENTERIC VEIN THROMBOSIS (H): ICD-10-CM

## 2024-03-27 DIAGNOSIS — I34.81 MITRAL ANNULAR CALCIFICATION: ICD-10-CM

## 2024-03-27 DIAGNOSIS — I49.3 PVC'S (PREMATURE VENTRICULAR CONTRACTIONS): ICD-10-CM

## 2024-03-27 DIAGNOSIS — I47.10 SVT (SUPRAVENTRICULAR TACHYCARDIA) (H): ICD-10-CM

## 2024-03-27 LAB
ATRIAL RATE - MUSE: 50 BPM
DIASTOLIC BLOOD PRESSURE - MUSE: NORMAL MMHG
INTERPRETATION ECG - MUSE: NORMAL
P AXIS - MUSE: 40 DEGREES
PR INTERVAL - MUSE: 166 MS
QRS DURATION - MUSE: 84 MS
QT - MUSE: 428 MS
QTC - MUSE: 390 MS
R AXIS - MUSE: 27 DEGREES
SYSTOLIC BLOOD PRESSURE - MUSE: NORMAL MMHG
T AXIS - MUSE: 27 DEGREES
VENTRICULAR RATE- MUSE: 50 BPM

## 2024-03-27 PROCEDURE — 93000 ELECTROCARDIOGRAM COMPLETE: CPT | Performed by: INTERNAL MEDICINE

## 2024-03-27 PROCEDURE — 99214 OFFICE O/P EST MOD 30 MIN: CPT | Performed by: NURSE PRACTITIONER

## 2024-03-27 RX ORDER — METOPROLOL SUCCINATE 50 MG/1
50 TABLET, EXTENDED RELEASE ORAL DAILY
Qty: 90 TABLET | Refills: 4 | Status: CANCELLED | OUTPATIENT
Start: 2024-03-27

## 2024-03-27 ASSESSMENT — PAIN SCALES - GENERAL: PAINLEVEL: NO PAIN (0)

## 2024-03-27 NOTE — NURSING NOTE
"Chief Complaint   Patient presents with    Follow Up     Yearly follow up       Initial /78 (BP Location: Right arm, Patient Position: Sitting, Cuff Size: Adult Large)   Pulse 53   Temp 97.1  F (36.2  C) (Tympanic)   Resp 18   Ht 1.765 m (5' 9.5\")   Wt 138 kg (304 lb 3.2 oz)   LMP  (LMP Unknown)   SpO2 98%   BMI 44.28 kg/m   Estimated body mass index is 44.28 kg/m  as calculated from the following:    Height as of this encounter: 1.765 m (5' 9.5\").    Weight as of this encounter: 138 kg (304 lb 3.2 oz).  Medication Reconciliation: complete    Malissa Kunz LPN    Advance Care Directive reviewed    "

## 2024-03-27 NOTE — PROGRESS NOTES
[facility] HEART CARE   CARDIOLOGY PROGRESS NOTE    Phyllis Washington   [address] [address] [id number]    [place]     Chief Complaint   Patient presents with    Follow Up     Yearly follow up        HPI:   Ms. Washington is a 59 year old female who presents for annual cardiology follow-up with history of palpitations. Patient with symptomatic PSVT and ectopy on previous cardiac monitoring. Patient has a PMH significant for hypertension, venous phlebitis, cervicalgia, rheumatoid arthritis, vitamin D deficiency, chronic myalgias, allergic rhinitis, reactive airway, GERD and obesity. She has followed hematology with history of DVT and mesenteric vein thrombosis, suspected hypercoagulable state- currently on Xarelto indefiantely. Underlying MGUS, bone marrow biopsy negative for myeloma.     In June 2020, patient was experiencing a new arm pain in right arm which prompted stress echocardiogram.  She underwent a dobutamine stress echocardiogram on 7/20/2020.  This was a normal dobutamine stress test without evidence of inducible ischemia.  No regional wall motion abnormalities at rest.  With stress, the LVEF increased from 55 to 60% up to greater than 65% and the LV size decreased appropriately.  No subjective symptoms of ischemia.  Resting EKG was normal, with initial infusion, there were frequent PVCs including bigeminy and at higher dobutamine doses, the PVCs resolved.  Minimal nonspecific ST and T wave changes were seen which were nondiagnostic.  Postinfusion, she had occasional PVCs and ST segments returned to normal baseline.     ZIO (9/23/2020 through 10/7/2020) revealing predominant sinus rhythm with an average heart rate of 73 bpm, minimum heart rate 43 bpm maximum heart rate of 176 bpm.  There were 17 episodes of supraventricular tachycardia with the longest lasting 17.6 seconds.  No ventricular tachycardia.  No atrial fibrillation or atrial tachycardia.  No pauses or high-grade AV  block.  Occasional isolated ventricular ectopy at a 1.6 burden, rare ventricular couplets and triplets.  Rare supraventricular ectopy, less than 1% of all beats.  Ventricular bigeminy and ventricular trigeminy was present.  Her longest episode of ventricular bigeminy lasted 22.3 seconds, longest ventricular trigeminy episode lasting 1 minute and 17 seconds.  She had 66 patient triggered events and 50 diary entries.  She was mainly symptomatic with ventricular ectopy, ventricular trigeminy and ventricular bigeminy.  She was also symptomatic with SVE and symptomatic with 1 episode of SVT.    Occasional ventricular ectopy with no significant ectopy burden.  She has been significantly symptomatic with these ectopic beats and therefore, she was started on diltiazem 120 mg daily.  She did describe benefit with starting this medication, reduction in the palpitations.  She had been feeling palpitations more in the evening after dinner, although has been less than in the past prior to starting diltiazem for suppression.     Interval History:  Today, patient admits that she continued to be doing very well. Couple episodes of palpitations occurred this last summer. Describes limited palpitation episodes since then. No chest pain or pressure. No lightheadedness or syncope. No increased edema. No increased dyspnea at rest or with exertion.     RELEVANT TESTING REVIEWED:  Echocardiogram 11/7/2022:  Interpretation Summary  Global and regional left ventricular function is normal with an EF of 55-60%.  Right ventricular function, chamber size, wall motion, and thickness are  normal.  Both atria appear normal.  The atrial septum is intact as assessed by agitated saline bubble study .  Pulmonary artery systolic pressure is normal.  The inferior vena cava is normal.  No pericardial effusion is present.    PAST MEDICAL HISTORY:   Past Medical History:   Diagnosis Date    Personal history of other medical treatment (CODE)     3 normal  childbirths    Rheumatoid arthritis (H)           FAMILY HISTORY:   Family History   Problem Relation Age of Onset    Hypertension Father         Hypertension,67    Other - See Comments Maternal Grandmother         Stroke,85    Other - See Comments Paternal Grandmother         Alzheimer's 73    Breast Cancer Mother 46        Cancer-breast,now 68    Cerebrovascular Disease Mother         stroke with hemiparesis 2017    Diabetes Brother 16        Diabetes,now 41          PAST SURGICAL HISTORY:   Past Surgical History:   Procedure Laterality Date    BONE MARROW BIOPSY, BONE SPECIMEN, NEEDLE/TROCAR Left 07/14/2022    Procedure: BIOPSY, BONE MARROW;  Surgeon: Glory Baig MD;  Location:  OR    COLONOSCOPY  2017    Dr. Fofana - 2 polyps found.  Told to f/u in 5 years.    COLONOSCOPY  11/17/2022    Ct - polyp with path pending - 5 year f/u rec'd    RELEASE TARSAL TUNNEL FOOT Right 10/05/2022    Procedure: RELEASE, TARSAL TUNNEL AND PLANTAT FASCIOTOMY;  Surgeon: Shiva Finch DPM;  Location:  OR          SOCIAL HISTORY:   Social History     Socioeconomic History    Marital status:      Spouse name: Not on file    Number of children: Not on file    Years of education: Not on file    Highest education level: Not on file   Occupational History    Not on file   Social Needs    Financial resource strain: Not on file    Food insecurity     Worry: Not on file     Inability: Not on file    Transportation needs     Medical: Not on file     Non-medical: Not on file   Tobacco Use    Smoking status: Never Smoker    Smokeless tobacco: Never Used   Substance and Sexual Activity    Alcohol use: No     Frequency: Never     Comment: Rarely    Drug use: No    Sexual activity: Yes     Partners: Male     Birth control/protection: Rhythm   Lifestyle    Physical activity     Days per week: Not on file     Minutes per session: Not on file    Stress: Not on file   Relationships    Social connections     Talks on phone: Not on  "file     Gets together: Not on file     Attends Voodoo service: Not on file     Active member of club or organization: Not on file     Attends meetings of clubs or organizations: Not on file     Relationship status: Not on file    Intimate partner violence     Fear of current or ex partner: Not on file     Emotionally abused: Not on file     Physically abused: Not on file     Forced sexual activity: Not on file   Other Topics Concern    Parent/sibling w/ CABG, MI or angioplasty before 65F 55M? Not Asked   Social History Narrative    , 3 adult children;     Homemaker; hobbies - reading, crafts.     Works at frame shop part-time.     Walks 3 miles a day     - Demetrio              CURRENT MEDICATIONS:   Current Outpatient Medications   Medication    B-D INSULIN SYRINGE MICROFINE 27G X 5/8\" 1 ML MISC    BD SAFETYGLIDE SYRINGE/NEEDLE 27G X 5/8\" 1 ML MISC    cyanocobalamin (VITAMIN B-12) 1000 MCG tablet    famotidine (PEPCID) 20 MG tablet    folic acid (FOLVITE) 1 MG tablet    hydroxychloroquine (PLAQUENIL) 200 MG tablet    methotrexate 50 MG/2ML injection    metoprolol succinate ER (TOPROL XL) 50 MG 24 hr tablet    predniSONE (DELTASONE) 5 MG tablet    rivaroxaban ANTICOAGULANT (XARELTO ANTICOAGULANT) 20 MG TABS tablet    thiamine (B-1) 100 MG tablet    vitamin B6 (PYRIDOXINE) 200 MG tablet     No current facility-administered medications for this visit.         ALLERGIES:   No Known Allergies       ROS:   CONSTITUTIONAL: No reported fever or chills. No changes in weight.  ENT: No visual disturbance, ear ache, epistaxis or sore throat.   CARDIOVASCULAR: No chest pain, chest pressure or chest discomfort.  Rare palpitations.  No increased edema.  RESPIRATORY: No increased dyspnea. No cough, wheezing or hemoptysis.  No orthopnea or PND.  GI: No reported abdominal pain, melena or hematochezia.  : No reported hematuria or dysuria.   NEUROLOGICAL: No lightheadedness, dizziness, syncope, ataxia, paresthesias or " "weakness.   HEMATOLOGIC: No history of anemia. No bleeding or excessive bruising.  Positive for history of DVT.  MUSCULOSKELETAL: Positive for chronic joint pain with arthritis and chronic myalgias- follows with rheumatology  ENDOCRINOLOGIC: No temperature intolerance. No hair or skin changes.  SKIN: No abnormal rashes or sores, no unusual itching.  PSYCHIATRIC: No history of depression or anxiety. No changes in mood, feeling down or anxious.      PHYSICAL EXAM:   /78 (BP Location: Right arm, Patient Position: Sitting, Cuff Size: Adult Large)   Pulse 53   Temp 97.1  F (36.2  C) (Tympanic)   Resp 18   Ht 1.765 m (5' 9.5\")   Wt 138 kg (304 lb 3.2 oz)   LMP  (LMP Unknown)   SpO2 98%   BMI 44.28 kg/m    GENERAL: The patient is a well-developed, well-nourished, in no apparent distress.  HEENT: Head is normocephalic and atraumatic. Eyes are symmetrical with normal visual tracking. No icterus, no xanthelasmas. Nares appeared normal without nasal drainage. Mucous membranes are moist, no cyanosis.  NECK: Supple.  No carotid bruits, no JVD.  CHEST/ LUNGS: Lungs clear to auscultation, no rales, rhonchi or wheezes, no use of accessory muscles, no retractions, respirations unlabored and normal respiratory rate.   CARDIO: Regular rate and rhythm normal with S1 and S2, no S3 or S4 and no murmur, click or rub.  ABD: Abdomen is nondistended.   EXTREMITIES: No lower extremity edema  MUSCULOSKELETAL: No visible joint swelling.   NEUROLOGIC: Alert and oriented X3. Normal speech, gait and affect. No focal neurologic deficits.   SKIN: No jaundice. No rashes or visible skin lesions present. No ecchymosis.       LAB RESULTS:   Lab on 03/12/2024   Component Date Value Ref Range Status    Protein Total 03/12/2024 7.2  6.4 - 8.3 g/dL Final    Albumin 03/12/2024 3.9  3.5 - 5.2 g/dL Final    Bilirubin Total 03/12/2024 0.2  <=1.2 mg/dL Final    Alkaline Phosphatase 03/12/2024 71  40 - 150 U/L Final    Reference intervals for this " test were updated on 11/14/2023 to more accurately reflect our healthy population. There may be differences in the flagging of prior results with similar values performed with this method. Interpretation of those prior results can be made in the context of the updated reference intervals.    AST 03/12/2024 28  0 - 45 U/L Final    Reference intervals for this test were updated on 6/12/2023 to more accurately reflect our healthy population. There may be differences in the flagging of prior results with similar values performed with this method. Interpretation of those prior results can be made in the context of the updated reference intervals.    ALT 03/12/2024 26  0 - 50 U/L Final    Reference intervals for this test were updated on 6/12/2023 to more accurately reflect our healthy population. There may be differences in the flagging of prior results with similar values performed with this method. Interpretation of those prior results can be made in the context of the updated reference intervals.      Bilirubin Direct 03/12/2024 <0.20  0.00 - 0.30 mg/dL Final    CRP Inflammation 03/12/2024 3.51  <5.00 mg/L Final    Erythrocyte Sedimentation Rate 03/12/2024 8  0 - 30 mm/hr Final    Creatinine 03/12/2024 1.01 (H)  0.51 - 0.95 mg/dL Final    GFR Estimate 03/12/2024 63  >60 mL/min/1.73m2 Final    TSH 03/12/2024 2.12  0.30 - 4.20 uIU/mL Final    WBC Count 03/12/2024 5.2  4.0 - 11.0 10e3/uL Final    RBC Count 03/12/2024 4.83  3.80 - 5.20 10e6/uL Final    Hemoglobin 03/12/2024 13.0  11.7 - 15.7 g/dL Final    Hematocrit 03/12/2024 40.8  35.0 - 47.0 % Final    MCV 03/12/2024 85  78 - 100 fL Final    MCH 03/12/2024 26.9  26.5 - 33.0 pg Final    MCHC 03/12/2024 31.9  31.5 - 36.5 g/dL Final    RDW 03/12/2024 14.9  10.0 - 15.0 % Final    Platelet Count 03/12/2024 233  150 - 450 10e3/uL Final    % Neutrophils 03/12/2024 50  % Final    % Lymphocytes 03/12/2024 42  % Final    % Monocytes 03/12/2024 8  % Final    % Eosinophils  03/12/2024 0  % Final    % Basophils 03/12/2024 0  % Final    % Immature Granulocytes 03/12/2024 0  % Final    NRBCs per 100 WBC 03/12/2024 0  <1 /100 Final    Absolute Neutrophils 03/12/2024 2.6  1.6 - 8.3 10e3/uL Final    Absolute Lymphocytes 03/12/2024 2.2  0.0 - 5.3 10e3/uL Final    Absolute Monocytes 03/12/2024 0.4  0.0 - 1.3 10e3/uL Final    Absolute Eosinophils 03/12/2024 0.0  0.0 - 0.7 10e3/uL Final    Absolute Basophils 03/12/2024 0.0  0.0 - 0.2 10e3/uL Final    Absolute Immature Granulocytes 03/12/2024 0.0  <=0.4 10e3/uL Final    Absolute NRBCs 03/12/2024 0.0  10e3/uL Final         ASSESSMENT:   Phyllis VILLASENOR Felix presents for annual cardiology follow-up with history of palpitations. Patient with symptomatic PSVT and ectopy on previous cardiac monitoring. Patient has a PMH significant for hypertension, venous phlebitis, cervicalgia, rheumatoid arthritis, vitamin D deficiency, chronic myalgias, allergic rhinitis, reactive airway, GERD and obesity. She has followed hematology with history of DVT and mesenteric vein thrombosis, suspected hypercoagulable state- currently on Xarelto indefiantely. Underlying MGUS, bone marrow biopsy negative for myeloma.   Today, patient admits that she continued to be doing very well. Couple episodes of palpitations occurred this last summer. Describes limited palpitation episodes since then. No chest pain or pressure. No lightheadedness or syncope. No increased edema. No increased dyspnea at rest or with exertion.       1. Palpitations  2. PVC's (premature ventricular contractions)  3. SVT (supraventricular tachycardia)  4. Mitral annular calcification  5. Mild hyperlipidemia  6. Mesenteric vein thrombosis (H)- Historic  7. Personal history of DVT (deep vein thrombosis)      PLAN:   1. Palpitations well controlled on Toprol-XL 50 mg daily, rare palpitations. MARIAN previously reviewed with symptoms identified to be associated to ventricular ectopy and ventricular bigeminy.   2.  If  recurrence of sustained palpitations, consider adding on metoprolol tartrate or short acting Cardizem as needed for palpitations.  3. She has avoided caffeine and no stimulants, no ETOH.  Denied any symptoms of sleep apnea which may contribute.  Previously reviewed that her Plaquenil and methotrexate may be contributing to her level of palpitations and ectopy.  4. Continue with xarelto 20 mg daily indefinitely.  She is followed hematology with history of DVT and mesenteric thrombosis.   5. Echocardiogram from 11/7/2022.  Normal biventricular function, no evidence of PFO, no hemodynamically significant valvular abnormalities.  No pulmonary hypertension and no pericardial effusion.  6. Follow-up with cardiology in one year, certainly sooner if needed.    Thank you for allowing me to participate in the care of your patient. Please do not hesitate to contact me if you have any questions.       Chelly Ruffin, MISSY CNP [facility]

## 2024-06-21 ENCOUNTER — LAB (OUTPATIENT)
Dept: LAB | Facility: OTHER | Age: 59
End: 2024-06-21
Attending: NURSE PRACTITIONER
Payer: COMMERCIAL

## 2024-06-21 DIAGNOSIS — M05.79 SEROPOSITIVE RHEUMATOID ARTHRITIS OF MULTIPLE SITES (H): ICD-10-CM

## 2024-06-21 DIAGNOSIS — D47.2 MGUS (MONOCLONAL GAMMOPATHY OF UNKNOWN SIGNIFICANCE): ICD-10-CM

## 2024-06-21 DIAGNOSIS — K55.069 SUPERIOR MESENTERIC VEIN THROMBOSIS (H): ICD-10-CM

## 2024-06-21 DIAGNOSIS — Z79.899 HIGH RISK MEDICATIONS (NOT ANTICOAGULANTS) LONG-TERM USE: ICD-10-CM

## 2024-06-21 LAB
ALBUMIN SERPL BCG-MCNC: 4.1 G/DL (ref 3.5–5.2)
ALP SERPL-CCNC: 61 U/L (ref 40–150)
ALT SERPL W P-5'-P-CCNC: 24 U/L (ref 0–50)
ANION GAP SERPL CALCULATED.3IONS-SCNC: 8 MMOL/L (ref 7–15)
AST SERPL W P-5'-P-CCNC: 27 U/L (ref 0–45)
BASOPHILS # BLD AUTO: 0 10E3/UL (ref 0–0.2)
BASOPHILS NFR BLD AUTO: 1 %
BILIRUB SERPL-MCNC: 0.4 MG/DL
BUN SERPL-MCNC: 16.2 MG/DL (ref 8–23)
CALCIUM SERPL-MCNC: 9.7 MG/DL (ref 8.8–10.2)
CHLORIDE SERPL-SCNC: 103 MMOL/L (ref 98–107)
CREAT SERPL-MCNC: 0.88 MG/DL (ref 0.51–0.95)
CREAT SERPL-MCNC: 0.9 MG/DL (ref 0.51–0.95)
CRP SERPL-MCNC: <3 MG/L
D DIMER PPP FEU-MCNC: <=0.27 UG/ML FEU (ref 0–0.5)
DEPRECATED HCO3 PLAS-SCNC: 29 MMOL/L (ref 22–29)
EGFRCR SERPLBLD CKD-EPI 2021: 73 ML/MIN/1.73M2
EGFRCR SERPLBLD CKD-EPI 2021: 75 ML/MIN/1.73M2
EOSINOPHIL # BLD AUTO: 0 10E3/UL (ref 0–0.7)
EOSINOPHIL NFR BLD AUTO: 1 %
ERYTHROCYTE [DISTWIDTH] IN BLOOD BY AUTOMATED COUNT: 15.3 % (ref 10–15)
ERYTHROCYTE [SEDIMENTATION RATE] IN BLOOD BY WESTERGREN METHOD: 11 MM/HR (ref 0–30)
GLUCOSE SERPL-MCNC: 80 MG/DL (ref 70–99)
HCT VFR BLD AUTO: 41.5 % (ref 35–47)
HGB BLD-MCNC: 13.5 G/DL (ref 11.7–15.7)
HOLD SPECIMEN: NORMAL
IMM GRANULOCYTES # BLD: 0 10E3/UL
IMM GRANULOCYTES NFR BLD: 0 %
LDH SERPL L TO P-CCNC: 194 U/L (ref 0–250)
LYMPHOCYTES # BLD AUTO: 1.7 10E3/UL (ref 0.8–5.3)
LYMPHOCYTES NFR BLD AUTO: 46 %
MCH RBC QN AUTO: 28.1 PG (ref 26.5–33)
MCHC RBC AUTO-ENTMCNC: 32.5 G/DL (ref 31.5–36.5)
MCV RBC AUTO: 87 FL (ref 78–100)
MONOCYTES # BLD AUTO: 0.3 10E3/UL (ref 0–1.3)
MONOCYTES NFR BLD AUTO: 8 %
NEUTROPHILS # BLD AUTO: 1.6 10E3/UL (ref 1.6–8.3)
NEUTROPHILS NFR BLD AUTO: 45 %
NRBC # BLD AUTO: 0 10E3/UL
NRBC BLD AUTO-RTO: 0 /100
PLATELET # BLD AUTO: 197 10E3/UL (ref 150–450)
POTASSIUM SERPL-SCNC: 4.1 MMOL/L (ref 3.4–5.3)
PROT SERPL-MCNC: 7.4 G/DL (ref 6.4–8.3)
RBC # BLD AUTO: 4.8 10E6/UL (ref 3.8–5.2)
SODIUM SERPL-SCNC: 140 MMOL/L (ref 135–145)
TOTAL PROTEIN SERUM FOR ELP: 6.9 G/DL (ref 6.4–8.3)
WBC # BLD AUTO: 3.6 10E3/UL (ref 4–11)

## 2024-06-21 PROCEDURE — 82232 ASSAY OF BETA-2 PROTEIN: CPT | Mod: ZL

## 2024-06-21 PROCEDURE — 86334 IMMUNOFIX E-PHORESIS SERUM: CPT | Mod: 26 | Performed by: STUDENT IN AN ORGANIZED HEALTH CARE EDUCATION/TRAINING PROGRAM

## 2024-06-21 PROCEDURE — 36415 COLL VENOUS BLD VENIPUNCTURE: CPT | Mod: ZL

## 2024-06-21 PROCEDURE — 85810 BLOOD VISCOSITY EXAMINATION: CPT | Mod: ZL

## 2024-06-21 PROCEDURE — 85379 FIBRIN DEGRADATION QUANT: CPT | Mod: ZL

## 2024-06-21 PROCEDURE — 82565 ASSAY OF CREATININE: CPT | Mod: ZL

## 2024-06-21 PROCEDURE — 84165 PROTEIN E-PHORESIS SERUM: CPT | Mod: TC,ZL | Performed by: STUDENT IN AN ORGANIZED HEALTH CARE EDUCATION/TRAINING PROGRAM

## 2024-06-21 PROCEDURE — 85652 RBC SED RATE AUTOMATED: CPT | Mod: ZL

## 2024-06-21 PROCEDURE — 85004 AUTOMATED DIFF WBC COUNT: CPT | Mod: ZL

## 2024-06-21 PROCEDURE — 84155 ASSAY OF PROTEIN SERUM: CPT | Mod: ZL

## 2024-06-21 PROCEDURE — 86140 C-REACTIVE PROTEIN: CPT | Mod: ZL

## 2024-06-21 PROCEDURE — 84165 PROTEIN E-PHORESIS SERUM: CPT | Mod: 26 | Performed by: STUDENT IN AN ORGANIZED HEALTH CARE EDUCATION/TRAINING PROGRAM

## 2024-06-21 PROCEDURE — 86334 IMMUNOFIX E-PHORESIS SERUM: CPT | Mod: ZL | Performed by: STUDENT IN AN ORGANIZED HEALTH CARE EDUCATION/TRAINING PROGRAM

## 2024-06-21 PROCEDURE — 83615 LACTATE (LD) (LDH) ENZYME: CPT | Mod: ZL

## 2024-06-21 PROCEDURE — 83521 IG LIGHT CHAINS FREE EACH: CPT | Mod: ZL

## 2024-06-21 PROCEDURE — 82784 ASSAY IGA/IGD/IGG/IGM EACH: CPT | Mod: ZL

## 2024-06-24 LAB
B2 MICROGLOB TUMOR MARKER SER-MCNC: 2.3 MG/L
IGA SERPL-MCNC: 255 MG/DL (ref 84–499)
IGG SERPL-MCNC: 1287 MG/DL (ref 610–1616)
IGM SERPL-MCNC: 119 MG/DL (ref 35–242)
KAPPA LC FREE SER-MCNC: 3.43 MG/DL (ref 0.33–1.94)
KAPPA LC FREE/LAMBDA FREE SER NEPH: 1.79 {RATIO} (ref 0.26–1.65)
LAMBDA LC FREE SERPL-MCNC: 1.92 MG/DL (ref 0.57–2.63)
VISC SER: 1.6 CP (ref 1.4–1.8)

## 2024-06-25 LAB
ALBUMIN SERPL ELPH-MCNC: 3.9 G/DL (ref 3.7–5.1)
ALPHA1 GLOB SERPL ELPH-MCNC: 0.3 G/DL (ref 0.2–0.4)
ALPHA2 GLOB SERPL ELPH-MCNC: 0.7 G/DL (ref 0.5–0.9)
B-GLOBULIN SERPL ELPH-MCNC: 0.8 G/DL (ref 0.6–1)
GAMMA GLOB SERPL ELPH-MCNC: 1.3 G/DL (ref 0.7–1.6)
M PROTEIN SERPL ELPH-MCNC: 0 G/DL
PATH REPORT.COMMENTS IMP SPEC: NORMAL
PATH REPORT.COMMENTS IMP SPEC: NORMAL
PROT PATTERN SERPL ELPH-IMP: NORMAL
PROT PATTERN SERPL IFE-IMP: NORMAL

## 2024-07-15 ENCOUNTER — ONCOLOGY VISIT (OUTPATIENT)
Dept: ONCOLOGY | Facility: OTHER | Age: 59
End: 2024-07-15
Attending: NURSE PRACTITIONER
Payer: COMMERCIAL

## 2024-07-15 VITALS
HEART RATE: 66 BPM | WEIGHT: 283 LBS | DIASTOLIC BLOOD PRESSURE: 84 MMHG | SYSTOLIC BLOOD PRESSURE: 128 MMHG | RESPIRATION RATE: 16 BRPM | TEMPERATURE: 97.9 F | OXYGEN SATURATION: 99 % | BODY MASS INDEX: 41.19 KG/M2

## 2024-07-15 DIAGNOSIS — K55.069 SUPERIOR MESENTERIC VEIN THROMBOSIS (H): ICD-10-CM

## 2024-07-15 DIAGNOSIS — D47.2 MGUS (MONOCLONAL GAMMOPATHY OF UNKNOWN SIGNIFICANCE): Primary | ICD-10-CM

## 2024-07-15 PROCEDURE — 99214 OFFICE O/P EST MOD 30 MIN: CPT | Performed by: NURSE PRACTITIONER

## 2024-07-15 ASSESSMENT — PAIN SCALES - GENERAL: PAINLEVEL: MILD PAIN (2)

## 2024-07-15 NOTE — PROGRESS NOTES
Oncology Follow-up Visit:  July 15, 2024  Diagnosis:MGUS    History Of Present Illness:  Patient here for followup of MGUS. Patient was seen in consultation at the request of Dr. Victoria in 05/2020.  At that time, Ms. Washington was a 57-year-old white female with history of recurrent superficial thrombophlebitis, status post ablation, heterozygous for the MTHFR DNA, on aspirin.  She had developed pain and swelling, worsening involving the legs.  She was seen by Dr. Victoria, who noted right thigh superficial phlebitis continuing up to the right lateral hip area.  Bilateral venous Dopplers were performed on 02/08/2022, and there was no evidence of lower extremity DVT.  There was interrogation of deep venous structures in bilateral common femoral veins.  The veins of the proximal calf demonstrated normal appearance consistent with superficial venous thrombus identified within the varicosity in the medial right thigh.  These were repeated again on 02/18/2022, and the findings were that there were thrombosed superficial varicosities present in the area.  The patient already had complaints of abdominal pain.  CT abdomen and pelvis was ordered, and it was revealed that there was a new thrombus within a couple premesenteric veins, including the superior mesenteric vein just below the confluence of the splenic vein.  No bowel wall thickening, no definite findings of leg ischemia.  She was started on Xarelto by Dr. Victoria.  The patient also had been seen by Rheumatology, where blood workup including beta-2 glycoprotein IgM antibody was normal.  Phospholipid IgM antibody was elevated at 19.4.  Myeloperoxidase antibodies were negative.  NARCISO was positive.  We had seen the patient previously before for evaluation of recurrent superficial thrombophlebitis back on 12/19/2019.  We had ordered hypercoagulability workup at that time, and she was found to be heterozygous for the MTHFR DNA [id number] locus.  We recommended  vitamin B6, B12, folic acid supplementation and continue aspirin.  Since then, she had an ablation.  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  When we saw the patient, the plan was to continue anticoagulation.  CT abdomen and pelvis was repeated on 04/05/2022.  This included CT chest with no evidence of residual or recurrent mesenteric vein thrombosis as it had resolved.  There were no abnormalities in the lung fields.  Also further workup was obtained including serum protein electrophoresis, which revealed that her M spike was 0.3.  Monoclonal IgG immunoglobulin was lambda light chain.  At the time, we repeated thrombophilia workup, and there was no evidence of thrombophilia, including antithrombin III which was negative.  Cardiolipin antibodies were negative.  Beta-2 glycoprotein antibodies were negative contributing to thrombophilia. A skeletal bone survey was done on 05/23/2022.  Findings were that there were degenerative changes of the spine and hips, no lytic defects.  The patient was seen in the emergency room with abdominal pain.  CT abdomen and pelvis was performed on 05/17/2022.  There was no evidence of mesenteric vein thrombosis, no acute abnormality.  Her abdominal pain resolved.  When we saw the patient on 06/01/2022, we wanted to absolutely rule out myeloma by obtaining bone marrow aspiration biopsy.  This was performed on 07/14/2022 and was read as negative for involvement by plasma cell neoplasm or B-cell lymphoma.  There was normocellular bone marrow for age.  Storage iron could not be assessed.  Cytogenetics were negative for clonal chromosomal abnormality.  The patient continued on Xarelto.  When patient was seen on 07/29/2022, the plan was to repeat CT abdomen and pelvis and continue Xarelto indefinitely. We felt that her mesenteric vein thrombosis was likely due to hypercoagulable state including the MTHFR  "DNA mutation as well as underlying MGUS. CT abdomen and pelvis was performed on 11/29/2022 and the findings were that there was unchanged CT appearance of the abdomen and pelvis. There was no evidence of mesenteric vein thrombosis, it had resolved.  Labs done on 6/21/24 show a normal d-dimer. M-spike is normal at 0.0. WBC is slightly decreased at 3.6, ANC is normal. All other labs are stable. Patient states she occasionally has some tenderness in her abdomen with certain clothing causing pressure to the area. She states she has not noticed this within the past couple months. Patient has no other new concerns at this time.      Review Of Systems:  Review Of Systems  Eyes/Ears/Nose/Throat: denies new vision changes  Respiratory: No shortness of breath, dyspnea on exertion, cough  Cardiovascular: denies chest pain  Gastrointestinal: denies abdominal pain or bowel changes  Genitourinary: denies dysuria or hematuria  Musculoskeletal: reports ongoing arthritis pain, denies new bone pain  Neurologic: denies headaches, no dizziness  Hematologic/Lymphatic/Immunologic: reports recent covid infection, symptoms resolved      There are no exam notes on file for this visit.    Past medical, social, surgical, and family histories reviewed.    Allergies:  Allergies as of 07/15/2024    (No Known Allergies)       Current Medications:  Current Outpatient Medications   Medication Sig Dispense Refill    B-D INSULIN SYRINGE MICROFINE 27G X 5/8\" 1 ML MISC 1 EACH BY DOES NOT APPLY ROUTE ONE TIME A WEEK. USE TO DRAW UP METHOTREXATE 25 MG ONCE WEEKLY.      BD SAFETYGLIDE SYRINGE/NEEDLE 27G X 5/8\" 1 ML MISC 1 EACH BY DOES NOT APPLY ROUTE ONE TIME A WEEK. USE TO DRAW UP METHOTREXATE 25 MG ONCE WEEKLY.      cyanocobalamin (VITAMIN B-12) 1000 MCG tablet Take 1,000 mcg by mouth daily      famotidine (PEPCID) 20 MG tablet Take 1 tablet (20 mg) by mouth 2 times daily 180 tablet 3    folic acid (FOLVITE) 1 MG tablet Take 2 mg by mouth daily      " hydroxychloroquine (PLAQUENIL) 200 MG tablet Take 2 tablets (400 mg) by mouth daily Take 400 mg by mouth daily 90 tablet 0    methotrexate 50 MG/2ML injection Inject 1 mL Subcutaneous once a week      metoprolol succinate ER (TOPROL XL) 50 MG 24 hr tablet TAKE 1 TABLET BY MOUTH EVERY DAY 90 tablet 2    predniSONE (DELTASONE) 5 MG tablet Take 5 mg by mouth daily  0    rivaroxaban ANTICOAGULANT (XARELTO ANTICOAGULANT) 20 MG TABS tablet Take 1 tablet (20 mg) by mouth daily (with dinner) 90 tablet 3    thiamine (B-1) 100 MG tablet Take 100 mg by mouth daily       vitamin B6 (PYRIDOXINE) 200 MG tablet Take 200 mg by mouth daily          Physical Exam:  LMP  (LMP Unknown)     GENERAL APPEARANCE: 59 year old female, alert and no distress     NECK: no adenopathy, no asymmetry or masses     LYMPHATICS: No cervical, supraclavicular, axillary  lymphadenopathy     RESP: lungs clear to auscultation - no rales, rhonchi or wheezes     CARDIOVASCULAR: regular rates and rhythm, normal S1 S2     ABDOMEN:  soft, nontender, bowel sounds normal     MUSCULOSKELETAL: extremities normal- no gross deformities noted, No edema b/l LE.     SKIN: no suspicious lesions or rashes on exposed skin     PSYCHIATRIC: mentation appears normal and affect normal    Laboratory/Imaging Studies  No visits with results within 2 Week(s) from this visit.   Latest known visit with results is:   Lab on 06/21/2024   Component Date Value Ref Range Status    Sodium 06/21/2024 140  135 - 145 mmol/L Final    Reference intervals for this test were updated on 09/26/2023 to more accurately reflect our healthy population. There may be differences in the flagging of prior results with similar values performed with this method. Interpretation of those prior results can be made in the context of the updated reference intervals.     Potassium 06/21/2024 4.1  3.4 - 5.3 mmol/L Final    Carbon Dioxide (CO2) 06/21/2024 29  22 - 29 mmol/L Final    Anion Gap 06/21/2024 8  7 - 15  mmol/L Final    Urea Nitrogen 06/21/2024 16.2  8.0 - 23.0 mg/dL Final    Creatinine 06/21/2024 0.90  0.51 - 0.95 mg/dL Final    GFR Estimate 06/21/2024 73  >60 mL/min/1.73m2 Final    eGFR calculated using 2021 CKD-EPI equation.    Calcium 06/21/2024 9.7  8.8 - 10.2 mg/dL Final    Chloride 06/21/2024 103  98 - 107 mmol/L Final    Glucose 06/21/2024 80  70 - 99 mg/dL Final    Alkaline Phosphatase 06/21/2024 61  40 - 150 U/L Final    AST 06/21/2024 27  0 - 45 U/L Final    Reference intervals for this test were updated on 6/12/2023 to more accurately reflect our healthy population. There may be differences in the flagging of prior results with similar values performed with this method. Interpretation of those prior results can be made in the context of the updated reference intervals.    ALT 06/21/2024 24  0 - 50 U/L Final    Reference intervals for this test were updated on 6/12/2023 to more accurately reflect our healthy population. There may be differences in the flagging of prior results with similar values performed with this method. Interpretation of those prior results can be made in the context of the updated reference intervals.      Protein Total 06/21/2024 7.4  6.4 - 8.3 g/dL Final    Albumin 06/21/2024 4.1  3.5 - 5.2 g/dL Final    Bilirubin Total 06/21/2024 0.4  <=1.2 mg/dL Final    Lactate Dehydrogenase 06/21/2024 194  0 - 250 U/L Final    D-Dimer Quantitative 06/21/2024 <=0.27  0.00 - 0.50 ug/mL FEU Final    Kappa Free Light Chains 06/21/2024 3.43 (H)  0.33 - 1.94 mg/dL Final    Lambda Free Light Chains 06/21/2024 1.92  0.57 - 2.63 mg/dL Final    Kappa /Lambda Ratio 06/21/2024 1.79 (H)  0.26 - 1.65 Final    Immunofixation ELP 06/21/2024 No monoclonal protein seen on immunofixation. Pathologic significance requires clinical correlation.  Ambreen Odonnell M.D., Ph.D.   Final    Signout Location if Remote 06/21/2024 [email or web address]   Final    Beta-2-Microglobulin 06/21/2024 2.3 (H)  <2.3 mg/L Final    Immunoglobulin G  06/21/2024 1,287  610 - 1,616 mg/dL Final    Immunoglobulin A 06/21/2024 255  84 - 499 mg/dL Final    Immunoglobulin M 06/21/2024 119  35 - 242 mg/dL Final    Viscosity Index 06/21/2024 1.6  1.4 - 1.8 Final    WBC Count 06/21/2024 3.6 (L)  4.0 - 11.0 10e3/uL Final    RBC Count 06/21/2024 4.80  3.80 - 5.20 10e6/uL Final    Hemoglobin 06/21/2024 13.5  11.7 - 15.7 g/dL Final    Hematocrit 06/21/2024 41.5  35.0 - 47.0 % Final    MCV 06/21/2024 87  78 - 100 fL Final    MCH 06/21/2024 28.1  26.5 - 33.0 pg Final    MCHC 06/21/2024 32.5  31.5 - 36.5 g/dL Final    RDW 06/21/2024 15.3 (H)  10.0 - 15.0 % Final    Platelet Count 06/21/2024 197  150 - 450 10e3/uL Final    % Neutrophils 06/21/2024 45  % Final    % Lymphocytes 06/21/2024 46  % Final    % Monocytes 06/21/2024 8  % Final    % Eosinophils 06/21/2024 1  % Final    % Basophils 06/21/2024 1  % Final    % Immature Granulocytes 06/21/2024 0  % Final    NRBCs per 100 WBC 06/21/2024 0  <1 /100 Final    Absolute Neutrophils 06/21/2024 1.6  1.6 - 8.3 10e3/uL Final    Absolute Lymphocytes 06/21/2024 1.7  0.8 - 5.3 10e3/uL Final    Absolute Monocytes 06/21/2024 0.3  0.0 - 1.3 10e3/uL Final    Absolute Eosinophils 06/21/2024 0.0  0.0 - 0.7 10e3/uL Final    Absolute Basophils 06/21/2024 0.0  0.0 - 0.2 10e3/uL Final    Absolute Immature Granulocytes 06/21/2024 0.0  <=0.4 10e3/uL Final    Absolute NRBCs 06/21/2024 0.0  10e3/uL Final    Total Protein Serum for ELP 06/21/2024 6.9  6.4 - 8.3 g/dL Final    Albumin 06/21/2024 3.9  3.7 - 5.1 g/dL Final    Alpha 1 06/21/2024 0.3  0.2 - 0.4 g/dL Final    Alpha 2 06/21/2024 0.7  0.5 - 0.9 g/dL Final    Beta Globulin 06/21/2024 0.8  0.6 - 1.0 g/dL Final    Gamma Globulin 06/21/2024 1.3  0.7 - 1.6 g/dL Final    Monoclonal Peak 06/21/2024 0.0  <=0.0 g/dL Final    ELP Interpretation 06/21/2024 Essentially normal electrophoretic pattern. No obvious monoclonal proteins seen. Pathologic significance requires clinical correlation. Ambreen Odonnell M.D.,  Ph.D.   Final    Signout Location if Remote 06/21/2024 [email or web address]   Final    CRP Inflammation 06/21/2024 <3.00  <5.00 mg/L Final    Erythrocyte Sedimentation Rate 06/21/2024 11  0 - 30 mm/hr Final    Creatinine 06/21/2024 0.88  0.51 - 0.95 mg/dL Final    GFR Estimate 06/21/2024 75  >60 mL/min/1.73m2 Final    eGFR calculated using 2021 CKD-EPI equation.    Hold Specimen 06/21/2024 [facility]   Final        ASSESSMENT/PLAN:  1.  MGUS.  Bone marrow aspiration biopsy was negative for myeloma.  Skeletal bone survey was negative for lytic defects. Labs were done on 6/21/24. M spike is normal at 0.0.  No evidence of end-organ damage.  Plan is to continue surveillance.  We will see the patient in 6 months and repeat MGUS labs.     2.  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  No evidence of metastatic process. D-dimer is normal.  She will continue on Xarelto indefinitely. The plan is to see the patient in 6 months and repeat D-dimer.     3. Neutropenia. Labs done on 6/6/23 show a slightly decreased wbc and anc. A repeat CBC on 6/22/23 was normal. Labs from 6/21/24 show a slightly decreased wbc of 3.4, the rest of the CBC was normal. Will continue to monitor.     Thirty six minutes spent with this encounter with time spent reviewing patient records, counseling patient regarding disease process, interpretation and review of labs with patient, discussing plan for ongoing follow up, obtaining a review of systems, performing a physical exam, ordering labs, documenting in EHR and coordination of care

## 2024-07-15 NOTE — NURSING NOTE
"Oncology Rooming Note    July 15, 2024 2:18 PM   Phyllis Washington is a 59 year old female who presents for:    Chief Complaint   Patient presents with    Hematology     6 month follow up MGUS     Initial Vitals: /84 (BP Location: Right arm, Patient Position: Sitting, Cuff Size: Adult Large)   Pulse 66   Temp 97.9  F (36.6  C) (Tympanic)   Resp 16   Wt 128.4 kg (283 lb)   LMP  (LMP Unknown)   SpO2 99%   BMI 41.19 kg/m   Estimated body mass index is 41.19 kg/m  as calculated from the following:    Height as of 3/27/24: 1.765 m (5' 9.5\").    Weight as of this encounter: 128.4 kg (283 lb). Body surface area is 2.51 meters squared.  Mild Pain (2) Comment: Data Unavailable   No LMP recorded (lmp unknown). Patient is postmenopausal.  Allergies reviewed: Yes  Medications reviewed: Yes    Medications: Medication refills not needed today.  Pharmacy name entered into [facility]:    [id number] - [place], [place] - [address]  [id number] - [place], [place] - [address]    Frailty Screening:   Is the patient here for a new oncology consult visit in cancer care? 2. No      Clinical concerns: no       Ambreen Odonnell LPN            "

## 2024-10-29 ENCOUNTER — HOSPITAL ENCOUNTER (OUTPATIENT)
Dept: ULTRASOUND IMAGING | Facility: OTHER | Age: 59
Discharge: HOME | End: 2024-10-29
Attending: INTERNAL MEDICINE | Admitting: INTERNAL MEDICINE
Payer: COMMERCIAL

## 2024-10-29 DIAGNOSIS — N28.9 RENAL INSUFFICIENCY: ICD-10-CM

## 2024-10-29 DIAGNOSIS — R74.01 ELEVATED TRANSAMINASE LEVEL: ICD-10-CM

## 2024-10-29 PROCEDURE — 76981 USE PARENCHYMA: CPT

## 2024-12-11 ENCOUNTER — LAB (OUTPATIENT)
Dept: LAB | Facility: OTHER | Age: 59
End: 2024-12-11
Attending: FAMILY MEDICINE
Payer: COMMERCIAL

## 2024-12-11 DIAGNOSIS — R74.01 ELEVATED TRANSAMINASE LEVEL: ICD-10-CM

## 2024-12-11 DIAGNOSIS — M05.7A: Primary | ICD-10-CM

## 2024-12-11 DIAGNOSIS — Z79.899 ENCOUNTER FOR LONG-TERM (CURRENT) USE OF MEDICATIONS: ICD-10-CM

## 2024-12-11 DIAGNOSIS — N28.9 RENAL INSUFFICIENCY: ICD-10-CM

## 2024-12-11 LAB
ALBUMIN SERPL BCG-MCNC: 4 G/DL (ref 3.5–5.2)
ALP SERPL-CCNC: 68 U/L (ref 40–150)
ALT SERPL W P-5'-P-CCNC: 21 U/L (ref 0–50)
ANION GAP SERPL CALCULATED.3IONS-SCNC: 5 MMOL/L (ref 7–15)
AST SERPL W P-5'-P-CCNC: 25 U/L (ref 0–45)
BASOPHILS # BLD AUTO: 0 10E3/UL (ref 0–0.2)
BASOPHILS NFR BLD AUTO: 1 %
BILIRUB SERPL-MCNC: 0.4 MG/DL
BUN SERPL-MCNC: 27 MG/DL (ref 8–23)
CALCIUM SERPL-MCNC: 9.3 MG/DL (ref 8.8–10.4)
CHLORIDE SERPL-SCNC: 103 MMOL/L (ref 98–107)
CREAT SERPL-MCNC: 1.05 MG/DL (ref 0.51–0.95)
CRP SERPL-MCNC: <3 MG/L
EGFRCR SERPLBLD CKD-EPI 2021: 61 ML/MIN/1.73M2
EOSINOPHIL # BLD AUTO: 0 10E3/UL (ref 0–0.7)
EOSINOPHIL NFR BLD AUTO: 0 %
ERYTHROCYTE [DISTWIDTH] IN BLOOD BY AUTOMATED COUNT: 14.6 % (ref 10–15)
ERYTHROCYTE [SEDIMENTATION RATE] IN BLOOD BY WESTERGREN METHOD: 6 MM/HR (ref 0–30)
GLUCOSE SERPL-MCNC: 108 MG/DL (ref 70–99)
HCO3 SERPL-SCNC: 29 MMOL/L (ref 22–29)
HCT VFR BLD AUTO: 39.3 % (ref 35–47)
HGB BLD-MCNC: 12.6 G/DL (ref 11.7–15.7)
IMM GRANULOCYTES # BLD: 0 10E3/UL
IMM GRANULOCYTES NFR BLD: 0 %
LYMPHOCYTES # BLD AUTO: 1.6 10E3/UL (ref 0.8–5.3)
LYMPHOCYTES NFR BLD AUTO: 30 %
MCH RBC QN AUTO: 27.7 PG (ref 26.5–33)
MCHC RBC AUTO-ENTMCNC: 32.1 G/DL (ref 31.5–36.5)
MCV RBC AUTO: 86 FL (ref 78–100)
MONOCYTES # BLD AUTO: 0.3 10E3/UL (ref 0–1.3)
MONOCYTES NFR BLD AUTO: 6 %
NEUTROPHILS # BLD AUTO: 3.3 10E3/UL (ref 1.6–8.3)
NEUTROPHILS NFR BLD AUTO: 64 %
NRBC # BLD AUTO: 0 10E3/UL
NRBC BLD AUTO-RTO: 0 /100
PLATELET # BLD AUTO: 253 10E3/UL (ref 150–450)
POTASSIUM SERPL-SCNC: 4.5 MMOL/L (ref 3.4–5.3)
PROT SERPL-MCNC: 7.1 G/DL (ref 6.4–8.3)
RBC # BLD AUTO: 4.55 10E6/UL (ref 3.8–5.2)
SODIUM SERPL-SCNC: 137 MMOL/L (ref 135–145)
WBC # BLD AUTO: 5.2 10E3/UL (ref 4–11)

## 2024-12-11 PROCEDURE — 86140 C-REACTIVE PROTEIN: CPT | Mod: ZL

## 2024-12-11 PROCEDURE — 36415 COLL VENOUS BLD VENIPUNCTURE: CPT | Mod: ZL

## 2024-12-11 PROCEDURE — 84460 ALANINE AMINO (ALT) (SGPT): CPT | Mod: ZL

## 2024-12-11 PROCEDURE — 84155 ASSAY OF PROTEIN SERUM: CPT | Mod: ZL

## 2024-12-11 PROCEDURE — 85652 RBC SED RATE AUTOMATED: CPT | Mod: ZL

## 2024-12-11 PROCEDURE — 85004 AUTOMATED DIFF WBC COUNT: CPT | Mod: ZL

## 2024-12-11 PROCEDURE — 84450 TRANSFERASE (AST) (SGOT): CPT | Mod: ZL

## 2024-12-11 PROCEDURE — 85041 AUTOMATED RBC COUNT: CPT | Mod: ZL

## 2024-12-16 DIAGNOSIS — R00.2 PALPITATIONS: ICD-10-CM

## 2024-12-16 DIAGNOSIS — I49.3 PVC'S (PREMATURE VENTRICULAR CONTRACTIONS): ICD-10-CM

## 2024-12-16 DIAGNOSIS — I49.8 VENTRICULAR BIGEMINY: ICD-10-CM

## 2024-12-16 DIAGNOSIS — I47.10 SVT (SUPRAVENTRICULAR TACHYCARDIA) (H): ICD-10-CM

## 2024-12-16 RX ORDER — METOPROLOL SUCCINATE 50 MG/1
TABLET, EXTENDED RELEASE ORAL
Qty: 90 TABLET | Refills: 0 | Status: SHIPPED | OUTPATIENT
Start: 2024-12-16

## 2024-12-16 NOTE — TELEPHONE ENCOUNTER
"Requested Prescriptions   Pending Prescriptions Disp Refills    metoprolol succinate ER (TOPROL XL) 50 MG 24 hr tablet [Pharmacy Med Name: METOPROLOL ER SUCCINATE 50MG TABS] 90 tablet 2     Sig: TAKE 1 TABLET BY MOUTH EVERY DAY       Beta-Blockers Protocol Passed - 12/16/2024  3:52 PM        Passed - Most recent blood pressure under 140/90 in past 12 months     BP Readings from Last 3 Encounters:   07/15/24 128/84   03/27/24 126/78   02/14/24 132/80   No data recorded        Passed - Patient is age 6 or older        Passed - Medication is active on med list        Passed - Medication indicated for associated diagnosis     Medication is associated with one or more of the following diagnoses:     Hypertension (HTN)   Atrial fibrillation/flutter   Angina   ASCVD   Migraine   Heart Failure   Tremor   Anxiety   Ocular hypertension   Glaucoma   PTSD   Obstructive hypertrophic cardiomyopathy   History of myocarditis   Palpitations   POTS (postural orthostatic tachycardia syndrome)   SVT (supraventricular tachycardia)   Hyperthyroidism   Tachycardia   Acute non-st segment elevation myocardial infarction   Subsequent non-st segment elevation myocardial infarction   Ischemic myocardial dysfunction        Passed - Recent (12 mo) or future (90 days) visit within the authorizing provider's specialty     The patient must have completed an in-person or virtual visit within the past 12 months or has a future visit scheduled within the next 90 days with the authorizing provider s specialty.  Urgent care and e-visits do not qualify as an office visit for this protocol.     Last Written Prescription Date:  1/19/24  Last Fill Quantity: 90,   # refills: 2    Last Office Visit: 3/27/24 and note states: \"Palpitations well controlled on Toprol-XL 50 mg daily\"  Future Office visit:    Next 5 appointments (look out 90 days)      Jan 14, 2025 11:00 AM  Return Visit with Mary Ojeda MD  [facility] ([facility] " [facility] [address]  [place] [address]  [phone]     Prescription approved per [facility] Refill Protocol.   Ann Lee RN ....................  12/16/2024   3:52 PM

## 2024-12-19 ENCOUNTER — PATIENT OUTREACH (OUTPATIENT)
Dept: CARE COORDINATION | Facility: CLINIC | Age: 59
End: 2024-12-19
Payer: COMMERCIAL

## 2025-01-07 ENCOUNTER — LAB (OUTPATIENT)
Dept: LAB | Facility: OTHER | Age: 60
End: 2025-01-07
Attending: NURSE PRACTITIONER
Payer: COMMERCIAL

## 2025-01-07 DIAGNOSIS — D47.2 MGUS (MONOCLONAL GAMMOPATHY OF UNKNOWN SIGNIFICANCE): ICD-10-CM

## 2025-01-07 DIAGNOSIS — K55.069 SUPERIOR MESENTERIC VEIN THROMBOSIS: ICD-10-CM

## 2025-01-07 LAB
ALBUMIN SERPL BCG-MCNC: 4 G/DL (ref 3.5–5.2)
ALP SERPL-CCNC: 63 U/L (ref 40–150)
ALT SERPL W P-5'-P-CCNC: 24 U/L (ref 0–50)
ANION GAP SERPL CALCULATED.3IONS-SCNC: 8 MMOL/L (ref 7–15)
AST SERPL W P-5'-P-CCNC: 25 U/L (ref 0–45)
BASOPHILS # BLD AUTO: 0 10E3/UL (ref 0–0.2)
BASOPHILS NFR BLD AUTO: 1 %
BILIRUB SERPL-MCNC: 0.4 MG/DL
BUN SERPL-MCNC: 20.6 MG/DL (ref 8–23)
CALCIUM SERPL-MCNC: 9.4 MG/DL (ref 8.8–10.4)
CHLORIDE SERPL-SCNC: 104 MMOL/L (ref 98–107)
CREAT SERPL-MCNC: 0.95 MG/DL (ref 0.51–0.95)
D DIMER PPP FEU-MCNC: <=0.27 UG/ML FEU (ref 0–0.5)
EGFRCR SERPLBLD CKD-EPI 2021: 68 ML/MIN/1.73M2
EOSINOPHIL # BLD AUTO: 0 10E3/UL (ref 0–0.7)
EOSINOPHIL NFR BLD AUTO: 0 %
ERYTHROCYTE [DISTWIDTH] IN BLOOD BY AUTOMATED COUNT: 14.9 % (ref 10–15)
GLUCOSE SERPL-MCNC: 77 MG/DL (ref 70–99)
HCO3 SERPL-SCNC: 29 MMOL/L (ref 22–29)
HCT VFR BLD AUTO: 40.4 % (ref 35–47)
HGB BLD-MCNC: 13.3 G/DL (ref 11.7–15.7)
IMM GRANULOCYTES # BLD: 0 10E3/UL
IMM GRANULOCYTES NFR BLD: 0 %
LDH SERPL L TO P-CCNC: 184 U/L (ref 0–250)
LYMPHOCYTES # BLD AUTO: 1.9 10E3/UL (ref 0.8–5.3)
LYMPHOCYTES NFR BLD AUTO: 40 %
MCH RBC QN AUTO: 28 PG (ref 26.5–33)
MCHC RBC AUTO-ENTMCNC: 32.9 G/DL (ref 31.5–36.5)
MCV RBC AUTO: 85 FL (ref 78–100)
MONOCYTES # BLD AUTO: 0.4 10E3/UL (ref 0–1.3)
MONOCYTES NFR BLD AUTO: 9 %
NEUTROPHILS # BLD AUTO: 2.4 10E3/UL (ref 1.6–8.3)
NEUTROPHILS NFR BLD AUTO: 50 %
NRBC # BLD AUTO: 0 10E3/UL
NRBC BLD AUTO-RTO: 0 /100
PLATELET # BLD AUTO: 238 10E3/UL (ref 150–450)
POTASSIUM SERPL-SCNC: 4.7 MMOL/L (ref 3.4–5.3)
PROT SERPL-MCNC: 7.1 G/DL (ref 6.4–8.3)
RBC # BLD AUTO: 4.75 10E6/UL (ref 3.8–5.2)
SODIUM SERPL-SCNC: 141 MMOL/L (ref 135–145)
TOTAL PROTEIN SERUM FOR ELP: 6.9 G/DL (ref 6.4–8.3)
WBC # BLD AUTO: 4.9 10E3/UL (ref 4–11)

## 2025-01-07 PROCEDURE — 86334 IMMUNOFIX E-PHORESIS SERUM: CPT | Mod: 26 | Performed by: STUDENT IN AN ORGANIZED HEALTH CARE EDUCATION/TRAINING PROGRAM

## 2025-01-07 PROCEDURE — 83615 LACTATE (LD) (LDH) ENZYME: CPT | Mod: ZL

## 2025-01-07 PROCEDURE — 82784 ASSAY IGA/IGD/IGG/IGM EACH: CPT | Mod: ZL

## 2025-01-07 PROCEDURE — 83521 IG LIGHT CHAINS FREE EACH: CPT | Mod: ZL

## 2025-01-07 PROCEDURE — 85810 BLOOD VISCOSITY EXAMINATION: CPT | Mod: ZL

## 2025-01-07 PROCEDURE — 36415 COLL VENOUS BLD VENIPUNCTURE: CPT | Mod: ZL

## 2025-01-07 PROCEDURE — 80053 COMPREHEN METABOLIC PANEL: CPT | Mod: ZL

## 2025-01-07 PROCEDURE — 85379 FIBRIN DEGRADATION QUANT: CPT | Mod: ZL

## 2025-01-07 PROCEDURE — 84165 PROTEIN E-PHORESIS SERUM: CPT | Mod: 26 | Performed by: STUDENT IN AN ORGANIZED HEALTH CARE EDUCATION/TRAINING PROGRAM

## 2025-01-07 PROCEDURE — 84155 ASSAY OF PROTEIN SERUM: CPT | Mod: ZL

## 2025-01-07 PROCEDURE — 85004 AUTOMATED DIFF WBC COUNT: CPT | Mod: ZL

## 2025-01-07 PROCEDURE — 86334 IMMUNOFIX E-PHORESIS SERUM: CPT | Mod: ZL | Performed by: STUDENT IN AN ORGANIZED HEALTH CARE EDUCATION/TRAINING PROGRAM

## 2025-01-07 PROCEDURE — 82232 ASSAY OF BETA-2 PROTEIN: CPT | Mod: ZL

## 2025-01-07 PROCEDURE — 85014 HEMATOCRIT: CPT | Mod: ZL

## 2025-01-07 PROCEDURE — 84165 PROTEIN E-PHORESIS SERUM: CPT | Mod: TC,ZL | Performed by: STUDENT IN AN ORGANIZED HEALTH CARE EDUCATION/TRAINING PROGRAM

## 2025-01-08 LAB
B2 MICROGLOB TUMOR MARKER SER-MCNC: 2.4 MG/L
IGA SERPL-MCNC: 258 MG/DL (ref 84–499)
IGG SERPL-MCNC: 1230 MG/DL (ref 610–1616)
IGM SERPL-MCNC: 134 MG/DL (ref 35–242)
KAPPA LC FREE SER-MCNC: 3.58 MG/DL (ref 0.33–1.94)
KAPPA LC FREE/LAMBDA FREE SER NEPH: 1.4 {RATIO} (ref 0.26–1.65)
LAMBDA LC FREE SERPL-MCNC: 2.56 MG/DL (ref 0.57–2.63)
VISC SER: 1.5 CP (ref 1.4–1.8)

## 2025-01-09 LAB
ALBUMIN SERPL ELPH-MCNC: 3.9 G/DL (ref 3.7–5.1)
ALPHA1 GLOB SERPL ELPH-MCNC: 0.3 G/DL (ref 0.2–0.4)
ALPHA2 GLOB SERPL ELPH-MCNC: 0.7 G/DL (ref 0.5–0.9)
B-GLOBULIN SERPL ELPH-MCNC: 0.8 G/DL (ref 0.6–1)
GAMMA GLOB SERPL ELPH-MCNC: 1.2 G/DL (ref 0.7–1.6)
LOCATION OF TASK: NORMAL
LOCATION OF TASK: NORMAL
M PROTEIN SERPL ELPH-MCNC: 0 G/DL
PROT PATTERN SERPL ELPH-IMP: NORMAL
PROT PATTERN SERPL IFE-IMP: NORMAL

## 2025-01-13 ENCOUNTER — ONCOLOGY VISIT (OUTPATIENT)
Dept: ONCOLOGY | Facility: OTHER | Age: 60
End: 2025-01-13
Attending: NURSE PRACTITIONER
Payer: COMMERCIAL

## 2025-01-13 VITALS
OXYGEN SATURATION: 97 % | TEMPERATURE: 96.6 F | WEIGHT: 286 LBS | RESPIRATION RATE: 18 BRPM | BODY MASS INDEX: 41.63 KG/M2 | HEART RATE: 60 BPM | SYSTOLIC BLOOD PRESSURE: 124 MMHG | DIASTOLIC BLOOD PRESSURE: 82 MMHG

## 2025-01-13 DIAGNOSIS — K55.069 SUPERIOR MESENTERIC VEIN THROMBOSIS: ICD-10-CM

## 2025-01-13 DIAGNOSIS — D47.2 MGUS (MONOCLONAL GAMMOPATHY OF UNKNOWN SIGNIFICANCE): Primary | ICD-10-CM

## 2025-01-13 PROCEDURE — 99214 OFFICE O/P EST MOD 30 MIN: CPT | Performed by: NURSE PRACTITIONER

## 2025-01-13 ASSESSMENT — PAIN SCALES - GENERAL: PAINLEVEL_OUTOF10: MILD PAIN (2)

## 2025-01-13 NOTE — PROGRESS NOTES
Oncology Follow-up Visit    Reason for Visit:  Phyllis is a 59 year old woman with a Hx of MGUS, who presents to the clinic today for routine follow-up.    Nursing Note and documentation reviewed: Yes    Interval History: Doing well. No big concerns today. Unsure if she told Malissa but notes that she was dealing with diarrhea which started about 10 months ago. UTD on colonoscopy, last in 11/2022. No hematochezia or melana. For diarrhea, she watched diet and tried acupuncture which has actually helped. Now notes an episode maybe every 2-3 weeks. Otherwise not recent signs of infection. No bleeding concerns. At times, will note periodic tenderness across abdomen (superficial) but no evidence of clots. No symptoms of DVT. Taking Xarelto, B12, B6 and folic acid daily. Eating well. Energy stable. Overall, doing well.     Oncologic History:     Patient has previously been followed by Malissa Hawkins NP and Dr. Ojeda. For full details see their prior notes.    Ms. Washington had a history of recurrent superficial thrombophlebitis, status post ablation, heterozygous for the MTHFR DNA, on aspirin.  She had developed pain and swelling, worsening involving the legs.  She was seen by Dr. Victoria, who noted right thigh superficial phlebitis continuing up to the right lateral hip area.  Bilateral venous Dopplers were performed on 02/08/2022, and there was no evidence of lower extremity DVT.  There was interrogation of deep venous structures in bilateral common femoral veins.  The veins of the proximal calf demonstrated normal appearance consistent with superficial venous thrombus identified within the varicosity in the medial right thigh. These were repeated again on 02/18/2022, and the findings were that there were thrombosed superficial varicosities present in the area.  The patient already had complaints of abdominal pain.  CT abdomen and pelvis was ordered, and it was revealed that there was a new thrombus within a  couple premesenteric veins, including the superior mesenteric vein just below the confluence of the splenic vein.  No bowel wall thickening, no definite findings of leg ischemia.  She was started on Xarelto by Dr. Victoria.      The patient also had been seen by Rheumatology, where blood workup including beta-2 glycoprotein IgM antibody was normal.  Phospholipid IgM antibody was elevated at 19.4.  Myeloperoxidase antibodies were negative.  NARCISO was positive.      Dr. Ojeda had seen the patient previously before for evaluation of recurrent superficial thrombophlebitis back on 12/19/2019.  We had ordered hypercoagulability workup at that time, and she was found to be heterozygous for the MTHFR DNA [id number] locus.  We recommended vitamin B6, B12, folic acid supplementation and continue aspirin. Since then, she had an ablation.  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      When we saw the patient, the plan was to continue anticoagulation.  CT abdomen and pelvis was repeated on 04/05/2022.  This included CT chest with no evidence of residual or recurrent mesenteric vein thrombosis as it had resolved.  There were no abnormalities in the lung fields.  Also further workup was obtained including serum protein electrophoresis, which revealed that her M spike was 0.3.  Monoclonal IgG immunoglobulin was lambda light chain.  At the time, we repeated thrombophilia workup, and there was no evidence of thrombophilia, including antithrombin III which was negative.  Cardiolipin antibodies were negative.  Beta-2 glycoprotein antibodies were negative contributing to thrombophilia. A skeletal bone survey was done on 05/23/2022.  Findings were that there were degenerative changes of the spine and hips, no lytic defects.  The patient was seen in the emergency room with abdominal pain.  CT abdomen and pelvis was performed on 05/17/2022.  There was  no evidence of mesenteric vein thrombosis, no acute abnormality.  Her abdominal pain resolved.      When we saw the patient on 06/01/2022, we wanted to absolutely rule out myeloma by obtaining bone marrow aspiration biopsy.  This was performed on 07/14/2022 and was read as negative for involvement by plasma cell neoplasm or B-cell lymphoma.  There was normocellular bone marrow for age.  Storage iron could not be assessed. Cytogenetics were negative for clonal chromosomal abnormality.  The patient continued on Xarelto.  When patient was seen on 07/29/2022, the plan was to repeat CT abdomen and pelvis and continue Xarelto indefinitely. We felt that her mesenteric vein thrombosis was likely due to hypercoagulable state including the MTHFR DNA mutation as well as underlying MGUS. CT abdomen and pelvis was performed on 11/29/2022 and the findings were that there was unchanged CT appearance of the abdomen and pelvis. There was no evidence of mesenteric vein thrombosis, it had resolved.  Labs done on 6/21/24 show a normal d-dimer. M-spike is normal at 0.0. WBC is slightly decreased at 3.6, ANC is normal. All other labs are stable.     Current TX:  MGUS: None, surveillance  MTHFR DNA mutation with recurrent clots: Xarelto indefinitely with folic acid, B12, B6, ASA       Past Medical History:   Diagnosis Date    Personal history of other medical treatment (CODE)     3 normal childbirths    Rheumatoid arthritis (H)        Past Surgical History:   Procedure Laterality Date    BONE MARROW BIOPSY, BONE SPECIMEN, NEEDLE/TROCAR Left 07/14/2022    Procedure: BIOPSY, BONE MARROW;  Surgeon: Glory Baig MD;  Location:  OR    COLONOSCOPY  2017    Dr. Fofana - 2 polyps found.  Told to f/u in 5 years.    COLONOSCOPY  11/17/2022    Ct - polyp with path pending - 5 year f/u rec'd    RELEASE TARSAL TUNNEL FOOT Right 10/05/2022    Procedure: RELEASE, TARSAL TUNNEL AND PLANTAT FASCIOTOMY;  Surgeon: Shiva Finch DPM;  Location:   OR       Family History   Problem Relation Age of Onset    Hypertension Father         Hypertension,67    Other - See Comments Maternal Grandmother         Stroke,85    Other - See Comments Paternal Grandmother         Alzheimer's 73    Breast Cancer Mother 46        Cancer-breast,now 68    Cerebrovascular Disease Mother         stroke with hemiparesis 2017    Diabetes Brother 16        Diabetes,now 41       Social History     Socioeconomic History    Marital status:      Spouse name: Not on file    Number of children: Not on file    Years of education: Not on file    Highest education level: Not on file   Occupational History    Not on file   Tobacco Use    Smoking status: Never     Passive exposure: Past    Smokeless tobacco: Never    Tobacco comments:     In childhood home   Vaping Use    Vaping status: Never Used   Substance and Sexual Activity    Alcohol use: Yes     Comment: 1 per month    Drug use: Never    Sexual activity: Yes     Partners: Male     Birth control/protection: Rhythm   Other Topics Concern    Parent/sibling w/ CABG, MI or angioplasty before 65F 55M? Not Asked   Social History Narrative    , 3 adult children;     Homemaker; hobbies - reading, crafts.      - Demetrio         Social Drivers of Health     Financial Resource Strain: Low Risk  (1/18/2024)    Financial Resource Strain     Within the past 12 months, have you or your family members you live with been unable to get utilities (heat, electricity) when it was really needed?: No   Food Insecurity: Not on File (9/26/2024)    Received from [facility]    Food Insecurity     Food: 0   Transportation Needs: Low Risk  (1/18/2024)    Transportation Needs     Within the past 12 months, has lack of transportation kept you from medical appointments, getting your medicines, non-medical meetings or appointments, work, or from getting things that you need?: No   Physical Activity: Not on File (7/31/2023)    [facility] from [facility]     "Physical Activity     Physical Activity: 0   Stress: Not on File (7/31/2023)    Received from [facility]    Stress     Stress: 0   Social Connections: Not on File (9/15/2024)    Received from [facility]    Social Connections     Connectedness: 0   Interpersonal Safety: Low Risk  (7/15/2024)    Interpersonal Safety     Do you feel physically and emotionally safe where you currently live?: Yes     Within the past 12 months, have you been hit, slapped, kicked or otherwise physically hurt by someone?: No     Within the past 12 months, have you been humiliated or emotionally abused in other ways by your partner or ex-partner?: No   Housing Stability: Low Risk  (1/18/2024)    Housing Stability     Do you have housing? : Yes     Are you worried about losing your housing?: No       Current Outpatient Medications   Medication Sig Dispense Refill    B-D INSULIN SYRINGE MICROFINE 27G X 5/8\" 1 ML MISC 1 EACH BY DOES NOT APPLY ROUTE ONE TIME A WEEK. USE TO DRAW UP METHOTREXATE 25 MG ONCE WEEKLY.      BD SAFETYGLIDE SYRINGE/NEEDLE 27G X 5/8\" 1 ML MISC 1 EACH BY DOES NOT APPLY ROUTE ONE TIME A WEEK. USE TO DRAW UP METHOTREXATE 25 MG ONCE WEEKLY.      cyanocobalamin (VITAMIN B-12) 1000 MCG tablet Take 1,000 mcg by mouth daily      famotidine (PEPCID) 20 MG tablet Take 1 tablet (20 mg) by mouth 2 times daily 180 tablet 3    folic acid (FOLVITE) 1 MG tablet Take 2 mg by mouth daily      hydroxychloroquine (PLAQUENIL) 200 MG tablet Take 2 tablets (400 mg) by mouth daily Take 400 mg by mouth daily 90 tablet 0    methotrexate 50 MG/2ML injection Inject 1 mL Subcutaneous once a week      metoprolol succinate ER (TOPROL XL) 50 MG 24 hr tablet TAKE 1 TABLET BY MOUTH EVERY DAY 90 tablet 0    predniSONE (DELTASONE) 5 MG tablet Take 5 mg by mouth daily (Patient not taking: Reported on 7/15/2024)  0    rivaroxaban ANTICOAGULANT (XARELTO ANTICOAGULANT) 20 MG TABS tablet Take 1 tablet (20 mg) by mouth daily (with dinner) 90 tablet 3    thiamine " (B-1) 100 MG tablet Take 100 mg by mouth daily       vitamin B6 (PYRIDOXINE) 200 MG tablet Take 200 mg by mouth daily       No current facility-administered medications for this visit.        No Known Allergies    Review Of Systems:  A complete review of systems is negative except for the above mentioned items in the interval history.     ECOG Performance Status: 0    Physical Exam:  LMP  (LMP Unknown)   GENERAL APPEARANCE: Healthy, alert and in no acute distress.  HEENT: Eyes appear normal without scleral icterus. Extraocular movements intact.   NECK:   Supple with normal range of motion. No asymmetry or masses.  LYMPHATICS: No palpable cervical, supraclavicular nodes.   RESP: Lungs clear to auscultation bilaterally, respirations regular and easy.  CARDIOVASCULAR: Regular rate and rhythm. Normal S1, S2; no murmur, gallop, or rub.  MUSCULOSKELETAL: Extremities without gross deformities noted. No edema of bilateral lower extremities.Varicose veins present BLE.   SKIN: No suspicious lesions or rashes.  NEURO: Alert and oriented x 3.  Gait steady.  PSYCHIATRIC: Mentation and affect appear normal.  Mood appropriate.    Laboratory:  Component      Latest Ref [facility] 1/7/2025  10:28 AM   WBC      4.0 - 11.0 10e3/uL 4.9    RBC Count      3.80 - 5.20 10e6/uL 4.75    Hemoglobin      11.7 - 15.7 g/dL 13.3    Hematocrit      35.0 - 47.0 % 40.4    MCV      78 - 100 fL 85    MCH      26.5 - 33.0 pg 28.0    MCHC      31.5 - 36.5 g/dL 32.9    RDW      10.0 - 15.0 % 14.9    Platelet Count      150 - 450 10e3/uL 238    % Neutrophils      % 50    % Lymphocytes      % 40    % Monocytes      % 9    % Eosinophils      % 0    % Basophils      % 1    % Immature Granulocytes      % 0    NRBCs per 100 WBC      <1 /100 0    Absolute Neutrophils      1.6 - 8.3 10e3/uL 2.4    Absolute Lymphocytes      0.8 - 5.3 10e3/uL 1.9    Absolute Monocytes      0.0 - 1.3 10e3/uL 0.4    Absolute Eosinophils      0.0 - 0.7 10e3/uL 0.0    Absolute Basophils       0.0 - 0.2 10e3/uL 0.0    Absolute Immature Granulocytes      <=0.4 10e3/uL 0.0    Absolute NRBCs      10e3/uL 0.0    Sodium      135 - 145 mmol/L 141    Potassium      3.4 - 5.3 mmol/L 4.7    Carbon Dioxide (CO2)      22 - 29 mmol/L 29    Anion Gap      7 - 15 mmol/L 8    Urea Nitrogen      8.0 - 23.0 mg/dL 20.6    Creatinine      0.51 - 0.95 mg/dL 0.95    GFR Estimate      >60 mL/min/1.73m2 68    Calcium      8.8 - 10.4 mg/dL 9.4    Chloride      98 - 107 mmol/L 104    Glucose      70 - 99 mg/dL 77    Alkaline Phosphatase      40 - 150 U/L 63    AST      0 - 45 U/L 25    ALT      0 - 50 U/L 24    Protein Total      6.4 - 8.3 g/dL 7.1    Albumin      3.5 - 5.2 g/dL 4.0    Bilirubin Total      <=1.2 mg/dL 0.4    Albumin Fraction      3.7 - 5.1 g/dL 3.9    Alpha 1 Fraction      0.2 - 0.4 g/dL 0.3    Alpha 2 Fraction      0.5 - 0.9 g/dL 0.7    Beta Fraction      0.6 - 1.0 g/dL 0.8    Gamma Fraction      0.7 - 1.6 g/dL 1.2    Monoclonal Peak      <=0.0 g/dL 0.0    ELP Interpretation: Essentially normal electrophoretic pattern. No obvious monoclonal proteins seen. Pathologic significance requires clinical correlation. Ambreen Odonnell M.D., Ph.D.    Signout Location if Remote ABK1    Signout Location if Remote ABK1    Kappa Free Lt Chain      0.33 - 1.94 mg/dL 3.58 (H)    Lambda Free Lt Chain      0.57 - 2.63 mg/dL 2.56    Kappa Lambda Ratio      0.26 - 1.65  1.40    IGG      610 - 1,616 mg/dL 1,230    IGA      84 - 499 mg/dL 258    IGM      35 - 242 mg/dL 134    Immunofixation ELP No monoclonal protein seen on immunofixation. Pathologic significance requires clinical correlation.  Ambreen Odonnell M.D., Ph.D.    Lactate Dehydrogenase      0 - 250 U/L 184    Beta-2-Microglobulin      <2.3 mg/L 2.4 (H)    Viscosity Index      1.4 - 1.8  1.5    D-Dimer Quantitative      0.00 - 0.50 ug/mL FEU <=0.27    Total Protein Serum for ELP      6.4 - 8.3 g/dL 6.9       Legend:  (H) High    Imaging Studies:    None this  visit    ASSESSMENT/PLAN:  1.  MGUS.  Bone marrow aspiration biopsy was negative for myeloma.  Skeletal bone survey was negative for lytic defects. Labs completed last week continue to show an M spike is normal at 0.0.  No evidence of end-organ damage.  Plan is to continue surveillance.  We will see the patient in 6 months and repeat MGUS labs.     2.  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  No evidence of metastatic process. D-dimer is normal.  She will continue on Xarelto indefinitely. The plan is to see the patient in 6 months and repeat D-dimer.    Patient in agreement with plan and verbalizes understanding. Agrees to call with any questions or concerns.    33 minutes spent in the patient's encounter today with time spent in review of patient's chart along with chart preparation and review of the treatment plan and signing of treatment plan.  Time was also spent with the patient in obtaining a review of systems and performing a physical exam along with detailed review of all test results. Time was also spent in discussing plan for future follow-up and relating instructions for follow-up and in placing future orders.    MISSY Rubin [facility]  Medical Oncology

## 2025-01-13 NOTE — NURSING NOTE
"Oncology Rooming Note    January 13, 2025 9:48 AM   Phyllis Washington is a 59 year old female who presents for:    Chief Complaint   Patient presents with    Hematology     6 month follow up MGUS     Initial Vitals: /82 (BP Location: Right arm, Patient Position: Sitting, Cuff Size: Adult Large)   Pulse 60   Temp (!) 96.6  F (35.9  C)   Resp 18   Wt 129.7 kg (286 lb)   LMP  (LMP Unknown)   SpO2 97%   BMI 41.63 kg/m   Estimated body mass index is 41.63 kg/m  as calculated from the following:    Height as of 3/27/24: 1.765 m (5' 9.5\").    Weight as of this encounter: 129.7 kg (286 lb). Body surface area is 2.52 meters squared.  Mild Pain (2) Comment: Data Unavailable   No LMP recorded (lmp unknown). Patient is postmenopausal.  Allergies reviewed: Yes  Medications reviewed: Yes    Medications: Medication refills not needed today.  Pharmacy name entered into Sumoing: [id number] - [place], [place] - [address]    Frailty Screening:   Is the patient here for a new oncology consult visit in cancer care? 2. No      Clinical concerns: no       Ambreen Odonnell LPN            "

## 2025-01-23 RX ORDER — METOPROLOL SUCCINATE 50 MG/1
50 TABLET, EXTENDED RELEASE ORAL DAILY
Qty: 90 TABLET | Refills: 0 | Status: CANCELLED | OUTPATIENT
Start: 2025-01-23

## 2025-02-10 DIAGNOSIS — I80.9 SUPERFICIAL PHLEBITIS: ICD-10-CM

## 2025-02-11 RX ORDER — RIVAROXABAN 20 MG/1
20 TABLET, FILM COATED ORAL
Qty: 90 TABLET | Refills: 3 | Status: SHIPPED | OUTPATIENT
Start: 2025-02-11

## 2025-02-11 SDOH — HEALTH STABILITY: PHYSICAL HEALTH
ON AVERAGE, HOW MANY DAYS PER WEEK DO YOU ENGAGE IN MODERATE TO STRENUOUS EXERCISE (LIKE A BRISK WALK)?: PATIENT DECLINED

## 2025-02-11 SDOH — HEALTH STABILITY: PHYSICAL HEALTH: ON AVERAGE, HOW MANY MINUTES DO YOU ENGAGE IN EXERCISE AT THIS LEVEL?: PATIENT DECLINED

## 2025-02-11 ASSESSMENT — ASTHMA QUESTIONNAIRES
QUESTION_3 LAST FOUR WEEKS HOW OFTEN DID YOUR ASTHMA SYMPTOMS (WHEEZING, COUGHING, SHORTNESS OF BREATH, CHEST TIGHTNESS OR PAIN) WAKE YOU UP AT NIGHT OR EARLIER THAN USUAL IN THE MORNING: NOT AT ALL
QUESTION_2 LAST FOUR WEEKS HOW OFTEN HAVE YOU HAD SHORTNESS OF BREATH: NOT AT ALL
ACT_TOTALSCORE: 25
QUESTION_1 LAST FOUR WEEKS HOW MUCH OF THE TIME DID YOUR ASTHMA KEEP YOU FROM GETTING AS MUCH DONE AT WORK, SCHOOL OR AT HOME: NONE OF THE TIME
ACT_TOTALSCORE: 25
QUESTION_4 LAST FOUR WEEKS HOW OFTEN HAVE YOU USED YOUR RESCUE INHALER OR NEBULIZER MEDICATION (SUCH AS ALBUTEROL): NOT AT ALL
QUESTION_5 LAST FOUR WEEKS HOW WOULD YOU RATE YOUR ASTHMA CONTROL: COMPLETELY CONTROLLED

## 2025-02-11 ASSESSMENT — SOCIAL DETERMINANTS OF HEALTH (SDOH): HOW OFTEN DO YOU GET TOGETHER WITH FRIENDS OR RELATIVES?: ONCE A WEEK

## 2025-02-11 NOTE — TELEPHONE ENCOUNTER
[place]  sent Rx request for the following:      Requested Prescriptions   Pending Prescriptions Disp Refills    XARELTO ANTICOAGULANT 20 MG TABS tablet [Pharmacy Med Name: XARELTO 20MG TABLETS] 90 tablet 3     Sig: TAKE 1 TABLET(20 MG) BY MOUTH DAILY WITH DINNER       Anticoagulant Agents Failed - 2/11/2025  3:20 PM        Failed - Creatinine Clearance greater than 50 ml/min on file in past 3 mos     No lab results found.          Failed - Recent (6 mo) or future (90 days) visit within the authorizing provider's specialty        Failed - Medication indicated for associated diagnosis     Medication is associated with one or more of the following diagnoses:  Atrial fibrillation  Deep venous thrombosis  Heparin-induced thrombocytopenia  Pulmonary embolism  Venous thromboembolism  H/O: Pulmonary embolus  Atrial flutter  Coronary artery finding  Transient cerebral ischemia  Percutaneous transluminal coronary angioplasty  Stable angina  Intermittent claudication  Arteriosclerotic vascular disease       Last Prescription Date:   1/18/24  Last Fill Qty/Refills:         90, R-3    Last Office Visit:              1/18/24   Future Office visit:             Next 5 appointments (look out 90 days)      Feb 12, 2025 9:00 AM  (Arrive by 8:45 AM)  Adult Preventative Visit with Jeniffer Minor MD  [facility] ([facility]) [address]  [place] [address]  [phone]     Mar 27, 2025 8:45 AM  (Arrive by 8:30 AM)  Return Visit with MISSY Michele [facility] ([facility]) [address]  [place] [address]  [phone]             Unable to complete prescription refill per RN Medication Refill Policy.   Routing to PCP/provider for refill consideration/determination for appointment.  Nini Kaba RN on 2/11/2025 at 3:22 PM

## 2025-02-12 ENCOUNTER — HOSPITAL ENCOUNTER (OUTPATIENT)
Dept: GENERAL RADIOLOGY | Facility: OTHER | Age: 60
Discharge: HOME | End: 2025-02-12
Attending: FAMILY MEDICINE
Payer: COMMERCIAL

## 2025-02-12 ENCOUNTER — OFFICE VISIT (OUTPATIENT)
Dept: FAMILY MEDICINE | Facility: OTHER | Age: 60
End: 2025-02-12
Attending: FAMILY MEDICINE
Payer: COMMERCIAL

## 2025-02-12 VITALS
SYSTOLIC BLOOD PRESSURE: 126 MMHG | TEMPERATURE: 97.6 F | OXYGEN SATURATION: 97 % | BODY MASS INDEX: 42.8 KG/M2 | DIASTOLIC BLOOD PRESSURE: 84 MMHG | HEIGHT: 69 IN | HEART RATE: 60 BPM | RESPIRATION RATE: 16 BRPM | WEIGHT: 289 LBS

## 2025-02-12 DIAGNOSIS — I49.8 VENTRICULAR BIGEMINY: ICD-10-CM

## 2025-02-12 DIAGNOSIS — M25.572 ACUTE LEFT ANKLE PAIN: ICD-10-CM

## 2025-02-12 DIAGNOSIS — R19.7 INTERMITTENT DIARRHEA: ICD-10-CM

## 2025-02-12 DIAGNOSIS — I80.9 SUPERFICIAL PHLEBITIS: ICD-10-CM

## 2025-02-12 DIAGNOSIS — Z12.31 ENCOUNTER FOR SCREENING MAMMOGRAM FOR BREAST CANCER: Primary | ICD-10-CM

## 2025-02-12 DIAGNOSIS — E66.813 CLASS 3 SEVERE OBESITY WITH SERIOUS COMORBIDITY AND BODY MASS INDEX (BMI) OF 40.0 TO 44.9 IN ADULT, UNSPECIFIED OBESITY TYPE (H): ICD-10-CM

## 2025-02-12 DIAGNOSIS — E66.01 CLASS 3 SEVERE OBESITY WITH SERIOUS COMORBIDITY AND BODY MASS INDEX (BMI) OF 40.0 TO 44.9 IN ADULT, UNSPECIFIED OBESITY TYPE (H): ICD-10-CM

## 2025-02-12 DIAGNOSIS — M06.9 RHEUMATOID ARTHRITIS, RHEUMATOID FACTOR STATUS UNKNOWN (H): ICD-10-CM

## 2025-02-12 DIAGNOSIS — R00.2 PALPITATIONS: ICD-10-CM

## 2025-02-12 DIAGNOSIS — I49.3 PVC'S (PREMATURE VENTRICULAR CONTRACTIONS): ICD-10-CM

## 2025-02-12 DIAGNOSIS — I47.10 SVT (SUPRAVENTRICULAR TACHYCARDIA): ICD-10-CM

## 2025-02-12 PROCEDURE — 73610 X-RAY EXAM OF ANKLE: CPT | Mod: LT

## 2025-02-12 NOTE — NURSING NOTE
"Chief Complaint   Patient presents with    Physical     Yearly physical.        Initial /84 (BP Location: Right arm, Patient Position: Sitting, Cuff Size: Adult Large)   Pulse 60   Temp 97.6  F (36.4  C) (Tympanic)   Resp 16   Ht 1.759 m (5' 9.25\")   Wt 131.1 kg (289 lb)   LMP  (LMP Unknown)   SpO2 97%   BMI 42.37 kg/m   Estimated body mass index is 42.37 kg/m  as calculated from the following:    Height as of this encounter: 1.759 m (5' 9.25\").    Weight as of this encounter: 131.1 kg (289 lb).  Medication Reconciliation: complete    Malissa Kunz LPN    Advance Care Directive reviewed    "

## 2025-02-26 ENCOUNTER — MYC MEDICAL ADVICE (OUTPATIENT)
Dept: FAMILY MEDICINE | Facility: OTHER | Age: 60
End: 2025-02-26
Payer: COMMERCIAL

## 2025-02-26 DIAGNOSIS — M05.79 RHEUMATOID ARTHRITIS INVOLVING MULTIPLE SITES WITH POSITIVE RHEUMATOID FACTOR (H): Primary | ICD-10-CM

## 2025-02-26 NOTE — TELEPHONE ENCOUNTER
Requesting Rheumatology referral for [facility] for RA.     Zach'd up.     Maisha Bray RN on 2/26/2025 at 11:50 AM

## 2025-02-26 NOTE — TELEPHONE ENCOUNTER
Signed rheumatology referral given she has been previously seen by rheum.   Allyssa Mckenna NP on 2/26/2025 at 12:52 PM

## 2025-03-02 ENCOUNTER — MYC MEDICAL ADVICE (OUTPATIENT)
Dept: CARDIOLOGY | Facility: OTHER | Age: 60
End: 2025-03-02
Payer: COMMERCIAL

## 2025-03-02 DIAGNOSIS — I47.10 SVT (SUPRAVENTRICULAR TACHYCARDIA): ICD-10-CM

## 2025-03-02 DIAGNOSIS — I49.3 PVC'S (PREMATURE VENTRICULAR CONTRACTIONS): ICD-10-CM

## 2025-03-02 DIAGNOSIS — I49.8 VENTRICULAR BIGEMINY: ICD-10-CM

## 2025-03-02 DIAGNOSIS — R00.2 PALPITATIONS: ICD-10-CM

## 2025-03-03 RX ORDER — METOPROLOL SUCCINATE 50 MG/1
50 TABLET, EXTENDED RELEASE ORAL DAILY
Qty: 30 TABLET | Refills: 0 | Status: SHIPPED | OUTPATIENT
Start: 2025-03-03

## 2025-03-03 NOTE — TELEPHONE ENCOUNTER
Requested Prescriptions   Pending Prescriptions Disp Refills    metoprolol succinate ER (TOPROL XL) 50 MG 24 hr tablet 90 tablet 0     Sig: Take 1 tablet (50 mg) by mouth daily.       Beta-Blockers Protocol Passed - 3/3/2025  3:16 PM        Passed - Most recent blood pressure under 140/90 in past 12 months     BP Readings from Last 3 Encounters:   02/12/25 126/84   01/13/25 124/82   07/15/24 128/84   No data recorded        Passed - Patient is age 6 or older        Passed - Medication is active on med list and the sig matches. RN to manually verify dose and sig if red X/fail.     If the protocol passes (green check), you do not need to verify med dose and sig.    A prescription matches if they are the same clinical intention.    For Example: once daily and every morning are the same.    For all fails (red x), verify dose and sig.    If the refill does match what is on file, the RN can still proceed to approve the refill request.     If they do not match, route to the appropriate provider.        Passed - Medication indicated for associated diagnosis     Medication is associated with one or more of the following diagnoses:     Hypertension (HTN)   Atrial fibrillation/flutter   Angina   ASCVD   Migraine   Heart Failure   Tremor   Anxiety   Ocular hypertension   Glaucoma   PTSD   Obstructive hypertrophic cardiomyopathy   History of myocarditis   Palpitations   POTS (postural orthostatic tachycardia syndrome)   SVT (supraventricular tachycardia)   Hyperthyroidism   Tachycardia   Acute non-st segment elevation myocardial infarction   Subsequent non-st segment elevation myocardial infarction   Ischemic myocardial dysfunction        Passed - Recent (12 mo) or future (90 days) visit within the authorizing provider's specialty     The patient must have completed an in-person or virtual visit within the past 12 months or has a future visit scheduled within the next 90 days with the authorizing provider s specialty.  Urgent  "care and e-visits do not qualify as an office visit for this protocol.     Last Written Prescription Date:  12/16/24  Last Fill Quantity: 90,   # refills: 0    Last Office Visit: 3/27/24 and note states: \"Palpitations well controlled on Toprol-XL 50 mg daily, rare palpitations. ZIO previously reviewed with symptoms identified to be associated to ventricular ectopy and ventricular bigeminy.\"    Future Office visit:    Next 5 appointments (look out 90 days)      Mar 27, 2025 8:45 AM  (Arrive by 8:30 AM)  Return Visit with MISSY Michele [facility] ([facility]) [address]  [place] [address]  [phone]     Prescription approved per [facility] Refill Protocol.  Ann Lee RN ....................  3/3/2025   3:16 PM    "

## 2025-03-09 ENCOUNTER — HEALTH MAINTENANCE LETTER (OUTPATIENT)
Age: 60
End: 2025-03-09

## 2025-03-13 ENCOUNTER — LAB (OUTPATIENT)
Dept: LAB | Facility: OTHER | Age: 60
End: 2025-03-13
Payer: COMMERCIAL

## 2025-03-13 ENCOUNTER — HOSPITAL ENCOUNTER (OUTPATIENT)
Dept: MAMMOGRAPHY | Facility: OTHER | Age: 60
Discharge: HOME | End: 2025-03-13
Attending: FAMILY MEDICINE
Payer: COMMERCIAL

## 2025-03-13 DIAGNOSIS — Z79.899 ENCOUNTER FOR LONG-TERM (CURRENT) USE OF MEDICATIONS: ICD-10-CM

## 2025-03-13 DIAGNOSIS — Z12.31 ENCOUNTER FOR SCREENING MAMMOGRAM FOR BREAST CANCER: ICD-10-CM

## 2025-03-13 DIAGNOSIS — M05.79 SEROPOSITIVE RHEUMATOID ARTHRITIS OF MULTIPLE SITES (H): Primary | ICD-10-CM

## 2025-03-13 LAB
ALBUMIN SERPL BCG-MCNC: 4 G/DL (ref 3.5–5.2)
ALP SERPL-CCNC: 71 U/L (ref 40–150)
ALT SERPL W P-5'-P-CCNC: 20 U/L (ref 0–50)
AST SERPL W P-5'-P-CCNC: 28 U/L (ref 0–45)
BASOPHILS # BLD AUTO: 0 10E3/UL (ref 0–0.2)
BASOPHILS NFR BLD AUTO: 1 %
BILIRUB DIRECT SERPL-MCNC: <0.08 MG/DL (ref 0–0.3)
BILIRUB SERPL-MCNC: 0.2 MG/DL
CREAT SERPL-MCNC: 1.03 MG/DL (ref 0.51–0.95)
CRP SERPL-MCNC: <3 MG/L
EGFRCR SERPLBLD CKD-EPI 2021: 62 ML/MIN/1.73M2
EOSINOPHIL # BLD AUTO: 0 10E3/UL (ref 0–0.7)
EOSINOPHIL NFR BLD AUTO: 0 %
ERYTHROCYTE [DISTWIDTH] IN BLOOD BY AUTOMATED COUNT: 14.3 % (ref 10–15)
ERYTHROCYTE [SEDIMENTATION RATE] IN BLOOD BY WESTERGREN METHOD: 10 MM/HR (ref 0–30)
HCT VFR BLD AUTO: 40.3 % (ref 35–47)
HGB BLD-MCNC: 13.4 G/DL (ref 11.7–15.7)
IMM GRANULOCYTES # BLD: 0 10E3/UL
IMM GRANULOCYTES NFR BLD: 0 %
LYMPHOCYTES # BLD AUTO: 2.5 10E3/UL (ref 0.8–5.3)
LYMPHOCYTES NFR BLD AUTO: 48 %
MCH RBC QN AUTO: 28.2 PG (ref 26.5–33)
MCHC RBC AUTO-ENTMCNC: 33.3 G/DL (ref 31.5–36.5)
MCV RBC AUTO: 85 FL (ref 78–100)
MONOCYTES # BLD AUTO: 0.4 10E3/UL (ref 0–1.3)
MONOCYTES NFR BLD AUTO: 8 %
NEUTROPHILS # BLD AUTO: 2.3 10E3/UL (ref 1.6–8.3)
NEUTROPHILS NFR BLD AUTO: 43 %
NRBC # BLD AUTO: 0 10E3/UL
NRBC BLD AUTO-RTO: 0 /100
PLATELET # BLD AUTO: 256 10E3/UL (ref 150–450)
PROT SERPL-MCNC: 7.4 G/DL (ref 6.4–8.3)
RBC # BLD AUTO: 4.75 10E6/UL (ref 3.8–5.2)
WBC # BLD AUTO: 5.3 10E3/UL (ref 4–11)

## 2025-03-13 PROCEDURE — 85041 AUTOMATED RBC COUNT: CPT | Mod: ZL

## 2025-03-13 PROCEDURE — 36415 COLL VENOUS BLD VENIPUNCTURE: CPT | Mod: ZL

## 2025-03-13 PROCEDURE — 85014 HEMATOCRIT: CPT | Mod: ZL

## 2025-03-13 PROCEDURE — 84155 ASSAY OF PROTEIN SERUM: CPT | Mod: ZL

## 2025-03-13 PROCEDURE — 85652 RBC SED RATE AUTOMATED: CPT | Mod: ZL

## 2025-03-13 PROCEDURE — 77067 SCR MAMMO BI INCL CAD: CPT

## 2025-03-13 PROCEDURE — 86140 C-REACTIVE PROTEIN: CPT | Mod: ZL

## 2025-03-13 PROCEDURE — 77063 BREAST TOMOSYNTHESIS BI: CPT

## 2025-03-13 PROCEDURE — 85004 AUTOMATED DIFF WBC COUNT: CPT | Mod: ZL

## 2025-03-13 PROCEDURE — 84075 ASSAY ALKALINE PHOSPHATASE: CPT | Mod: ZL

## 2025-03-13 PROCEDURE — 82565 ASSAY OF CREATININE: CPT | Mod: ZL

## 2025-03-20 ENCOUNTER — THERAPY VISIT (OUTPATIENT)
Dept: PHYSICAL THERAPY | Facility: OTHER | Age: 60
End: 2025-03-20
Attending: FAMILY MEDICINE
Payer: COMMERCIAL

## 2025-03-20 DIAGNOSIS — M25.572 ACUTE LEFT ANKLE PAIN: ICD-10-CM

## 2025-03-20 PROCEDURE — 97530 THERAPEUTIC ACTIVITIES: CPT | Mod: GP

## 2025-03-20 PROCEDURE — 97110 THERAPEUTIC EXERCISES: CPT | Mod: GP

## 2025-03-20 PROCEDURE — 97161 PT EVAL LOW COMPLEX 20 MIN: CPT | Mod: GP

## 2025-03-20 NOTE — PROGRESS NOTES
PHYSICAL THERAPY EVALUATION  Type of Visit: Evaluation       Fall Risk Screen:  Fall screen completed by: PT  Have you fallen 2 or more times in the past year?: No  Have you fallen and had an injury in the past year?: No  Is patient a fall risk?: No    Subjective         Presenting condition or subjective complaint: Pain started in October 2024, can't rememebr any singular event that started pain. But has been in significant pain since then after it began to ramp up. Pt notes that has had many years worth of arch issues, wears shoes all the time in and out of the house. Does note that she had been playing pickleball all fall and by December was forced to stop playing due to pain, attempted to wear a cheap soft ankle brace and was without improvement. Tries to elevate feet most days. Gets pain almost every day, but worse in the morning and worsens throughout the day if she has been on her feet a lot. Most of her pain is inferior to medial malleolus but does refer down under foot or to anterior joint line. Had an issue in the past with numbness in the R foot and performed a tarsal tunnel release in 2020 without luck for reducing numbness - described as plantar surface of foot and outer half of outside foot. Pain described as high as 9/10 and occurs all the time roughly at 3-4/10, worsens with walking or prolonged standing. Pain improves with sitting and releative rest, NSAIDs, and bracing. Pt is retired. Has hx heart problems - PVCs for about four years monitoring closely, HBP, menopause, and RA. Had X-ray, unremarkable.  Date of onset: 10/20/24    Relevant medical history: Heart problems; High blood pressure; Overweight; Rheumatoid arthritis   Dates & types of surgery:  Denies     Prior diagnostic imaging/testing results: X-ray     Prior therapy history for the same diagnosis, illness or injury: No      Prior Level of Function  Transfers: Independent  Ambulation: Independent  ADL: Independent    Living  Environment  Social support: With a significant other or spouse   Type of home: House; 2-story   Stairs to enter the home: Yes 4 Is there a railing: Yes     Ramp: No   Stairs inside the home: Yes 12 Is there a railing: Yes     Help at home: None  Equipment owned:       Employment: No    Hobbies/Interests: Reading,  sewing,  gardening    Patient goals for therapy: Would like to be able to walk much further (currently limited a few blocks without sig pain), loves sewing, loves gardening throughout the summertime.    Pain assessment: Location: L ankle /Ratin/10 at worst      Objective   AROM  L DF 6 deg, PF 42 deg, Inv WFL, Ev 10 deg  R WFL  PROM  L DF 9 deg, PF 50 deg, Inv 55 deg, Ev 15 deg painful all   Calv ev 5-10 deg at rest L LE  25 deg hallux valgus at rest L   20 deg hallux valgus R at rest   DNT hip and lumbar spine today    Palpation  Sig ttp L post tib distribution and PF near calc   Sig calc ttp with calc ev assessment  + Medial G/S complex ttp and high tone   Increased edema noted throughout L ankle compared to R     Strength  R WFL except post tib 4/5  L WFL except peroneals 4-/5 and post tib 3/5 due to pain    Special tests  + 2nd deg navicular drop test L (R 1st degree)  + Windlass Test L   + SLR L 25 deg    Observation  Increased tread wear L lateral calc pattern of shoe and medial great toe   Increased out toeing L LE and poor inefficient push off L LE with whip to finish and increased forefoot valgus with push off    Assessment & Plan   CLINICAL IMPRESSIONS  Medical Diagnosis: M25.572    Treatment Diagnosis: Limited ROM, weakness, consistent with post tib tendinopathy and chronic arch pain   Impression/Assessment: Patient is a 60 year old female with chronic L ankle pain complaints.  The following significant findings have been identified: Pain, Decreased ROM/flexibility, Decreased joint mobility, Decreased strength, Impaired balance, Decreased proprioception, Impaired gait, Impaired muscle  performance, Decreased activity tolerance, and Impaired posture. These impairments interfere with their ability to perform self care tasks, work tasks, recreational activities, household chores, household mobility, and community mobility as compared to previous level of function.     Clinical Decision Making (Complexity):  Clinical Presentation: Stable/Uncomplicated  Clinical Presentation Rationale: based on medical and personal factors listed in PT evaluation  Clinical Decision Making (Complexity): Low complexity    PLAN OF CARE  Treatment Interventions:  Modalities: Biofeedback, Contrast Bath, Cryotherapy, Cupping, Dry Needling, Fluidotherapy, E-stim, Hot Pack, Infrared, Iontophoresis, Mechanical Traction, Ultrasound  Interventions: Gait Training, Manual Therapy, Neuromuscular Re-education, Therapeutic Activity, Therapeutic Exercise, Self-Care/Home Management, Aquatic Therapy    Long Term Goals     PT Goal 1  Goal Identifier: ROM  Goal Description: Pt will be able to tolerate complete ROM without pain or compensation to improve QoL  Rationale: to maximize safety and independence with performance of ADLs and functional tasks;to maximize safety and independence within the community;to maximize safety and independence with self cares  Target Date: 05/01/25  PT Goal 2  Goal Identifier: Ambulation  Goal Description: Pt will be able to tolerate all gait tasks up to 1 mile at self selected pace without pain or compensation to improve QoL  Rationale: to maximize safety and independence with performance of ADLs and functional tasks;to maximize safety and independence with transportation;to maximize safety and independence within the community  Target Date: 05/15/25  PT Goal 3  Goal Identifier: ADLs  Goal Description: Pt will be able to tolerate all ADLs and functional movements without pain or compensation to improve QoL  Rationale: to maximize safety and independence with performance of ADLs and functional tasks;to  maximize safety and independence within the community;to maximize safety and independence with self cares  Target Date: 05/15/25      Frequency of Treatment: 1-2x per week  Duration of Treatment: up to 8 weeks    Recommended Referrals to Other Professionals:  None  Education Assessment:   Learner/Method: Patient;Listening;Reading;Demonstration;Pictures/Video  Education Comments: HEP, POC    Risks and benefits of evaluation/treatment have been explained.   Patient/Family/caregiver agrees with Plan of Care.     Evaluation Time:     PT Eval, Low Complexity Minutes (42878): 15       Signing Clinician: Marcos Jacob, PT

## 2025-03-27 ENCOUNTER — OFFICE VISIT (OUTPATIENT)
Dept: CARDIOLOGY | Facility: OTHER | Age: 60
End: 2025-03-27
Attending: NURSE PRACTITIONER
Payer: COMMERCIAL

## 2025-03-27 ENCOUNTER — THERAPY VISIT (OUTPATIENT)
Dept: PHYSICAL THERAPY | Facility: OTHER | Age: 60
End: 2025-03-27
Attending: FAMILY MEDICINE
Payer: COMMERCIAL

## 2025-03-27 VITALS
OXYGEN SATURATION: 98 % | RESPIRATION RATE: 18 BRPM | TEMPERATURE: 97.7 F | DIASTOLIC BLOOD PRESSURE: 80 MMHG | HEART RATE: 64 BPM | BODY MASS INDEX: 41.23 KG/M2 | HEIGHT: 70 IN | WEIGHT: 288 LBS | SYSTOLIC BLOOD PRESSURE: 124 MMHG

## 2025-03-27 DIAGNOSIS — I49.3 PVC'S (PREMATURE VENTRICULAR CONTRACTIONS): ICD-10-CM

## 2025-03-27 DIAGNOSIS — K55.069 MESENTERIC VEIN THROMBOSIS: ICD-10-CM

## 2025-03-27 DIAGNOSIS — M25.572 ACUTE LEFT ANKLE PAIN: Primary | ICD-10-CM

## 2025-03-27 DIAGNOSIS — R00.2 PALPITATIONS: Primary | ICD-10-CM

## 2025-03-27 DIAGNOSIS — Z86.718 PERSONAL HISTORY OF DVT (DEEP VEIN THROMBOSIS): ICD-10-CM

## 2025-03-27 DIAGNOSIS — I47.10 SVT (SUPRAVENTRICULAR TACHYCARDIA): ICD-10-CM

## 2025-03-27 LAB
ATRIAL RATE - MUSE: 60 BPM
DIASTOLIC BLOOD PRESSURE - MUSE: NORMAL MMHG
INTERPRETATION ECG - MUSE: NORMAL
P AXIS - MUSE: 41 DEGREES
PR INTERVAL - MUSE: 164 MS
QRS DURATION - MUSE: 84 MS
QT - MUSE: 394 MS
QTC - MUSE: 394 MS
R AXIS - MUSE: 39 DEGREES
SYSTOLIC BLOOD PRESSURE - MUSE: NORMAL MMHG
T AXIS - MUSE: 36 DEGREES
VENTRICULAR RATE- MUSE: 60 BPM

## 2025-03-27 PROCEDURE — 97140 MANUAL THERAPY 1/> REGIONS: CPT | Mod: GP

## 2025-03-27 PROCEDURE — 97110 THERAPEUTIC EXERCISES: CPT | Mod: GP

## 2025-03-27 RX ORDER — METOPROLOL SUCCINATE 50 MG/1
50 TABLET, EXTENDED RELEASE ORAL DAILY
Qty: 90 TABLET | Refills: 4 | Status: SHIPPED | OUTPATIENT
Start: 2025-03-27

## 2025-03-27 ASSESSMENT — PAIN SCALES - GENERAL: PAINLEVEL_OUTOF10: NO PAIN (0)

## 2025-03-27 NOTE — NURSING NOTE
"Chief Complaint   Patient presents with    Yearly Exam     Cardiac Care        Initial /80 (BP Location: Right arm, Patient Position: Sitting, Cuff Size: Adult Large)   Pulse 64   Temp 97.7  F (36.5  C) (Temporal)   Resp 18   Ht 1.765 m (5' 9.5\")   Wt 130.6 kg (288 lb)   LMP  (LMP Unknown)   SpO2 98%   BMI 41.92 kg/m   Estimated body mass index is 41.92 kg/m  as calculated from the following:    Height as of this encounter: 1.765 m (5' 9.5\").    Weight as of this encounter: 130.6 kg (288 lb).  Meds Reconciled: complete  Pt is not on Aspirin  Pt is not on a Statin  Pt is on Xarelto or Eliquis  Pt is not on a Warfarin   PHQ and/or GUERITA reviewed. Pt referred to PCP/MH Provider as appropriate.    Josee Beltrán LPN         "

## 2025-03-27 NOTE — PATIENT INSTRUCTIONS
Toprol XL refilled today, no changes.   Normal EKG today.   You may follow-up with cardiology as needed.

## 2025-03-27 NOTE — PROGRESS NOTES
[facility] HEART CARE   CARDIOLOGY PROGRESS NOTE    Phyllis Washington   [address] [address] [id number]    [place]     Chief Complaint   Patient presents with    Yearly Exam     Cardiac Care         HPI:   Ms. Washington is a 60 year old female who presents for annual cardiology follow-up with history of palpitations. Patient with symptomatic PSVT and ectopy on previous cardiac monitoring. Patient has a PMH significant for hypertension, venous phlebitis, cervicalgia, rheumatoid arthritis, vitamin D deficiency, chronic myalgias, allergic rhinitis, reactive airway, GERD and obesity. She has followed hematology with history of DVT and mesenteric vein thrombosis, suspected hypercoagulable state- on Xarelto indefiantely. Underlying MGUS, bone marrow biopsy negative for myeloma.     In June 2020, patient was experiencing a new arm pain in right arm which prompted stress echocardiogram.  She underwent a dobutamine stress echocardiogram on 7/20/2020.  This was a normal dobutamine stress test without evidence of inducible ischemia.  No regional wall motion abnormalities at rest.  With stress, the LVEF increased from 55 to 60% up to greater than 65% and the LV size decreased appropriately.  No subjective symptoms of ischemia.  Resting EKG was normal, with initial infusion, there were frequent PVCs including bigeminy and at higher dobutamine doses, the PVCs resolved.  Minimal nonspecific ST and T wave changes were seen which were nondiagnostic.  Postinfusion, she had occasional PVCs and ST segments returned to normal baseline.     ZIO (9/23/2020 through 10/7/2020) revealing predominant sinus rhythm with an average heart rate of 73 bpm, minimum heart rate 43 bpm maximum heart rate of 176 bpm.  There were 17 episodes of supraventricular tachycardia with the longest lasting 17.6 seconds.  No ventricular tachycardia.  No atrial fibrillation or atrial tachycardia.  No pauses or high-grade AV block.   Occasional isolated ventricular ectopy at a 1.6 burden, rare ventricular couplets and triplets.  Rare supraventricular ectopy, less than 1% of all beats.  Ventricular bigeminy and ventricular trigeminy was present.  Her longest episode of ventricular bigeminy lasted 22.3 seconds, longest ventricular trigeminy episode lasting 1 minute and 17 seconds.  She had 66 patient triggered events and 50 diary entries.  She was mainly symptomatic with ventricular ectopy, ventricular trigeminy and ventricular bigeminy.  She was also symptomatic with SVE and symptomatic with 1 episode of SVT.    Occasional ventricular ectopy with no significant ectopy burden.  She has been significantly symptomatic with these ectopic beats and therefore, she was started on diltiazem 120 mg daily.  She did describe benefit with starting this medication, reduction in the palpitations.  She had been feeling palpitations more in the evening after dinner, although has been less than in the past prior to starting diltiazem for suppression. She was later adjusted to beta blocker and has had good suppression of palpitations.    Interval History:  Today, patient admits that she has been doing very well. Rare brief palpitations. No chest pain or pressure. No lightheadedness or syncope. No increased edema. No increased dyspnea at rest or with exertion. No specific concerns today.    RELEVANT TESTING REVIEWED:  Echocardiogram 11/7/2022:  Interpretation Summary  Global and regional left ventricular function is normal with an EF of 55-60%.  Right ventricular function, chamber size, wall motion, and thickness are  normal.  Both atria appear normal.  The atrial septum is intact as assessed by agitated saline bubble study .  Pulmonary artery systolic pressure is normal.  The inferior vena cava is normal.  No pericardial effusion is present.    PAST MEDICAL HISTORY:   Past Medical History:   Diagnosis Date    Personal history of other medical treatment (CODE)      3 normal childbirths    Rheumatoid arthritis (H)           FAMILY HISTORY:   Family History   Problem Relation Age of Onset    Hypertension Father         Hypertension,67    Other - See Comments Maternal Grandmother         Stroke,85    Other - See Comments Paternal Grandmother         Alzheimer's 73    Breast Cancer Mother 46        Cancer-breast,now 68    Cerebrovascular Disease Mother         stroke with hemiparesis 2017    Diabetes Brother 16        Diabetes,now 41          PAST SURGICAL HISTORY:   Past Surgical History:   Procedure Laterality Date    BONE MARROW BIOPSY, BONE SPECIMEN, NEEDLE/TROCAR Left 07/14/2022    Procedure: BIOPSY, BONE MARROW;  Surgeon: Glory Baig MD;  Location:  OR    COLONOSCOPY  2017    Dr. Fofana - 2 polyps found.  Told to f/u in 5 years.    COLONOSCOPY  11/17/2022    Ct - polyp with path pending - 5 year f/u rec'd    RELEASE TARSAL TUNNEL FOOT Right 10/05/2022    Procedure: RELEASE, TARSAL TUNNEL AND PLANTAT FASCIOTOMY;  Surgeon: Shiva Finch DPM;  Location:  OR          SOCIAL HISTORY:   Social History     Socioeconomic History    Marital status:      Spouse name: Not on file    Number of children: Not on file    Years of education: Not on file    Highest education level: Not on file   Occupational History    Not on file   Social Needs    Financial resource strain: Not on file    Food insecurity     Worry: Not on file     Inability: Not on file    Transportation needs     Medical: Not on file     Non-medical: Not on file   Tobacco Use    Smoking status: Never Smoker    Smokeless tobacco: Never Used   Substance and Sexual Activity    Alcohol use: No     Frequency: Never     Comment: Rarely    Drug use: No    Sexual activity: Yes     Partners: Male     Birth control/protection: Rhythm   Lifestyle    Physical activity     Days per week: Not on file     Minutes per session: Not on file    Stress: Not on file   Relationships    Social connections     Talks on phone:  "Not on file     Gets together: Not on file     Attends Sikhism service: Not on file     Active member of club or organization: Not on file     Attends meetings of clubs or organizations: Not on file     Relationship status: Not on file    Intimate partner violence     Fear of current or ex partner: Not on file     Emotionally abused: Not on file     Physically abused: Not on file     Forced sexual activity: Not on file   Other Topics Concern    Parent/sibling w/ CABG, MI or angioplasty before 65F 55M? Not Asked   Social History Narrative    , 3 adult children;     Homemaker; hobbies - reading, crafts.     Works at frame shop part-time.     Walks 3 miles a day     - Demetrio              CURRENT MEDICATIONS:   Current Outpatient Medications   Medication Sig Dispense Refill    B-D INSULIN SYRINGE MICROFINE 27G X 5/8\" 1 ML MISC 1 EACH BY DOES NOT APPLY ROUTE ONE TIME A WEEK. USE TO DRAW UP METHOTREXATE 25 MG ONCE WEEKLY.      BD SAFETYGLIDE SYRINGE/NEEDLE 27G X 5/8\" 1 ML MISC 1 EACH BY DOES NOT APPLY ROUTE ONE TIME A WEEK. USE TO DRAW UP METHOTREXATE 25 MG ONCE WEEKLY.      cyanocobalamin (VITAMIN B-12) 1000 MCG tablet Take 1,000 mcg by mouth daily      famotidine (PEPCID) 20 MG tablet Take 1 tablet (20 mg) by mouth 2 times daily 180 tablet 3    folic acid (FOLVITE) 1 MG tablet Take 2 mg by mouth daily      hydroxychloroquine (PLAQUENIL) 200 MG tablet Take 2 tablets (400 mg) by mouth daily Take 400 mg by mouth daily 90 tablet 0    methotrexate 50 MG/2ML injection Inject 1 mL Subcutaneous once a week      metoprolol succinate ER (TOPROL XL) 50 MG 24 hr tablet Take 1 tablet (50 mg) by mouth daily. 90 tablet 4    thiamine (B-1) 100 MG tablet Take 100 mg by mouth daily       vitamin B6 (PYRIDOXINE) 200 MG tablet Take 200 mg by mouth daily      XARELTO ANTICOAGULANT 20 MG TABS tablet TAKE 1 TABLET(20 MG) BY MOUTH DAILY WITH DINNER 90 tablet 3     No current facility-administered medications for this visit. " "        ALLERGIES:   No Known Allergies     ROS:   CONSTITUTIONAL: No reported fever or chills. No changes in weight.  ENT: No visual disturbance, ear ache, epistaxis or sore throat.   CARDIOVASCULAR: No chest pain, chest pressure or chest discomfort.  Rare brief palpitations.  No increased edema.  RESPIRATORY: No increased dyspnea. No cough, wheezing or hemoptysis.  No orthopnea or PND.  GI: No reported abdominal pain, melena or hematochezia.  : No reported hematuria or dysuria.   NEUROLOGICAL: No lightheadedness, dizziness, syncope, ataxia, paresthesias or weakness.   HEMATOLOGIC: No history of anemia. No bleeding or excessive bruising.  Positive for history of DVT.  MUSCULOSKELETAL: Positive for chronic joint pain with arthritis- follows with rheumatology.  ENDOCRINOLOGIC: No temperature intolerance. No hair or skin changes.  SKIN: No abnormal rashes or sores, no unusual itching.    PHYSICAL EXAM:   /80 (BP Location: Right arm, Patient Position: Sitting, Cuff Size: Adult Large)   Pulse 64   Temp 97.7  F (36.5  C) (Temporal)   Resp 18   Ht 1.765 m (5' 9.5\")   Wt 130.6 kg (288 lb)   LMP  (LMP Unknown)   SpO2 98%   BMI 41.92 kg/m    GENERAL: The patient is a well-developed, well-nourished, in no apparent distress.  HEENT: Head is normocephalic and atraumatic. Eyes are symmetrical with normal visual tracking. No icterus, no xanthelasmas. Nares appeared normal without nasal drainage. Mucous membranes are moist, no cyanosis.  NECK: Supple.  No carotid bruits, no JVD.  CHEST/ LUNGS: Lungs clear to auscultation, no rales, rhonchi or wheezes, no use of accessory muscles, no retractions, respirations unlabored and normal respiratory rate.   CARDIO: Regular rate and rhythm normal with S1 and S2, no S3 or S4 and no murmur, click or rub.  ABD: Abdomen is nondistended.   EXTREMITIES: No lower extremity edema  MUSCULOSKELETAL: No visible joint swelling.   NEUROLOGIC: Alert and oriented X3. Normal speech, gait " and affect. No focal neurologic deficits.   SKIN: No jaundice. No rashes or visible skin lesions present. No ecchymosis.     EKG: NSR, rate 60 bpm, no ST-T changes    LAB RESULTS:   Lab on 03/13/2025   Component Date Value Ref Range Status    Protein Total 03/13/2025 7.4  6.4 - 8.3 g/dL Final    Albumin 03/13/2025 4.0  3.5 - 5.2 g/dL Final    Bilirubin Total 03/13/2025 0.2  <=1.2 mg/dL Final    Alkaline Phosphatase 03/13/2025 71  40 - 150 U/L Final    AST 03/13/2025 28  0 - 45 U/L Final    ALT 03/13/2025 20  0 - 50 U/L Final    Bilirubin Direct 03/13/2025 <0.08  0.00 - 0.30 mg/dL Final    CRP Inflammation 03/13/2025 <3.00  <5.00 mg/L Final    Erythrocyte Sedimentation Rate 03/13/2025 10  0 - 30 mm/hr Final    Creatinine 03/13/2025 1.03 (H)  0.51 - 0.95 mg/dL Final    GFR Estimate 03/13/2025 62  >60 mL/min/1.73m2 Final    eGFR calculated using 2021 CKD-EPI equation.    WBC Count 03/13/2025 5.3  4.0 - 11.0 10e3/uL Final    RBC Count 03/13/2025 4.75  3.80 - 5.20 10e6/uL Final    Hemoglobin 03/13/2025 13.4  11.7 - 15.7 g/dL Final    Hematocrit 03/13/2025 40.3  35.0 - 47.0 % Final    MCV 03/13/2025 85  78 - 100 fL Final    MCH 03/13/2025 28.2  26.5 - 33.0 pg Final    MCHC 03/13/2025 33.3  31.5 - 36.5 g/dL Final    RDW 03/13/2025 14.3  10.0 - 15.0 % Final    Platelet Count 03/13/2025 256  150 - 450 10e3/uL Final    % Neutrophils 03/13/2025 43  % Final    % Lymphocytes 03/13/2025 48  % Final    % Monocytes 03/13/2025 8  % Final    % Eosinophils 03/13/2025 0  % Final    % Basophils 03/13/2025 1  % Final    % Immature Granulocytes 03/13/2025 0  % Final    NRBCs per 100 WBC 03/13/2025 0  <1 /100 Final    Absolute Neutrophils 03/13/2025 2.3  1.6 - 8.3 10e3/uL Final    Absolute Lymphocytes 03/13/2025 2.5  0.8 - 5.3 10e3/uL Final    Absolute Monocytes 03/13/2025 0.4  0.0 - 1.3 10e3/uL Final    Absolute Eosinophils 03/13/2025 0.0  0.0 - 0.7 10e3/uL Final    Absolute Basophils 03/13/2025 0.0  0.0 - 0.2 10e3/uL Final    Absolute  Immature Granulocytes 03/13/2025 0.0  <=0.4 10e3/uL Final    Absolute NRBCs 03/13/2025 0.0  10e3/uL Final         ASSESSMENT:   Phyllis Washington presents for annual cardiology follow-up with history of palpitations. Patient with symptomatic PSVT and ectopy on previous cardiac monitoring. Patient has a PMH significant for hypertension, venous phlebitis, cervicalgia, rheumatoid arthritis, vitamin D deficiency, chronic myalgias, allergic rhinitis, reactive airway, GERD and obesity. She has followed hematology with history of DVT and mesenteric vein thrombosis, suspected hypercoagulable state- on Xarelto indefiantely. Underlying MGUS, bone marrow biopsy negative for myeloma.   Today, patient admits that she has been doing very well. Rare brief palpitations. No chest pain or pressure. No lightheadedness or syncope. No increased edema. No increased dyspnea at rest or with exertion. No specific concerns today.      1. Palpitations (Primary)  2. SVT (supraventricular tachycardia)  3. PVC's (premature ventricular contractions)  4. Mesenteric vein thrombosis (H)- Historic  5. Personal history of DVT (deep vein thrombosis)    PLAN:   1. Palpitations well controlled on Toprol-XL 50 mg daily, rare brief palpitations. ZIO previously reviewed with symptoms identified to be associated to ventricular ectopy and ventricular bigeminy.   2. She has avoided caffeine and no stimulants, no ETOH.  Denied any symptoms of sleep apnea which may contribute.  Previously reviewed that her Plaquenil and methotrexate may be contributing to her level of palpitations and ectopy.  3. Continue with xarelto 20 mg daily indefinitely, no bleeding complications.  She is followed hematology with history of DVT and mesenteric thrombosis.   4. Echocardiogram from 11/7/2022 previously reviewed- Normal biventricular function, no evidence of PFO, no hemodynamically significant valvular abnormalities.  No pulmonary hypertension and no pericardial  effusion.    Orders Placed This Encounter   Procedures    EKG 12-lead, tracing only (Same Day)     Follow-up with cardiology as needed.    Thank you for allowing me to participate in the care of your patient. Please do not hesitate to contact me if you have any questions.     Chelly Ruffin, APRN CNP [facility]

## 2025-03-31 ENCOUNTER — OFFICE VISIT (OUTPATIENT)
Dept: FAMILY MEDICINE | Facility: OTHER | Age: 60
End: 2025-03-31
Attending: FAMILY MEDICINE
Payer: COMMERCIAL

## 2025-03-31 ENCOUNTER — ANCILLARY ORDERS (OUTPATIENT)
Dept: FAMILY MEDICINE | Facility: OTHER | Age: 60
End: 2025-03-31

## 2025-03-31 ENCOUNTER — HOSPITAL ENCOUNTER (OUTPATIENT)
Dept: MAMMOGRAPHY | Facility: OTHER | Age: 60
Discharge: HOME | End: 2025-03-31
Attending: FAMILY MEDICINE
Payer: COMMERCIAL

## 2025-03-31 VITALS
HEART RATE: 61 BPM | BODY MASS INDEX: 42.04 KG/M2 | RESPIRATION RATE: 18 BRPM | DIASTOLIC BLOOD PRESSURE: 74 MMHG | OXYGEN SATURATION: 96 % | SYSTOLIC BLOOD PRESSURE: 122 MMHG | WEIGHT: 288.8 LBS | TEMPERATURE: 97 F

## 2025-03-31 DIAGNOSIS — R92.8 ABNORMAL FINDING ON BREAST IMAGING: ICD-10-CM

## 2025-03-31 DIAGNOSIS — R10.11 RUQ ABDOMINAL PAIN: ICD-10-CM

## 2025-03-31 DIAGNOSIS — R92.8 ABNORMAL FINDING ON BREAST IMAGING: Primary | ICD-10-CM

## 2025-03-31 DIAGNOSIS — L98.9 SKIN LESIONS: Primary | ICD-10-CM

## 2025-03-31 DIAGNOSIS — K52.9 CHRONIC DIARRHEA: ICD-10-CM

## 2025-03-31 LAB
ALBUMIN SERPL BCG-MCNC: 4.2 G/DL (ref 3.5–5.2)
ALP SERPL-CCNC: 64 U/L (ref 40–150)
ALT SERPL W P-5'-P-CCNC: 21 U/L (ref 0–50)
ANION GAP SERPL CALCULATED.3IONS-SCNC: 10 MMOL/L (ref 7–15)
AST SERPL W P-5'-P-CCNC: 29 U/L (ref 0–45)
BASOPHILS # BLD AUTO: 0 10E3/UL (ref 0–0.2)
BASOPHILS NFR BLD AUTO: 1 %
BILIRUB SERPL-MCNC: 0.5 MG/DL
BUN SERPL-MCNC: 16.3 MG/DL (ref 8–23)
CALCIUM SERPL-MCNC: 9.4 MG/DL (ref 8.8–10.4)
CHLORIDE SERPL-SCNC: 103 MMOL/L (ref 98–107)
CREAT SERPL-MCNC: 0.86 MG/DL (ref 0.51–0.95)
CRP SERPL-MCNC: <3 MG/L
EGFRCR SERPLBLD CKD-EPI 2021: 76 ML/MIN/1.73M2
EOSINOPHIL # BLD AUTO: 0 10E3/UL (ref 0–0.7)
EOSINOPHIL NFR BLD AUTO: 0 %
ERYTHROCYTE [DISTWIDTH] IN BLOOD BY AUTOMATED COUNT: 14 % (ref 10–15)
ERYTHROCYTE [SEDIMENTATION RATE] IN BLOOD BY WESTERGREN METHOD: 7 MM/HR (ref 0–30)
GLUCOSE SERPL-MCNC: 99 MG/DL (ref 70–99)
HCO3 SERPL-SCNC: 27 MMOL/L (ref 22–29)
HCT VFR BLD AUTO: 40.9 % (ref 35–47)
HGB BLD-MCNC: 13.8 G/DL (ref 11.7–15.7)
IMM GRANULOCYTES # BLD: 0 10E3/UL
IMM GRANULOCYTES NFR BLD: 0 %
LIPASE SERPL-CCNC: 30 U/L (ref 13–60)
LYMPHOCYTES # BLD AUTO: 1.8 10E3/UL (ref 0.8–5.3)
LYMPHOCYTES NFR BLD AUTO: 40 %
MCH RBC QN AUTO: 28.6 PG (ref 26.5–33)
MCHC RBC AUTO-ENTMCNC: 33.7 G/DL (ref 31.5–36.5)
MCV RBC AUTO: 85 FL (ref 78–100)
MONOCYTES # BLD AUTO: 0.4 10E3/UL (ref 0–1.3)
MONOCYTES NFR BLD AUTO: 9 %
NEUTROPHILS # BLD AUTO: 2.2 10E3/UL (ref 1.6–8.3)
NEUTROPHILS NFR BLD AUTO: 50 %
NRBC # BLD AUTO: 0 10E3/UL
NRBC BLD AUTO-RTO: 0 /100
PLATELET # BLD AUTO: 224 10E3/UL (ref 150–450)
POTASSIUM SERPL-SCNC: 4.9 MMOL/L (ref 3.4–5.3)
PROT SERPL-MCNC: 7.4 G/DL (ref 6.4–8.3)
RBC # BLD AUTO: 4.82 10E6/UL (ref 3.8–5.2)
SODIUM SERPL-SCNC: 140 MMOL/L (ref 135–145)
TSH SERPL DL<=0.005 MIU/L-ACNC: 2.15 UIU/ML (ref 0.3–4.2)
WBC # BLD AUTO: 4.5 10E3/UL (ref 4–11)

## 2025-03-31 PROCEDURE — 82040 ASSAY OF SERUM ALBUMIN: CPT | Mod: ZL | Performed by: FAMILY MEDICINE

## 2025-03-31 PROCEDURE — 85014 HEMATOCRIT: CPT | Mod: ZL | Performed by: FAMILY MEDICINE

## 2025-03-31 PROCEDURE — 77061 BREAST TOMOSYNTHESIS UNI: CPT | Mod: LT

## 2025-03-31 PROCEDURE — 84443 ASSAY THYROID STIM HORMONE: CPT | Mod: ZL | Performed by: FAMILY MEDICINE

## 2025-03-31 PROCEDURE — 77065 DX MAMMO INCL CAD UNI: CPT | Mod: LT

## 2025-03-31 PROCEDURE — 82247 BILIRUBIN TOTAL: CPT | Mod: ZL | Performed by: FAMILY MEDICINE

## 2025-03-31 PROCEDURE — 82310 ASSAY OF CALCIUM: CPT | Mod: ZL | Performed by: FAMILY MEDICINE

## 2025-03-31 PROCEDURE — 83690 ASSAY OF LIPASE: CPT | Mod: ZL | Performed by: FAMILY MEDICINE

## 2025-03-31 PROCEDURE — 36415 COLL VENOUS BLD VENIPUNCTURE: CPT | Mod: ZL | Performed by: FAMILY MEDICINE

## 2025-03-31 PROCEDURE — 86140 C-REACTIVE PROTEIN: CPT | Mod: ZL | Performed by: FAMILY MEDICINE

## 2025-03-31 PROCEDURE — 85652 RBC SED RATE AUTOMATED: CPT | Mod: ZL | Performed by: FAMILY MEDICINE

## 2025-03-31 ASSESSMENT — PAIN SCALES - GENERAL: PAINLEVEL_OUTOF10: MILD PAIN (2)

## 2025-03-31 NOTE — PROGRESS NOTES
Nursing Notes:   Allyssa Kay LPN  3/31/2025  9:43 AM  Signed  Chief Complaint   Patient presents with    Derm Problem         Medication Reconciliation: complete    Allyssa Kay LPN        Subjective   Phyllis is a 60 year old, presenting for the following health issues:  Derm Problem        3/31/2025     9:30 AM   Additional Questions   Roomed by Allyssa Kay     Has a spot on her upper chest that started changing about a month ago.  Had initially felt a pulling sensation like dry skin was pulling off.  Noticed it looks more raised and pink than it had before.  No bleeding has been noted.  Has had some peeling of dry skin from the area.  Has a couple of spots on her nose that feel dry/scaly.  Has a couple of other tiny red spots on her nose as well.    Has had some GI issues for about a year.  Comes and goes.  Last spring, had frequent diarrhea 2-3 times a week.  Mid summer was better.  Still has issues every 1-2 weeks with diarrhea.  Her bowel movements softer than normal between the episodes of diarrhea.  Hasn't had a normal bowel movement in a few months.  No hard stools or constipation.  Might only have bowel movements every 2-3 days at times.  Feels like it doesn't completely empty.  If she strains, it seems like it might trigger some diarrhea.  This seems to come out of the blue.  Doesn't seem to matter what she eats.  Has tried eliminating both dairy and gluten and has not necessarily helped.  3 weeks ago, started having more pain with this.  Sometimes has more right sided pain after eating.  Pain is in the RUQ.  Sometimes epigastric.  Still has gall bladder.  The diarrhea is not associated with eating.  Might have a bowel movement 2-3 times a day if the diarrhea hits.  Last colonoscopy 11/2022.  5 year follow up was recommended due to polyps.  She has underlying rheumatoid arthritis, but it seems to be pretty well controlled.  No recent antibiotics.    History of Present Illness       Reason for  visit:  Check a brown spot and GI issues  Symptom onset:  More than a month  Symptoms include:  Recent changes in brown spot, abdominal pains, diarrhea  Symptom intensity:  Moderate  Symptom progression:  Worsening  Had these symptoms before:  No   She is taking medications regularly.            Review of Systems  Constitutional, HEENT, cardiovascular, pulmonary, GI, , musculoskeletal, neuro, skin, endocrine and psych systems are negative, except as otherwise noted.      Objective    /74   Pulse 61   Temp 97  F (36.1  C) (Tympanic)   Resp 18   Wt 131 kg (288 lb 12.8 oz)   LMP  (LMP Unknown)   SpO2 96%   Breastfeeding No   BMI 42.04 kg/m    Body mass index is 42.04 kg/m .  Physical Exam  Constitutional:       Appearance: Normal appearance.   HENT:      Head: Normocephalic.   Eyes:      Extraocular Movements: Extraocular movements intact.      Pupils: Pupils are equal, round, and reactive to light.   Cardiovascular:      Rate and Rhythm: Normal rate and regular rhythm.      Heart sounds: Normal heart sounds. No murmur heard.  Pulmonary:      Effort: Pulmonary effort is normal.      Breath sounds: Normal breath sounds. No wheezing, rhonchi or rales.   Abdominal:      General: Abdomen is flat. Bowel sounds are normal.      Palpations: Abdomen is soft.      Tenderness: There is no abdominal tenderness. There is no guarding or rebound.   Musculoskeletal:      Cervical back: Normal range of motion and neck supple.   Lymphadenopathy:      Cervical: No cervical adenopathy.   Skin:     Comments: On the left side of her nose ala and also on the bridge of her nose on the left side, there are a couple of poorly defined rough lesions.  These were frozen x3 each with liquid nitrogen.  She tolerated this well.    On her upper chest, there is a slightly raised, shiny, purple-pink colored plaque.  It appears to be slightly over 1 cm in diameter.   Neurological:      Mental Status: She is alert.   Psychiatric:          Mood and Affect: Mood normal.         Behavior: Behavior normal.                  ICD-10-CM    1. Skin lesions  L98.9 Adult Gen Surg  Referral     DESTRUCT PREMALIGNANT LESION, 2-14      2. Chronic diarrhea  K52.9 Calprotectin Feces     Enteric Bacteria and Virus Panel PCR     Routine parasitology exam     C. difficile Toxin B PCR with reflex to C. difficile EIA     TSH with free T4 reflex     CBC with Platelets & Differential     Comprehensive metabolic panel     Sedimentation Rate (ESR)     CRP inflammation      3. RUQ abdominal pain  R10.11 US Abdomen Complete     Lipase           2 skin lesions on her nose look consistent with actinic keratoses and were frozen as noted above.  Discussed if these do not resolve with this treatment, would recommend referral to Dermatology.  The spot on her chest looks concerning for a basal cell.  Referred to general surgery for consideration of removal.  Stool studies completed to rule out infectious process.  She has underlying rheumatoid arthritis and IBD would be a possibility with this illness.  Calprotectin ordered as well looking for inflammation.  Other labs completed as above.  If work up normal, would recommend referral to GI for possible repeat colonoscopy to evaluate further.  ultrasound of abdomen ordered as well to evaluate for possible gall bladder disease that could be contributing to her right upper quadrant/epigastric pain.     No follow-ups on file.     The longitudinal plan of care for the diagnosis(es)/condition(s) as documented were addressed during this visit. Due to the added complexity in care, I will continue to support Phyllis in the subsequent management and with ongoing continuity of care.    Ya Victoria MD

## 2025-03-31 NOTE — NURSING NOTE
Chief Complaint   Patient presents with    Derm Problem         Medication Reconciliation: complete    Allyssa Kay, LPN

## 2025-03-31 NOTE — LETTER
My Asthma Action Plan    Name: Phyllis Washington   YOB: 1965  Date: 3/31/2025   My doctor: Ya Victoria MD   My clinic: [facility]        My Rescue Medicine: none   My Asthma Severity:   Intermittent / Exercise Induced  Know your asthma triggers: Patient is unaware of triggers             GREEN ZONE   Good Control  I feel good  No cough or wheeze  Can work, sleep and play without asthma symptoms       Take your asthma control medicine every day.     If exercise triggers your asthma, take your rescue medication  15 minutes before exercise or sports, and  During exercise if you have asthma symptoms  Spacer to use with inhaler: If you have a spacer, make sure to use it with your inhaler             YELLOW ZONE Getting Worse  I have ANY of these:  I do not feel good  Cough or wheeze  Chest feels tight  Wake up at night   Keep taking your Green Zone medications  Start taking your rescue medicine:  every 20 minutes for up to 1 hour. Then every 4 hours for 24-48 hours.  If you stay in the Yellow Zone for more than 12-24 hours, contact your doctor.  If you do not return to the Green Zone in 12-24 hours or you get worse, start taking your oral steroid medicine if prescribed by your provider.           RED ZONE Medical Alert - Get Help  I have ANY of these:  I feel awful  Medicine is not helping  Breathing getting harder  Trouble walking or talking  Nose opens wide to breathe       Take your rescue medicine NOW  If your provider has prescribed an oral steroid medicine, start taking it NOW  Call your doctor NOW  If you are still in the Red Zone after 20 minutes and you have not reached your doctor:  Take your rescue medicine again and  Call 911 or go to the emergency room right away    See your regular doctor within 2 weeks of an Emergency Room or Urgent Care visit for follow-up treatment.          Annual Reminders:  Meet with Asthma Educator,  Flu Shot in the Fall, consider  Pneumonia Vaccination for patients with asthma (aged 19 and older).    Pharmacy: [id number] - [place], [place] - [address]    Electronically signed by Ya Victoria MD   Date: 03/31/25                    Asthma Triggers  How To Control Things That Make Your Asthma Worse    Triggers are things that make your asthma worse.  Look at the list below to help you find your triggers and   what you can do about them. You can help prevent asthma flare-ups by staying away from your triggers.      Trigger                                                          What you can do   Cigarette Smoke  Tobacco smoke can make asthma worse. Do not allow smoking in your home, car or around you.  Be sure no one smokes at a child s day care or school.  If you smoke, ask your health care provider for ways to help you quit.  Ask family members to quit too.  Ask your health care provider for a referral to Quit Plan to help you quit smoking, or call 2-435-506-PLAN.     Colds, Flu, Bronchitis  These are common triggers of asthma. Wash your hands often.  Don t touch your eyes, nose or mouth.  Get a flu shot every year.     Dust Mites  These are tiny bugs that live in cloth or carpet. They are too small to see. Wash sheets and blankets in hot water every week.   Encase pillows and mattress in dust mite proof covers.  Avoid having carpet if you can. If you have carpet, vacuum weekly.   Use a dust mask and HEPA vacuum.   Pollen and Outdoor Mold  Some people are allergic to trees, grass, or weed pollen, or molds. Try to keep your windows closed.  Limit time out doors when pollen count is high.   Ask you health care provider about taking medicine during allergy season.     Animal Dander  Some people are allergic to skin flakes, urine or saliva from pets with fur or feathers. Keep pets with fur or feathers out of your home.    If you can t keep the pet outdoors, then keep the pet out of your  bedroom.  Keep the bedroom door closed.  Keep pets off cloth furniture and away from stuffed toys.     Mice, Rats, and Cockroaches  Some people are allergic to the waste from these pests.   Cover food and garbage.  Clean up spills and food crumbs.  Store grease in the refrigerator.   Keep food out of the bedroom.   Indoor Mold  This can be a trigger if your home has high moisture. Fix leaking faucets, pipes, or other sources of water.   Clean moldy surfaces.  Dehumidify basement if it is damp and smelly.   Smoke, Strong Odors, and Sprays  These can reduce air quality. Stay away from strong odors and sprays, such as perfume, powder, hair spray, paints, smoke incense, paint, cleaning products, candles and new carpet.   Exercise or Sports  Some people with asthma have this trigger. Be active!  Ask your doctor about taking medicine before sports or exercise to prevent symptoms.    Warm up for 5-10 minutes before and after sports or exercise.     Other Triggers of Asthma  Cold air:  Cover your nose and mouth with a scarf.  Sometimes laughing or crying can be a trigger.  Some medicines and food can trigger asthma.

## 2025-04-03 ENCOUNTER — ANCILLARY ORDERS (OUTPATIENT)
Dept: FAMILY MEDICINE | Facility: OTHER | Age: 60
End: 2025-04-03

## 2025-04-03 ENCOUNTER — THERAPY VISIT (OUTPATIENT)
Dept: PHYSICAL THERAPY | Facility: OTHER | Age: 60
End: 2025-04-03
Attending: FAMILY MEDICINE
Payer: COMMERCIAL

## 2025-04-03 DIAGNOSIS — M25.572 ACUTE LEFT ANKLE PAIN: Primary | ICD-10-CM

## 2025-04-03 DIAGNOSIS — R92.8 ABNORMAL FINDING ON BREAST IMAGING: Primary | ICD-10-CM

## 2025-04-03 PROCEDURE — 97112 NEUROMUSCULAR REEDUCATION: CPT | Mod: GP

## 2025-04-03 PROCEDURE — 97140 MANUAL THERAPY 1/> REGIONS: CPT | Mod: GP

## 2025-04-03 PROCEDURE — 97110 THERAPEUTIC EXERCISES: CPT | Mod: GP

## 2025-04-04 ENCOUNTER — HOSPITAL ENCOUNTER (OUTPATIENT)
Dept: MAMMOGRAPHY | Facility: OTHER | Age: 60
Discharge: HOME | End: 2025-04-04
Attending: FAMILY MEDICINE
Payer: COMMERCIAL

## 2025-04-04 DIAGNOSIS — R92.8 ABNORMAL FINDING ON BREAST IMAGING: ICD-10-CM

## 2025-04-04 PROCEDURE — 76098 X-RAY EXAM SURGICAL SPECIMEN: CPT

## 2025-04-04 PROCEDURE — 19081 BX BREAST 1ST LESION STRTCTC: CPT | Mod: LT

## 2025-04-04 PROCEDURE — 19081 BX BREAST 1ST LESION STRTCTC: CPT | Mod: LT | Performed by: STUDENT IN AN ORGANIZED HEALTH CARE EDUCATION/TRAINING PROGRAM

## 2025-04-04 PROCEDURE — 250N000011 HC RX IP 250 OP 636: Performed by: STUDENT IN AN ORGANIZED HEALTH CARE EDUCATION/TRAINING PROGRAM

## 2025-04-04 PROCEDURE — 76098 X-RAY EXAM SURGICAL SPECIMEN: CPT | Mod: 26 | Performed by: STUDENT IN AN ORGANIZED HEALTH CARE EDUCATION/TRAINING PROGRAM

## 2025-04-04 PROCEDURE — 999N000065 MA POST PROCEDURE LEFT

## 2025-04-04 PROCEDURE — 250N000009 HC RX 250: Performed by: STUDENT IN AN ORGANIZED HEALTH CARE EDUCATION/TRAINING PROGRAM

## 2025-04-04 RX ORDER — LIDOCAINE HYDROCHLORIDE AND EPINEPHRINE 10; 10 MG/ML; UG/ML
20 INJECTION, SOLUTION INFILTRATION; PERINEURAL ONCE
Status: COMPLETED | OUTPATIENT
Start: 2025-04-04 | End: 2025-04-04

## 2025-04-04 RX ORDER — LIDOCAINE HYDROCHLORIDE 10 MG/ML
20 INJECTION, SOLUTION INFILTRATION; PERINEURAL ONCE
Status: COMPLETED | OUTPATIENT
Start: 2025-04-04 | End: 2025-04-04

## 2025-04-04 RX ADMIN — LIDOCAINE HYDROCHLORIDE 2 ML: 10 INJECTION, SOLUTION EPIDURAL; INFILTRATION; INTRACAUDAL; PERINEURAL at 08:23

## 2025-04-04 RX ADMIN — LIDOCAINE HYDROCHLORIDE AND EPINEPHRINE 20 ML: 10; 10 INJECTION, SOLUTION INFILTRATION; PERINEURAL at 08:23

## 2025-04-04 NOTE — PROGRESS NOTES
Patient here for stereotactic guided biopsy of left breast.  Procedure reviewed with patient by writer and radiologist, questions answered.  Time out performed prior to biopsy.  Biopsy completed by radiologist, clip placed.  Pressure held to biopsy site for 10 minutes.  Medipore dressing applied.   Post clip mammogram completed.  Sports bra and ice pack applied over dressing.  Discharge instructions reviewed with patient, patient verbalizes understanding of instructions.  Discharged to home in stable condition with no evidence of bleeding from biopsy site.   Marjan Keller RN.

## 2025-04-04 NOTE — DISCHARGE INSTRUCTIONS
After Your Breast Biopsy  Bleeding, bruising, and pain  Breast tenderness and some bruising is normal and may last several days. You may wear your bra overnight to support the breast.  You may use an ice pack for pain. Place it over the area for 15 to 20 minutes, several times a day.  You may take over-the-counter pain medicine:  On the day of the biopsy, we recommend Tylenol (acetaminophen) because it does not raise your risk of bleeding.  The next day, you may take an anti-inflammatory medicine (aspirin, ibuprofen, Motrin, Aleve, Advil), unless your doctor tells you not to.  Bandages and showering  Keep your bandage in place until tomorrow morning. Don't get it wet.  If you have small pieces of tape on the skin, leave them in place. They will fall off on their own, or you can remove them after 5 days.  You may shower the next morning after your biopsy.  Activity  No heavy activity (no running, no gym workouts, no lifting, no vacuuming, etc.) on the day of your biopsy.  You may go back to normal activity the next day. But limit what you do if you still have pain or discomfort.  Infection  Infection is rare. Signs of infection include:  Fever (including sweats and chills)  Redness  Pain that gets worse  Fluid draining from the biopsy site  Biopsy results  Results may take up to 5 business days.  A nurse or doctor from the Breast Center will call with your results. We will also send the results to the doctor that ordered your biopsy.  If you have not gotten your results in 5 days, please call the Breast Center.  Call the Breast Center with questions or if:   You have bleeding that lasts more than 20 minutes.  You have pain that you can't control.  You have signs of infection (fever, sweats, chills, redness, increasing pain, or drainage).  After hours, please call the doctor who ordered your biopsy.  For informational purposes only. Not to replace the advice of your health care provider. Copyright   2010 [facility]. All rights reserved. Clinically reviewed by Laurel Gomez, Director, [facility]. Zafin [id number] - REV 08/23.

## 2025-04-07 LAB
PATH REPORT.COMMENTS IMP SPEC: NORMAL
PATH REPORT.FINAL DX SPEC: NORMAL
PHOTO IMAGE: NORMAL

## 2025-04-08 ENCOUNTER — OFFICE VISIT (OUTPATIENT)
Dept: SURGERY | Facility: OTHER | Age: 60
End: 2025-04-08
Attending: SURGERY
Payer: COMMERCIAL

## 2025-04-08 ENCOUNTER — THERAPY VISIT (OUTPATIENT)
Dept: PHYSICAL THERAPY | Facility: OTHER | Age: 60
End: 2025-04-08
Attending: FAMILY MEDICINE
Payer: COMMERCIAL

## 2025-04-08 VITALS
BODY MASS INDEX: 42.36 KG/M2 | WEIGHT: 291 LBS | OXYGEN SATURATION: 99 % | DIASTOLIC BLOOD PRESSURE: 84 MMHG | HEART RATE: 64 BPM | SYSTOLIC BLOOD PRESSURE: 134 MMHG | RESPIRATION RATE: 16 BRPM | TEMPERATURE: 97 F

## 2025-04-08 DIAGNOSIS — M25.572 ACUTE LEFT ANKLE PAIN: Primary | ICD-10-CM

## 2025-04-08 DIAGNOSIS — N60.12 FIBROCYSTIC BREAST CHANGES, LEFT: ICD-10-CM

## 2025-04-08 DIAGNOSIS — Z80.3 FAMILY HISTORY OF BREAST CANCER IN MOTHER: ICD-10-CM

## 2025-04-08 DIAGNOSIS — R92.8 ABNORMAL MAMMOGRAM OF LEFT BREAST: Primary | ICD-10-CM

## 2025-04-08 PROCEDURE — 97112 NEUROMUSCULAR REEDUCATION: CPT | Mod: GP

## 2025-04-08 PROCEDURE — 97110 THERAPEUTIC EXERCISES: CPT | Mod: GP

## 2025-04-08 ASSESSMENT — PAIN SCALES - GENERAL: PAINLEVEL_OUTOF10: NO PAIN (0)

## 2025-04-08 NOTE — PROGRESS NOTES
Primary Care Physician: Ya Victoria MD    I was requested to see this patient in consultation by Ya Victoria MD for evaluation of left breast microcalcifications. A copy of this note will be sent to Ya Victoria MD.    HPI:   The patient is 60 year old female with increasing microcalcifications noted in the left breast on recent mammogram. The patient hasn't noted any skin, nipple or breast changes. No previous breast cancer. No previous breast biopsy. Family history of breast cancer-mother age 46. The patient had biopsy performed that showed proliferative fibrocystic changes  Patient has done well since the biopsy.        CONSULTATION ASSESSMENT AND PLAN/RECOMMENDATIONS:   I discussed with the patient the pathophysiology of microcalcifications and breast disease. We specifically discussed that most abnormalities seen on mammogram and US are not breast cancer. I explained that fibrocystic change is not a precancerous condition and that it doesn't increase future risk for breast cancer. I recommended a 6 month left breast mammogram to follow up breast changes after the recent biopsy.  I discussed with the patient that family history can increase her lifetime risk for breast cancer. We discussed current NCCN guidelines for high risk screening and surveillance in patients with a greater than 20 % lifetime risk of breast cancer.  We discussed that her estimated lifetime breast cancer risk is estimated to be 27.2% by the MATTIE model. This is elevated. It is recommended that she consider high risk screening strategies. Currently, this would include yearly mammogram and yearly MRI, alternating the studies every 6 months. She wishes to proceed with high risk screening.  We discussed the option of a genetic evaluation for further information about her breast cancer risk and the risks she may have for other cancers. The patient expressed understanding and denies further questions at this time. She  would like to have us call her when the [employer] MyRisk testing becomes an option. We will watch for this appointment and any testing results. If results will change her screening recommendations or risk, we will see her in the office to discuss. The patient will call with questions or concerns.   She will be due for left breast mammogram and MRI in about 6 months. She will then be due for bilateral screening mammogram in March of 2026       REVIEW OF SYSTEMS  GENERAL: No fevers or chills. Denies fatigue, recent weight loss.  HEENT: No sinus drainage. No changes with vision or hearing. No difficulty swallowing.   LYMPHATICS:  No swollen nodes in axilla, neck or groin.  CARDIOVASCULAR: Denies chest pain, palpitations and dyspnea on exertion.  PULMONARY: No shortness of breath or cough. No increase in sputum production.  GI: Denies melena, bright red blood in stools. No hematemesis. No constipation or diarrhea.  : No dysuria or hematuria.  SKIN: No recent rashes or ulcers. Has a skin lesion on her chest wall-is getting that removed.  HEMATOLOGY:  No history of easy bruising or bleeding.  ENDOCRINE:  No history of diabetes or thyroid problems.  NEUROLOGY:  No history of seizures or headaches. No motor or sensory changes.  BREAST:  As above.  Past Medical History:   Diagnosis Date    Personal history of other medical treatment (CODE)     3 normal childbirths    Rheumatoid arthritis (H)      Past Surgical History:   Procedure Laterality Date    BONE MARROW BIOPSY, BONE SPECIMEN, NEEDLE/TROCAR Left 07/14/2022    Procedure: BIOPSY, BONE MARROW;  Surgeon: Glory aBig MD;  Location:  OR    COLONOSCOPY  2017    Dr. Fofana - 2 polyps found.  Told to f/u in 5 years.    COLONOSCOPY  11/17/2022    Ct - polyp with path pending - 5 year f/u rec'd    RELEASE TARSAL TUNNEL FOOT Right 10/05/2022    Procedure: RELEASE, TARSAL TUNNEL AND PLANTAT FASCIOTOMY;  Surgeon: Shiva Finch DPM;  Location:  OR     Current Outpatient  "Medications   Medication Sig Dispense Refill    B-D INSULIN SYRINGE MICROFINE 27G X 5/8\" 1 ML MISC 1 EACH BY DOES NOT APPLY ROUTE ONE TIME A WEEK. USE TO DRAW UP METHOTREXATE 25 MG ONCE WEEKLY.      BD SAFETYGLIDE SYRINGE/NEEDLE 27G X 5/8\" 1 ML MISC 1 EACH BY DOES NOT APPLY ROUTE ONE TIME A WEEK. USE TO DRAW UP METHOTREXATE 25 MG ONCE WEEKLY.      cyanocobalamin (VITAMIN B-12) 1000 MCG tablet Take 1,000 mcg by mouth daily      famotidine (PEPCID) 20 MG tablet Take 1 tablet (20 mg) by mouth 2 times daily 180 tablet 3    folic acid (FOLVITE) 1 MG tablet Take 2 mg by mouth daily      hydroxychloroquine (PLAQUENIL) 200 MG tablet Take 2 tablets (400 mg) by mouth daily Take 400 mg by mouth daily 90 tablet 0    methotrexate 50 MG/2ML injection Inject 1 mL Subcutaneous once a week      metoprolol succinate ER (TOPROL XL) 50 MG 24 hr tablet Take 1 tablet (50 mg) by mouth daily. 90 tablet 4    thiamine (B-1) 100 MG tablet Take 100 mg by mouth daily       vitamin B6 (PYRIDOXINE) 200 MG tablet Take 200 mg by mouth daily      XARELTO ANTICOAGULANT 20 MG TABS tablet TAKE 1 TABLET(20 MG) BY MOUTH DAILY WITH DINNER 90 tablet 3     No current facility-administered medications for this visit.     No Known Allergies  Family History   Problem Relation Age of Onset    Breast Cancer Mother 46        Cancer-breast, at 80    Cerebrovascular Disease Mother         stroke with hemiparesis     Hypertension Father         Hypertension    Diabetes Brother 16        Diabetes,  pulmonary embolism, had lymphoma    Other - See Comments Maternal Grandmother         Stroke,85    Other - See Comments Paternal Grandmother         Alzheimer's 73     Social History     Socioeconomic History    Marital status:      Spouse name: None    Number of children: None    Years of education: None    Highest education level: None   Tobacco Use    Smoking status: Never     Passive exposure: Past    Smokeless tobacco: Never    Tobacco comments:     " In childhood home   Vaping Use    Vaping status: Never Used   Substance and Sexual Activity    Alcohol use: Yes     Comment: 1 per month    Drug use: Never    Sexual activity: Yes     Partners: Male     Birth control/protection: Rhythm   Social History Narrative    , 3 adult children;     Homemaker; hobbies - reading, crafts.      - Demetrio         Social Drivers of Health     Financial Resource Strain: Low Risk  (2/11/2025)    Financial Resource Strain     Within the past 12 months, have you or your family members you live with been unable to get utilities (heat, electricity) when it was really needed?: No   Food Insecurity: Low Risk  (2/11/2025)    Food Insecurity     Within the past 12 months, did you worry that your food would run out before you got money to buy more?: No     Within the past 12 months, did the food you bought just not last and you didn t have money to get more?: No   Transportation Needs: Low Risk  (2/11/2025)    Transportation Needs     Within the past 12 months, has lack of transportation kept you from medical appointments, getting your medicines, non-medical meetings or appointments, work, or from getting things that you need?: No   Physical Activity: Patient Declined (2/11/2025)    Exercise Vital Sign     Days of Exercise per Week: Patient declined     Minutes of Exercise per Session: Patient declined   Stress: No Stress Concern Present (2/11/2025)    [place] [employer] - Occupational Stress Questionnaire     Feeling of Stress : Not at all   Social Connections: Unknown (2/11/2025)    Social Connection and Isolation Panel [NHANES]     Frequency of Social Gatherings with Friends and Family: Once a week   Interpersonal Safety: Low Risk  (3/31/2025)    Interpersonal Safety     Do you feel physically and emotionally safe where you currently live?: Yes     Within the past 12 months, have you been hit, slapped, kicked or otherwise physically hurt by someone?: No      Within the past 12 months, have you been humiliated or emotionally abused in other ways by your partner or ex-partner?: No   Housing Stability: Low Risk  (2/11/2025)    Housing Stability     Do you have housing? : Yes     Are you worried about losing your housing?: No     The above history was reviewed and updated today, 4/8/2025  PHYSICAL EXAM  /84 (BP Location: Right arm, Patient Position: Sitting, Cuff Size: Adult Large)   Pulse 64   Temp 97  F (36.1  C) (Tympanic)   Resp 16   Wt 132 kg (291 lb)   LMP  (LMP Unknown)   SpO2 99%   BMI 42.36 kg/m    GENERAL: Healthy appearing patient in no acute distress. Pleasant and cooperative with exam and interview.   HEENT:Head-normocephalic. Eyes-no scleral icterus. Nose-no nasal drainage. No lesions. Mouth-oral mucosa pink and moist, no lesions.  NECK: Supple. No thyroid nodules. Trachea midline.  LYMPHATICS:  No cervical, axillary or supraclavicular adenopathy.  CV: Regular rate and rhythm, no murmurs. No peripheral edema.  LUNGS:  No respiratory distress. Clear bilaterally to auscultation.  ABDOMEN: Non distended. Bowel sounds active. Soft, non-tender, no hepatosplenomegaly or hernias. No peritoneal signs.  SKIN: Pink, warm and dry. No jaundice. No rash.  NEURO:  Cranial nerves II-XII grossly intact. Alert and oriented.  PSYCH: Appropriate mood and affect.  BREAST: Breasts were examined in the seated and supine position. No mass noted bilaterally. No nipple changes or discharge bilaterally. Chronic left nipple inversion. Post biopsy changes noted left breast. Resolving ecchymosis with no sign of infection. Appropriate tenderness noted.    IMAGING/LAB  I personally reviewed patient's recent mammogram and biopsy images and reports and pathology report.

## 2025-04-08 NOTE — NURSING NOTE
"Chief Complaint   Patient presents with    Consult     Left breast       Initial /84 (BP Location: Right arm, Patient Position: Sitting, Cuff Size: Adult Large)   Pulse 64   Temp 97  F (36.1  C) (Tympanic)   Resp 16   Wt 132 kg (291 lb)   LMP  (LMP Unknown)   SpO2 99%   BMI 42.36 kg/m   Estimated body mass index is 42.36 kg/m  as calculated from the following:    Height as of 3/27/25: 1.765 m (5' 9.5\").    Weight as of this encounter: 132 kg (291 lb).  Medication Reconciliation: complete    At what age did you start menopause? Early 50's  What age did your menstrual cycle start? 11  Are you on or have you ever taken any hormone replacement or birth control? Birth control  How many children do you have? 3  How old were you when your first child was born? 21  Did you breast feed? yes  Do you have a family history of breast cancer? Mom - 46  Ambreen Odonnell LPN..........4/8/2025  1:32 PM  "

## 2025-04-08 NOTE — PATIENT INSTRUCTIONS
We will call you when the [facility] becomes available.   You will get a call for an MRI and left breast mammogram in about 6 months.   Call with questions or concern!

## 2025-04-10 ENCOUNTER — THERAPY VISIT (OUTPATIENT)
Dept: PHYSICAL THERAPY | Facility: OTHER | Age: 60
End: 2025-04-10
Attending: FAMILY MEDICINE
Payer: COMMERCIAL

## 2025-04-10 DIAGNOSIS — M25.572 ACUTE LEFT ANKLE PAIN: Primary | ICD-10-CM

## 2025-04-10 PROCEDURE — 97112 NEUROMUSCULAR REEDUCATION: CPT | Mod: GP

## 2025-04-10 PROCEDURE — 97140 MANUAL THERAPY 1/> REGIONS: CPT | Mod: GP

## 2025-04-10 PROCEDURE — 97110 THERAPEUTIC EXERCISES: CPT | Mod: GP

## 2025-04-14 ENCOUNTER — THERAPY VISIT (OUTPATIENT)
Dept: PHYSICAL THERAPY | Facility: OTHER | Age: 60
End: 2025-04-14
Attending: FAMILY MEDICINE
Payer: COMMERCIAL

## 2025-04-14 DIAGNOSIS — M25.572 ACUTE LEFT ANKLE PAIN: Primary | ICD-10-CM

## 2025-04-14 PROCEDURE — 97140 MANUAL THERAPY 1/> REGIONS: CPT | Mod: GP

## 2025-04-14 PROCEDURE — 97112 NEUROMUSCULAR REEDUCATION: CPT | Mod: GP

## 2025-04-15 ENCOUNTER — OFFICE VISIT (OUTPATIENT)
Dept: SURGERY | Facility: OTHER | Age: 60
End: 2025-04-15
Attending: FAMILY MEDICINE
Payer: COMMERCIAL

## 2025-04-15 VITALS
RESPIRATION RATE: 16 BRPM | BODY MASS INDEX: 41.52 KG/M2 | HEART RATE: 56 BPM | WEIGHT: 290 LBS | SYSTOLIC BLOOD PRESSURE: 128 MMHG | OXYGEN SATURATION: 93 % | DIASTOLIC BLOOD PRESSURE: 80 MMHG | TEMPERATURE: 97.6 F | HEIGHT: 70 IN

## 2025-04-15 DIAGNOSIS — L98.9 SKIN LESIONS: ICD-10-CM

## 2025-04-15 ASSESSMENT — PAIN SCALES - GENERAL: PAINLEVEL_OUTOF10: NO PAIN (0)

## 2025-04-15 NOTE — NURSING NOTE
"Chief Complaint   Patient presents with    Procedure     Skin lesion on chest         Medication reconciliation completed.    FOOD SECURITY SCREENING QUESTIONS:    The next two questions are to help us understand your food security.  If you are feeling you need any assistance in this area, we have resources available to support you today.    Hunger Vital Signs:  Within the past 12 months we worried whether our food would run out before we got money to buy more. Never  Within the past 12 months the food we bought just didn't last and we didn't have money to get more. Never    Initial /80 (BP Location: Left arm, Patient Position: Sitting, Cuff Size: Adult Large)   Pulse 56   Temp 97.6  F (36.4  C) (Tympanic)   Resp 16   Ht 1.765 m (5' 9.5\")   Wt 131.5 kg (290 lb)   LMP  (LMP Unknown)   SpO2 93%   BMI 42.21 kg/m   Estimated body mass index is 42.21 kg/m  as calculated from the following:    Height as of this encounter: 1.765 m (5' 9.5\").    Weight as of this encounter: 131.5 kg (290 lb).       Rosetta Elder LPN .......  4/15/2025  9:48 AM    "

## 2025-04-15 NOTE — PROGRESS NOTES
Procedure Note      Pre/Post Operative Diagnosis:   Pigmented skin lesion      Procedure:   Cryoablation of suspected seborrheic keratoses.  This is already sloughed off on its own.  Surgeon: Ulises Felder MD    Local Anesthesia:     Indication for the procedure:  Phyllis previously had a pigmented skin lesion on her chest.  It is now sloughed off and there is small pink flat area remaining.  My suspicion is that she has a pigmented seborrheic keratosis.  We discussed options for biopsy excision versus destruction.  She chose destruction.    We discussed cryo therapy of the lesion. We specifically discussed the risks of infection, discoloration and the possible need for further treatments. The patient expressed understanding and the patient wishes to proceed. Informed consent paperwork was completed.     Procedure:  The area of the skin lesion on the chest was treated with liquid nitrogen for 3 freeze thaw cycles. The patient tolerated the procedure with no immediately apparent complications. We reviewed discharge instructions. The patient will call for any concerns. The patient will follow up in 3-4 weeks for a recheck of the area. The patient denies questions at this time.

## 2025-05-01 RX ORDER — PREDNISONE 5 MG/1
TABLET ORAL
COMMUNITY
Start: 2025-04-09 | End: 2025-05-01

## 2025-05-01 NOTE — PROGRESS NOTES
Referral from: 25 MISSY Montoya, CNP: M05.79 (ICD-10-CM) - Rheumatoid arthritis involving multiple sites with positive rheumatoid factor     Prior patient at: [facility], [place] (Last visit 24 with Dr Figueredo). Prior to [facility] she was seen at [facility] until an insurance change prohibited her from seeing an [facility] provider.    Applicable Labs:   3/31/25 -- (New 12 week DMARD monitoring standing lab orders pended to go under Dr Earl at [facility].)     Applicable Imagin25 XR Left Ankle    Applicable Medications:   1) Methotrexate 20 mg (0.8 ml) under the skin once weekly (Verify if this is correct.)--(Refill pended to go under Dr Earl at [facility].)   2) Folic Acid 2 mg daily (Verify if this is correct.)--(Refill pended to go under Dr Earl at [facility].)   3) Plaquenil (Hydroxychloroquine) 400 mg daily (Verify if this is correct.)--(Refill pended to go under Dr Earl at [facility].)   4) Prednisone 5 mg daily as needed for flare--(Refill pended to go under Dr Earl at [facility]. Verify if this appropriate quantity & refill.)    Applicable History:   1) Has previously tried Sulfasalazine (Azulfidine), Humira (Adalimumab), Orenica (Abatacept), Rinvoq (Upadacitinib).    2) History of MGUS.    3) Going to Physical Therapy at [facility] for left ankle pain.    4) Left breast has increasing microcalcifications--proceeding with high risk screening.    5) Has Phyllis has a Plaquenil (Hydroxychloroquine) eye exam in the last 12 months? If yes, have her sign TOMMY & fax for records. If no, give her Plaquenil (Hydroxychloroquine) eye exam handout & ask her to make an appointment at her eye clinic.

## 2025-05-06 ENCOUNTER — THERAPY VISIT (OUTPATIENT)
Dept: PHYSICAL THERAPY | Facility: OTHER | Age: 60
End: 2025-05-06
Attending: FAMILY MEDICINE
Payer: COMMERCIAL

## 2025-05-06 DIAGNOSIS — M25.572 ACUTE LEFT ANKLE PAIN: Primary | ICD-10-CM

## 2025-05-06 PROCEDURE — 97112 NEUROMUSCULAR REEDUCATION: CPT | Mod: GP

## 2025-05-06 PROCEDURE — 97110 THERAPEUTIC EXERCISES: CPT | Mod: GP

## 2025-05-06 NOTE — PROGRESS NOTES
05/06/25 0500   Appointment Info   Signing clinician's name / credentials Dusty Jacob DPT   Total/Authorized Visits 8/365   Visits Used 8/10   Medical Diagnosis M25.572   PT Tx Diagnosis Limited ROM, weakness, consistent with post tib tendinopathy and chronic arch pain   Progress Note/Certification   Onset of illness/injury or Date of Surgery 10/20/24   Therapy Frequency 1-2x per week   Predicted Duration up to 6 weeks   Progress Note Due Date 06/17/25   Progress Note Completed Date 05/06/25   GOALS   PT Goals 2;3   PT Goal 1   Goal Identifier ROM   Goal Description Pt will be able to tolerate complete ROM without pain or compensation to improve QoL   Rationale to maximize safety and independence with performance of ADLs and functional tasks;to maximize safety and independence within the community;to maximize safety and independence with self cares   Goal Progress 100%   Target Date 05/01/25   PT Goal 2   Goal Identifier Ambulation   Goal Description Pt will be able to tolerate all gait tasks up to 1 mile at self selected pace without pain or compensation to improve QoL   Rationale to maximize safety and independence with performance of ADLs and functional tasks;to maximize safety and independence with transportation;to maximize safety and independence within the community   Goal Progress 50%, has not walked a mile on the road yet, progressed for next check in   Target Date 06/17/25   PT Goal 3   Goal Identifier ADLs   Goal Description Pt will be able to tolerate all ADLs and functional movements without pain or compensation to improve QoL   Rationale to maximize safety and independence with performance of ADLs and functional tasks;to maximize safety and independence within the community;to maximize safety and independence with self cares   Goal Progress 80% per patient report   Target Date 05/15/25   Subjective Report   Subjective Report Pt feels like for the first time in years she is doing really well, is  having some days where her ankle/foot is sore by the end of a day, but no more morning pain. Pt notes she has not gotten back into the realm of walking daily but has been having urges to get out and do a few short walks. Pt notes she does parts of her HEP daily and has stayed with her program doing well. Lunges and toe walking are still much more difficult than the rest of them.   Objective Measures   Objective Measures Objective Measure 1   Objective Measure 1   Objective Measure ROM, palpation, Special Test   Details ROM WFL through ankle and rearfoot, mild ttp L post tib distribution, 1st degree navicular drop L LE, + SLR L 15 deg   Treatment Interventions (PT)   Interventions Therapeutic Procedure/Exercise;Therapeutic Activity;Manual Therapy;Neuromuscular Re-education   Therapeutic Procedure/Exercise   Therapeutic Procedures: strength, endurance, ROM, flexibility minutes (92103) 29   Ther Proc 1 Review and update MedB HEP, modified sets, reps, frequency per week, and breakdown into 3 single day workouts to manage symptoms and separate strength, stability, and ROM   Skilled Intervention Cues on sets, reps and form to improve ROM, strength, and function   Patient Response/Progress good tolerance   Ther Proc 1 - Details Educ on use of OTC orthotic build for improvement of symptoms long term and preventative measure   Neuromuscular Re-education   Neuromuscular re-ed of mvmt, balance, coord, kinesthetic sense, posture, proprioception minutes (27064) 16   Neuro Re-ed 1 Brief JM MWM into active DF, arch doming and motor relearning through arch and hip complex to improve performance of balance stabilization   Neuro Re-ed 1 - Details Reviewed [facility] MWM into DF with foot on stool, 2x10 with 3 second hold with PT hands first, then with heavy band   Skilled Intervention tactile and verbal cues to improve performance and ROM   Patient Response/Progress good tolerance   Education   Learner/Method  Patient;Listening;Reading;Demonstration;Pictures/Video   Education Comments HEP   Plan   Home program Access Code: [email or web address]   Plan for next session tissue release, JM, progress strength/stability as tolerated   Comments   Comments Pt has made strong progress towards goals noting 80% improvement in function from SOC, pt continues to have mild pain at the end of long days on feet and notes this and avoidance of long walks is what has stopped her from saying 100% improvement. Pt is to trial another 3 weeks without PT and D/C pending no return of symptoms.   Total Session Time   Timed Code Treatment Minutes 45   Total Treatment Time (sum of timed and untimed services) 45         PLAN  Continue therapy per current plan of care.    Beginning/End Dates of Progress Note Reporting Period:  3/20/25 to 05/06/2025    Referring Provider:  Jeniffer Minor

## 2025-05-12 ASSESSMENT — RHEUMATOLOGY NEW PATIENT QUESTIONNAIRE
BEHAVIORAL CHANGES: N
MEMORY LOSS: N
NAUSEA: N
HOW WOULD YOU DESCRIBE YOUR STIFFNESS ON AVERAGE: MILD
NODULES/BUMPS: N
EXCESSIVE HAIR LOSS (MORE THAN YOUR NORM): N
CHEST PAIN: N
FEVER: N
RASH: N
DIFFICULTY FALLING ASLEEP: N
SEIZURES: N
SWOLLEN OR TENDER GLANDS: N
AGITATION: N
ANEMIA: N
PERSISTENT DIARRHEA: N
DIFFICULTY SWALLOWING: N
SWOLLEN LEGS OR FEET: Y
EYE DRYNESS: N
MORNING STIFFNESS: Y
SUN SENSITIVE (SUN ALLERGY): Y
DOUBLE OR BLURRED VISION: N
SHORTNESS OF BREATH: N
HEADACHES: N
LOSS OF CONSCIOUSNESS: N
PAIN OR BURNING ON URINATION: N
SKIN TIGHTNESS: N
HEARTBURN OR REFLUX: Y
NIGHT SWEATS: N
EYE PAIN: N
ANXIETY: N
EASILY LOSING TEMPER: N
ABNORMAL URINE: N
VOMITING OF BLOOD OR COFFEE GROUND CONSISTENCY MATERIAL: N
UNEXPLAINED HEARING LOSS: N
UNEXPLAINED WEIGHT CHANGE: N
DIFFICULTY STAYING ASLEEP: N
DEPRESSION: N
HOARSE VOICE: N
SKIN REDNESS: N
MUSCLE WEAKNESS: Y
UNUSUALLY RAPID OR SLOWED HEART RATE: N
MORNING STIFFNESS IN LOWER BACK: N
BLOOD IN STOOLS: N
NUMBNESS OR TINGLING IN HANDS OR FEET: Y
DIFFICULTY BREATHING LYING DOWN: N
COUGH: N
INCREASED SUSCEPTIBILITY TO INFECTION: N
JOINT PAIN: Y
RASH OR ULCERS: N
VAGINAL DRYNESS: N
LOSS OF VISION: N
JOINT SWELLING: N
DRYNESS OF MOUTH: Y
FAINTING: N
JAUNDICE: N
UNUSUAL FATIGUE: Y
COLOR CHANGES OF HANDS OR FEET IN THE COLD: N
BLACK STOOLS: N
UNUSUAL BLEEDING: N
SORES IN MOUTH OR NOSE: N
STOMACH PAIN: Y
EASY BRUISING: Y
EYE REDNESS: N

## 2025-05-14 ENCOUNTER — OFFICE VISIT (OUTPATIENT)
Facility: OTHER | Age: 60
End: 2025-05-14
Attending: INTERNAL MEDICINE
Payer: COMMERCIAL

## 2025-05-14 VITALS
TEMPERATURE: 98.2 F | RESPIRATION RATE: 18 BRPM | HEART RATE: 60 BPM | OXYGEN SATURATION: 99 % | BODY MASS INDEX: 42.65 KG/M2 | WEIGHT: 293 LBS

## 2025-05-14 DIAGNOSIS — K21.9 GASTROESOPHAGEAL REFLUX DISEASE, UNSPECIFIED WHETHER ESOPHAGITIS PRESENT: ICD-10-CM

## 2025-05-14 DIAGNOSIS — M15.8 OTHER OSTEOARTHRITIS INVOLVING MULTIPLE JOINTS: ICD-10-CM

## 2025-05-14 DIAGNOSIS — Z71.89 OTHER SPECIFIED COUNSELING: Chronic | ICD-10-CM

## 2025-05-14 DIAGNOSIS — Z28.39 UNDERIMMUNIZATION STATUS: ICD-10-CM

## 2025-05-14 DIAGNOSIS — Z79.52 LONG-TERM CORTICOSTEROID USE: ICD-10-CM

## 2025-05-14 DIAGNOSIS — D68.59 PRIMARY HYPERCOAGULABLE STATE: ICD-10-CM

## 2025-05-14 DIAGNOSIS — Z79.899 OTHER LONG TERM (CURRENT) DRUG THERAPY: ICD-10-CM

## 2025-05-14 DIAGNOSIS — D84.9 IMMUNOCOMPROMISED PATIENT: ICD-10-CM

## 2025-05-14 DIAGNOSIS — M06.9 RHEUMATOID ARTHRITIS, RHEUMATOID FACTOR STATUS UNKNOWN (H): Primary | ICD-10-CM

## 2025-05-14 DIAGNOSIS — E66.01 MORBID OBESITY (H): ICD-10-CM

## 2025-05-14 LAB
ALBUMIN SERPL BCG-MCNC: 4.1 G/DL (ref 3.5–5.2)
ALP SERPL-CCNC: 64 U/L (ref 40–150)
ALT SERPL W P-5'-P-CCNC: 54 U/L (ref 0–50)
ANION GAP SERPL CALCULATED.3IONS-SCNC: 10 MMOL/L (ref 7–15)
AST SERPL W P-5'-P-CCNC: 59 U/L (ref 0–45)
BASOPHILS # BLD AUTO: 0 10E3/UL (ref 0–0.2)
BASOPHILS NFR BLD AUTO: 1 %
BILIRUB SERPL-MCNC: 0.4 MG/DL
BUN SERPL-MCNC: 18.4 MG/DL (ref 8–23)
CALCIUM SERPL-MCNC: 9.3 MG/DL (ref 8.8–10.4)
CHLORIDE SERPL-SCNC: 103 MMOL/L (ref 98–107)
CREAT SERPL-MCNC: 0.89 MG/DL (ref 0.51–0.95)
CRP SERPL-MCNC: <3 MG/L
EGFRCR SERPLBLD CKD-EPI 2021: 73 ML/MIN/1.73M2
EOSINOPHIL # BLD AUTO: 0 10E3/UL (ref 0–0.7)
EOSINOPHIL NFR BLD AUTO: 0 %
ERYTHROCYTE [DISTWIDTH] IN BLOOD BY AUTOMATED COUNT: 13.5 % (ref 10–15)
ERYTHROCYTE [SEDIMENTATION RATE] IN BLOOD BY WESTERGREN METHOD: 27 MM/HR (ref 0–30)
GLUCOSE SERPL-MCNC: 94 MG/DL (ref 70–99)
HCO3 SERPL-SCNC: 26 MMOL/L (ref 22–29)
HCT VFR BLD AUTO: 41 % (ref 35–47)
HGB BLD-MCNC: 13.6 G/DL (ref 11.7–15.7)
IMM GRANULOCYTES # BLD: 0 10E3/UL
IMM GRANULOCYTES NFR BLD: 0 %
LYMPHOCYTES # BLD AUTO: 1.9 10E3/UL (ref 0.8–5.3)
LYMPHOCYTES NFR BLD AUTO: 39 %
MCH RBC QN AUTO: 27.8 PG (ref 26.5–33)
MCHC RBC AUTO-ENTMCNC: 33.2 G/DL (ref 31.5–36.5)
MCV RBC AUTO: 84 FL (ref 78–100)
MONOCYTES # BLD AUTO: 0.3 10E3/UL (ref 0–1.3)
MONOCYTES NFR BLD AUTO: 6 %
NEUTROPHILS # BLD AUTO: 2.6 10E3/UL (ref 1.6–8.3)
NEUTROPHILS NFR BLD AUTO: 54 %
NRBC # BLD AUTO: 0 10E3/UL
NRBC BLD AUTO-RTO: 0 /100
PLATELET # BLD AUTO: 236 10E3/UL (ref 150–450)
POTASSIUM SERPL-SCNC: 4.8 MMOL/L (ref 3.4–5.3)
PROT SERPL-MCNC: 7.5 G/DL (ref 6.4–8.3)
RBC # BLD AUTO: 4.9 10E6/UL (ref 3.8–5.2)
SODIUM SERPL-SCNC: 139 MMOL/L (ref 135–145)
WBC # BLD AUTO: 4.9 10E3/UL (ref 4–11)

## 2025-05-14 PROCEDURE — 86140 C-REACTIVE PROTEIN: CPT | Mod: ZL | Performed by: INTERNAL MEDICINE

## 2025-05-14 PROCEDURE — G2211 COMPLEX E/M VISIT ADD ON: HCPCS | Performed by: INTERNAL MEDICINE

## 2025-05-14 PROCEDURE — 86705 HEP B CORE ANTIBODY IGM: CPT | Mod: ZL | Performed by: INTERNAL MEDICINE

## 2025-05-14 PROCEDURE — 1125F AMNT PAIN NOTED PAIN PRSNT: CPT | Performed by: INTERNAL MEDICINE

## 2025-05-14 PROCEDURE — 87340 HEPATITIS B SURFACE AG IA: CPT | Mod: ZL | Performed by: INTERNAL MEDICINE

## 2025-05-14 PROCEDURE — 85652 RBC SED RATE AUTOMATED: CPT | Mod: ZL | Performed by: INTERNAL MEDICINE

## 2025-05-14 PROCEDURE — 36415 COLL VENOUS BLD VENIPUNCTURE: CPT | Mod: ZL | Performed by: INTERNAL MEDICINE

## 2025-05-14 PROCEDURE — 85025 COMPLETE CBC W/AUTO DIFF WBC: CPT | Mod: ZL | Performed by: INTERNAL MEDICINE

## 2025-05-14 PROCEDURE — 86481 TB AG RESPONSE T-CELL SUSP: CPT | Mod: ZL | Performed by: INTERNAL MEDICINE

## 2025-05-14 PROCEDURE — 80053 COMPREHEN METABOLIC PANEL: CPT | Mod: ZL | Performed by: INTERNAL MEDICINE

## 2025-05-14 PROCEDURE — 99205 OFFICE O/P NEW HI 60 MIN: CPT | Performed by: INTERNAL MEDICINE

## 2025-05-14 PROCEDURE — 86706 HEP B SURFACE ANTIBODY: CPT | Mod: ZL | Performed by: INTERNAL MEDICINE

## 2025-05-14 PROCEDURE — 86803 HEPATITIS C AB TEST: CPT | Mod: ZL | Performed by: INTERNAL MEDICINE

## 2025-05-14 RX ORDER — HYDROXYCHLOROQUINE SULFATE 200 MG/1
200 TABLET, FILM COATED ORAL 2 TIMES DAILY
Qty: 180 TABLET | Refills: 3 | Status: SHIPPED | OUTPATIENT
Start: 2025-05-14

## 2025-05-14 RX ORDER — PREDNISONE 5 MG/1
5 TABLET ORAL DAILY PRN
Qty: 30 TABLET | Refills: 1 | Status: SHIPPED | OUTPATIENT
Start: 2025-05-14

## 2025-05-14 RX ORDER — METHOTREXATE 25 MG/ML
25 INJECTION, SOLUTION INTRAMUSCULAR; INTRATHECAL; INTRAVENOUS; SUBCUTANEOUS WEEKLY
Qty: 12 ML | Refills: 1 | Status: SHIPPED | OUTPATIENT
Start: 2025-05-14

## 2025-05-14 RX ORDER — FOLIC ACID 1 MG/1
2 TABLET ORAL DAILY
Qty: 180 TABLET | Refills: 3 | Status: SHIPPED | OUTPATIENT
Start: 2025-05-14

## 2025-05-14 ASSESSMENT — PAIN SCALES - GENERAL: PAINLEVEL_OUTOF10: MILD PAIN (2)

## 2025-05-14 NOTE — PATIENT INSTRUCTIONS
Rheumatology Clinic - [facility]  Patient Check-Out Instruction Sheet    It was very nice to see you today. We will discuss the results of any labs/imaging at your follow-up appointment if applicable, or if you have any questions/concerns or need clarification sooner please don't hesitate to reach out to us via [employer] or call our clinic at [phone]. To send us prior medical records, lab/imaging results or Plaquenil eye screening results (if applicable), please have information faxed to [phone] - addressed to [facility]. Please complete the following [ X ] as soon as possible:    [ x ] Radiology:  --Go to radiology to have x-rays completed (walk-in), or call radiology to schedule other studies if applicable:  Study ordered:     No imaging planned today.    [ x ] Lab:  --Go to the lab and have labs completed today if applicable (you will need to call or stop at the  today to schedule any future lab draws):      Labs today before leaving.    Will need labs every 4 weeks for 3 checks after increasing methotrexate dosage, then return to every 3-month routine checks thereafter    [  X ] Pharmacy:  --Go to the pharmacy to pick-up any new medications / refills.  --Recommendations:     Increase methotrexate to 25 mg (1.0 mL) injection under the skin once weekly    Continue Plaquenil (hydroxychloroquine) 400 mg daily    Can take Tylenol up to 1000 mg 3 times daily as needed for pain    Try topical Voltaren gel (1% diclofenac gel available over-the-counter), as needed for pain/swelling as directed below:  -Apply gel to affected joint and rub into skin gently, up to 4 times daily as need for pain, 2 grams to upper extremity joints, 4 grams to lower extremity joints, maximum 32 grams daily; apply to entire joint.    NOTE: If you are taking a medication that suppresses your immune system and you develop an infection requiring treatment, please hold your immunosuppressive medication and let  the rheumatology clinic know immediately.    [ x ] Immunizations (Vaccinations):  --Immunizations can be provided today in clinic. You can also stop by scheduling to get an appointment for immunizations.  --Hold your immunosuppressive medications for 1-2 weeks after receiving the vaccination(s), or as discussed during your appointment.     Vaccinations recommended:     Up to date on vaccines. Recommend influenza and Covid vaccines in the fall yearly.  --Can get COVID-19 booster every 6 months    [ x ] Consults:  --Schedule appointments for the following:    Plaquenil eye exam once yearly-please have results faxed to our clinic at the number above    [ x ] Follow-up visit in Rheumatology:  --Schedule a follow-up appointment with Karl Earl MD in 3-4 months, sooner if acute issues arise

## 2025-05-14 NOTE — NURSING NOTE
"Chief Complaint   Patient presents with    Rheumatoid Arthritis       Initial Pulse 60   Temp 98.2  F (36.8  C) (Temporal)   Resp 18   Wt 132.9 kg (293 lb)   LMP  (LMP Unknown)   SpO2 99%   BMI 42.65 kg/m   Estimated body mass index is 42.65 kg/m  as calculated from the following:    Height as of 4/15/25: 1.765 m (5' 9.5\").    Weight as of this encounter: 132.9 kg (293 lb).  Medication Review: complete    The next two questions are to help us understand your food security.  If you are feeling you need any assistance in this area, we have resources available to support you today.          5/14/2025   SDOH- Food Insecurity   Within the past 12 months, did you worry that your food would run out before you got money to buy more? N   Within the past 12 months, did the food you bought just not last and you didn t have money to get more? N         Health Care Directive:  Patient does not have a Health Care Directive: Discussed advance care planning with patient; however, patient declined at this time.    Candi Lofton      "

## 2025-05-15 ENCOUNTER — MYC MEDICAL ADVICE (OUTPATIENT)
Facility: OTHER | Age: 60
End: 2025-05-15
Payer: COMMERCIAL

## 2025-05-15 DIAGNOSIS — M06.9 RHEUMATOID ARTHRITIS, RHEUMATOID FACTOR STATUS UNKNOWN (H): Primary | ICD-10-CM

## 2025-05-15 LAB
HBV CORE IGM SERPL QL IA: NONREACTIVE
HBV SURFACE AB SERPL IA-ACNC: <3.5 M[IU]/ML
HBV SURFACE AB SERPL IA-ACNC: NONREACTIVE M[IU]/ML
HBV SURFACE AG SERPL QL IA: NONREACTIVE
HCV AB SERPL QL IA: NONREACTIVE

## 2025-05-15 NOTE — TELEPHONE ENCOUNTER
Via [employer]^Itt Phyllis requested syringes to do Methotrexate injections. These were pended & will be routed to Dr Earl for signature.

## 2025-05-17 LAB
GAMMA INTERFERON BACKGROUND BLD IA-ACNC: 0.05 IU/ML
M TB IFN-G BLD-IMP: NEGATIVE
M TB IFN-G CD4+ BCKGRND COR BLD-ACNC: 9.95 IU/ML
MITOGEN IGNF BCKGRD COR BLD-ACNC: 0.01 IU/ML
MITOGEN IGNF BCKGRD COR BLD-ACNC: 0.02 IU/ML
QUANTIFERON MITOGEN: 10 IU/ML
QUANTIFERON NIL TUBE: 0.05 IU/ML
QUANTIFERON TB1 TUBE: 0.07 IU/ML
QUANTIFERON TB2 TUBE: 0.06

## 2025-05-19 NOTE — TELEPHONE ENCOUNTER
Labs reviewed.  ESR still normal, but near the upper limits, which correlates with her exam which did show some mild disease activity/synovitis at the MCP joints.  He would be expected that her inflammatory markers are more elevated than they have been in the past when she was better controlled.  For that reason the methotrexate dosage was increased.  I agree that the low-level elevation in AST/ALT, which is just slightly above the normal limit, this something that we can monitor over time.  If it does rise to over 2 times the upper limit of normal consistently then we may need to decrease the dosage.  And/or choose an alternative medication.  Her next labs will be due in less than a month, so we can see what the trend is at that time.

## 2025-05-19 NOTE — PROGRESS NOTES
RHEUMATOLOGY CONSULTATION    REQUESTING PROVIDER: No ref. provider found    REASON FOR VISIT:     Phyllis Washington is a 60 year old female who presents for evaluation of seropositive rheumatoid arthritis    PRELIMINARY BACKGROUND:  Diagnosis: Seropositive rheumatoid arthritis-symptom onset fall 2011  A) Manifested by:    --Symptoms / physical findings:   Initial onset with full body aches, muscle pain and weakness, extreme fatigue, difficulty sleeping due to pain  Found to have positive Lyme antibody testing, treated with 3 weeks of doxycycline  Subsequently developed daily pains in the hands and feet, nuclear medicine bone scan showed uptake in the right foot possible early RA     --lab findings:    Positive/abnormal:   Rheumatoid factor 121    Negative/normal:   CCP antibody     --rad findings / studies:   Nuclear medicine bone scan 5/22/2012-asymmetrically prominent uptake within the right foot when compared to the left, symmetric uptake seen in the first metatarsophalangeal joints of the feet, symmetric uptake within the hips as well is within the knees and shoulders  X-ray bilateral hands 9/24/2023-few well-corticated lucencies within the right carpus may indicate cysts at the capitate and hamate, no evidence for aggressive erosions within either hand or wrist, no juxta articular osteopenia.    b) Treatment history:   Glucocorticoids  Methotrexate oral (poorly tolerated) switch to subcutaneous injections-better tolerated  Sulfasalazine-2014-stopped after short trial due to malaise  Humira-started 20 15 through 20 19-secondary treatment failure  Orencia-started 2020-not covered by insurance  Rinvoq-5/20/2020 through 7/20/2021-secondary treatment failure, PVCs, elevated cholesterol    c) Currently taking:   Methotrexate 20 mg subcutaneous injection once weekly, folic acid 2 mg daily  Plaquenil 400 mg daily  Prednisone 5 mg daily as needed for flares      Other notable history/co-morbidities:  MGUS,  GERD,  diverticulosis  Hypertension,  History of DVT-blood clots intra-abdominal and bilateral legs-on chronic Xarelto      [] Family history of autoimmune disease (list):   None noted  [] Alcohol (how much?): None noted  [] Smoking / tobacco / vaping (how much?): Never  [] Drug Allergies (Notable - listed; otherwise as per Allergy list):   None noted    HISTORY OF PRESENT ILLNESS:    Initial Evaluation (5/14/2025): Phyllis Washington is a 60-year-old female with seropositive rheumatoid arthritis here to establish care.  She was last seen by Dr. Figueredo 9/24/2024.  Her symptoms for started in fall of 2011, initially with fully body aches, muscle pain and weakness with extreme fatigue, difficulty sleeping due to pain.  She was found to have a high titer positive rheumatoid factor (121), but also tested positive for Lyme antibodies and was given 3 weeks of doxycycline.  By her 6-month follow-up in 9/2012, she developed daily pains in the hands and feet.  She had a nuclear medicine bone scan showing uptake of the right foot, concerning for potential early rheumatoid arthritis.  She was given a trial of doxycycline 100 mg twice daily for 6 months, which perhaps helped some, but then her symptoms worsened.  She was ultimately started on Plaquenil 3/2013 for presumed seropositive rheumatoid arthritis.  She has been trialed on numerous different medications, including methotrexate both oral and subcutaneous, sulfasalazine, Humira, Orencia, and Rinvoq.  She is currently taking Plaquenil 400 mg daily, methotrexate 20 mg injection once weekly, folic acid 2 mg daily, and prednisone 5 mg as needed for flares.  She is generally tolerating the medications without issue, was having some diarrhea issues last year spring/summer, but those issues have now resolved with no diarrhea for the last 6 weeks.  She does have labs ordered to further evaluate if the diarrhea does recur.    She currently endorses some increased achiness especially at  the MCP joints in the ankles, with over 1 hour morning stiffness.  She denies inflammatory eye disease history or symptoms, chronic cough/shortness of breath, interstitial/rheumatoid lung disease or pericarditis, rheumatoid nodules or vasculitic rashes.  No history of chronic cytopenias or Felty syndrome.      Rapid 3    5/12/2025  6:22 AM CDT - Filed by Patient   Please select the best answer for your abilities at this time:   Dress yourself, including tying shoelaces and doing buttons? Without any difficulty   Get in and out of bed? Without any difficulty   Lift a full cup or Glass to your mouth? Without any difficulty   Walk outdoors on flat ground? Without any difficulty   Wash and dry your entire body? Without any difficulty   Bend down to  clothing from the floor? Without any difficulty   Turn regular faucets on and off? Without any difficulty   Get in and out of a car, bus, train or airplane? With some difficulty   Walk two miles or three kilometers, if you wish? With much difficulty   Participate in recreational activities and sports as you would like, if you wish? With much difficulty   How much pain have you had because of your condition over the past week?   Please indicate how severe your pain has been, on the scale from 0-10 (with 0 being no pain and 10 being pain as bad as it could be) 5   Considering all the ways in which illness and health conditions may affect you at this time.   Please indicate below how you are doing (with 0 being doing very well and 10 being doing very poorly): 5     Rheum New Patient Questionnaire-Ros    5/12/2025  6:32 AM CDT - Filed by Patient   Constitutional   Unexplained weight change No   Unusual fatigue Yes   Fever No   Night sweats No   Eyes   Eye pain No   Eye redness No   Loss of vision No   Double or blurred vision No   Eye dryness No   Ears-Nose-Mouth-Throat   Unexplained hearing loss No   Swelling of ear No   Swelling of nasal bridge No   Sores in mouth or  nose No   Dryness of mouth Yes   Hoarse voice No   Difficulty swallowing No   Cardiovascular   Chest pain No   Unusually rapid or slowed heart rate No   Swollen legs or feet Yes   Respiratory   Shortness of breath No   Difficulty breathing lying down No   Cough No   Gastrointestinal   Nausea No   Vomiting of blood or coffee ground consistency material No   Stomach pain Yes   Jaundice No   Persistent diarrhea No   Blood in stools No   Black stools No   Heartburn or reflux Yes   Genitourinary   Pain or burning on urination No   Abnormal urine No   Vaginal dryness No   Rash/ulcers No   Musculoskeletal   Morning stiffness Yes   In the past 7 days, how long did your stiffness last, on average? 30 minutes - 1 hour   How would you describe your stiffness, on average? Mild   Morning stiffness in lower back No   Joint pain Yes   Muscle weakness Yes   Joint swelling No   Integumentary (skin and/or breast)   Easy bruising Yes   Skin redness No   Rash No   Sun sensitive (sun allergy) Yes   Skin tightness No   Nodules/bumps No   Excessive hair loss (more than your norm) No   Color changes of hands or feet in the cold No   Neurological   Headaches No   Fainting No   Seizures No   Loss of consciousness No   Numbness or tingling in hands or feet Yes   Memory loss No   Psychiatric   Behavioral changes No   Anxiety No   Easily losing temper No   Depression No   Agitation No   Difficulty falling asleep No   Difficulty staying asleep No   Hematologic/Lymphatic   Swollen or tender glands No   Anemia No   Unusual bleeding No   Allergic/Immunologic   Increased susceptibility to infection No     Rheum New Patient Questionnaire-Medication History 1    5/12/2025  6:42 AM CDT - Filed by Patient   Have you taken medications for your rheumatological condition? Yes, pills;  Yes, injections   Have you taken any of these medications (pills)? hydroxychloroquine (Plaquenil);  methotrexate (Trexall);  prednisone (Deltasone);  sulfasalazine  "(Azulfidine);  upadacitinib (Rinvoq)   Have you taken any of these medications (injections)? adalimumab or biosimilar (Humira, Amjevita);  methotrexate (Otrexup, Rasuvo, RediTrex)   How much did each of the following pills help and did you experience side effects? (check all that apply)   hydroxychloroquine (Plaquenil) Helped a lot   methotrexate (Trexall) Helped a lot;  I experienced side effects   prednisone (Deltasone) Helped a lot   sulfasalazine (Azulfidine) Did not help at all   upadacitinib (Rinvoq) Helped a lot;  Did not help at all   How much did each of the following injections help and did you experience side effects? (check all that apply)   adalimumab or biosimilar (Humira, Amjevita) Helped a lot   methotrexate (Otrexup, Rasuvo, RediTrex) Helped some   Have you been treated for osteoporosis or gout? None of these          14 point review of systems unremarkable other than as noted per HPI     -------------------------------------------------------------------------------------------------------------------------  PAST HISTORY:  Past Medical / Surgical history:    Reviewed, notable as above   Current Outpatient Medications   Medication Sig Dispense Refill    B-D INSULIN SYRINGE MICROFINE 27G X 5/8\" 1 ML MISC 1 EACH BY DOES NOT APPLY ROUTE ONE TIME A WEEK. USE TO DRAW UP METHOTREXATE 25 MG ONCE WEEKLY.      BD SAFETYGLIDE SYRINGE/NEEDLE 27G X 5/8\" 1 ML MISC 1 EACH BY DOES NOT APPLY ROUTE ONE TIME A WEEK. USE TO DRAW UP METHOTREXATE 25 MG ONCE WEEKLY.      cyanocobalamin (VITAMIN B-12) 1000 MCG tablet Take 1,000 mcg by mouth daily      famotidine (PEPCID) 20 MG tablet Take 1 tablet (20 mg) by mouth 2 times daily 180 tablet 3    folic acid (FOLVITE) 1 MG tablet Take 2 tablets (2 mg) by mouth daily. 180 tablet 3    hydroxychloroquine (PLAQUENIL) 200 MG tablet Take 1 tablet (200 mg) by mouth 2 times daily. Take 400 mg by mouth daily 180 tablet 3    methotrexate 50 MG/2ML injection Inject 1 mL (25 mg) " "subcutaneously once a week. 12 mL 1    metoprolol succinate ER (TOPROL XL) 50 MG 24 hr tablet Take 1 tablet (50 mg) by mouth daily. 90 tablet 4    predniSONE (DELTASONE) 5 MG tablet Take 1 tablet (5 mg) by mouth daily as needed (arthritis flare). Take 5-10 mg daily as needed for Rheumatoid Arthritis flare. 30 tablet 1    thiamine (B-1) 100 MG tablet Take 100 mg by mouth daily       vitamin B6 (PYRIDOXINE) 200 MG tablet Take 200 mg by mouth daily      XARELTO ANTICOAGULANT 20 MG TABS tablet TAKE 1 TABLET(20 MG) BY MOUTH DAILY WITH DINNER 90 tablet 3    Syringe/Needle, Disp, 27G X 5/8\" 1 ML MISC 1 each once a week. Use as directed. 12 each 3     No Known Allergies    SOCIAL HISTORY:  Reviewed    FAMILY HISTORY:  Notable as above      PHYSICAL EXAM:  Pulse 60   Temp 98.2  F (36.8  C) (Temporal)   Resp 18   Wt 132.9 kg (293 lb)   LMP  (LMP Unknown)   SpO2 99%   BMI 42.65 kg/m    Body mass index is 42.65 kg/m .    Physical Exam  Vitals reviewed.   Constitutional:       General: She is not in acute distress.     Appearance: Normal appearance. She is obese. She is not ill-appearing.   HENT:      Head: Atraumatic.      Right Ear: External ear normal.      Left Ear: External ear normal.      Nose: No congestion.      Mouth/Throat:      Pharynx: Oropharynx is clear.   Eyes:      General: No scleral icterus.     Conjunctiva/sclera: Conjunctivae normal.   Cardiovascular:      Rate and Rhythm: Normal rate and regular rhythm.   Pulmonary:      Effort: Pulmonary effort is normal. No respiratory distress.      Breath sounds: Normal breath sounds.   Abdominal:      General: There is no distension.   Musculoskeletal:      Right lower leg: Edema present.      Left lower leg: Edema present.      Comments: Trace pitting edema bilaterally in the lower extremities   Lymphadenopathy:      Cervical: No cervical adenopathy.   Skin:     Findings: No rash.      Comments: Varicose veins bilateral lower extremities   Neurological:      " General: No focal deficit present.      Mental Status: She is alert.          Rheumatology Musculoskeletal Exam  Normal unless noted [x]: with no deformity, swelling, subluxation, muscle wasting, erythema/warmth/swelling, synovitis, TTP, effusions, nodules/tophi. FROM intact. Strength intact. No locking/crepitus. (S=swollen, T=tender)   Shoulder: Elbow:   Wrist:     Right:  **S: []  T: []  ** [] Other:   Left:  **S: []  T: []  ** [] Other:    Right:  **S: []  T: []  ** [] Other:   Left:  **S: []  T: []  ** [] Other:    Right:  **S: []  T: []  ** [] Other:   Left:  **S: []  T: []  ** [] Other:      Hand MCP:   Hand PIP / 1st IP:   Hand DIP:   Right:  **1st:  S: [x]  T: [x]  **2nd:  S: [x]  T: [x]  **3rd:  S: []  T: []  **4th:  S: []  T: []  **5th:  S: []  T: []  ** [] Other:   Left:  **1st:  S: []  T: []  **2nd:  S: [x]  T: [x]  **3rd:  S: []  T: []  **4th:  S: []  T: []  **5th:  S: [x]  T: [x]  ** [] Other:    Right:  **1st:  S: []  T: []  **2nd:  S: []  T: []  **3rd:  S: []  T: []  **4th:  S: []  T: []  **5th:  S: []  T: []  **Bouch nodes: []  ** [] Other:   Left:  **1st:  S: []  T: []  **2nd:  S: []  T: []  **3rd:  S: []  T: []  **4th:  S: []  T: []  **5th:  S: []  T: []  **Bouch nodes: []  ** [] Other:     Right:  S: []  T: []  Location(s):   **Heb nodes: []  ** [] Other:   Left:  S: []  T: []  Location(s):   **Heb nodes: []  ** [] Other:      Knee:  Ankle: Foot:   Right:  **S: []  T: []  ** [] Other:   Left:  **S: []  T: []  ** [] Other:    Right:  **S: []  T: []  ** [] Other:   Left:  **S: []  T: []  ** [] Other:    Right:  **S: []  T: []  **(+) MT squeeze:  []  ** [x] Other: Bilateral bunion deformities  Left:  **S: []  T: []  **(+) MT squeeze:  []  ** [x] Other: As above     Hip / SIJ: Back: Other:   Right:  **(+) Lateral T: []   **(+) FADIR: []   **(+) JAZ: []   ** [] Other:   Left:  **(+) Lateral T: []  **(+) FADIR: []  **(+) JAZ: []   ** [] Other:    ** [] Flex / Ext / ROM:   ** [] Other:    []  Fibromyalgia tenderpoints:   _________    [] Nailfold capillaroscopy: ________     No images are attached to the encounter.    LABS / STUDIES:   Reviewed - notable as above    -----------------------------------------------------------------    DISEASE ACTIVITY SCORES:  RAPID 3:   Date: 5/14/25 : score 11.3 (moderate severity)   -------------------------------------------------------------------------------------------------------------------------------------------------------------------------------------------------------------------------------------------  ASSESSMENT / PLAN:     ASSESSMENT:   Rheumatoid arthritis, rheumatoid factor status unknown (H) (Primary)  Phyllis Washington is a 60 year old female who presents for evaluation of seropositive rheumatoid arthritis.  Her disease is manifested by inflammatory polyarthritis especially affecting the small joints of the hands/feet, with high titer positive rheumatoid factor (121).  This may have been triggered by initial Lyme infection.    Current medication regimen includes methotrexate 20 mg subcutaneous injection once weekly, folic acid 2 mg daily, Plaquenil 400 mg daily, and as needed prednisone for flares.  She is tolerating medication well without issue at this point.  Did have some diarrhea, however this is resolved over the last 6 weeks so not clearly related to the methotrexate or Plaquenil.    She does have mild synovitis on exam today, moderate disease activity by rapid 3.  We discussed potential treatment options, she would like to optimize the methotrexate increasing to 25 mg injection once weekly.  We discussed the possibility of adding on low-dose Arava in the future as well, versus adding on a biologic DMARD.    Future treatment options remaining for Rheumatoid arthritis:  Conventional oral DMARDs:    **Leflunomide  Biologic DMARDs:    **TNFi - etanercept, infliximab, golimumab, certolizumab pegol  **IL-6i - tocilizumab, sarilumab-does have  diverticulosis but no diverticulitis  **CTLA-4 - abatacept (previously not covered by insurance)  **B-cell - rituximab   Targeted synthetic DMARDs:    **Marina - Tofacitinib, Baracitinib-does have history of blood clots (intra-abdominal, bilateral legs) on Xarelto so would avoid this if possible  Other:  anakinra, azathioprine    PLAN:   Treatment regimen:   --Medications:    Methotrexate 25 mg subcutaneous injection once weekly, folic acid 2 mg daily  Plaquenil 400 mg daily  ** Glucocorticoids: 5 mg daily as needed for flare  ** Ca/Vit D / bisphosphonate / PCP prophylaxis: Calcium/vitamin D twice daily when on steroids for bone protection  Monitoring / further work-up:   Labs:   --Diagnostic labs: As noted below  --DMARD monitoring labs:   **DMARD labs every 4 weeks for 3 checks after increasing methotrexate dosage, then every 3 months thereafter  --chronic hepatitis B/C negative: 5/14/2025  --QuantiFERON TB testing negative: 5/14/2025  --ophthalmology (HCQ toxicity monitoring / uveitis): Annual Plaquenil eye exam-next due 10/2025  Consults:   **None  Imaging:   --X-rays / CT / MRI:   None today    --DXA: 12/16/2022-normal  Vaccinations / prev med:    --Immunizations:  see below   --Counseling: see other specified counseling below   F/U plan:    --Return in about 3 months (around 8/14/2025) for Follow up, in person, 30 minute, Dr. Karl Earl MD. . Follow-up sooner if acute issues arise;   --RTC precautions provided for specific signs/symptoms concerning for emerging/worsening autoimmune disease. Patient educated during the visit regarding medications and management plan and given the opportunity to ask questions.    - folic acid (FOLVITE) 1 MG tablet; Take 2 tablets (2 mg) by mouth daily.  Dispense: 180 tablet; Refill: 3  - hydroxychloroquine (PLAQUENIL) 200 MG tablet; Take 1 tablet (200 mg) by mouth 2 times daily. Take 400 mg by mouth daily  Dispense: 180 tablet; Refill: 3  - methotrexate 50 MG/2ML injection;  Inject 1 mL (25 mg) subcutaneously once a week.  Dispense: 12 mL; Refill: 1  - predniSONE (DELTASONE) 5 MG tablet; Take 1 tablet (5 mg) by mouth daily as needed (arthritis flare). Take 5-10 mg daily as needed for Rheumatoid Arthritis flare.  Dispense: 30 tablet; Refill: 1  - Hepatitis B core antibody IgM; Future  - Hepatitis B Surface Antibody; Future  - Hepatitis B surface antigen; Future  - Hepatitis C antibody; Future  - Quantiferon-TB Gold Plus; Future  - CRP inflammation; Future  - Erythrocyte sedimentation rate auto; Future  - CBC with Platelets & Differential; Future  - Comprehensive metabolic panel; Future  - CBC with Platelets & Differential; Standing  - Comprehensive metabolic panel; Standing  - CRP inflammation; Standing  - Hepatitis B core antibody IgM  - Hepatitis B Surface Antibody  - Hepatitis B surface antigen  - Hepatitis C antibody  - Quantiferon-TB Gold Plus  - CRP inflammation  - Erythrocyte sedimentation rate auto  - CBC with Platelets & Differential  - Comprehensive metabolic panel    Primary hypercoagulable state  History of DVT (intra-abdominal, bilateral legs).  Is on chronic anticoagulation.  Avoid systemic NSAIDs if possible.  Avoid Octavio inhibitors if possible given increased risk of clotting.    Morbid obesity (H)  Obesity may exacerbate osteoarthritis and may in some cases contribute to low level elevation in inflammatory markers (ESR, CRP). Will avoid steroids as possible, which may contribute to weight gain. Encourage continued attempts at weight loss. Continue to follow over time as this may impact medication dosages.  Follow-up with PCP for continued management.    Gastroesophageal reflux disease, unspecified whether esophagitis present  Has GERD, no dysphagia or other symptoms to clearly suggest IIM or systemic sclerosis which can have esophageal muscle involvement as a component.  If GERD is significant, might limit oral bisphosphonate use in the future.    Other osteoarthritis  involving multiple joints  Does have osteoarthritis as well, which can also cause signficant joint pain and which will need to be differentiated from inflammatory arthritis flares.    --Encourage exercise, maintenance of a healthy weight through diet/exercise (e.g. water therapy, piter chi). May also benefit from dietician / weight management evaluations.   -- Avoid systemic NSAIDs of possible given on chronic anticoagulation  --Can take Tylenol up to 1000 mg 3 times daily as needed.    --Voltaren gel topically 4 times daily is an option. Can also try topical lidocaine OTC preparations, topical capsacin, topical biofreeze.   --Option for Steroid injections / viscosupplementation in the future as needed.   --PT/OT evaluation/treatment and orthopedics treatment as needed for worsening disease.    Long-term corticosteroid use  Attempt to taper steroid off or to lowest effective dose, as above (+/- with use of steroid sparing DMARD).  Avoid NSAIDs when on steroids. Vitamin D when on steroid for bone protection, ensure replete.  BMD monitoring. Close follow-up with PCP for monitoring / management of comorbidities which may be exacerbated by steroid usage.    Immunocompromised patient  Immunocompromised due to underlying disease compounded by immunosuppressive treatments.  Recommend social distancing and masking in high-risk situations if appropriate, avoidance of close/prolonged contact with sick individuals.  Immunization recommendations as below.    Other long term (current) drug therapy  --Monitoring labs/toxicity screening as above    Other specified counseling  --Discussed diagnosis, evaluation, management. Educational handouts have been provided for rheumatoid arthritis, methotrexate, Plaquenil, prednisone, Arava.  --Close follow-up with PCP for routine cancer screening, optimization of cardiovascular risk management reduction (BP, lipids).  --Avoid smoking or secondhand smoke as able, for general health benefits and  as this may actually have a role in increasing autoimmune disease activity and decrease drug activity in certain situations.   --Encourage use of sunscreen / sun protective clothing as this may contribute to autoimmune diseases which are photosensitive and given increased malignancy risk seen with autoimmune disease and immunosuppressive treatments.   --avoid or limit ETOH, may impact risk of hepatotoxicity with current or future DMARD medications    Underimmunization status  Vaccinations: Reviewed  Immunization History   Administered Date(s) Administered    COVID-19 12+ (MODERNA) 09/10/2024    COVID-19 12+ (Pfizer) 10/18/2023    COVID-19 Bivalent 12+ (Pfizer) 12/29/2022    COVID-19 MONOVALENT 12+ (Pfizer) 03/11/2021, 04/08/2021, 09/14/2021    COVID-19 Monovalent 12+ (Pfizer 2022) 08/10/2022    Flu, Unspecified 12/12/2008    [id number] Novel Flu 12/14/2009    INFLUENZA,TRIVALENT (FLUCELVAX) 09/10/2024    Influenza (H1N1) 12/14/2009    Influenza (IIV3) PF 11/15/2005, 11/17/2006, 11/27/2007, 10/20/2010, 11/10/2015, 11/14/2017    Influenza (prior to 2024) 10/04/2011, 09/25/2012, 10/25/2013, 09/09/2014    Influenza Not Indicated - By Hx 11/15/2005, 11/17/2006, 11/27/2007, 12/12/2008, 10/20/2010, 10/05/2011, 10/25/2013    Influenza Vaccine (Flucelvax Quadrivalent) 09/25/2012, 09/09/2014, 11/10/2015, 11/14/2017    Influenza Vaccine 18-64 (Flublok) 09/26/2016, 11/12/2021, 09/12/2022, 10/18/2023    Influenza Vaccine >6 months,quad, PF 11/10/2015, 09/26/2016, 11/14/2017, 10/30/2018, 10/10/2019, 09/21/2020    Influenza, Whole Virus 12/12/2008    Pneumo Conj 13-V (2010&after) 11/12/2020    Pneumococcal 23 valent 11/15/2005, 12/30/2022    RSV (Abrysvo) 09/10/2024    TDAP Vaccine (Boostrix) 01/02/2004, 10/01/2015    Td (Adult), Adsorbed 11/02/2004    Zoster recombinant adjuvanted (Shingrix) 08/06/2021, 10/14/2021      Plan:  Immunizations Recommended:     Up to date on vaccines. Recommend influenza and Covid vaccines in the fall  yearly.    --NOTE:   -can request in the pharmacy or with PCP.  - (If applicable, would hold methotrexate for 1 week after influenza vaccine, about  2 weeks before next rituximab infusion. If on steroids, would wait until dosage < 20 mg prednisone (or equivalent), optimally <10 mg, prior to receiving immunizations, to optimize response).     Karl Earl MD, Rheumatology     60 minutes were spent on this patient on this date for a face-to-face visit, reviewing medical records, developing a treatment plan, counseling/discussing treatment strategy, coordinating care and documenting.

## 2025-06-11 ENCOUNTER — LAB (OUTPATIENT)
Dept: LAB | Facility: OTHER | Age: 60
End: 2025-06-11
Attending: FAMILY MEDICINE
Payer: COMMERCIAL

## 2025-06-11 DIAGNOSIS — M06.9 RHEUMATOID ARTHRITIS, RHEUMATOID FACTOR STATUS UNKNOWN (H): ICD-10-CM

## 2025-06-11 LAB
ALBUMIN SERPL BCG-MCNC: 4 G/DL (ref 3.5–5.2)
ALP SERPL-CCNC: 66 U/L (ref 40–150)
ALT SERPL W P-5'-P-CCNC: 23 U/L (ref 0–50)
ANION GAP SERPL CALCULATED.3IONS-SCNC: 7 MMOL/L (ref 7–15)
AST SERPL W P-5'-P-CCNC: 22 U/L (ref 0–45)
BASOPHILS # BLD AUTO: 0 10E3/UL (ref 0–0.2)
BASOPHILS NFR BLD AUTO: 1 %
BILIRUB SERPL-MCNC: 0.4 MG/DL
BUN SERPL-MCNC: 24.8 MG/DL (ref 8–23)
CALCIUM SERPL-MCNC: 9.5 MG/DL (ref 8.8–10.4)
CHLORIDE SERPL-SCNC: 104 MMOL/L (ref 98–107)
CREAT SERPL-MCNC: 0.92 MG/DL (ref 0.51–0.95)
CRP SERPL-MCNC: <3 MG/L
EGFRCR SERPLBLD CKD-EPI 2021: 70 ML/MIN/1.73M2
EOSINOPHIL # BLD AUTO: 0 10E3/UL (ref 0–0.7)
EOSINOPHIL NFR BLD AUTO: 0 %
ERYTHROCYTE [DISTWIDTH] IN BLOOD BY AUTOMATED COUNT: 14.2 % (ref 10–15)
GLUCOSE SERPL-MCNC: 91 MG/DL (ref 70–99)
HCO3 SERPL-SCNC: 29 MMOL/L (ref 22–29)
HCT VFR BLD AUTO: 39.6 % (ref 35–47)
HGB BLD-MCNC: 12.7 G/DL (ref 11.7–15.7)
IMM GRANULOCYTES # BLD: 0 10E3/UL
IMM GRANULOCYTES NFR BLD: 0 %
LYMPHOCYTES # BLD AUTO: 2.3 10E3/UL (ref 0.8–5.3)
LYMPHOCYTES NFR BLD AUTO: 42 %
MCH RBC QN AUTO: 27.6 PG (ref 26.5–33)
MCHC RBC AUTO-ENTMCNC: 32.1 G/DL (ref 31.5–36.5)
MCV RBC AUTO: 86 FL (ref 78–100)
MONOCYTES # BLD AUTO: 0.5 10E3/UL (ref 0–1.3)
MONOCYTES NFR BLD AUTO: 8 %
NEUTROPHILS # BLD AUTO: 2.7 10E3/UL (ref 1.6–8.3)
NEUTROPHILS NFR BLD AUTO: 49 %
NRBC # BLD AUTO: 0 10E3/UL
NRBC BLD AUTO-RTO: 0 /100
PLATELET # BLD AUTO: 226 10E3/UL (ref 150–450)
POTASSIUM SERPL-SCNC: 4.9 MMOL/L (ref 3.4–5.3)
PROT SERPL-MCNC: 7.1 G/DL (ref 6.4–8.3)
RBC # BLD AUTO: 4.6 10E6/UL (ref 3.8–5.2)
SODIUM SERPL-SCNC: 140 MMOL/L (ref 135–145)
WBC # BLD AUTO: 5.4 10E3/UL (ref 4–11)

## 2025-06-11 PROCEDURE — 36415 COLL VENOUS BLD VENIPUNCTURE: CPT | Mod: ZL

## 2025-06-11 PROCEDURE — 82310 ASSAY OF CALCIUM: CPT | Mod: ZL

## 2025-06-11 PROCEDURE — 86140 C-REACTIVE PROTEIN: CPT | Mod: ZL

## 2025-06-11 PROCEDURE — 85025 COMPLETE CBC W/AUTO DIFF WBC: CPT | Mod: ZL

## 2025-07-01 PROBLEM — M25.572 ACUTE LEFT ANKLE PAIN: Status: RESOLVED | Noted: 2025-03-20 | Resolved: 2025-07-01

## 2025-07-09 ENCOUNTER — LAB (OUTPATIENT)
Dept: LAB | Facility: OTHER | Age: 60
End: 2025-07-09
Attending: NURSE PRACTITIONER
Payer: COMMERCIAL

## 2025-07-09 DIAGNOSIS — K55.069 SUPERIOR MESENTERIC VEIN THROMBOSIS: ICD-10-CM

## 2025-07-09 DIAGNOSIS — D47.2 MGUS (MONOCLONAL GAMMOPATHY OF UNKNOWN SIGNIFICANCE): ICD-10-CM

## 2025-07-09 LAB
ALBUMIN SERPL BCG-MCNC: 3.8 G/DL (ref 3.5–5.2)
ALP SERPL-CCNC: 77 U/L (ref 40–150)
ALT SERPL W P-5'-P-CCNC: 24 U/L (ref 0–50)
ANION GAP SERPL CALCULATED.3IONS-SCNC: 9 MMOL/L (ref 7–15)
AST SERPL W P-5'-P-CCNC: 29 U/L (ref 0–45)
BASOPHILS # BLD AUTO: 0 10E3/UL (ref 0–0.2)
BASOPHILS NFR BLD AUTO: 1 %
BILIRUB SERPL-MCNC: 0.3 MG/DL
BUN SERPL-MCNC: 17.6 MG/DL (ref 8–23)
CALCIUM SERPL-MCNC: 9.1 MG/DL (ref 8.8–10.4)
CHLORIDE SERPL-SCNC: 105 MMOL/L (ref 98–107)
CREAT SERPL-MCNC: 0.88 MG/DL (ref 0.51–0.95)
D DIMER PPP FEU-MCNC: <0.27 UG/ML FEU (ref 0–0.5)
EGFRCR SERPLBLD CKD-EPI 2021: 74 ML/MIN/1.73M2
EOSINOPHIL # BLD AUTO: 0 10E3/UL (ref 0–0.7)
EOSINOPHIL NFR BLD AUTO: 1 %
ERYTHROCYTE [DISTWIDTH] IN BLOOD BY AUTOMATED COUNT: 14.8 % (ref 10–15)
GLUCOSE SERPL-MCNC: 62 MG/DL (ref 70–99)
HCO3 SERPL-SCNC: 27 MMOL/L (ref 22–29)
HCT VFR BLD AUTO: 40.5 % (ref 35–47)
HGB BLD-MCNC: 13.2 G/DL (ref 11.7–15.7)
IMM GRANULOCYTES # BLD: 0 10E3/UL
IMM GRANULOCYTES NFR BLD: 0 %
LYMPHOCYTES # BLD AUTO: 1.7 10E3/UL (ref 0.8–5.3)
LYMPHOCYTES NFR BLD AUTO: 44 %
MCH RBC QN AUTO: 28 PG (ref 26.5–33)
MCHC RBC AUTO-ENTMCNC: 32.6 G/DL (ref 31.5–36.5)
MCV RBC AUTO: 86 FL (ref 78–100)
MONOCYTES # BLD AUTO: 0.4 10E3/UL (ref 0–1.3)
MONOCYTES NFR BLD AUTO: 10 %
NEUTROPHILS # BLD AUTO: 1.8 10E3/UL (ref 1.6–8.3)
NEUTROPHILS NFR BLD AUTO: 45 %
NRBC # BLD AUTO: 0 10E3/UL
NRBC BLD AUTO-RTO: 0 /100
PLATELET # BLD AUTO: 207 10E3/UL (ref 150–450)
POTASSIUM SERPL-SCNC: 4 MMOL/L (ref 3.4–5.3)
PROT SERPL-MCNC: 7.1 G/DL (ref 6.4–8.3)
RBC # BLD AUTO: 4.72 10E6/UL (ref 3.8–5.2)
SODIUM SERPL-SCNC: 141 MMOL/L (ref 135–145)
TOTAL PROTEIN SERUM FOR ELP: 6.7 G/DL (ref 6.4–8.3)
WBC # BLD AUTO: 3.9 10E3/UL (ref 4–11)

## 2025-07-09 PROCEDURE — 82232 ASSAY OF BETA-2 PROTEIN: CPT | Mod: ZL

## 2025-07-09 PROCEDURE — 85004 AUTOMATED DIFF WBC COUNT: CPT | Mod: ZL

## 2025-07-09 PROCEDURE — 84155 ASSAY OF PROTEIN SERUM: CPT | Mod: ZL

## 2025-07-09 PROCEDURE — 86334 IMMUNOFIX E-PHORESIS SERUM: CPT | Mod: 26 | Performed by: PATHOLOGY

## 2025-07-09 PROCEDURE — 86334 IMMUNOFIX E-PHORESIS SERUM: CPT | Mod: ZL | Performed by: PATHOLOGY

## 2025-07-09 PROCEDURE — 84165 PROTEIN E-PHORESIS SERUM: CPT | Mod: TC,ZL | Performed by: PATHOLOGY

## 2025-07-09 PROCEDURE — 83521 IG LIGHT CHAINS FREE EACH: CPT | Mod: ZL

## 2025-07-09 PROCEDURE — 85379 FIBRIN DEGRADATION QUANT: CPT | Mod: ZL

## 2025-07-09 PROCEDURE — 36415 COLL VENOUS BLD VENIPUNCTURE: CPT | Mod: ZL

## 2025-07-09 PROCEDURE — 84165 PROTEIN E-PHORESIS SERUM: CPT | Mod: 26 | Performed by: PATHOLOGY

## 2025-07-09 PROCEDURE — 85810 BLOOD VISCOSITY EXAMINATION: CPT | Mod: ZL

## 2025-07-09 PROCEDURE — 82784 ASSAY IGA/IGD/IGG/IGM EACH: CPT | Mod: ZL

## 2025-07-10 LAB
ALBUMIN SERPL ELPH-MCNC: 3.8 G/DL (ref 3.7–5.1)
ALPHA1 GLOB SERPL ELPH-MCNC: 0.3 G/DL (ref 0.2–0.4)
ALPHA2 GLOB SERPL ELPH-MCNC: 0.7 G/DL (ref 0.5–0.9)
B-GLOBULIN SERPL ELPH-MCNC: 0.8 G/DL (ref 0.6–1)
B2 MICROGLOB TUMOR MARKER SER-MCNC: 2 MG/L
GAMMA GLOB SERPL ELPH-MCNC: 1.1 G/DL (ref 0.7–1.6)
IGA SERPL-MCNC: 253 MG/DL (ref 84–499)
IGG SERPL-MCNC: 1099 MG/DL (ref 610–1616)
IGM SERPL-MCNC: 133 MG/DL (ref 35–242)
KAPPA LC FREE SER-MCNC: 3.57 MG/DL (ref 0.33–1.94)
KAPPA LC FREE/LAMBDA FREE SER NEPH: 1.68 {RATIO} (ref 0.26–1.65)
LAMBDA LC FREE SERPL-MCNC: 2.12 MG/DL (ref 0.57–2.63)
M PROTEIN SERPL ELPH-MCNC: 0 G/DL
PROT PATTERN SERPL ELPH-IMP: NORMAL
PROT PATTERN SERPL IFE-IMP: NORMAL
VISC SER: 1.6 CP (ref 1.4–1.8)

## 2025-07-11 ENCOUNTER — LAB (OUTPATIENT)
Dept: LAB | Facility: OTHER | Age: 60
End: 2025-07-11
Attending: FAMILY MEDICINE
Payer: COMMERCIAL

## 2025-07-11 DIAGNOSIS — M06.9 RHEUMATOID ARTHRITIS, RHEUMATOID FACTOR STATUS UNKNOWN (H): ICD-10-CM

## 2025-07-11 LAB
ALBUMIN SERPL BCG-MCNC: 3.8 G/DL (ref 3.5–5.2)
ALP SERPL-CCNC: 72 U/L (ref 40–150)
ALT SERPL W P-5'-P-CCNC: 26 U/L (ref 0–50)
ANION GAP SERPL CALCULATED.3IONS-SCNC: 7 MMOL/L (ref 7–15)
AST SERPL W P-5'-P-CCNC: 28 U/L (ref 0–45)
BASOPHILS # BLD AUTO: 0 10E3/UL (ref 0–0.2)
BASOPHILS NFR BLD AUTO: 1 %
BILIRUB SERPL-MCNC: 0.4 MG/DL
BUN SERPL-MCNC: 18.1 MG/DL (ref 8–23)
CALCIUM SERPL-MCNC: 9 MG/DL (ref 8.8–10.4)
CHLORIDE SERPL-SCNC: 104 MMOL/L (ref 98–107)
CREAT SERPL-MCNC: 0.93 MG/DL (ref 0.51–0.95)
CRP SERPL-MCNC: <3 MG/L
EGFRCR SERPLBLD CKD-EPI 2021: 70 ML/MIN/1.73M2
EOSINOPHIL # BLD AUTO: 0 10E3/UL (ref 0–0.7)
EOSINOPHIL NFR BLD AUTO: 1 %
ERYTHROCYTE [DISTWIDTH] IN BLOOD BY AUTOMATED COUNT: 14.9 % (ref 10–15)
GLUCOSE SERPL-MCNC: 82 MG/DL (ref 70–99)
HCO3 SERPL-SCNC: 28 MMOL/L (ref 22–29)
HCT VFR BLD AUTO: 38.9 % (ref 35–47)
HGB BLD-MCNC: 12.8 G/DL (ref 11.7–15.7)
IMM GRANULOCYTES # BLD: 0 10E3/UL
IMM GRANULOCYTES NFR BLD: 0 %
LYMPHOCYTES # BLD AUTO: 2 10E3/UL (ref 0.8–5.3)
LYMPHOCYTES NFR BLD AUTO: 47 %
MCH RBC QN AUTO: 28.3 PG (ref 26.5–33)
MCHC RBC AUTO-ENTMCNC: 32.9 G/DL (ref 31.5–36.5)
MCV RBC AUTO: 86 FL (ref 78–100)
MONOCYTES # BLD AUTO: 0.3 10E3/UL (ref 0–1.3)
MONOCYTES NFR BLD AUTO: 8 %
NEUTROPHILS # BLD AUTO: 1.9 10E3/UL (ref 1.6–8.3)
NEUTROPHILS NFR BLD AUTO: 44 %
NRBC # BLD AUTO: 0 10E3/UL
NRBC BLD AUTO-RTO: 0 /100
PLATELET # BLD AUTO: 220 10E3/UL (ref 150–450)
POTASSIUM SERPL-SCNC: 4.8 MMOL/L (ref 3.4–5.3)
PROT SERPL-MCNC: 6.8 G/DL (ref 6.4–8.3)
RBC # BLD AUTO: 4.52 10E6/UL (ref 3.8–5.2)
SODIUM SERPL-SCNC: 139 MMOL/L (ref 135–145)
WBC # BLD AUTO: 4.4 10E3/UL (ref 4–11)

## 2025-07-11 PROCEDURE — 86140 C-REACTIVE PROTEIN: CPT | Mod: ZL

## 2025-07-11 PROCEDURE — 80053 COMPREHEN METABOLIC PANEL: CPT | Mod: ZL

## 2025-07-11 PROCEDURE — 85004 AUTOMATED DIFF WBC COUNT: CPT | Mod: ZL

## 2025-07-11 PROCEDURE — 36415 COLL VENOUS BLD VENIPUNCTURE: CPT | Mod: ZL

## 2025-07-16 ENCOUNTER — ONCOLOGY VISIT (OUTPATIENT)
Dept: ONCOLOGY | Facility: OTHER | Age: 60
End: 2025-07-16
Attending: NURSE PRACTITIONER
Payer: COMMERCIAL

## 2025-07-16 VITALS
BODY MASS INDEX: 43.38 KG/M2 | SYSTOLIC BLOOD PRESSURE: 124 MMHG | TEMPERATURE: 97.1 F | RESPIRATION RATE: 16 BRPM | DIASTOLIC BLOOD PRESSURE: 82 MMHG | OXYGEN SATURATION: 99 % | HEART RATE: 52 BPM | WEIGHT: 293 LBS

## 2025-07-16 DIAGNOSIS — D47.2 MGUS (MONOCLONAL GAMMOPATHY OF UNKNOWN SIGNIFICANCE): Primary | ICD-10-CM
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
## 2025-07-16 DIAGNOSIS — D68.59 PRIMARY HYPERCOAGULABLE STATE: ICD-10-CM

## 2025-07-16 DIAGNOSIS — S00.06XA TICK BITE OF SCALP, INITIAL ENCOUNTER: ICD-10-CM

## 2025-07-16 ASSESSMENT — PAIN SCALES - GENERAL: PAINLEVEL_OUTOF10: NO PAIN (0)

## 2025-07-16 NOTE — PROGRESS NOTES
Oncology Follow-up Visit    Reason for Visit:  Phyllis is a 60 year old woman with a Hx of MGUS, who presents to the clinic today for routine follow-up.    Nursing Note and documentation reviewed: Yes    Provider Location: [facility]    Interval History:     Doing well. No big concerns today other than she did have a tiny, sub mm sized tick found behind her knee this morning. No symptoms.     No recent infectious symptoms. No bleeding concerns. No clotting concerns. No pain, warmth, redness, swelling, breathing concerns, abdominal pain, etc. Eating well. Energy varies but good for most part. Overall, doing well.     Oncologic History:     Patient has previously been followed by Malissa Hawkins NP and Dr. Ojeda. For full details see their prior notes.    Ms. Washington had a history of recurrent superficial thrombophlebitis, status post ablation, heterozygous for the MTHFR DNA, on aspirin.  She had developed pain and swelling, worsening involving the legs.  She was seen by Dr. Victoria, who noted right thigh superficial phlebitis continuing up to the right lateral hip area.  Bilateral venous Dopplers were performed on 02/08/2022, and there was no evidence of lower extremity DVT.  There was interrogation of deep venous structures in bilateral common femoral veins.  The veins of the proximal calf demonstrated normal appearance consistent with superficial venous thrombus identified within the varicosity in the medial right thigh. These were repeated again on 02/18/2022, and the findings were that there were thrombosed superficial varicosities present in the area.  The patient already had complaints of abdominal pain.  CT abdomen and pelvis was ordered, and it was revealed that there was a new thrombus within a couple premesenteric veins, including the superior mesenteric vein just below the confluence of the splenic vein.  No bowel wall thickening, no definite findings of leg ischemia.  She was started on Xarelto by   Esme.      The patient also had been seen by Rheumatology, where blood workup including beta-2 glycoprotein IgM antibody was normal.  Phospholipid IgM antibody was elevated at 19.4.  Myeloperoxidase antibodies were negative.  NARCISO was positive.      Dr. Ojeda had seen the patient previously before for evaluation of recurrent superficial thrombophlebitis back on 12/19/2019.  He had ordered hypercoagulability workup at that time, and she was found to be heterozygous for the MTHFR DNA [id number] locus.  He recommended vitamin B6, B12, folic acid supplementation and continue aspirin. Since then, she had an ablation.  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      When he saw the patient, the plan was to continue anticoagulation.  CT abdomen and pelvis was repeated on 04/05/2022.  This included CT chest with no evidence of residual or recurrent mesenteric vein thrombosis as it had resolved.  There were no abnormalities in the lung fields.  Also further workup was obtained including serum protein electrophoresis, which revealed that her M spike was 0.3.  Monoclonal IgG immunoglobulin was lambda light chain.  At the time, he repeated thrombophilia workup, and there was no evidence of thrombophilia, including antithrombin III which was negative.  Cardiolipin antibodies were negative.  Beta-2 glycoprotein antibodies were negative contributing to thrombophilia. A skeletal bone survey was done on 05/23/2022.  Findings were that there were degenerative changes of the spine and hips, no lytic defects.  The patient was seen in the emergency room with abdominal pain.  CT abdomen and pelvis was performed on 05/17/2022.  There was no evidence of mesenteric vein thrombosis, no acute abnormality.  Her abdominal pain resolved.      When seen on 06/01/2022, we wanted to absolutely rule out myeloma by obtaining bone marrow aspiration biopsy.  This  was performed on 2022 and was read as negative for involvement by plasma cell neoplasm or B-cell lymphoma.  There was normocellular bone marrow for age.  Storage iron could not be assessed. Cytogenetics were negative for clonal chromosomal abnormality.  The patient continued on Xarelto.  When patient was seen on 2022, the plan was to repeat CT abdomen and pelvis and continue Xarelto indefinitely. We felt that her mesenteric vein thrombosis was likely due to hypercoagulable state including the MTHFR DNA mutation as well as underlying MGUS. CT abdomen and pelvis was performed on 2022 and the findings were that there was unchanged CT appearance of the abdomen and pelvis. There was no evidence of mesenteric vein thrombosis, it had resolved.  Labs done on 24 show a normal d-dimer. M-spike is normal at 0.0. WBC is slightly decreased at 3.6, ANC is normal. All other labs are stable.     Current TX:  MGUS: None, surveillance  MTHFR DNA mutation with recurrent clots: Xarelto indefinitely with folic acid, B12, B6      Past Medical History:   Diagnosis Date    DVT (deep venous thrombosis) (H)     Personal history of other medical treatment (CODE)     3 normal childbirths    Rheumatoid arthritis (H)        Past Surgical History:   Procedure Laterality Date    BONE MARROW BIOPSY, BONE SPECIMEN, NEEDLE/TROCAR Left 2022    Procedure: BIOPSY, BONE MARROW;  Surgeon: Glory Baig MD;  Location:  OR    COLONOSCOPY      Dr. Fofana - 2 polyps found.  Told to f/u in 5 years.    COLONOSCOPY  2022    Ct - polyp with path pending - 5 year f/u rec'd    RELEASE TARSAL TUNNEL FOOT Right 10/05/2022    Procedure: RELEASE, TARSAL TUNNEL AND PLANTAT FASCIOTOMY;  Surgeon: Shiva Finch DPM;  Location:  OR       Family History   Problem Relation Age of Onset    Breast Cancer Mother 46        Cancer-breast, at 80    Cerebrovascular Disease Mother         stroke with hemiparesis      Hypertension Father         Hypertension    Diabetes Brother 16        Diabetes,  pulmonary embolism, had lymphoma    Other - See Comments Maternal Grandmother         Stroke,85    Other - See Comments Paternal Grandmother         Alzheimer's 73       Social History     Socioeconomic History    Marital status:      Spouse name: Not on file    Number of children: Not on file    Years of education: Not on file    Highest education level: Not on file   Occupational History    Not on file   Tobacco Use    Smoking status: Never     Passive exposure: Past    Smokeless tobacco: Never    Tobacco comments:     In childhood home   Vaping Use    Vaping status: Never Used   Substance and Sexual Activity    Alcohol use: Yes     Comment: 1 per month    Drug use: Never    Sexual activity: Yes     Partners: Male     Birth control/protection: Rhythm   Other Topics Concern    Parent/sibling w/ CABG, MI or angioplasty before 65F 55M? Not Asked   Social History Narrative    , 3 adult children;     Homemaker; hobbies - reading, crafts.      - Demetrio         Social Drivers of Health     Financial Resource Strain: Low Risk  (2025)    Financial Resource Strain     Within the past 12 months, have you or your family members you live with been unable to get utilities (heat, electricity) when it was really needed?: No   Food Insecurity: Low Risk  (2025)    Food Insecurity     Within the past 12 months, did you worry that your food would run out before you got money to buy more?: No     Within the past 12 months, did the food you bought just not last and you didn t have money to get more?: No   Transportation Needs: Low Risk  (2025)    Transportation Needs     Within the past 12 months, has lack of transportation kept you from medical appointments, getting your medicines, non-medical meetings or appointments, work, or from getting things that you need?: No   Physical Activity: Patient Declined (2025)  "   Exercise Vital Sign     Days of Exercise per Week: Patient declined     Minutes of Exercise per Session: Patient declined   Stress: No Stress Concern Present (2/11/2025)    [employer] [place] [place] [employer] - Occupational Stress Questionnaire     Feeling of Stress : Not at all   Social Connections: Unknown (2/11/2025)    Social Connection and Isolation Panel [NHANES]     Frequency of Communication with Friends and Family: Not on file     Frequency of Social Gatherings with Friends and Family: Once a week     Attends [employer] Services: Not on file     Active Member of Clubs or Organizations: Not on file     Attends Club or Organization Meetings: Not on file     Marital Status: Not on file   Interpersonal Safety: Low Risk  (7/16/2025)    Interpersonal Safety     Do you feel physically and emotionally safe where you currently live?: Yes     Within the past 12 months, have you been hit, slapped, kicked or otherwise physically hurt by someone?: No     Within the past 12 months, have you been humiliated or emotionally abused in other ways by your partner or ex-partner?: No   Housing Stability: Low Risk  (2/11/2025)    Housing Stability     Do you have housing? : Yes     Are you worried about losing your housing?: No       Current Outpatient Medications   Medication Sig Dispense Refill    B-D INSULIN SYRINGE MICROFINE 27G X 5/8\" 1 ML MISC 1 EACH BY DOES NOT APPLY ROUTE ONE TIME A WEEK. USE TO DRAW UP METHOTREXATE 25 MG ONCE WEEKLY.      BD SAFETYGLIDE SYRINGE/NEEDLE 27G X 5/8\" 1 ML MISC 1 EACH BY DOES NOT APPLY ROUTE ONE TIME A WEEK. USE TO DRAW UP METHOTREXATE 25 MG ONCE WEEKLY.      cyanocobalamin (VITAMIN B-12) 1000 MCG tablet Take 1,000 mcg by mouth daily      famotidine (PEPCID) 20 MG tablet Take 1 tablet (20 mg) by mouth 2 times daily 180 tablet 3    folic acid (FOLVITE) 1 MG tablet Take 2 tablets (2 mg) by mouth daily. 180 tablet 3    hydroxychloroquine (PLAQUENIL) 200 MG tablet Take 1 tablet (200 mg) by " "mouth 2 times daily. Take 400 mg by mouth daily 180 tablet 3    methotrexate 50 MG/2ML injection Inject 1 mL (25 mg) subcutaneously once a week. 12 mL 1    metoprolol succinate ER (TOPROL XL) 50 MG 24 hr tablet Take 1 tablet (50 mg) by mouth daily. 90 tablet 4    predniSONE (DELTASONE) 5 MG tablet Take 1 tablet (5 mg) by mouth daily as needed (arthritis flare). Take 5-10 mg daily as needed for Rheumatoid Arthritis flare. 30 tablet 1    Syringe/Needle, Disp, 27G X 5/8\" 1 ML MISC 1 each once a week. Use as directed. 12 each 3    thiamine (B-1) 100 MG tablet Take 100 mg by mouth daily       vitamin B6 (PYRIDOXINE) 200 MG tablet Take 200 mg by mouth daily      XARELTO ANTICOAGULANT 20 MG TABS tablet TAKE 1 TABLET(20 MG) BY MOUTH DAILY WITH DINNER 90 tablet 3     No current facility-administered medications for this visit.        No Known Allergies    Review Of Systems:  A complete review of systems is negative except for the above mentioned items in the interval history.     ECOG Performance Status: 0    Physical Exam:  /82 (BP Location: Right arm, Patient Position: Sitting, Cuff Size: Adult Large)   Pulse 52   Temp 97.1  F (36.2  C) (Tympanic)   Resp 16   Wt 135.2 kg (298 lb)   LMP  (LMP Unknown)   SpO2 99%   BMI 43.38 kg/m    GENERAL APPEARANCE: Healthy, alert and in no acute distress.  HEENT: Eyes appear normal without scleral icterus. Extraocular movements intact.   NECK:   Supple with normal range of motion. No asymmetry or masses.  RESP: Lungs clear to auscultation bilaterally, respirations regular and easy.  CARDIOVASCULAR: Regular rate and rhythm. Normal S1, S2; no murmur, gallop, or rub.  MUSCULOSKELETAL: Extremities without gross deformities noted. No edema of bilateral lower extremities.  SKIN: No suspicious lesions or rashes.  NEURO: Alert and oriented x 3.  Gait steady.  PSYCHIATRIC: Mentation and affect appear normal.  Mood appropriate.    Laboratory:  Component      Latest Ref Rng " 7/9/2025  9:39 AM   WBC      4.0 - 11.0 10e3/uL 3.9 (L)    RBC Count      3.80 - 5.20 10e6/uL 4.72    Hemoglobin      11.7 - 15.7 g/dL 13.2    Hematocrit      35.0 - 47.0 % 40.5    MCV      78 - 100 fL 86    MCH      26.5 - 33.0 pg 28.0    MCHC      31.5 - 36.5 g/dL 32.6    RDW      10.0 - 15.0 % 14.8    Platelet Count      150 - 450 10e3/uL 207    % Neutrophils      % 45    % Lymphocytes      % 44    % Monocytes      % 10    % Eosinophils      % 1    % Basophils      % 1    % Immature Granulocytes      % 0    NRBC/W      <1 /100 0    Absolute Neutrophil      1.6 - 8.3 10e3/uL 1.8    Absolute Lymphocytes      0.8 - 5.3 10e3/uL 1.7    Absolute Monocytes      0.0 - 1.3 10e3/uL 0.4    Absolute Eosinophils      0.0 - 0.7 10e3/uL 0.0    Absolute Basophils      0.0 - 0.2 10e3/uL 0.0    Absolute Immature Granulocytes      <=0.4 10e3/uL 0.0    Absolute NRBCs      10e3/uL 0.0    Sodium      135 - 145 mmol/L 141    Potassium      3.4 - 5.3 mmol/L 4.0    Carbon Dioxide (CO2)      22 - 29 mmol/L 27    Anion Gap      7 - 15 mmol/L 9    Urea Nitrogen      8.0 - 23.0 mg/dL 17.6    Creatinine      0.51 - 0.95 mg/dL 0.88    GFR Estimate      >60 mL/min/1.73m2 74    Calcium      8.8 - 10.4 mg/dL 9.1    Chloride      98 - 107 mmol/L 105    Glucose      70 - 99 mg/dL 62 (L)    Alkaline Phosphatase      40 - 150 U/L 77    AST      0 - 45 U/L 29    ALT      0 - 50 U/L 24    Protein Total      6.4 - 8.3 g/dL 7.1    Albumin      3.5 - 5.2 g/dL 3.8    Bilirubin Total      <=1.2 mg/dL 0.3    Albumin Fraction      3.7 - 5.1 g/dL 3.8    Alpha 1 Fraction      0.2 - 0.4 g/dL 0.3    Alpha 2 Fraction      0.5 - 0.9 g/dL 0.7    Beta Fraction      0.6 - 1.0 g/dL 0.8    Gamma Fraction      0.7 - 1.6 g/dL 1.1    Monoclonal Peak      <=0.0 g/dL 0.0    ELP Interpretation: Essentially normal electrophoretic pattern. No obvious monoclonal proteins seen. Pathologic significance requires clinical correlation. ARIK Martínez M.D., Ph.D., Pathologist.    IGG       610 - 1,616 mg/dL 1,099    IGA      84 - 499 mg/dL 253    IGM      35 - 242 mg/dL 133    [place] Free Lt Chain      0.33 - 1.94 mg/dL 3.57 (H)    Lambda Free Lt Chain      0.57 - 2.63 mg/dL 2.12    Kappa Lambda Ratio      0.26 - 1.65  1.68 (H)    Beta-2-Microglobulin      <2.3 mg/L 2.0    Viscosity Index      1.4 - 1.8  1.6    Immunofixation ELP No monoclonal protein seen on immunofixation. Pathologic significance requires clinical correlation. ARIK Martínez M.D., Ph.D., Pathologist    D-Dimer Quantitative      0.00 - 0.50 ug/mL FEU <0.27    Total Protein Serum for ELP      6.4 - 8.3 g/dL 6.7       Legend:  (L) Low  (H) High    Imaging Studies:    None this visit    ASSESSMENT/PLAN:  1.  MGUS.  Bone marrow aspiration biopsy was negative for myeloma.  Skeletal bone survey was negative for lytic defects. Labs completed last week continue to show an M spike is normal at 0.0.  No evidence of end-organ damage.  Plan is to continue surveillance.  We will see the patient in 6 months and repeat MGUS labs.     2.  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 D-dimer is normal.  She will continue on Xarelto indefinitely. The plan is to see the patient in 6 months and repeat D-dimer.    3. Tick bite Found this morning. Sub mm in size. Advised to monitor and follow-up with PCP if she develops symptoms.     Patient in agreement with plan and verbalizes understanding. Agrees to call with any questions or concerns.    34 minutes spent in the patient's encounter today with time spent in review of patient's chart along with chart preparation and review of the treatment plan and signing of treatment plan.  Time was also spent with the patient in obtaining a review of systems and performing a physical exam along with detailed review of all test results. Time was also spent in discussing plan for future follow-up and relating  instructions for follow-up and in placing future orders.    The longitudinal plan of care for the diagnosis(es)/condition(s) as documented were addressed during this visit. Due to the added complexity in care, I will continue to support Phyllis in the subsequent management and with ongoing continuity of care.      MISSY Rubin [facility]  Medical Oncology

## 2025-07-16 NOTE — NURSING NOTE
"Oncology Rooming Note    July 16, 2025 9:06 AM   Phyllis Washington is a 60 year old female who presents for:    No chief complaint on file.    Initial Vitals: /82 (BP Location: Right arm, Patient Position: Sitting, Cuff Size: Adult Large)   Pulse 52   Temp 97.1  F (36.2  C) (Tympanic)   Resp 16   Wt 135.2 kg (298 lb)   LMP  (LMP Unknown)   SpO2 99%   BMI 43.38 kg/m   Estimated body mass index is 43.38 kg/m  as calculated from the following:    Height as of 4/15/25: 1.765 m (5' 9.5\").    Weight as of this encounter: 135.2 kg (298 lb). Body surface area is 2.57 meters squared.  No Pain (0) Comment: Data Unavailable   No LMP recorded (lmp unknown). Patient is postmenopausal.  Allergies reviewed: Yes  Medications reviewed: Yes    Medications: Medication refills not needed today.  Pharmacy name entered into [employer]: [facility] #[id number] - [address]    PHQ9:  Did this patient require a PHQ9?: No      Clinical concerns: No specific oncology concerns.       Nina Morin, [facility]              "

## 2025-07-29 ENCOUNTER — DOCUMENTATION ONLY (OUTPATIENT)
Facility: OTHER | Age: 60
End: 2025-07-29
Payer: COMMERCIAL

## 2025-07-29 DIAGNOSIS — D84.821 IMMUNODEFICIENCY DUE TO DRUG THERAPY: ICD-10-CM

## 2025-07-29 DIAGNOSIS — M05.79 RHEUMATOID ARTHRITIS INVOLVING MULTIPLE SITES WITH POSITIVE RHEUMATOID FACTOR (H): Primary | ICD-10-CM

## 2025-07-29 DIAGNOSIS — Z79.899 IMMUNODEFICIENCY DUE TO DRUG THERAPY: ICD-10-CM

## 2025-07-29 NOTE — PROGRESS NOTES
Phyllis Washington has an upcoming lab appointment and there are no orders available. Please review and place future orders, as appropriate.    Eriac Reveles

## 2025-08-08 ENCOUNTER — LAB (OUTPATIENT)
Dept: LAB | Facility: OTHER | Age: 60
End: 2025-08-08
Attending: FAMILY MEDICINE
Payer: COMMERCIAL

## 2025-08-08 DIAGNOSIS — M05.79 RHEUMATOID ARTHRITIS INVOLVING MULTIPLE SITES WITH POSITIVE RHEUMATOID FACTOR (H): ICD-10-CM

## 2025-08-08 DIAGNOSIS — D84.821 IMMUNODEFICIENCY DUE TO DRUG THERAPY: ICD-10-CM

## 2025-08-08 DIAGNOSIS — Z79.899 IMMUNODEFICIENCY DUE TO DRUG THERAPY: ICD-10-CM

## 2025-08-08 LAB
ALBUMIN SERPL BCG-MCNC: 3.7 G/DL (ref 3.5–5.2)
ALP SERPL-CCNC: 64 U/L (ref 40–150)
ALT SERPL W P-5'-P-CCNC: 21 U/L (ref 0–50)
ANION GAP SERPL CALCULATED.3IONS-SCNC: 8 MMOL/L (ref 7–15)
AST SERPL W P-5'-P-CCNC: 27 U/L (ref 0–45)
BASOPHILS # BLD AUTO: 0 10E3/UL (ref 0–0.2)
BASOPHILS NFR BLD AUTO: 0 %
BILIRUB SERPL-MCNC: 0.3 MG/DL
BUN SERPL-MCNC: 19.3 MG/DL (ref 8–23)
CALCIUM SERPL-MCNC: 9.2 MG/DL (ref 8.8–10.4)
CHLORIDE SERPL-SCNC: 104 MMOL/L (ref 98–107)
CREAT SERPL-MCNC: 0.94 MG/DL (ref 0.51–0.95)
CRP SERPL-MCNC: <3 MG/L
EGFRCR SERPLBLD CKD-EPI 2021: 69 ML/MIN/1.73M2
EOSINOPHIL # BLD AUTO: 0 10E3/UL (ref 0–0.7)
EOSINOPHIL NFR BLD AUTO: 1 %
ERYTHROCYTE [DISTWIDTH] IN BLOOD BY AUTOMATED COUNT: 15.5 % (ref 10–15)
GLUCOSE SERPL-MCNC: 85 MG/DL (ref 70–99)
HCO3 SERPL-SCNC: 28 MMOL/L (ref 22–29)
HCT VFR BLD AUTO: 39.8 % (ref 35–47)
HGB BLD-MCNC: 12.9 G/DL (ref 11.7–15.7)
IMM GRANULOCYTES # BLD: 0 10E3/UL
IMM GRANULOCYTES NFR BLD: 0 %
LYMPHOCYTES # BLD AUTO: 1.7 10E3/UL (ref 0.8–5.3)
LYMPHOCYTES NFR BLD AUTO: 36 %
MCH RBC QN AUTO: 27.9 PG (ref 26.5–33)
MCHC RBC AUTO-ENTMCNC: 32.4 G/DL (ref 31.5–36.5)
MCV RBC AUTO: 86 FL (ref 78–100)
MONOCYTES # BLD AUTO: 0.5 10E3/UL (ref 0–1.3)
MONOCYTES NFR BLD AUTO: 10 %
NEUTROPHILS # BLD AUTO: 2.5 10E3/UL (ref 1.6–8.3)
NEUTROPHILS NFR BLD AUTO: 53 %
NRBC # BLD AUTO: 0 10E3/UL
NRBC BLD AUTO-RTO: 0 /100
PLATELET # BLD AUTO: 211 10E3/UL (ref 150–450)
POTASSIUM SERPL-SCNC: 4.6 MMOL/L (ref 3.4–5.3)
PROT SERPL-MCNC: 7 G/DL (ref 6.4–8.3)
RBC # BLD AUTO: 4.62 10E6/UL (ref 3.8–5.2)
SODIUM SERPL-SCNC: 140 MMOL/L (ref 135–145)
WBC # BLD AUTO: 4.7 10E3/UL (ref 4–11)

## 2025-08-08 PROCEDURE — 82040 ASSAY OF SERUM ALBUMIN: CPT | Mod: ZL

## 2025-08-08 PROCEDURE — 85025 COMPLETE CBC W/AUTO DIFF WBC: CPT | Mod: ZL

## 2025-08-08 PROCEDURE — 36415 COLL VENOUS BLD VENIPUNCTURE: CPT | Mod: ZL

## 2025-08-08 PROCEDURE — 86140 C-REACTIVE PROTEIN: CPT | Mod: ZL

## 2025-08-13 RX ORDER — INFLUENZA A VIRUS A/GEORGIA/12/2022 CVR-167 (H1N1) ANTIGEN (MDCK CELL DERIVED, PROPIOLACTONE INACTIVATED), INFLUENZA A VIRUS A/SYDNEY/1304/2022 (H3N2) ANTIGEN (MDCK CELL DERIVED, PROPIOLACTONE INACTIVATED), INFLUENZA B VIRUS B/SINGAPORE/WUH4618/2021 ANTIGEN (MDCK CELL DERIVED, PROPIOLACTONE INACTIVATED) 15; 15; 15 UG/.5ML; UG/.5ML; UG/.5ML
INJECTION, SUSPENSION INTRAMUSCULAR
COMMUNITY
Start: 2024-09-10 | End: 2025-08-19

## 2025-08-13 RX ORDER — RESPIRATORY SYNCYTIAL VIRUS VACCINE 120MCG/0.5
KIT INTRAMUSCULAR
COMMUNITY
Start: 2024-09-10 | End: 2025-08-19

## 2025-08-13 RX ORDER — COVID-19 VACCINE, MRNA 50 UG/.5ML
INJECTION, SUSPENSION INTRAMUSCULAR
COMMUNITY
Start: 2024-09-10 | End: 2025-08-19

## 2025-08-17 ASSESSMENT — RHEUMATOLOGY NEW PATIENT QUESTIONNAIRE
LOSS OF CONSCIOUSNESS: N
PAIN OR BURNING ON URINATION: N
MUSCLE WEAKNESS: Y
ANEMIA: N
DIFFICULTY FALLING ASLEEP: N
DEPRESSION: N
NUMBNESS OR TINGLING IN HANDS OR FEET: N
NODULES/BUMPS: N
EYE PAIN: N
RASH OR ULCERS: N
SWOLLEN LEGS OR FEET: Y
ANXIETY: N
UNEXPLAINED HEARING LOSS: N
NIGHT SWEATS: N
UNEXPLAINED WEIGHT CHANGE: N
COLOR CHANGES OF HANDS OR FEET IN THE COLD: N
SHORTNESS OF BREATH: N
PERSISTENT DIARRHEA: N
EYE DRYNESS: N
DOUBLE OR BLURRED VISION: N
DIFFICULTY BREATHING LYING DOWN: Y
CHEST PAIN: N
UNUSUAL FATIGUE: N
BLOOD IN STOOLS: N
MEMORY LOSS: N
SKIN REDNESS: N
ABNORMAL URINE: N
FEVER: N
DRYNESS OF MOUTH: Y
VAGINAL DRYNESS: N
FAINTING: N
MORNING STIFFNESS: Y
RASH: N
UNUSUAL BLEEDING: N
EASILY LOSING TEMPER: N
SWOLLEN OR TENDER GLANDS: N
SORES IN MOUTH OR NOSE: Y
SUN SENSITIVE (SUN ALLERGY): Y
LOSS OF VISION: N
JOINT PAIN: Y
EXCESSIVE HAIR LOSS (MORE THAN YOUR NORM): N
SEIZURES: N
SKIN TIGHTNESS: N
COUGH: N
UNUSUALLY RAPID OR SLOWED HEART RATE: Y
HOARSE VOICE: N
HOW WOULD YOU DESCRIBE YOUR STIFFNESS ON AVERAGE: MILD
INCREASED SUSCEPTIBILITY TO INFECTION: Y
EASY BRUISING: Y
BLACK STOOLS: N
JOINT SWELLING: N
STOMACH PAIN: N
NAUSEA: N
JAUNDICE: N
AGITATION: N
VOMITING OF BLOOD OR COFFEE GROUND CONSISTENCY MATERIAL: N
DIFFICULTY SWALLOWING: N
EYE REDNESS: N
DIFFICULTY STAYING ASLEEP: N
BEHAVIORAL CHANGES: N
MORNING STIFFNESS IN LOWER BACK: Y
HEARTBURN OR REFLUX: Y
HEADACHES: N

## 2025-08-19 ENCOUNTER — OFFICE VISIT (OUTPATIENT)
Facility: OTHER | Age: 60
End: 2025-08-19
Attending: NURSE PRACTITIONER
Payer: COMMERCIAL

## 2025-08-19 VITALS — BODY MASS INDEX: 43.49 KG/M2 | OXYGEN SATURATION: 96 % | WEIGHT: 293 LBS | TEMPERATURE: 97.7 F | HEART RATE: 90 BPM

## 2025-08-19 DIAGNOSIS — Z79.01 CHRONIC ANTICOAGULATION: ICD-10-CM

## 2025-08-19 DIAGNOSIS — M05.79 RHEUMATOID ARTHRITIS INVOLVING MULTIPLE SITES WITH POSITIVE RHEUMATOID FACTOR (H): Primary | ICD-10-CM

## 2025-08-19 DIAGNOSIS — Z71.89 OTHER SPECIFIED COUNSELING: Chronic | ICD-10-CM

## 2025-08-19 DIAGNOSIS — D84.9 IMMUNOCOMPROMISED PATIENT: ICD-10-CM

## 2025-08-19 DIAGNOSIS — Z79.899 OTHER LONG TERM (CURRENT) DRUG THERAPY: ICD-10-CM

## 2025-08-19 DIAGNOSIS — Z79.52 LONG-TERM CORTICOSTEROID USE: ICD-10-CM

## 2025-08-19 DIAGNOSIS — E66.01 MORBID OBESITY (H): ICD-10-CM

## 2025-08-19 DIAGNOSIS — Z28.39 UNDERIMMUNIZATION STATUS: ICD-10-CM

## 2025-08-19 DIAGNOSIS — K21.9 GASTROESOPHAGEAL REFLUX DISEASE, UNSPECIFIED WHETHER ESOPHAGITIS PRESENT: ICD-10-CM

## 2025-08-19 DIAGNOSIS — M15.8 OTHER OSTEOARTHRITIS INVOLVING MULTIPLE JOINTS: ICD-10-CM

## 2025-08-19 PROCEDURE — 1125F AMNT PAIN NOTED PAIN PRSNT: CPT | Performed by: NURSE PRACTITIONER

## 2025-08-19 PROCEDURE — 99417 PROLNG OP E/M EACH 15 MIN: CPT | Performed by: NURSE PRACTITIONER

## 2025-08-19 PROCEDURE — 99215 OFFICE O/P EST HI 40 MIN: CPT | Performed by: NURSE PRACTITIONER

## 2025-08-19 PROCEDURE — G2211 COMPLEX E/M VISIT ADD ON: HCPCS | Performed by: NURSE PRACTITIONER

## 2025-08-19 ASSESSMENT — PAIN SCALES - GENERAL: PAINLEVEL_OUTOF10: MILD PAIN (2)

## 2025-08-26 ENCOUNTER — OFFICE VISIT (OUTPATIENT)
Dept: CARDIOLOGY | Facility: OTHER | Age: 60
End: 2025-08-26
Attending: NURSE PRACTITIONER
Payer: COMMERCIAL

## 2025-08-26 ASSESSMENT — PAIN SCALES - GENERAL: PAINLEVEL_OUTOF10: NO PAIN (0)

## 2025-09-03 ENCOUNTER — TELEPHONE (OUTPATIENT)
Dept: CARDIOLOGY | Facility: OTHER | Age: 60
End: 2025-09-03
Payer: COMMERCIAL

## (undated) DEVICE — ESU GROUND PAD ADULT W/CORD E7507

## (undated) DEVICE — DRSG TEGADERM 4X4 3/4" 1626W

## (undated) DEVICE — BLADE KNIFE SURG 15 371115

## (undated) DEVICE — DRSG XEROFORM 1X8"

## (undated) DEVICE — SOL WATER 1500ML

## (undated) DEVICE — TOURNIQUET SGL BLADDER 18"X4" RED 5921-218-135

## (undated) DEVICE — GLOVE BIOGEL 6.5 LATEX

## (undated) DEVICE — PACK MAJOR EXTREMITY SOP15MEFCA

## (undated) DEVICE — PREP CHLORAPREP 26ML TINTED ORANGE  260815

## (undated) DEVICE — SPLINT SCOTCHCAST 2X10" 72210

## (undated) DEVICE — DRSG GAUZE 4X4" 3033

## (undated) DEVICE — GLOVE BIOGEL INDICATOR 7.5 LF 41675

## (undated) DEVICE — GLOVE PROTEXIS PI ORTHO PF 7.5 2D73HT75

## (undated) DEVICE — GLOVE PROTEXIS PI ORTHO PF 8.5 2D73HT76

## (undated) DEVICE — SLEEVE COMPRESSION SCD KNEE MED 74022

## (undated) DEVICE — TOURNIQUET SGL  BLADDER 30"X4" BLUE 5921030135

## (undated) DEVICE — COVER LIGHT HANDLE LT-F02

## (undated) DEVICE — DRAPE EXTREMITY W/ARMBOARD 29405

## (undated) DEVICE — SU VICRYL 2-0 FS-1 27" UND  J443H

## (undated) DEVICE — SU PROLENE 3-0 FS-1 18" 8684G

## (undated) DEVICE — DRAPE STERI TOWEL LG 1010

## (undated) DEVICE — GLOVE BIOGEL PI INDICATOR 8.0 LF 41680

## (undated) DEVICE — DRAPE TOWEL 17X27" BLUE LF DISP 28700-004

## (undated) RX ORDER — IBUPROFEN 400 MG/1
TABLET, FILM COATED ORAL
Status: DISPENSED
Start: 2020-02-11

## (undated) RX ORDER — LIDOCAINE HYDROCHLORIDE 10 MG/ML
INJECTION, SOLUTION INFILTRATION; PERINEURAL
Status: DISPENSED
Start: 2019-12-04

## (undated) RX ORDER — DEXAMETHASONE SODIUM PHOSPHATE 4 MG/ML
INJECTION, SOLUTION INTRA-ARTICULAR; INTRALESIONAL; INTRAMUSCULAR; INTRAVENOUS; SOFT TISSUE
Status: DISPENSED
Start: 2022-10-05

## (undated) RX ORDER — BUPIVACAINE HYDROCHLORIDE 5 MG/ML
INJECTION, SOLUTION EPIDURAL; INTRACAUDAL
Status: DISPENSED
Start: 2022-10-05

## (undated) RX ORDER — LIDOCAINE HYDROCHLORIDE 20 MG/ML
INJECTION, SOLUTION EPIDURAL; INFILTRATION; INTRACAUDAL; PERINEURAL
Status: DISPENSED
Start: 2022-10-05

## (undated) RX ORDER — PROPOFOL 10 MG/ML
INJECTION, EMULSION INTRAVENOUS
Status: DISPENSED
Start: 2022-10-05

## (undated) RX ORDER — ASPIRIN 81 MG/1
TABLET, CHEWABLE ORAL
Status: DISPENSED
Start: 2018-09-17

## (undated) RX ORDER — CEFAZOLIN SODIUM/WATER 3 G/30 ML
SYRINGE (ML) INTRAVENOUS
Status: DISPENSED
Start: 2022-10-05

## (undated) RX ORDER — IBUPROFEN 400 MG/1
TABLET, FILM COATED ORAL
Status: DISPENSED
Start: 2019-12-04

## (undated) RX ORDER — LIDOCAINE HYDROCHLORIDE 10 MG/ML
INJECTION, SOLUTION INFILTRATION; PERINEURAL
Status: DISPENSED
Start: 2020-02-11

## (undated) RX ORDER — ONDANSETRON 2 MG/ML
INJECTION INTRAMUSCULAR; INTRAVENOUS
Status: DISPENSED
Start: 2022-10-05

## (undated) RX ORDER — SODIUM CHLORIDE 9 MG/ML
INJECTION, SOLUTION INTRAVENOUS
Status: DISPENSED
Start: 2020-07-03

## (undated) RX ORDER — FENTANYL CITRATE 50 UG/ML
INJECTION, SOLUTION INTRAMUSCULAR; INTRAVENOUS
Status: DISPENSED
Start: 2022-10-05